# Patient Record
Sex: FEMALE | Race: BLACK OR AFRICAN AMERICAN | NOT HISPANIC OR LATINO | Employment: OTHER | URBAN - METROPOLITAN AREA
[De-identification: names, ages, dates, MRNs, and addresses within clinical notes are randomized per-mention and may not be internally consistent; named-entity substitution may affect disease eponyms.]

---

## 2017-02-06 ENCOUNTER — HISTORICAL (OUTPATIENT)
Dept: ADMINISTRATIVE | Facility: HOSPITAL | Age: 72
End: 2017-02-06

## 2017-02-06 LAB — GLUCOSE SERPL-MCNC: 95 MG/DL (ref 70–99)

## 2017-05-25 ENCOUNTER — HOSPITAL ENCOUNTER (EMERGENCY)
Facility: HOSPITAL | Age: 72
Discharge: HOSPICE | End: 2017-05-26
Attending: EMERGENCY MEDICINE
Payer: MEDICARE

## 2017-05-25 DIAGNOSIS — M54.2 NECK PAIN: ICD-10-CM

## 2017-05-25 LAB
ALBUMIN SERPL BCP-MCNC: 3.3 G/DL
ALP SERPL-CCNC: 55 U/L
ALT SERPL W/O P-5'-P-CCNC: 7 U/L
ANION GAP SERPL CALC-SCNC: 13 MMOL/L
AST SERPL-CCNC: 10 U/L
BASOPHILS # BLD AUTO: 0.1 K/UL
BASOPHILS NFR BLD: 0.8 %
BILIRUB SERPL-MCNC: 0.3 MG/DL
BUN SERPL-MCNC: 8 MG/DL
CALCIUM SERPL-MCNC: 9.3 MG/DL
CHLORIDE SERPL-SCNC: 107 MMOL/L
CO2 SERPL-SCNC: 23 MMOL/L
CREAT SERPL-MCNC: 0.7 MG/DL
CRP SERPL-MCNC: 179.7 MG/L
DIFFERENTIAL METHOD: ABNORMAL
EOSINOPHIL # BLD AUTO: 0.2 K/UL
EOSINOPHIL NFR BLD: 1.8 %
ERYTHROCYTE [DISTWIDTH] IN BLOOD BY AUTOMATED COUNT: 16.8 %
ERYTHROCYTE [SEDIMENTATION RATE] IN BLOOD BY WESTERGREN METHOD: 63 MM/HR
EST. GFR  (AFRICAN AMERICAN): >60 ML/MIN/1.73 M^2
EST. GFR  (NON AFRICAN AMERICAN): >60 ML/MIN/1.73 M^2
GLUCOSE SERPL-MCNC: 92 MG/DL
HCT VFR BLD AUTO: 32.1 %
HGB BLD-MCNC: 10.7 G/DL
LYMPHOCYTES # BLD AUTO: 1.8 K/UL
LYMPHOCYTES NFR BLD: 18.3 %
MCH RBC QN AUTO: 27.7 PG
MCHC RBC AUTO-ENTMCNC: 33.3 %
MCV RBC AUTO: 83 FL
MONOCYTES # BLD AUTO: 0.8 K/UL
MONOCYTES NFR BLD: 8 %
NEUTROPHILS # BLD AUTO: 7.1 K/UL
NEUTROPHILS NFR BLD: 71.1 %
PLATELET # BLD AUTO: 195 K/UL
PMV BLD AUTO: 9.6 FL
POTASSIUM SERPL-SCNC: 4.1 MMOL/L
PROT SERPL-MCNC: 7.4 G/DL
RBC # BLD AUTO: 3.86 M/UL
SODIUM SERPL-SCNC: 143 MMOL/L
WBC # BLD AUTO: 9.9 K/UL

## 2017-05-25 PROCEDURE — A9585 GADOBUTROL INJECTION: HCPCS | Performed by: EMERGENCY MEDICINE

## 2017-05-25 PROCEDURE — 80053 COMPREHEN METABOLIC PANEL: CPT

## 2017-05-25 PROCEDURE — 85651 RBC SED RATE NONAUTOMATED: CPT

## 2017-05-25 PROCEDURE — 96376 TX/PRO/DX INJ SAME DRUG ADON: CPT

## 2017-05-25 PROCEDURE — 63600175 PHARM REV CODE 636 W HCPCS: Performed by: EMERGENCY MEDICINE

## 2017-05-25 PROCEDURE — 96374 THER/PROPH/DIAG INJ IV PUSH: CPT

## 2017-05-25 PROCEDURE — 86140 C-REACTIVE PROTEIN: CPT

## 2017-05-25 PROCEDURE — 36415 COLL VENOUS BLD VENIPUNCTURE: CPT

## 2017-05-25 PROCEDURE — 99285 EMERGENCY DEPT VISIT HI MDM: CPT | Mod: 25

## 2017-05-25 PROCEDURE — 25500020 PHARM REV CODE 255: Performed by: EMERGENCY MEDICINE

## 2017-05-25 PROCEDURE — 85025 COMPLETE CBC W/AUTO DIFF WBC: CPT

## 2017-05-25 RX ORDER — MORPHINE SULFATE 2 MG/ML
2 INJECTION, SOLUTION INTRAMUSCULAR; INTRAVENOUS
Status: COMPLETED | OUTPATIENT
Start: 2017-05-25 | End: 2017-05-25

## 2017-05-25 RX ORDER — ATORVASTATIN CALCIUM 80 MG/1
80 TABLET, FILM COATED ORAL DAILY
COMMUNITY
End: 2020-01-01 | Stop reason: SDUPTHER

## 2017-05-25 RX ORDER — LISINOPRIL 20 MG/1
20 TABLET ORAL DAILY
COMMUNITY
End: 2020-01-01

## 2017-05-25 RX ORDER — METOPROLOL TARTRATE 25 MG/1
12.5 TABLET, FILM COATED ORAL DAILY
COMMUNITY
End: 2020-01-01 | Stop reason: SDUPTHER

## 2017-05-25 RX ORDER — GADOBUTROL 604.72 MG/ML
INJECTION INTRAVENOUS
Status: DISCONTINUED
Start: 2017-05-25 | End: 2017-05-26 | Stop reason: HOSPADM

## 2017-05-25 RX ORDER — ALBUTEROL SULFATE 5 MG/ML
2.5 SOLUTION RESPIRATORY (INHALATION) 2 TIMES DAILY
Status: ON HOLD | COMMUNITY
End: 2020-01-01 | Stop reason: SDUPTHER

## 2017-05-25 RX ORDER — SUCRALFATE 1 G/1
1 TABLET ORAL 4 TIMES DAILY
COMMUNITY
End: 2019-10-15

## 2017-05-25 RX ORDER — METHOCARBAMOL 750 MG/1
750 TABLET, FILM COATED ORAL 4 TIMES DAILY
COMMUNITY
End: 2017-09-14

## 2017-05-25 RX ORDER — GADOBUTROL 604.72 MG/ML
6 INJECTION INTRAVENOUS
Status: COMPLETED | OUTPATIENT
Start: 2017-05-25 | End: 2017-05-25

## 2017-05-25 RX ADMIN — GADOBUTROL 6 ML: 604.72 INJECTION INTRAVENOUS at 09:05

## 2017-05-25 RX ADMIN — MORPHINE SULFATE 2 MG: 2 INJECTION, SOLUTION INTRAMUSCULAR; INTRAVENOUS at 09:05

## 2017-05-25 RX ADMIN — MORPHINE SULFATE 2 MG: 2 INJECTION, SOLUTION INTRAMUSCULAR; INTRAVENOUS at 05:05

## 2017-05-25 NOTE — ED PROVIDER NOTES
Encounter Date: 5/25/2017    SCRIBE #1 NOTE: I, Zoë maynard, am scribing for, and in the presence of, Dr Nam.       History     Chief Complaint   Patient presents with    Neck Pain     x 2 week. pt states it hurts from back of head to neck. seen at [facility] was told it was arthritis. pt reports no improvement with robaxin     Review of patient's allergies indicates:   Allergen Reactions    Iodine and iodide containing products      05/25/2017  4:50 PM     Chief Complaint: Neck Pain    Yocasta Almonte is a 72 y.o. female presenting to the E.D. with a gradual onset of constant moderate neck pain which has been worsening over the past two weeks. Pain is exacerbated with movement and there are no alleviating factors. Assocaited posterior HA and chronic subjective weakness to her right arm. No new numbness or weakness. Denies fever, abdominal pain. She has a past medical history of Cancer; COPD; Diabetes mellitus; GERD; Hyperlipidemia; Hypertension; and Thyroid disease.  Pt has no past surgical history on file.  This is her fourth visit to an emergency Department for the symptoms.      The history is provided by the patient.     Past Medical History:   Diagnosis Date    Cancer     lymphoma    COPD (chronic obstructive pulmonary disease)     Diabetes mellitus     GERD (gastroesophageal reflux disease)     Hyperlipidemia     Hypertension     Thyroid disease      History reviewed. No pertinent surgical history.  History reviewed. No pertinent family history.  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   Eyes: Negative for visual disturbance.   Respiratory: Negative for cough.    Cardiovascular: Negative for chest pain.    Gastrointestinal: Negative for abdominal pain, diarrhea, nausea and vomiting.   Genitourinary: Negative for difficulty urinating and pelvic pain.   Musculoskeletal: Positive for neck pain. Negative for arthralgias.   Skin: Negative for rash.   Neurological: Positive for headaches. Negative for weakness.   All other systems reviewed and are negative.      Physical Exam     Initial Vitals [05/25/17 1625]   BP Pulse Resp Temp SpO2   124/60 98 17 98.4 °F (36.9 °C) 98 %     Physical Exam    Nursing note and vitals reviewed.  Constitutional: She appears well-developed and well-nourished. She is not diaphoretic. No distress.   HENT:   Head: Normocephalic and atraumatic.   Mouth/Throat: Oropharynx is clear and moist.   Oropharyngeal space is widely patent without any swelling   Eyes: Conjunctivae are normal.   Neck: Neck supple.   Markedly decreased ROM.  Diffuse tenderness throughout the cervical spine and also the trapezius muscles.  Decreased ability to extend the neck or look left or right.  She is still able to flex her neck.   Cardiovascular: Normal rate, regular rhythm, normal heart sounds and intact distal pulses. Exam reveals no gallop and no friction rub.    No murmur heard.  Pulmonary/Chest: Breath sounds normal. She has no wheezes. She has no rhonchi. She has no rales.   Abdominal: Soft. She exhibits no distension. There is no tenderness.   Musculoskeletal:        Cervical back: She exhibits tenderness.   Midline cervical tenderness. Trapezius tenderness.    Neurological: She is alert and oriented to person, place, and time. No cranial nerve deficit.   No upper or lower extremity weakness.  Bicep flexion and extension is normal.  Wrist flexion and extension is normal.  Plantar flexion and dorsiflexion is normal.   Skin: No rash noted. No erythema.   Psychiatric: She has a normal mood and affect.         ED Course   Procedures  Labs Reviewed - No data to display          Medical Decision Making:   Clinical  Tests:   Lab Tests: Ordered and Reviewed  Radiological Study: Ordered and Reviewed            Scribe Attestation:   Scribe #1: I performed the above scribed service and the documentation accurately describes the services I performed. I attest to the accuracy of the note.    Attending Attestation:           Physician Attestation for Scribe:  Physician Attestation Statement for Scribe #1: I, Dr Nam, reviewed documentation, as scribed by Zoë Ramos in my presence, and it is both accurate and complete.                 ED Course   Value Comment By Time   CRP: (!) 179.7 (Reviewed) Nam Nam MD 05/25 1827    IMPRESSION:  1. No cervical spine fracture or other acute CT pathology.  2. There is likely multinodular thyroid goiter, greater in the left lobe than in the right lobe.  Thank you for allowing us to participate in the care of your patient.  Dictated and Authenticated by: Ghanshyam Cartwright MD  05/25/2017 8:07 PM Central Time ([place] & [place]) Nam Nam MD 05/25 2011    Impression:  Abnormal thickening of prevertebral soft tissues and abnormal signal along the inferior endplate of  C3; please see differential considerations and follow reasoning discussed above.  Mild cord signal abnormality suggesting mild edema or contusion (if there has been recent trauma).  Thank you for allowing us to participate in the care of your patient.  Dictated and Authenticated by: Ferny Mata MD  05/25/2017 10:42 PM Central Time ([place] & [place]) Nam Nam MD 05/25 9499    Case discussed with virtual radiology MRI concerning for phlegmon versus osteomyelitis.  Patient requesting transfer to [place].  Will not be able to keep the patient here at [facility] since we have no neurosurgery. Nam Nam MD 05/25 9719    Transfer center called to facilitate transfer Nam Nam MD 05/25 0309    Patient has no neurologic deficits at this time no numbness or weakness of the extremities no strength deficit Nam  KAREN Nam MD 05/25 8066     Clinical Impression:   The encounter diagnosis was Neck pain.    72-year-old with diabetes and lymphoma presents with several weeks of neck pain.  Patient has lymphoma and diabetes which puts her at risk for immune suppression.  CRP and ESR markedly elevated so an MRI was performed to assess for infection.  MRI does appear to show prevertebral phlegmon and possible C3 osteomyelitis.  Case was discussed with Dr Acosta at [facility] and he accepts.  I have verified that they have neurosurgery at their facility which we do not have here.  Patient is being transferred to [place] for a higher level of care family has requested to go to [place] instead of [place].  She has no neurologic deficits at the time of transfer.  She is complaining of chronic right arm weakness but none is present objectively on exam.        Nam Nam MD  05/28/17 2447

## 2017-05-25 NOTE — ED NOTES
C/O severe neck pain for weeks that's not getting any better. Has been to the ER 4 times for same. Robaxin and physical therapy not effective. Decreased ROM with neck. Pain goes from top of head to bottom of neck. Denies fever.  AAO x3. Family at bedside.

## 2017-05-26 VITALS
RESPIRATION RATE: 16 BRPM | HEART RATE: 86 BPM | OXYGEN SATURATION: 100 % | TEMPERATURE: 98 F | SYSTOLIC BLOOD PRESSURE: 149 MMHG | BODY MASS INDEX: 23.08 KG/M2 | WEIGHT: 143 LBS | DIASTOLIC BLOOD PRESSURE: 70 MMHG

## 2017-05-26 NOTE — ED NOTES
Attempted to call report to [facility] but nurse is unavailable at this time.

## 2017-05-26 NOTE — PROVIDER PROGRESS NOTES - EMERGENCY DEPT.
Encounter Date: 5/25/2017    ED Physician Progress Notes        Physician Note:   I discussed recent MRI with Dr. Farooq regarding findings mostly concordant with overnight radiology with the exception of small epidural fluid collection worrisome for additional epidural abscess.  I did call [facility] and speak with the patient's nurse who will pass the findings on to the managing team.

## 2017-08-08 ENCOUNTER — OFFICE VISIT (OUTPATIENT)
Dept: RADIATION ONCOLOGY | Facility: CLINIC | Age: 72
End: 2017-08-08
Payer: MEDICARE

## 2017-08-08 DIAGNOSIS — C34.11 MALIGNANT NEOPLASM OF UPPER LOBE OF RIGHT LUNG: ICD-10-CM

## 2017-08-08 PROCEDURE — 3008F BODY MASS INDEX DOCD: CPT | Mod: ,,, | Performed by: RADIOLOGY

## 2017-08-08 PROCEDURE — 99214 OFFICE O/P EST MOD 30 MIN: CPT | Mod: ,,, | Performed by: RADIOLOGY

## 2017-08-08 PROCEDURE — 1159F MED LIST DOCD IN RCRD: CPT | Mod: ,,, | Performed by: RADIOLOGY

## 2017-08-08 NOTE — PROGRESS NOTES
Yocasta Almonte  [id number]  1945 8/8/2017  Kee Manzo Md  [address] [place] [address]    DIAGNOSIS:     ICD-10-CM ICD-9-CM    1. Malignant neoplasm of upper lobe of right lung C34.11 162.3      REASON FOR VISIT: Routine scheduled follow-up.    HISTORY OF PRESENT ILLNESS:   Patient with a diagnosis of right upper lobe nodule consistent with tumor underwent stereotactic radiation therapy in October 2016.  Patient received a dose of 54 gray in 3 fractions.    Since that time she has done moderately well. She has had some decrease in weight. She's had no evidence of metastatic disease clinically.    She underwent a CAT scan of the chest this week which showed a stable 1.4 cm lesion in the right upper lobe of the lung without evidence of metastasis or lymph node enlargement.        INTERVAL HISTORY:   Most recently she did have some issues with her neck. She stated that she went to [facility] where it sounds like she underwent cervical spine surgery. She relates to me that it was not malignant related.  Clinically at the present time she states that she does continue to smoke but she's had no increasing shortness of breath chest pain or pleuritic type symptoms. Minor nonproductive cough.  She remains able tour with a cane.    Review of Systems   Constitutional: Negative for chills and diaphoresis.   HENT:   Negative for lump/mass and mouth sores.    Eyes: Negative for eye problems and icterus.   Respiratory: Positive for cough. Negative for dizziness on exertion and hemoptysis.    Cardiovascular: Negative for chest pain and leg swelling.   Gastrointestinal: Negative for abdominal pain and constipation.   Genitourinary: Negative for dysuria and frequency.    Musculoskeletal: Positive for arthralgias, back pain and neck pain. Negative for gait problem.   Skin: Negative for itching and rash.   Neurological: Negative for gait problem and headaches.   Hematological: Negative for adenopathy. Does not  bruise/bleed easily.   Psychiatric/Behavioral: Negative for confusion. The patient is not nervous/anxious.      Past Medical History:   Diagnosis Date    Cancer     lymphoma    COPD (chronic obstructive pulmonary disease)     Diabetes mellitus     GERD (gastroesophageal reflux disease)     Hyperlipidemia     Hypertension     Thyroid disease      Past Surgical History:   Procedure Laterality Date    NECK SURGERY      infection in neck removed     Social History     Social History    Marital status:      Spouse name: N/A    Number of children: N/A    Years of education: N/A     Social History Main Topics    Smoking status: Current Some Day Smoker     Packs/day: 0.50     Years: 50.00    Smokeless tobacco: Never Used      Comment: says she is trying to quit    Alcohol use No      Comment: 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 No pertinent family history.  Medication List with Changes/Refills   Current Medications    ALBUTEROL (PROVENTIL) 5 MG/ML NEBULIZER SOLUTION    Take 2.5 mg by nebulization every 6 (six) hours as needed for Wheezing. Rescue    ATORVASTATIN (LIPITOR) 80 MG TABLET    Take 80 mg by mouth once daily.    CALCIUM CARBONATE (OS-HARINDER) 600 MG (1,500 MG) TAB    Take 600 mg by mouth once daily.    CILOSTAZOL (PLETAL) 100 MG TAB    Take 100 mg by mouth 2 (two) times daily.     DOCUSATE SODIUM (COLACE) 100 MG CAPSULE    Take 100 mg by mouth 2 (two) times daily as needed for Constipation.     GABAPENTIN (NEURONTIN) 300 MG CAPSULE    Take 300 mg by mouth 3 (three) times daily.    GLIMEPIRIDE (AMARYL) 2 MG TABLET    Take 2 mg by mouth 2 (two) times daily with meals.    IBRUTINIB (IMBRUVICA) 140 MG CAP    Take 420 mg by mouth once daily.    LISINOPRIL (PRINIVIL,ZESTRIL) 20 MG TABLET     Take 20 mg by mouth once daily.    METHOCARBAMOL (ROBAXIN) 750 MG TAB    Take 750 mg by mouth 4 (four) times daily.     METOPROLOL TARTRATE (LOPRESSOR) 25 MG TABLET    Take 12.5 mg by mouth 2 (two) times daily.     OXYMETAZOLINE (AFRIN) 0.05 % NASAL SPRAY    2 sprays by Nasal route 2 (two) times daily.    PANTOPRAZOLE (PROTONIX) 40 MG TABLET    Take 1 tablet (40 mg total) by mouth once daily.    POTASSIUM CHLORIDE SA (K-DUR,KLOR-CON) 20 MEQ TABLET    Take 20 mEq by mouth 2 (two) times daily.    PRENATAL #115-IRON-FOLIC ACID 29 MG IRON- 1 MG CHEW    Take by mouth.    SUCRALFATE (CARAFATE) 1 GRAM TABLET    Take 1 g by mouth 4 (four) times daily.     Review of patient's allergies indicates:   Allergen Reactions    Iodine and iodide containing products        QUALITY OF LIFE: 70%- Cares for Self: Unable to Carry on Normal Activity or Active Work    There were no vitals filed for this visit.    PHYSICAL EXAM: Weight a proximal 135 a decrease of about 20 pounds over the past year  GENERAL: alert; in no apparent distress.   HEAD: normocephalic, atraumatic.  EYES: pupils are equal, round, reactive to light and accommodation. Sclera anicteric. Conjunctiva not injected.   NOSE/THROAT: no nasal erythema or rhinorrhea. Oropharynx pink, without erythema, ulcerations or thrush.   NECK: no cervical motion rigidity; supple with no masses.  CHEST: clear to auscultation bilaterally; no wheezes, crackles or rubs. Patient is speaking comfortably on room air with normal work of breathing without using accessory muscles of respiration. Lungs are grossly hyperexpanded  CARDIOVASCULAR: regular rate and rhythm; no murmurs, rubs or gallops.  ABDOMEN: soft, nontender, nondistended. Bowel sounds present.   Muscle skeletal- patient has decreased range of motion of the cervical neck on flexion and extension. Small scar noted anteriorly   NEUROLOGIC: cranial nerves II-XII intact bilaterally. Strength 5/5 in bilateral upper and lower extremities.  No sensory deficits appreciated. Reflexes globally intact. No cerebellar signs. Normal gait.  LYMPHATIC: no cervical, supraclavicular or axillary adenopathy appreciated bilaterally.   EXTREMITIES: no clubbing, cyanosis, edema.  SKIN: no erythema, rashes, ulcerations noted.     ANCILLARY DATA: CT scan of the chest discussed as above showing stability of disease no progression in size of the lesion    ASSESSMENT: Patient with diagnosis of lung mass no tissue obtainable secondary to severe COPD status post SB RT, 54 gray in 3 fractions, 10 months ago  PLAN:  Patient appears to be showing stability radiographically and clinically. I will have her follow-up with me in 6 months. I recommend repeat imaging of the chest around that time..  She has an appointment to see Dr. Carmen in 3 months.    All questions answered and contact information provided. Patient understands free to call us anytime with any questions or concerns regarding radiation therapy.    TIME SPENT WITH PATIENT: I have personally seen and evaluated this patient. Approximately 30 minutes were spent with the patient discussing follow-up careplan.     PHYSICIAN: Abelino Culp MD

## 2017-09-14 ENCOUNTER — HOSPITAL ENCOUNTER (EMERGENCY)
Facility: HOSPITAL | Age: 72
Discharge: HOME | End: 2017-09-15
Attending: EMERGENCY MEDICINE
Payer: MEDICARE

## 2017-09-14 DIAGNOSIS — M54.42 CHRONIC LEFT-SIDED LOW BACK PAIN WITH LEFT-SIDED SCIATICA: Primary | ICD-10-CM

## 2017-09-14 DIAGNOSIS — G89.29 CHRONIC LEFT-SIDED LOW BACK PAIN WITH LEFT-SIDED SCIATICA: Primary | ICD-10-CM

## 2017-09-14 DIAGNOSIS — N30.01 ACUTE CYSTITIS WITH HEMATURIA: ICD-10-CM

## 2017-09-14 LAB
ALBUMIN SERPL BCP-MCNC: 3.2 G/DL
ALP SERPL-CCNC: 71 U/L
ALT SERPL W/O P-5'-P-CCNC: 8 U/L
ANION GAP SERPL CALC-SCNC: 13 MMOL/L
AST SERPL-CCNC: 13 U/L
BACTERIA #/AREA URNS HPF: ABNORMAL /HPF
BASOPHILS # BLD AUTO: 0.1 K/UL
BASOPHILS NFR BLD: 0.5 %
BILIRUB SERPL-MCNC: 0.2 MG/DL
BILIRUB UR QL STRIP: NEGATIVE
BUN SERPL-MCNC: 14 MG/DL
CALCIUM SERPL-MCNC: 9.4 MG/DL
CHLORIDE SERPL-SCNC: 107 MMOL/L
CLARITY UR: CLEAR
CO2 SERPL-SCNC: 23 MMOL/L
COLOR UR: YELLOW
CREAT SERPL-MCNC: 0.7 MG/DL
DIFFERENTIAL METHOD: ABNORMAL
EOSINOPHIL # BLD AUTO: 0.3 K/UL
EOSINOPHIL NFR BLD: 2.3 %
ERYTHROCYTE [DISTWIDTH] IN BLOOD BY AUTOMATED COUNT: 16.5 %
EST. GFR  (AFRICAN AMERICAN): >60 ML/MIN/1.73 M^2
EST. GFR  (NON AFRICAN AMERICAN): >60 ML/MIN/1.73 M^2
GIANT PLATELETS BLD QL SMEAR: PRESENT
GLUCOSE SERPL-MCNC: 97 MG/DL
GLUCOSE UR QL STRIP: NEGATIVE
HCT VFR BLD AUTO: 32.1 %
HGB BLD-MCNC: 10.6 G/DL
HGB UR QL STRIP: ABNORMAL
KETONES UR QL STRIP: NEGATIVE
LEUKOCYTE ESTERASE UR QL STRIP: ABNORMAL
LYMPHOCYTES # BLD AUTO: 3.3 K/UL
LYMPHOCYTES NFR BLD: 27.8 %
MCH RBC QN AUTO: 26.4 PG
MCHC RBC AUTO-ENTMCNC: 33 G/DL
MCV RBC AUTO: 80 FL
MICROSCOPIC COMMENT: ABNORMAL
MONOCYTES # BLD AUTO: 0.9 K/UL
MONOCYTES NFR BLD: 7.4 %
NEUTROPHILS # BLD AUTO: 7.4 K/UL
NEUTROPHILS NFR BLD: 62 %
NITRITE UR QL STRIP: NEGATIVE
NRBC BLD-RTO: 0 /100 WBC
PH UR STRIP: 6 [PH] (ref 5–8)
PLATELET # BLD AUTO: 222 K/UL
PLATELET BLD QL SMEAR: ABNORMAL
PMV BLD AUTO: 10 FL
POTASSIUM SERPL-SCNC: 3.7 MMOL/L
PROT SERPL-MCNC: 7.2 G/DL
PROT UR QL STRIP: ABNORMAL
RBC # BLD AUTO: 4.01 M/UL
RBC #/AREA URNS HPF: 38 /HPF (ref 0–4)
SODIUM SERPL-SCNC: 143 MMOL/L
SP GR UR STRIP: 1.02 (ref 1–1.03)
SQUAMOUS #/AREA URNS HPF: 17 /HPF
URN SPEC COLLECT METH UR: ABNORMAL
UROBILINOGEN UR STRIP-ACNC: 1 EU/DL
WBC # BLD AUTO: 11.9 K/UL
WBC #/AREA URNS HPF: 10 /HPF (ref 0–5)

## 2017-09-14 PROCEDURE — 96360 HYDRATION IV INFUSION INIT: CPT

## 2017-09-14 PROCEDURE — 80053 COMPREHEN METABOLIC PANEL: CPT

## 2017-09-14 PROCEDURE — 99284 EMERGENCY DEPT VISIT MOD MDM: CPT | Mod: 25

## 2017-09-14 PROCEDURE — 81000 URINALYSIS NONAUTO W/SCOPE: CPT

## 2017-09-14 PROCEDURE — 25000003 PHARM REV CODE 250: Performed by: EMERGENCY MEDICINE

## 2017-09-14 PROCEDURE — 85025 COMPLETE CBC W/AUTO DIFF WBC: CPT

## 2017-09-14 PROCEDURE — 36415 COLL VENOUS BLD VENIPUNCTURE: CPT

## 2017-09-14 RX ORDER — CEPHALEXIN 250 MG/1
500 CAPSULE ORAL
Status: COMPLETED | OUTPATIENT
Start: 2017-09-14 | End: 2017-09-15

## 2017-09-14 RX ORDER — CEPHALEXIN 500 MG/1
500 CAPSULE ORAL 4 TIMES DAILY
Qty: 28 CAPSULE | Refills: 0 | Status: SHIPPED | OUTPATIENT
Start: 2017-09-14 | End: 2017-09-21

## 2017-09-14 RX ADMIN — SODIUM CHLORIDE 500 ML: 0.9 INJECTION, SOLUTION INTRAVENOUS at 08:09

## 2017-09-15 VITALS
RESPIRATION RATE: 17 BRPM | SYSTOLIC BLOOD PRESSURE: 144 MMHG | DIASTOLIC BLOOD PRESSURE: 70 MMHG | HEART RATE: 92 BPM | WEIGHT: 130 LBS | HEIGHT: 66 IN | OXYGEN SATURATION: 98 % | TEMPERATURE: 98 F | BODY MASS INDEX: 20.89 KG/M2

## 2017-09-15 PROCEDURE — 25000003 PHARM REV CODE 250: Performed by: EMERGENCY MEDICINE

## 2017-09-15 RX ADMIN — CEPHALEXIN 500 MG: 250 CAPSULE ORAL at 12:09

## 2017-09-15 NOTE — ED NOTES
Pt aaox4, resp even and unlabored, skin warm dry appropriate for race. Pt reports back pain and aches x 6 days.

## 2017-09-15 NOTE — ED PROVIDER NOTES
Encounter Date: 9/14/2017    SCRIBE #1 NOTE: I, Bibiana Amezquita, am scribing for, and in the presence of, Dr. Glynn.       History     Chief Complaint   Patient presents with    Back Pain     Pain across low back since Saturday.     9/14/2017  7:16 PM     Chief Complaint: Back pain    The patient is a 72 y.o. female with PMHx of DM, GERD, HTN, thyroid disease, COPD, HLD and lymphoma who presents with back pain. Patient c/o gradual onset of constant, sharp pains in the right lower back radiating to the right buttocks and down the legs bilaterally which started 5 days ago. Pt states her pain is exacerbated with ambulation and mild sob when exerting herself. Denies any recent falls or injuries. Pt has oxycodone for pain but states it is not helping with pain. She reports mild neck pain which started right before her back pain. No rashes, numbness, weakness, bladder or bowel incontinence. Pt states she has a decreased appetite due to pain. Denies any cp, dysuria, chest pain, nausea, vomiting or abdominal pain. Shx of neck surgery.      The history is provided by the patient.     Review of patient's allergies indicates:   Allergen Reactions    Iodine and iodide containing products      Past Medical History:   Diagnosis Date    Cancer     lymphoma    COPD (chronic obstructive pulmonary disease)     Diabetes mellitus     GERD (gastroesophageal reflux disease)     Hyperlipidemia     Hypertension     Thyroid disease      Past Surgical History:   Procedure Laterality Date    NECK SURGERY      infection in neck removed     History reviewed. No pertinent family history.  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 Negative for chills and fever.   HENT: Negative for congestion, rhinorrhea and sore throat.    Respiratory: Positive for shortness of breath. Negative for cough.    Cardiovascular: Negative for chest pain.   Gastrointestinal: Negative for abdominal pain, diarrhea, nausea and vomiting.   Genitourinary: Negative for dysuria.   Musculoskeletal: Positive for back pain and neck pain. Negative for myalgias.   Skin: Negative for rash.   Neurological: Negative for weakness and numbness.   Hematological: Does not bruise/bleed easily.   All other systems reviewed and are negative.      Physical Exam     Initial Vitals [09/14/17 1838]   BP Pulse Resp Temp SpO2   (!) 154/70 84 16 98.1 °F (36.7 °C) 98 %      MAP       98         Physical Exam    Nursing note and vitals reviewed.  Constitutional: She appears well-developed and well-nourished. No distress.   HENT:   Head: Normocephalic and atraumatic.   Nose: Nose normal.   Eyes: EOM are normal. Pupils are equal, round, and reactive to light.   Neck: Normal range of motion.   Scar over anterior throat.   Cardiovascular: Normal rate, regular rhythm, normal heart sounds, intact distal pulses and normal pulses. Exam reveals no gallop and no friction rub.    No murmur heard.  Pulses:       Dorsalis pedis pulses are 2+ on the right side, and 2+ on the left side.   Pulmonary/Chest: Breath sounds normal. She has no wheezes. She has no rhonchi. She has no rales.   Abdominal: Soft. She exhibits no distension. There is no tenderness.   Musculoskeletal: Normal range of motion. She exhibits no edema.   Neurological: She is alert and oriented to person, place, and time. She displays a negative Romberg sign.   5/5 knee flexion and extension. Weakness to dorsi flexion of the left foot. Patient is able to stand up and use her walker to ambulate.   Skin: Skin is dry. No rash noted.   Psychiatric: She has a normal mood and affect.         ED Course   Procedures  Labs Reviewed - No data  to display          Medical Decision Making:   Patient has low back pain with radiation to the back down to the left leg and the right calf.  She likely has sciatica, however given her medical history and the fact that she is on Pletal I'm concerned about worsening claudication.  However, she doesn't seem to have these at rest but mostly on exertion.  She needs to closely follow-up with vascular surgery and her primary care physician.  She does not appear to be significantly dehydrated.  She does not appear to have a high anion gap metabolic acidosis.  She does not have a cool extremity and she has 2+ bilateral dorsalis pedis and posterior tibialis pulses.  I will give her a rx for orthopedics and she needs to follow up closely with her pmd.     11:34 PM patient's ABIs appear to be normal and arterial ultrasound doesn't show any signs of significant stenosis.  This is likely a lumbar radiculopathy.  I don't believe she has cauda equina feel that she is stable for discharge.  [facility] already has pain medicine.  I will give her first dose metabolic for urinary tract infection here.  She has no signs of sepsis or pyelonephritis.  She needs to follow up with her regular doctor.            Scribe Attestation:   Scribe #1: I performed the above scribed service and the documentation accurately describes the services I performed. I attest to the accuracy of the note.    Attending Attestation:           Physician Attestation for Scribe:  Physician Attestation Statement for Scribe #1: I, Dr. Glynn, reviewed documentation, as scribed by Bibiana Amezquita in my presence, and it is both accurate and complete.                 ED Course      Clinical Impression:   The primary encounter diagnosis was Chronic left-sided low back pain with left-sided sciatica. A diagnosis of Acute cystitis with hematuria was also pertinent to this visit.                           Ghanshyam Glynn MD  09/14/17 3297

## 2017-11-24 DIAGNOSIS — G06.1 INTRASPINAL ABSCESS AND GRANULOMA: Primary | ICD-10-CM

## 2017-11-24 DIAGNOSIS — M50.21 OTHER CERVICAL DISC DISPLACEMENT, HIGH CERVICAL REGION: ICD-10-CM

## 2017-11-24 DIAGNOSIS — M54.2 CERVICALGIA: ICD-10-CM

## 2017-11-24 DIAGNOSIS — R13.10 DYSPHAGIA, UNSPECIFIED TYPE: ICD-10-CM

## 2017-12-05 ENCOUNTER — LAB VISIT (OUTPATIENT)
Dept: LAB | Facility: HOSPITAL | Age: 72
End: 2017-12-05
Attending: INTERNAL MEDICINE
Payer: MEDICARE

## 2017-12-05 DIAGNOSIS — M54.2 CERVICALGIA: ICD-10-CM

## 2017-12-05 DIAGNOSIS — R13.10 PROBLEMS WITH SWALLOWING AND MASTICATION: ICD-10-CM

## 2017-12-05 DIAGNOSIS — R63.4 LOSS OF WEIGHT: ICD-10-CM

## 2017-12-05 DIAGNOSIS — E11.9 DIABETES MELLITUS WITHOUT COMPLICATION: ICD-10-CM

## 2017-12-05 DIAGNOSIS — G06.1 INTRASPINAL ABSCESS: Primary | ICD-10-CM

## 2017-12-05 DIAGNOSIS — M50.21 HERNIATED NUCLEUS PULPOSUS, C3-4: ICD-10-CM

## 2017-12-05 DIAGNOSIS — K22.2 STRICTURE AND STENOSIS OF ESOPHAGUS: ICD-10-CM

## 2017-12-05 DIAGNOSIS — J44.89 OBSTRUCTIVE CHRONIC BRONCHITIS WITHOUT EXACERBATION: ICD-10-CM

## 2017-12-05 LAB
BUN SERPL-MCNC: 10 MG/DL
CREAT SERPL-MCNC: 0.6 MG/DL
EST. GFR  (AFRICAN AMERICAN): >60 ML/MIN/1.73 M^2
EST. GFR  (NON AFRICAN AMERICAN): >60 ML/MIN/1.73 M^2

## 2017-12-05 PROCEDURE — 84520 ASSAY OF UREA NITROGEN: CPT

## 2017-12-05 PROCEDURE — 36415 COLL VENOUS BLD VENIPUNCTURE: CPT

## 2017-12-05 PROCEDURE — 82565 ASSAY OF CREATININE: CPT

## 2017-12-06 ENCOUNTER — HOSPITAL ENCOUNTER (OUTPATIENT)
Dept: RADIOLOGY | Facility: HOSPITAL | Age: 72
Discharge: HOME | End: 2017-12-06
Attending: INTERNAL MEDICINE
Payer: MEDICARE

## 2017-12-06 DIAGNOSIS — G06.1 INTRASPINAL ABSCESS AND GRANULOMA: ICD-10-CM

## 2017-12-06 DIAGNOSIS — M54.2 CERVICALGIA: ICD-10-CM

## 2017-12-06 DIAGNOSIS — M50.21 OTHER CERVICAL DISC DISPLACEMENT, HIGH CERVICAL REGION: ICD-10-CM

## 2017-12-06 DIAGNOSIS — R13.10 DYSPHAGIA, UNSPECIFIED TYPE: ICD-10-CM

## 2017-12-06 PROCEDURE — A9585 GADOBUTROL INJECTION: HCPCS | Performed by: INTERNAL MEDICINE

## 2017-12-06 PROCEDURE — 72156 MRI NECK SPINE W/O & W/DYE: CPT | Mod: 26,,, | Performed by: RADIOLOGY

## 2017-12-06 PROCEDURE — 25500020 PHARM REV CODE 255: Performed by: INTERNAL MEDICINE

## 2017-12-06 PROCEDURE — 72156 MRI NECK SPINE W/O & W/DYE: CPT | Mod: TC

## 2017-12-06 RX ORDER — GADOBUTROL 604.72 MG/ML
INJECTION INTRAVENOUS
Status: DISPENSED
Start: 2017-12-06 | End: 2017-12-06

## 2017-12-06 RX ORDER — GADOBUTROL 604.72 MG/ML
5 INJECTION INTRAVENOUS
Status: COMPLETED | OUTPATIENT
Start: 2017-12-06 | End: 2017-12-06

## 2017-12-06 RX ADMIN — GADOBUTROL 5 ML: 604.72 INJECTION INTRAVENOUS at 09:12

## 2018-06-26 ENCOUNTER — HOSPITAL ENCOUNTER (EMERGENCY)
Facility: HOSPITAL | Age: 73
Discharge: HOME | End: 2018-06-26
Attending: EMERGENCY MEDICINE
Payer: MEDICARE

## 2018-06-26 VITALS
TEMPERATURE: 98 F | BODY MASS INDEX: 22.18 KG/M2 | HEART RATE: 72 BPM | RESPIRATION RATE: 18 BRPM | WEIGHT: 138 LBS | HEIGHT: 66 IN | SYSTOLIC BLOOD PRESSURE: 134 MMHG | OXYGEN SATURATION: 98 % | DIASTOLIC BLOOD PRESSURE: 62 MMHG

## 2018-06-26 VITALS
DIASTOLIC BLOOD PRESSURE: 51 MMHG | TEMPERATURE: 98 F | HEART RATE: 72 BPM | RESPIRATION RATE: 18 BRPM | SYSTOLIC BLOOD PRESSURE: 100 MMHG | BODY MASS INDEX: 22.18 KG/M2 | WEIGHT: 138 LBS | HEIGHT: 66 IN | OXYGEN SATURATION: 100 %

## 2018-06-26 DIAGNOSIS — H61.23 BILATERAL IMPACTED CERUMEN: Primary | ICD-10-CM
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
## 2018-06-26 DIAGNOSIS — M25.561 ACUTE PAIN OF RIGHT KNEE: Primary | ICD-10-CM

## 2018-06-26 PROCEDURE — 25000003 PHARM REV CODE 250: Performed by: PHYSICIAN ASSISTANT

## 2018-06-26 PROCEDURE — 99283 EMERGENCY DEPT VISIT LOW MDM: CPT | Mod: 25

## 2018-06-26 PROCEDURE — 25000003 PHARM REV CODE 250: Performed by: EMERGENCY MEDICINE

## 2018-06-26 PROCEDURE — 69209 REMOVE IMPACTED EAR WAX UNI: CPT | Mod: LT

## 2018-06-26 PROCEDURE — 99283 EMERGENCY DEPT VISIT LOW MDM: CPT | Mod: 25,27

## 2018-06-26 RX ORDER — ACETAMINOPHEN 325 MG/1
650 TABLET ORAL
Status: COMPLETED | OUTPATIENT
Start: 2018-06-26 | End: 2018-06-26

## 2018-06-26 RX ORDER — DOCUSATE SODIUM 50 MG/5ML
100 LIQUID ORAL ONCE
Status: COMPLETED | OUTPATIENT
Start: 2018-06-26 | End: 2018-06-26

## 2018-06-26 RX ORDER — DOCUSATE SODIUM 50 MG/5ML
100 LIQUID ORAL DAILY
Status: DISCONTINUED | OUTPATIENT
Start: 2018-06-26 | End: 2018-06-26

## 2018-06-26 RX ADMIN — DOCUSATE SODIUM 100 MG: 50 LIQUID ORAL at 01:06

## 2018-06-26 RX ADMIN — ACETAMINOPHEN 650 MG: 325 TABLET, FILM COATED ORAL at 04:06

## 2018-06-26 NOTE — ED NOTES
C/o rash to left jawline, and ongoing left ear pain has been taking cipro drops since June 13 but is not feeling better. States right ear also feels full. Alert calm family at bedside aware to notify nurse of needs or concerns.

## 2018-06-26 NOTE — ED NOTES
Both ears irrigated with warm water, hydrogen peroxide and colace. Tolerated well. Large amount of black ear wax removed from right, what appears to be cotton removed with ear wax from left ear pt states that she has been using cotton balls to keep ear drops in place advised not to do so in the future.

## 2018-06-26 NOTE — ED NOTES
"Pt stepped off a curb and tripped fell onto right knee, applied ice, has not taken anything for pain, able to ambulate but states it is very painful, sensation and pulses intact. States pain hurts "all the way down my leg" ice pack applied and warm blanket. Family at bedside. Pt currently eating chips alert calm denies hitting head or LOC  "

## 2018-06-26 NOTE — ED PROVIDER NOTES
"Encounter Date: 6/26/2018    SCRIBE #1 NOTE: I, Bibiana Amezquita, am scribing for, and in the presence of, Manju Hagen PA-C.       History     Chief Complaint   Patient presents with    Otalgia     L ear pain x 3 wks. saw PCP on June 13th and told it was "stopped up". on cipro drops with no relief    Rash     Rash to L jawline x 2 weeks. stings     6/26/2018  1:26 PM     Chief Complaint: Ear pain    The patient is a 73 y.o. female with PMHx of DM, HTN, GERD, COPD, HLD, thyroid disease and lymphoma who presents with ear pain. Patient c/o gradual onset of moderate stinging left ear pain that has been constant for 2 weeks. "Feeks like something is bubbling in my ear". The patient has been seen by her pcp, Dr. Welch at [facility] twice. She received antibiotics and ear drops, however patient reports no relief of pain. No ear drainage or fevers. Has hx of sinuses. Patient also reports an itchy rash to the left jaw that started 1 week ago. She denies any new detergents or soaps. Shx of neck surgery.      The history is provided by the patient.     Review of patient's allergies indicates:   Allergen Reactions    Iodine and iodide containing products      Past Medical History:   Diagnosis Date    Cancer     lymphoma    COPD (chronic obstructive pulmonary disease)     Diabetes mellitus     GERD (gastroesophageal reflux disease)     Hyperlipidemia     Hypertension     Thyroid disease      Past Surgical History:   Procedure Laterality Date    NECK SURGERY      infection in neck removed     History reviewed. No pertinent family history.  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   HENT: Positive " for ear pain (left). Negative for congestion, rhinorrhea and sore throat.    Respiratory: Negative for cough and shortness of breath.    Cardiovascular: Negative for chest pain.   Gastrointestinal: Negative for abdominal pain, diarrhea, nausea and vomiting.   Genitourinary: Negative for dysuria.   Musculoskeletal: Negative for back pain and myalgias.   Skin: Positive for rash (left jaw).   Neurological: Negative for weakness and numbness.   Hematological: Does not bruise/bleed easily.       Physical Exam     Initial Vitals [06/26/18 1313]   BP Pulse Resp Temp SpO2   134/62 72 18 98.1 °F (36.7 °C) 98 %      MAP       --         Physical Exam    Nursing note and vitals reviewed.  Constitutional: No distress.   HENT:   Head: Normocephalic and atraumatic.   Right Ear: External ear normal.   Left Ear: External ear normal.   Mouth/Throat: Mucous membranes are normal.   Papular rash noted to the left jaw line. No vesicles. No TTP.    Cerumen impaction bilaterally.   Eyes: EOM are normal. Pupils are equal, round, and reactive to light.   Neck: Normal range of motion.   Cardiovascular: Normal rate, regular rhythm, normal heart sounds and intact distal pulses. Exam reveals no gallop and no friction rub.    No murmur heard.  Pulmonary/Chest: Breath sounds normal. She has no wheezes. She has no rhonchi. She has no rales.   Musculoskeletal: Normal range of motion. She exhibits no edema.   Neurological: She is alert and oriented to person, place, and time. She has normal strength.   Skin: Skin is dry. Rash noted. No petechiae and no purpura noted. Rash is papular. Rash is not vesicular.   Psychiatric: She has a normal mood and affect.         ED Course   Procedures  Labs Reviewed - No data to display       Imaging Results    None          Medical Decision Making:   History:   Old Medical Records: I decided to obtain old medical records.       APC / Resident Notes:   Urgent evaluation of a 73-year-old female who presents with left  ear pain for 3 weeks.  She has bilateral cerumen impaction.  No movement tenderness. She denied drainage or decreased hearing.  She also reports rash to the left face.  She has a papular rash that is nontender.  There is no petechiae purpura.  No vesicles noted. I doubt herpes zoster.  No oral lesions. Irrigation of bilateral ears performed with significant cerumen noted. Upon re-evaluation unable to visualize the TM.  There is no retraction, perforation or erythema.  She reports improvement in symptoms after the irrigation.  Follow up with ENT and dermatology. Discussed results with patient. Return precautions given. Based on my clinical evaluation, I do not appreciate any immediate, emergent, or life threatening condition or etiology that warrants additional workup today and feel that the patient can be discharged with close follow up care.  Patient is to follow up with their primary care provider. Case was discussed with Dr. Sharma who has evaluated the patient and is in agreement with the plan of care. All questions answered.          Scribe Attestation:   Scribe #1: I performed the above scribed service and the documentation accurately describes the services I performed. I attest to the accuracy of the note.    I, Manju Hagen PA-C, personally performed the services described in this documentation. All medical record entries made by the scribe were at my direction and in my presence.  I have reviewed the chart and agree that the record reflects my personal performance and is accurate and complete. Manju Hagen PA-C.  2:29 PM 06/26/2018             Clinical Impression:   The encounter diagnosis was Bilateral impacted cerumen.      Disposition:   Disposition: Discharged  Condition: Stable                        Manju Hagen PA-C  06/26/18 1431

## 2018-06-26 NOTE — ED PROVIDER NOTES
Encounter Date: 6/26/2018    SCRIBE #1 NOTE: Bibiana SANCHEZ , am scribing for, and in the presence of, Manju Hagen PA-C.       History     Chief Complaint   Patient presents with    Fall     trip and fall off curb with R knee pain and inability to bear weight. no obvious deformity. denies hitting head or LOC     6/26/2018  4:36 PM     Chief Complaint: Fall    The patient is a 73 y.o. female with PMHx of DM, GERD, HTN, thyroid disease, COPD, HLD who presents with fall. The patient tripped and fell over the curb while walking out of the store 15 minutes ago PTA. Pt fell on her knee. She complains of constant sharp pain to the right knee with mild swelling. She also reports increasing pain with walking. No numbness or tingling sensation. Shx of neck surgery.      The history is provided by the patient.     Review of patient's allergies indicates:   Allergen Reactions    Iodine and iodide containing products      Past Medical History:   Diagnosis Date    Cancer     lymphoma    COPD (chronic obstructive pulmonary disease)     Diabetes mellitus     GERD (gastroesophageal reflux disease)     Hyperlipidemia     Hypertension     Thyroid disease      Past Surgical History:   Procedure Laterality Date    NECK SURGERY      infection in neck removed     No family history on file.  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   HENT: Negative for congestion, rhinorrhea and sore throat.    Respiratory: Negative for cough and shortness of breath.    Cardiovascular: Negative for chest pain.   Gastrointestinal: Negative for abdominal pain, diarrhea, nausea and vomiting.   Genitourinary: Negative for  dysuria.   Musculoskeletal: Positive for arthralgias (r knee pain) and joint swelling (r knee). Negative for back pain and myalgias.   Skin: Negative for rash.   Neurological: Negative for weakness and numbness.   Hematological: Does not bruise/bleed easily.       Physical Exam     Initial Vitals [06/26/18 1540]   BP Pulse Resp Temp SpO2   (!) 100/51 72 18 97.9 °F (36.6 °C) 100 %      MAP       --         Physical Exam    Nursing note and vitals reviewed.  Constitutional: No distress.   HENT:   Head: Normocephalic and atraumatic.   Right Ear: External ear normal.   Left Ear: External ear normal.   Mouth/Throat: Mucous membranes are normal.   Eyes: EOM are normal. Pupils are equal, round, and reactive to light.   Neck: Normal range of motion.   Cardiovascular: Normal rate, regular rhythm, normal heart sounds and intact distal pulses. Exam reveals no gallop and no friction rub.    No murmur heard.  Pulmonary/Chest: Breath sounds normal. She has no wheezes. She has no rhonchi. She has no rales.   Abdominal: Soft. She exhibits no distension. There is no tenderness.   Musculoskeletal: Normal range of motion. She exhibits tenderness.   Right medial knee pain with small joint effusion. No joint laxity. Pain with full ROM. Normal pedal pulse.   Neurological: She is alert and oriented to person, place, and time. She has normal strength.   Skin: Skin is dry and intact. No abrasion, no bruising, no ecchymosis and no laceration noted. No erythema.   Psychiatric: She has a normal mood and affect.         ED Course   Procedures  Labs Reviewed - No data to display       Imaging Results          X-Ray Knee 3 View Right (Final result)  Result time 06/26/18 17:03:35    Final result by Kvng Colin MD (06/26/18 17:03:35)                 Impression:      Small joint effusion and moderate osteoarthritis.  No radiographically evident fracture.      Electronically signed by: Kvng Colin  MD  Date:    06/26/2018  Time:    17:03             Narrative:    EXAMINATION:  XR KNEE 3 VIEW RIGHT    CLINICAL HISTORY:  Unspecified fall, initial encounter    TECHNIQUE:  AP, lateral, and Merchant views of the right knee were performed.    COMPARISON:  None    FINDINGS:  A small joint effusion is present.  No fracture identified.  Moderate tricompartmental degenerative change is present.  Moderate vascular calcifications are seen.  Chondrocalcinosis is seen in the medial and lateral compartments.                                 Medical Decision Making:   History:   Old Medical Records: I decided to obtain old medical records.  Clinical Tests:   Radiological Study: Ordered and Reviewed       APC / Resident Notes:   This is an urgent evaluation of a 73 year old female with complaint of knee pain. Patient reports tripping and falling onto the right knee. Patient reports pain is worse with ambulation. Patient denied lower extremity numbness/tingling. Patient is noted to have tenderness to palpation on exam. There is no erythema or warmth. I doubt septic joint. Small effusion noted. No joint laxity. Joint line tenderness. Pain with valgus and varus stress. Patient with normal strength bilaterally to lower extremities. Xray is negative. Patient states she has a cane at home to help with stability. Discussed results with patient. Return precautions given. Patient is to follow up with their primary care provider. Case was discussed with Dr. Sharma who has evaluated the patient and is in agreement with the plan of care. All questions answered.          Scribe Attestation:   Scribe #1: I performed the above scribed service and the documentation accurately describes the services I performed. I attest to the accuracy of the note.    I, Manju Hagen PA-C, personally performed the services described in this documentation. All medical record entries made by the scribe were at my direction and in my presence.  I have  reviewed the chart and agree that the record reflects my personal performance and is accurate and complete. Manju Hagen PA-C.  5:31 PM 06/26/2018             Clinical Impression:   The primary encounter diagnosis was Acute pain of right knee. A diagnosis of Fall was also pertinent to this visit.      Disposition:   Disposition: Discharged  Condition: Stable                        Manju Hagen PA-C  06/26/18 1730

## 2018-06-26 NOTE — DISCHARGE INSTRUCTIONS
Follow up with an ear, nose and throat doctor and dermatology.  Do not put anything in your ear.  Follow up with your primary care provider.  For worsening symptoms, chest pain, shortness of breath, increased abdominal pain, high grade fever, stroke or stroke like symptoms, immediately go to the nearest Emergency Room or call 911 as soon as possible.

## 2018-06-27 ENCOUNTER — HOSPITAL ENCOUNTER (EMERGENCY)
Facility: HOSPITAL | Age: 73
Discharge: HOME | End: 2018-06-27
Attending: EMERGENCY MEDICINE
Payer: MEDICARE

## 2018-06-27 VITALS
BODY MASS INDEX: 22.27 KG/M2 | TEMPERATURE: 98 F | HEART RATE: 90 BPM | RESPIRATION RATE: 24 BRPM | SYSTOLIC BLOOD PRESSURE: 140 MMHG | DIASTOLIC BLOOD PRESSURE: 91 MMHG | WEIGHT: 138 LBS | OXYGEN SATURATION: 97 %

## 2018-06-27 DIAGNOSIS — S89.91XD RIGHT KNEE INJURY, SUBSEQUENT ENCOUNTER: ICD-10-CM

## 2018-06-27 DIAGNOSIS — M25.561 RIGHT KNEE PAIN: ICD-10-CM

## 2018-06-27 PROCEDURE — 99284 EMERGENCY DEPT VISIT MOD MDM: CPT | Mod: 25

## 2018-06-27 PROCEDURE — 63600175 PHARM REV CODE 636 W HCPCS: Performed by: EMERGENCY MEDICINE

## 2018-06-27 PROCEDURE — 96376 TX/PRO/DX INJ SAME DRUG ADON: CPT

## 2018-06-27 PROCEDURE — 96374 THER/PROPH/DIAG INJ IV PUSH: CPT

## 2018-06-27 RX ORDER — FLUTICASONE PROPIONATE 50 MCG
1 SPRAY, SUSPENSION (ML) NASAL DAILY
COMMUNITY
End: 2018-09-19 | Stop reason: SDUPTHER

## 2018-06-27 RX ORDER — HYDROCODONE BITARTRATE AND ACETAMINOPHEN 5; 325 MG/1; MG/1
1 TABLET ORAL EVERY 6 HOURS PRN
Qty: 4 TABLET | Refills: 0 | Status: SHIPPED | OUTPATIENT
Start: 2018-06-27 | End: 2018-09-19

## 2018-06-27 RX ORDER — MORPHINE SULFATE 4 MG/ML
6 INJECTION, SOLUTION INTRAMUSCULAR; INTRAVENOUS
Status: COMPLETED | OUTPATIENT
Start: 2018-06-27 | End: 2018-06-27

## 2018-06-27 RX ORDER — AMOXICILLIN 500 MG/1
500 CAPSULE ORAL EVERY 12 HOURS
COMMUNITY
Start: 2018-06-26 | End: 2018-07-06

## 2018-06-27 RX ORDER — MORPHINE SULFATE 4 MG/ML
3 INJECTION, SOLUTION INTRAMUSCULAR; INTRAVENOUS
Status: COMPLETED | OUTPATIENT
Start: 2018-06-27 | End: 2018-06-27

## 2018-06-27 RX ADMIN — MORPHINE SULFATE 6 MG: 4 INJECTION INTRAVENOUS at 07:06

## 2018-06-27 RX ADMIN — MORPHINE SULFATE 3 MG: 4 INJECTION INTRAVENOUS at 08:06

## 2018-06-27 NOTE — ED NOTES
Pt presents to ED with c/o pain to RLE. . S/p trip and fall yesterday. States she was seen in ED and has an appt wit orthopedics tomorrow. Reports swelling has doubled in size to the right knee. States she is unable to bear any weight to affected extremity. Pt placed on BP, pulse ox monitor. VSS. Awaiting MD evaluation. Will continue to monitor prn.

## 2018-06-27 NOTE — ED PROVIDER NOTES
Encounter Date: 6/27/2018    SCRIBE #1 NOTE: I, Ghanshyam Everett, am scribing for, and in the presence of, Dr. Sharma .       History     Chief Complaint   Patient presents with    Leg Pain     bilat leg pain. Fell yesterday     Time seen by provider: 6:30 PM on 06/27/2018    Chief Complaint: right leg pain     Yocasta Almonte is a 73 y.o. female with a PMHx of DM, GERD, HTN, COPD, and HLD who presents to the ED for further evaluation of bilateral leg pain status post fall yesterday at noon. Patient was seen at [facility] ED after the fall, where she had a series of x-rays that was negative. She states that since then her right knee has became more swollen and she has been unable to weight bear on the legs. Patient reports that this pain is a sharp pain that is worse with movement as well. Patient relays that this pain radiates down from her hip throughout the leg.  Daughter admits that the patient has an appointment tomorrow morning with an orthopedist at 0900 this am. Patient denies numbness, weakness, redness, fever, chest pain, abdominal pain, and SOB.         The history is provided by the patient.     Review of patient's allergies indicates:   Allergen Reactions    Iodine and iodide containing products      Past Medical History:   Diagnosis Date    Cancer     lymphoma    COPD (chronic obstructive pulmonary disease)     Diabetes mellitus     GERD (gastroesophageal reflux disease)     Hyperlipidemia     Hypertension     Thyroid disease      Past Surgical History:   Procedure Laterality Date    NECK SURGERY      infection in neck removed     History reviewed. No pertinent family history.  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   HENT: Negative for congestion.    Eyes: Negative for visual disturbance.   Respiratory: Negative for shortness of breath.    Cardiovascular: Negative for chest pain.   Gastrointestinal: Negative for abdominal pain, nausea and vomiting.   Genitourinary: Negative for difficulty urinating.   Musculoskeletal: Positive for arthralgias and joint swelling. Negative for back pain and myalgias.   Skin: Negative for rash.   Neurological: Negative for weakness and numbness.   All other systems reviewed and are negative.      Physical Exam     Initial Vitals [06/27/18 1803]   BP Pulse Resp Temp SpO2   (!) 194/76 75 (!) 24 98.2 °F (36.8 °C) 98 %      MAP       --         Physical Exam    Nursing note and vitals reviewed.  Constitutional: She appears well-developed and well-nourished. She is not diaphoretic. No distress.   HENT:   Head: Normocephalic and atraumatic.   Mouth/Throat: Oropharynx is clear and moist.   Eyes: Conjunctivae are normal.   Neck: Neck supple.   Cardiovascular: Normal rate, regular rhythm, normal heart sounds and intact distal pulses. Exam reveals no gallop and no friction rub.    No murmur heard.  Pulses:       Dorsalis pedis pulses are 2+ on the right side, and 1+ on the left side.        Posterior tibial pulses are 1+ on the right side, and 1+ on the left side.   Pulmonary/Chest: Breath sounds normal. She has no wheezes. She has no rhonchi. She has no rales.   Abdominal: Soft. She exhibits no distension. There is no tenderness.   Musculoskeletal: Normal range of motion.   Right leg being held in the external rotation position. Poor effort exerted in allowing to assess the symmetry of the legs secondary to pain. Diffuse tenderness to palpation to the right knee with a moderate effusion. Mild diffuse tenderness to the right hip region.     Neurological: She is alert and oriented to person, place, and time.   Skin: No rash noted. No erythema.   Psychiatric: Her speech is normal.          ED Course   Procedures  Labs Reviewed - No data to display       Imaging Results    None          Medical Decision Making:   History:   Old Medical Records: I decided to obtain old medical records.  Clinical Tests:   Radiological Study: Ordered and Reviewed            Scribe Attestation:   Scribe #1: I performed the above scribed service and the documentation accurately describes the services I performed. I attest to the accuracy of the note.    I, Dr. Trey Sharma, personally performed the services described in this documentation. All medical record entries made by the scribe were at my direction and in my presence.  I have reviewed the chart and agree that the record reflects my personal performance and is accurate and complete. Trey Sharma MD.  10:47 PM 06/27/2018    Yocasta Almonte is a 73 y.o. female presenting with new caps persistent right leg pain since fall yesterday.  There is no distal neurovascular compromise.  Pain is primarily around the knee with repeat x-rays today show no sign of new fracture or dislocation not previously seen.  I doubt occult hip fracture.  Possibility of ligamentous or meniscal injury to the knee with enlarged knee effusion today once again discussed.  I doubt knee dislocation with relocation and popliteal artery injury; I do not think angiography of the leg is indicated here.  I doubt compartment syndrome.  I did offer admission since the patient is having difficulty walking with the effusion but patient and family state they wish to closely follow up with Orthopedics as planned tomorrow and have already scheduled close follow-up.  She received significant pain relief with morphine IV in the emergency department and I did prescribe several doses of hydrocodone pending appointment tomorrow if needed.  I did also review detailed return precautions.  Patient and family are reliable and have capacity to return.  I do not think she requires emergent orthopedic  intervention this evening.          ED Course as of Jun 27 2247 Wed Jun 27, 2018 2017 XR R hip:  No fx or dislocation. (my read)  [MR]   2018 XR R femur:  No fx or dislocation. (my read)  [MR]   2019 XR R knee:  No fx or dislocation. (my read)  [MR]      ED Course User Index  [MR] Trey Sharma MD     Clinical Impression:   Diagnoses of Right knee injury, subsequent encounter and Right knee pain were pertinent to this visit.                             Trey Sharma MD  06/27/18 2514

## 2018-07-24 ENCOUNTER — HOSPITAL ENCOUNTER (OUTPATIENT)
Facility: HOSPITAL | Age: 73
Discharge: HOME HEALTH | End: 2018-07-26
Attending: EMERGENCY MEDICINE | Admitting: INTERNAL MEDICINE
Payer: MEDICARE

## 2018-07-24 DIAGNOSIS — N39.0 URINARY TRACT INFECTION WITHOUT HEMATURIA, SITE UNSPECIFIED: ICD-10-CM

## 2018-07-24 DIAGNOSIS — E11.9 TYPE 2 DIABETES MELLITUS WITHOUT COMPLICATION, UNSPECIFIED WHETHER LONG TERM INSULIN USE: ICD-10-CM

## 2018-07-24 DIAGNOSIS — R52 PAIN: ICD-10-CM

## 2018-07-24 DIAGNOSIS — I10 ESSENTIAL HYPERTENSION: ICD-10-CM

## 2018-07-24 DIAGNOSIS — L03.90 CELLULITIS: ICD-10-CM

## 2018-07-24 DIAGNOSIS — L03.90 CELLULITIS, UNSPECIFIED CELLULITIS SITE: Primary | ICD-10-CM

## 2018-07-24 DIAGNOSIS — K21.9 GASTROESOPHAGEAL REFLUX DISEASE, ESOPHAGITIS PRESENCE NOT SPECIFIED: ICD-10-CM

## 2018-07-24 DIAGNOSIS — E87.20 LACTIC ACIDOSIS: ICD-10-CM

## 2018-07-24 DIAGNOSIS — J44.9 CHRONIC OBSTRUCTIVE PULMONARY DISEASE, UNSPECIFIED COPD TYPE: ICD-10-CM

## 2018-07-24 PROBLEM — C85.90 LYMPHOMA: Status: ACTIVE | Noted: 2018-07-24

## 2018-07-24 PROBLEM — E78.5 HLD (HYPERLIPIDEMIA): Status: ACTIVE | Noted: 2018-07-24

## 2018-07-24 LAB
ALBUMIN SERPL BCP-MCNC: 3.3 G/DL
ALP SERPL-CCNC: 86 U/L
ALT SERPL W/O P-5'-P-CCNC: 10 U/L
ANION GAP SERPL CALC-SCNC: 14 MMOL/L
AST SERPL-CCNC: 21 U/L
BACTERIA #/AREA URNS HPF: ABNORMAL /HPF
BASOPHILS # BLD AUTO: 0.1 K/UL
BASOPHILS NFR BLD: 1.1 %
BILIRUB SERPL-MCNC: 0.2 MG/DL
BILIRUB UR QL STRIP: NEGATIVE
BUN SERPL-MCNC: 12 MG/DL
CALCIUM SERPL-MCNC: 9.4 MG/DL
CHLORIDE SERPL-SCNC: 105 MMOL/L
CLARITY UR: CLEAR
CO2 SERPL-SCNC: 23 MMOL/L
COLOR UR: YELLOW
CREAT SERPL-MCNC: 0.6 MG/DL
CRP SERPL-MCNC: 26.7 MG/L
DIFFERENTIAL METHOD: ABNORMAL
EOSINOPHIL # BLD AUTO: 0.5 K/UL
EOSINOPHIL NFR BLD: 7.5 %
ERYTHROCYTE [DISTWIDTH] IN BLOOD BY AUTOMATED COUNT: 15.6 %
ERYTHROCYTE [SEDIMENTATION RATE] IN BLOOD BY WESTERGREN METHOD: 68 MM/HR
EST. GFR  (AFRICAN AMERICAN): >60 ML/MIN/1.73 M^2
EST. GFR  (NON AFRICAN AMERICAN): >60 ML/MIN/1.73 M^2
GLUCOSE SERPL-MCNC: 83 MG/DL
GLUCOSE UR QL STRIP: NEGATIVE
HCT VFR BLD AUTO: 35.4 %
HGB BLD-MCNC: 11.4 G/DL
HGB UR QL STRIP: ABNORMAL
INR PPP: 1
KETONES UR QL STRIP: NEGATIVE
LACTATE SERPL-SCNC: 2.5 MMOL/L
LACTATE SERPL-SCNC: 3.1 MMOL/L
LEUKOCYTE ESTERASE UR QL STRIP: ABNORMAL
LYMPHOCYTES # BLD AUTO: 2.3 K/UL
LYMPHOCYTES NFR BLD: 33.3 %
MCH RBC QN AUTO: 27.4 PG
MCHC RBC AUTO-ENTMCNC: 32.1 G/DL
MCV RBC AUTO: 85 FL
MICROSCOPIC COMMENT: ABNORMAL
MONOCYTES # BLD AUTO: 0.6 K/UL
MONOCYTES NFR BLD: 8 %
NEUTROPHILS # BLD AUTO: 3.5 K/UL
NEUTROPHILS NFR BLD: 50.1 %
NITRITE UR QL STRIP: NEGATIVE
PH UR STRIP: 6 [PH] (ref 5–8)
PLATELET # BLD AUTO: 227 K/UL
PMV BLD AUTO: 9 FL
POTASSIUM SERPL-SCNC: 5.2 MMOL/L
PROT SERPL-MCNC: 7.7 G/DL
PROT UR QL STRIP: NEGATIVE
PROTHROMBIN TIME: 9.7 SEC
RBC # BLD AUTO: 4.14 M/UL
RBC #/AREA URNS HPF: 8 /HPF (ref 0–4)
SODIUM SERPL-SCNC: 142 MMOL/L
SP GR UR STRIP: <=1.005 (ref 1–1.03)
SQUAMOUS #/AREA URNS HPF: 20 /HPF
URN SPEC COLLECT METH UR: ABNORMAL
UROBILINOGEN UR STRIP-ACNC: NEGATIVE EU/DL
WBC # BLD AUTO: 7 K/UL
WBC #/AREA URNS HPF: 6 /HPF (ref 0–5)

## 2018-07-24 PROCEDURE — G0378 HOSPITAL OBSERVATION PER HR: HCPCS

## 2018-07-24 PROCEDURE — 83036 HEMOGLOBIN GLYCOSYLATED A1C: CPT

## 2018-07-24 PROCEDURE — 85651 RBC SED RATE NONAUTOMATED: CPT

## 2018-07-24 PROCEDURE — 63600175 PHARM REV CODE 636 W HCPCS: Performed by: INTERNAL MEDICINE

## 2018-07-24 PROCEDURE — 36415 COLL VENOUS BLD VENIPUNCTURE: CPT

## 2018-07-24 PROCEDURE — 85610 PROTHROMBIN TIME: CPT

## 2018-07-24 PROCEDURE — 81000 URINALYSIS NONAUTO W/SCOPE: CPT | Mod: 91

## 2018-07-24 PROCEDURE — S0077 INJECTION, CLINDAMYCIN PHOSP: HCPCS | Performed by: EMERGENCY MEDICINE

## 2018-07-24 PROCEDURE — 96366 THER/PROPH/DIAG IV INF ADDON: CPT

## 2018-07-24 PROCEDURE — 25000003 PHARM REV CODE 250: Performed by: INTERNAL MEDICINE

## 2018-07-24 PROCEDURE — 83605 ASSAY OF LACTIC ACID: CPT | Mod: 91

## 2018-07-24 PROCEDURE — 96365 THER/PROPH/DIAG IV INF INIT: CPT

## 2018-07-24 PROCEDURE — 25000003 PHARM REV CODE 250: Performed by: NURSE PRACTITIONER

## 2018-07-24 PROCEDURE — 25000003 PHARM REV CODE 250: Performed by: EMERGENCY MEDICINE

## 2018-07-24 PROCEDURE — 86140 C-REACTIVE PROTEIN: CPT

## 2018-07-24 PROCEDURE — 87086 URINE CULTURE/COLONY COUNT: CPT

## 2018-07-24 PROCEDURE — 99219 PR INITIAL OBSERVATION CARE,LEVL II: CPT | Mod: ,,, | Performed by: INTERNAL MEDICINE

## 2018-07-24 PROCEDURE — 83605 ASSAY OF LACTIC ACID: CPT

## 2018-07-24 PROCEDURE — 81000 URINALYSIS NONAUTO W/SCOPE: CPT

## 2018-07-24 PROCEDURE — 85025 COMPLETE CBC W/AUTO DIFF WBC: CPT

## 2018-07-24 PROCEDURE — 87040 BLOOD CULTURE FOR BACTERIA: CPT | Mod: 59

## 2018-07-24 PROCEDURE — 80053 COMPREHEN METABOLIC PANEL: CPT

## 2018-07-24 PROCEDURE — 99284 EMERGENCY DEPT VISIT MOD MDM: CPT | Mod: 25

## 2018-07-24 RX ORDER — ONDANSETRON 2 MG/ML
8 INJECTION INTRAMUSCULAR; INTRAVENOUS EVERY 8 HOURS PRN
Status: DISCONTINUED | OUTPATIENT
Start: 2018-07-24 | End: 2018-07-26 | Stop reason: HOSPADM

## 2018-07-24 RX ORDER — LISINOPRIL 10 MG/1
20 TABLET ORAL DAILY
Status: DISCONTINUED | OUTPATIENT
Start: 2018-07-25 | End: 2018-07-25

## 2018-07-24 RX ORDER — VANCOMYCIN HCL IN 5 % DEXTROSE 1G/250ML
15 PLASTIC BAG, INJECTION (ML) INTRAVENOUS ONCE
Status: COMPLETED | OUTPATIENT
Start: 2018-07-24 | End: 2018-07-25

## 2018-07-24 RX ORDER — ATORVASTATIN CALCIUM 40 MG/1
80 TABLET, FILM COATED ORAL DAILY
Status: DISCONTINUED | OUTPATIENT
Start: 2018-07-25 | End: 2018-07-26 | Stop reason: HOSPADM

## 2018-07-24 RX ORDER — SODIUM CHLORIDE 9 MG/ML
INJECTION, SOLUTION INTRAVENOUS CONTINUOUS
Status: DISCONTINUED | OUTPATIENT
Start: 2018-07-24 | End: 2018-07-26 | Stop reason: HOSPADM

## 2018-07-24 RX ORDER — ENOXAPARIN SODIUM 100 MG/ML
40 INJECTION SUBCUTANEOUS EVERY 24 HOURS
Status: DISCONTINUED | OUTPATIENT
Start: 2018-07-24 | End: 2018-07-26 | Stop reason: HOSPADM

## 2018-07-24 RX ORDER — HYDROCODONE BITARTRATE AND ACETAMINOPHEN 5; 325 MG/1; MG/1
1 TABLET ORAL EVERY 6 HOURS PRN
Status: DISCONTINUED | OUTPATIENT
Start: 2018-07-24 | End: 2018-07-26 | Stop reason: HOSPADM

## 2018-07-24 RX ORDER — ACETAMINOPHEN 325 MG/1
650 TABLET ORAL EVERY 4 HOURS PRN
Status: DISCONTINUED | OUTPATIENT
Start: 2018-07-24 | End: 2018-07-26 | Stop reason: HOSPADM

## 2018-07-24 RX ORDER — GABAPENTIN 300 MG/1
300 CAPSULE ORAL 3 TIMES DAILY
Status: DISCONTINUED | OUTPATIENT
Start: 2018-07-24 | End: 2018-07-26 | Stop reason: HOSPADM

## 2018-07-24 RX ORDER — RAMELTEON 8 MG/1
8 TABLET ORAL NIGHTLY PRN
Status: DISCONTINUED | OUTPATIENT
Start: 2018-07-24 | End: 2018-07-26 | Stop reason: HOSPADM

## 2018-07-24 RX ORDER — PANTOPRAZOLE SODIUM 40 MG/1
40 TABLET, DELAYED RELEASE ORAL DAILY
Status: DISCONTINUED | OUTPATIENT
Start: 2018-07-25 | End: 2018-07-26 | Stop reason: HOSPADM

## 2018-07-24 RX ORDER — GLUCAGON 1 MG
1 KIT INJECTION
Status: DISCONTINUED | OUTPATIENT
Start: 2018-07-24 | End: 2018-07-26 | Stop reason: HOSPADM

## 2018-07-24 RX ORDER — CLINDAMYCIN PHOSPHATE 900 MG/50ML
900 INJECTION, SOLUTION INTRAVENOUS
Status: COMPLETED | OUTPATIENT
Start: 2018-07-24 | End: 2018-07-24

## 2018-07-24 RX ORDER — LANOLIN ALCOHOL/MO/W.PET/CERES
800 CREAM (GRAM) TOPICAL
Status: DISCONTINUED | OUTPATIENT
Start: 2018-07-24 | End: 2018-07-26 | Stop reason: HOSPADM

## 2018-07-24 RX ORDER — SUCRALFATE 1 G/1
1 TABLET ORAL 4 TIMES DAILY
Status: DISCONTINUED | OUTPATIENT
Start: 2018-07-24 | End: 2018-07-26 | Stop reason: HOSPADM

## 2018-07-24 RX ORDER — POTASSIUM CHLORIDE 20 MEQ/15ML
40 SOLUTION ORAL
Status: DISCONTINUED | OUTPATIENT
Start: 2018-07-24 | End: 2018-07-26 | Stop reason: HOSPADM

## 2018-07-24 RX ORDER — METOPROLOL TARTRATE 25 MG/1
12.5 TABLET ORAL DAILY
Status: DISCONTINUED | OUTPATIENT
Start: 2018-07-25 | End: 2018-07-26 | Stop reason: HOSPADM

## 2018-07-24 RX ORDER — IBUPROFEN 200 MG
24 TABLET ORAL
Status: DISCONTINUED | OUTPATIENT
Start: 2018-07-24 | End: 2018-07-26 | Stop reason: HOSPADM

## 2018-07-24 RX ORDER — POTASSIUM CHLORIDE 20 MEQ/15ML
60 SOLUTION ORAL
Status: DISCONTINUED | OUTPATIENT
Start: 2018-07-24 | End: 2018-07-26 | Stop reason: HOSPADM

## 2018-07-24 RX ORDER — IBUPROFEN 200 MG
16 TABLET ORAL
Status: DISCONTINUED | OUTPATIENT
Start: 2018-07-24 | End: 2018-07-26 | Stop reason: HOSPADM

## 2018-07-24 RX ORDER — ACETAMINOPHEN 500 MG
1000 TABLET ORAL EVERY 6 HOURS PRN
Status: DISCONTINUED | OUTPATIENT
Start: 2018-07-24 | End: 2018-07-26 | Stop reason: HOSPADM

## 2018-07-24 RX ORDER — IPRATROPIUM BROMIDE AND ALBUTEROL SULFATE 2.5; .5 MG/3ML; MG/3ML
3 SOLUTION RESPIRATORY (INHALATION) EVERY 4 HOURS PRN
Status: DISCONTINUED | OUTPATIENT
Start: 2018-07-24 | End: 2018-07-26 | Stop reason: HOSPADM

## 2018-07-24 RX ORDER — SODIUM CHLORIDE 0.9 % (FLUSH) 0.9 %
5 SYRINGE (ML) INJECTION
Status: DISCONTINUED | OUTPATIENT
Start: 2018-07-24 | End: 2018-07-26 | Stop reason: HOSPADM

## 2018-07-24 RX ORDER — FLUTICASONE FUROATE AND VILANTEROL 100; 25 UG/1; UG/1
1 POWDER RESPIRATORY (INHALATION) DAILY
Status: DISCONTINUED | OUTPATIENT
Start: 2018-07-25 | End: 2018-07-26 | Stop reason: HOSPADM

## 2018-07-24 RX ORDER — FLUTICASONE PROPIONATE 50 MCG
1 SPRAY, SUSPENSION (ML) NASAL DAILY
Status: DISCONTINUED | OUTPATIENT
Start: 2018-07-25 | End: 2018-07-26 | Stop reason: HOSPADM

## 2018-07-24 RX ORDER — INSULIN ASPART 100 [IU]/ML
0-5 INJECTION, SOLUTION INTRAVENOUS; SUBCUTANEOUS
Status: DISCONTINUED | OUTPATIENT
Start: 2018-07-24 | End: 2018-07-26 | Stop reason: HOSPADM

## 2018-07-24 RX ADMIN — HYDROCODONE BITARTRATE AND ACETAMINOPHEN 1 TABLET: 5; 325 TABLET ORAL at 11:07

## 2018-07-24 RX ADMIN — GABAPENTIN 300 MG: 300 CAPSULE ORAL at 11:07

## 2018-07-24 RX ADMIN — SODIUM CHLORIDE 500 ML: 0.9 INJECTION, SOLUTION INTRAVENOUS at 07:07

## 2018-07-24 RX ADMIN — VANCOMYCIN HYDROCHLORIDE 1000 MG: 1 INJECTION, POWDER, LYOPHILIZED, FOR SOLUTION INTRAVENOUS at 11:07

## 2018-07-24 RX ADMIN — SODIUM CHLORIDE: 0.9 INJECTION, SOLUTION INTRAVENOUS at 11:07

## 2018-07-24 RX ADMIN — CLINDAMYCIN IN 5 PERCENT DEXTROSE 900 MG: 18 INJECTION, SOLUTION INTRAVENOUS at 07:07

## 2018-07-24 RX ADMIN — SUCRALFATE 1 G: 1 TABLET ORAL at 11:07

## 2018-07-24 NOTE — ED PROVIDER NOTES
Encounter Date: 7/24/2018    SCRIBE #1 NOTE: Gisel SANCHEZ, am scribing for, and in the presence of, .       History     Chief Complaint   Patient presents with    Foot Swelling     LEFT foot swelling, warmth    Rash     LEFT ear       Time seen by provider: 6:18 PM on 07/24/2018    Yocasta Almonte is a 73 y.o. female with NIDDM, HTN, Thyroid disease, COPD, HLD, and lymphoma who presents to the ED with leg pain. Patient complains of left lower leg swelling and pain for the past few days. She states that she fractured her right leg a month ago that has been doing well. Patient complains otherwise of a white productive cough and denies fever, shortness of breath, chest pain, abdominal pain, constipation, nausea, vomiting, numbness/tingling, or weakness. She notes having a left ear rash and swelling and states she has been put on medication for shingles and states the pain has improved but the rash has not resolved.         The history is provided by the patient. No  was used.     Review of patient's allergies indicates:   Allergen Reactions    Iodine and iodide containing products      Past Medical History:   Diagnosis Date    Cancer     lymphoma    COPD (chronic obstructive pulmonary disease)     Diabetes mellitus     GERD (gastroesophageal reflux disease)     Hyperlipidemia     Hypertension     Thyroid disease      Past Surgical History:   Procedure Laterality Date    NECK SURGERY      infection in neck removed     Family History   Problem Relation Age of Onset    Cancer Mother     No Known Problems Father      Social History   Substance Use Topics    Smoking status: Current Every Day Smoker     Packs/day: 0.50     Years: 50.00    Smokeless tobacco: Never Used      Comment: says she is trying to quit    Alcohol use No      Comment: hx of alochol abuse but does not drink at all now     Review of Systems   Constitutional: Negative for activity change, appetite change,  chills, diaphoresis, fatigue and fever.   HENT: Negative for congestion, ear discharge, ear pain, rhinorrhea, sore throat, trouble swallowing and voice change.    Respiratory: Positive for cough (white productive). Negative for choking, chest tightness, shortness of breath, wheezing and stridor.    Cardiovascular: Negative for chest pain, palpitations and leg swelling.   Gastrointestinal: Negative for abdominal distention, abdominal pain, blood in stool, constipation, diarrhea, nausea and vomiting.   Genitourinary: Negative for difficulty urinating, dysuria, flank pain, frequency and hematuria.   Musculoskeletal: Positive for arthralgias (L ankle/foot). Negative for back pain, joint swelling, myalgias, neck pain and neck stiffness.   Skin: Positive for rash. Negative for color change.        Swelling to L ear     Neurological: Negative for dizziness, syncope, speech difficulty, weakness, numbness and headaches.   Hematological: Negative for adenopathy. Does not bruise/bleed easily.   All other systems reviewed and are negative.      Physical Exam     Initial Vitals [07/24/18 1659]   BP Pulse Resp Temp SpO2   (!) 140/67 98 16 98 °F (36.7 °C) 96 %      MAP       --         Physical Exam    Nursing note and vitals reviewed.  Constitutional: She appears well-developed and well-nourished. She is not diaphoretic. No distress.   HENT:   Head: Normocephalic and atraumatic.   Mouth/Throat: Oropharynx is clear and moist. No oropharyngeal exudate.   Swelling to the left ear currently being treated for shingles no tenderness to palpation, no vesicular rash to ear drum no drainage or sign of infection.    Eyes: EOM are normal. Pupils are equal, round, and reactive to light.   3 mm pupils   Neck: Neck supple. No tracheal deviation present.   Cardiovascular: Normal rate, regular rhythm, normal heart sounds and intact distal pulses. Exam reveals no gallop and no friction rub.    No murmur heard.  Pulses:       Radial pulses are 2+  on the right side, and 2+ on the left side.        Posterior tibial pulses are 2+ on the right side, and 2+ on the left side.   Pulmonary/Chest: Breath sounds normal. No respiratory distress. She has no wheezes. She has no rhonchi. She has no rales.   Abdominal: Soft. Bowel sounds are normal. She exhibits no distension. There is no tenderness. There is no rebound, no guarding and no CVA tenderness.   Musculoskeletal: She exhibits edema. She exhibits no tenderness.   No step-off, deformity, or bony tenderness of the spine. Full ROM in LE's. Negative straight leg raise, bilaterally.   Right leg in hinged knee immobilizer. Left leg has mild edema and warmth to palpation at mid calf downward. Good ROM of the ankle and knee on the left. Small pressure blister to lateral of left 5th and 4th digit of right foot with no purulent drainage.    Lymphadenopathy:     She has no cervical adenopathy.   Neurological: She is alert and oriented to person, place, and time. She has normal strength. No cranial nerve deficit or sensory deficit.   Skin: Skin is warm and dry. Capillary refill takes less than 2 seconds.         ED Course   Procedures  Labs Reviewed   LACTIC ACID, PLASMA - Abnormal; Notable for the following:        Result Value    Lactate (Lactic Acid) 3.1 (*)     All other components within normal limits   URINALYSIS - Abnormal; Notable for the following:     Specific Gravity, UA <=1.005 (*)     Occult Blood UA 2+ (*)     Leukocytes, UA 2+ (*)     All other components within normal limits   CBC W/ AUTO DIFFERENTIAL - Abnormal; Notable for the following:     Hemoglobin 11.4 (*)     Hematocrit 35.4 (*)     RDW 15.6 (*)     MPV 9.0 (*)     All other components within normal limits   COMPREHENSIVE METABOLIC PANEL - Abnormal; Notable for the following:     Potassium 5.2 (*)     Albumin 3.3 (*)     All other components within normal limits   SEDIMENTATION RATE - Abnormal; Notable for the following:     Sed Rate 68 (*)     All  other components within normal limits   C-REACTIVE PROTEIN - Abnormal; Notable for the following:     CRP 26.7 (*)     All other components within normal limits   URINALYSIS MICROSCOPIC - Abnormal; Notable for the following:     RBC, UA 8 (*)     WBC, UA 6 (*)     Bacteria, UA Few (*)     All other components within normal limits   CULTURE, BLOOD   CULTURE, BLOOD   PROTIME-INR          Imaging Results          X-Ray Ankle Complete Left (Final result)  Result time 07/24/18 18:45:11    Final result by Adi Khan DO (07/24/18 18:45:11)                 Impression:      As above      Electronically signed by: Adi Khan DO  Date:    07/24/2018  Time:    18:45             Narrative:    EXAMINATION:  XR ANKLE COMPLETE 3 VIEW LEFT    CLINICAL HISTORY:  Pain, unspecified    TECHNIQUE:  AP, lateral and oblique views of the left ankle were performed.    COMPARISON:  None    FINDINGS:  No acute fracture or dislocation.  There are dystrophic calcifications noted within the soft tissues of the distal lower leg.  There is a sclerotic lesion within the distal diaphysis of the tibia that appears to demonstrate some marks and rings type calcification possibly representing an enchondroma.  This lesion measures approximately 2.5 cm.  Dorsal and plantar calcaneal enthesophytes noted..                               X-Ray Foot Complete Left (Final result)  Result time 07/24/18 18:45:59    Final result by Adi Khan DO (07/24/18 18:45:59)                 Impression:      As above      Electronically signed by: Adi Khan DO  Date:    07/24/2018  Time:    18:45             Narrative:    EXAMINATION:  XR FOOT COMPLETE 3 VIEW LEFT    CLINICAL HISTORY:  . Pain, unspecified    TECHNIQUE:  AP, lateral, and oblique views of the foot were performed.    COMPARISON:  None    FINDINGS:  There is no evidence to suggest acute fracture or dislocation.  A mild hallux valgus deformity is noted.  There are mild degenerative changes  noted throughout the foot.  Dorsal and plantar calcaneal enthesophytes are noted.  Generalized osteopenia is noted.                                 Medical Decision Making:   History:   Old Medical Records: I decided to obtain old medical records.  Initial Assessment:   Emergent evaluation of 73 year old female presenting with redness and swelling of the left foot.  Differential Diagnosis:   Different diagnosis includes, but is not limited to soft tissue injury, fracture, and cellulitis.   Clinical Tests:   Lab Tests: Ordered and Reviewed  Radiological Study: Ordered and Reviewed            Scribe Attestation:   Scribe #1: I performed the above scribed service and the documentation accurately describes the services I performed. I attest to the accuracy of the note.    Attending Attestation:             Attending ED Notes:   Patient has cellulitis with possibly infected blisters to her left foot, patient also noted to have urinary tract infection given the fact that she is a diabetic patient will be admitted to Internal Medicine for IV antibiotics and for further evaluation and monitoring       I, Dr. Sony Baxter, personally performed the services described in this documentation. All medical record entries made by the scribe were at my direction and in my presence.  I have reviewed the chart and agree that the record reflects my personal performance and is accurate and complete. Sony Brady MD.                 Clinical Impression:   The primary encounter diagnosis was Cellulitis, unspecified cellulitis site. Diagnoses of Pain and Urinary tract infection without hematuria, site unspecified were also pertinent to this visit.      Disposition:   Disposition: Discharged  Condition: Stable                        Sony Baxter MD  07/24/18 3691

## 2018-07-25 PROBLEM — I10 ESSENTIAL HYPERTENSION: Status: ACTIVE | Noted: 2018-07-25

## 2018-07-25 PROBLEM — E11.9 TYPE 2 DIABETES MELLITUS WITHOUT COMPLICATION: Status: ACTIVE | Noted: 2018-07-25

## 2018-07-25 PROBLEM — E87.20 LACTIC ACIDOSIS: Status: ACTIVE | Noted: 2018-07-25

## 2018-07-25 PROBLEM — B02.9 SHINGLES: Status: ACTIVE | Noted: 2018-07-25

## 2018-07-25 LAB
ALBUMIN SERPL BCP-MCNC: 2.9 G/DL
ALP SERPL-CCNC: 75 U/L
ALT SERPL W/O P-5'-P-CCNC: 6 U/L
ANION GAP SERPL CALC-SCNC: 8 MMOL/L
AST SERPL-CCNC: 7 U/L
BACTERIA #/AREA URNS HPF: ABNORMAL /HPF
BASOPHILS # BLD AUTO: 0 K/UL
BASOPHILS NFR BLD: 0.4 %
BILIRUB SERPL-MCNC: 0.2 MG/DL
BILIRUB UR QL STRIP: NEGATIVE
BUN SERPL-MCNC: 12 MG/DL
CALCIUM SERPL-MCNC: 8.8 MG/DL
CHLORIDE SERPL-SCNC: 108 MMOL/L
CLARITY UR: CLEAR
CO2 SERPL-SCNC: 26 MMOL/L
COLOR UR: YELLOW
CREAT SERPL-MCNC: 0.6 MG/DL
DIFFERENTIAL METHOD: ABNORMAL
EOSINOPHIL # BLD AUTO: 0.5 K/UL
EOSINOPHIL NFR BLD: 7.9 %
ERYTHROCYTE [DISTWIDTH] IN BLOOD BY AUTOMATED COUNT: 15.9 %
EST. GFR  (AFRICAN AMERICAN): >60 ML/MIN/1.73 M^2
EST. GFR  (NON AFRICAN AMERICAN): >60 ML/MIN/1.73 M^2
ESTIMATED AVG GLUCOSE: 120 MG/DL
FOLATE SERPL-MCNC: 16.3 NG/ML
GLUCOSE SERPL-MCNC: 92 MG/DL
GLUCOSE UR QL STRIP: NEGATIVE
HBA1C MFR BLD HPLC: 5.8 %
HCT VFR BLD AUTO: 31 %
HGB BLD-MCNC: 10.2 G/DL
HGB UR QL STRIP: ABNORMAL
IRON SERPL-MCNC: 29 UG/DL
KETONES UR QL STRIP: NEGATIVE
LACTATE SERPL-SCNC: 1.7 MMOL/L
LEUKOCYTE ESTERASE UR QL STRIP: ABNORMAL
LYMPHOCYTES # BLD AUTO: 1.9 K/UL
LYMPHOCYTES NFR BLD: 28.7 %
MAGNESIUM SERPL-MCNC: 1.8 MG/DL
MCH RBC QN AUTO: 27.8 PG
MCHC RBC AUTO-ENTMCNC: 32.8 G/DL
MCV RBC AUTO: 85 FL
MICROSCOPIC COMMENT: ABNORMAL
MONOCYTES # BLD AUTO: 0.8 K/UL
MONOCYTES NFR BLD: 11.9 %
NEUTROPHILS # BLD AUTO: 3.4 K/UL
NEUTROPHILS NFR BLD: 51.1 %
NITRITE UR QL STRIP: NEGATIVE
PH UR STRIP: 7 [PH] (ref 5–8)
PHOSPHATE SERPL-MCNC: 3.7 MG/DL
PLATELET # BLD AUTO: 190 K/UL
PMV BLD AUTO: 8.5 FL
POCT GLUCOSE: 121 MG/DL (ref 70–110)
POCT GLUCOSE: 127 MG/DL (ref 70–110)
POTASSIUM SERPL-SCNC: 3.7 MMOL/L
PROT SERPL-MCNC: 6.4 G/DL
PROT UR QL STRIP: NEGATIVE
RBC # BLD AUTO: 3.66 M/UL
RBC #/AREA URNS HPF: 6 /HPF (ref 0–4)
SATURATED IRON: 10 %
SODIUM SERPL-SCNC: 142 MMOL/L
SP GR UR STRIP: 1.01 (ref 1–1.03)
SQUAMOUS #/AREA URNS HPF: 1 /HPF
TOTAL IRON BINDING CAPACITY: 299 UG/DL
TRANSFERRIN SERPL-MCNC: 202 MG/DL
URN SPEC COLLECT METH UR: ABNORMAL
UROBILINOGEN UR STRIP-ACNC: NEGATIVE EU/DL
VIT B12 SERPL-MCNC: 476 PG/ML
WBC # BLD AUTO: 6.7 K/UL
WBC #/AREA URNS HPF: 1 /HPF (ref 0–5)

## 2018-07-25 PROCEDURE — 25000003 PHARM REV CODE 250: Performed by: INTERNAL MEDICINE

## 2018-07-25 PROCEDURE — 96360 HYDRATION IV INFUSION INIT: CPT

## 2018-07-25 PROCEDURE — 63600175 PHARM REV CODE 636 W HCPCS: Performed by: INTERNAL MEDICINE

## 2018-07-25 PROCEDURE — 94640 AIRWAY INHALATION TREATMENT: CPT

## 2018-07-25 PROCEDURE — 96361 HYDRATE IV INFUSION ADD-ON: CPT

## 2018-07-25 PROCEDURE — 83605 ASSAY OF LACTIC ACID: CPT

## 2018-07-25 PROCEDURE — 96372 THER/PROPH/DIAG INJ SC/IM: CPT

## 2018-07-25 PROCEDURE — 96366 THER/PROPH/DIAG IV INF ADDON: CPT

## 2018-07-25 PROCEDURE — 25000242 PHARM REV CODE 250 ALT 637 W/ HCPCS: Performed by: NURSE PRACTITIONER

## 2018-07-25 PROCEDURE — 94761 N-INVAS EAR/PLS OXIMETRY MLT: CPT

## 2018-07-25 PROCEDURE — G0378 HOSPITAL OBSERVATION PER HR: HCPCS

## 2018-07-25 PROCEDURE — 63600175 PHARM REV CODE 636 W HCPCS: Performed by: NURSE PRACTITIONER

## 2018-07-25 PROCEDURE — 25000003 PHARM REV CODE 250: Performed by: NURSE PRACTITIONER

## 2018-07-25 PROCEDURE — 80053 COMPREHEN METABOLIC PANEL: CPT

## 2018-07-25 PROCEDURE — 83735 ASSAY OF MAGNESIUM: CPT

## 2018-07-25 PROCEDURE — 82607 VITAMIN B-12: CPT

## 2018-07-25 PROCEDURE — S4991 NICOTINE PATCH NONLEGEND: HCPCS | Performed by: NURSE PRACTITIONER

## 2018-07-25 PROCEDURE — 82746 ASSAY OF FOLIC ACID SERUM: CPT

## 2018-07-25 PROCEDURE — 85025 COMPLETE CBC W/AUTO DIFF WBC: CPT

## 2018-07-25 PROCEDURE — 36415 COLL VENOUS BLD VENIPUNCTURE: CPT

## 2018-07-25 PROCEDURE — 96365 THER/PROPH/DIAG IV INF INIT: CPT

## 2018-07-25 PROCEDURE — 99225 PR SUBSEQUENT OBSERVATION CARE,LEVEL II: CPT | Mod: ,,, | Performed by: INTERNAL MEDICINE

## 2018-07-25 PROCEDURE — 84100 ASSAY OF PHOSPHORUS: CPT

## 2018-07-25 PROCEDURE — 99900035 HC TECH TIME PER 15 MIN (STAT)

## 2018-07-25 PROCEDURE — 83540 ASSAY OF IRON: CPT

## 2018-07-25 RX ORDER — MUPIROCIN 20 MG/G
OINTMENT TOPICAL 2 TIMES DAILY
Status: DISCONTINUED | OUTPATIENT
Start: 2018-07-25 | End: 2018-07-26 | Stop reason: HOSPADM

## 2018-07-25 RX ORDER — IBUPROFEN 200 MG
1 TABLET ORAL DAILY
Status: DISCONTINUED | OUTPATIENT
Start: 2018-07-25 | End: 2018-07-26 | Stop reason: HOSPADM

## 2018-07-25 RX ADMIN — ENOXAPARIN SODIUM 40 MG: 100 INJECTION SUBCUTANEOUS at 12:07

## 2018-07-25 RX ADMIN — ATORVASTATIN CALCIUM 80 MG: 40 TABLET, FILM COATED ORAL at 09:07

## 2018-07-25 RX ADMIN — GABAPENTIN 300 MG: 300 CAPSULE ORAL at 09:07

## 2018-07-25 RX ADMIN — HYDROCODONE BITARTRATE AND ACETAMINOPHEN 1 TABLET: 5; 325 TABLET ORAL at 09:07

## 2018-07-25 RX ADMIN — VANCOMYCIN HYDROCHLORIDE 750 MG: 1 INJECTION, POWDER, LYOPHILIZED, FOR SOLUTION INTRAVENOUS at 12:07

## 2018-07-25 RX ADMIN — GABAPENTIN 300 MG: 300 CAPSULE ORAL at 02:07

## 2018-07-25 RX ADMIN — ENOXAPARIN SODIUM 40 MG: 100 INJECTION SUBCUTANEOUS at 05:07

## 2018-07-25 RX ADMIN — FLUTICASONE PROPIONATE 50 MCG: 50 SPRAY, METERED NASAL at 08:07

## 2018-07-25 RX ADMIN — NICOTINE 1 PATCH: 14 PATCH, EXTENDED RELEASE TRANSDERMAL at 08:07

## 2018-07-25 RX ADMIN — FLUTICASONE FUROATE AND VILANTEROL TRIFENATATE 1 PUFF: 100; 25 POWDER RESPIRATORY (INHALATION) at 07:07

## 2018-07-25 RX ADMIN — SUCRALFATE 1 G: 1 TABLET ORAL at 09:07

## 2018-07-25 RX ADMIN — VANCOMYCIN HYDROCHLORIDE 750 MG: 1 INJECTION, POWDER, LYOPHILIZED, FOR SOLUTION INTRAVENOUS at 11:07

## 2018-07-25 RX ADMIN — MUPIROCIN: 20 OINTMENT TOPICAL at 09:07

## 2018-07-25 RX ADMIN — SUCRALFATE 1 G: 1 TABLET ORAL at 08:07

## 2018-07-25 RX ADMIN — PANTOPRAZOLE SODIUM 40 MG: 40 TABLET, DELAYED RELEASE ORAL at 09:07

## 2018-07-25 RX ADMIN — SUCRALFATE 1 G: 1 TABLET ORAL at 05:07

## 2018-07-25 RX ADMIN — SUCRALFATE 1 G: 1 TABLET ORAL at 12:07

## 2018-07-25 NOTE — ED NOTES
Upon transfer to room 202, patient is AAOx4, no cardiac or respiratory complications at this time. No needs or questions from patient or family.

## 2018-07-25 NOTE — PLAN OF CARE
"The pt stated that she lives alone however her grandaughter recently moved in after her fall to check on her. She has a hoe walker,cane and nebulizer. She is active with HH services and she thinks the name is "[facility] ". The pts pharmacy of choice is [facility]. Family transports her to medical appts. Verified PCP as Dr. Hernandez and insurance as [employer].  I educated the pt on the blue discharge folder and wrote my name and number on the pts white board. Joleen ADAM [facility] [place]     07/25/18 0247   Discharge Assessment   Assessment Type Discharge Planning Assessment   Confirmed/corrected address and phone number on facesheet? Yes   Assessment information obtained from? Patient   Communicated expected length of stay with patient/caregiver no   Prior to hospitilization cognitive status: Alert/Oriented   Prior to hospitalization functional status: Assistive Equipment   Current cognitive status: Alert/Oriented   Current Functional Status: Assistive Equipment   Lives With alone   Able to Return to Prior Arrangements yes   Is patient able to care for self after discharge? Yes   Readmission Within The Last 30 Days no previous admission in last 30 days   Patient currently being followed by outpatient case management? No   Patient currently receives any other outside agency services? No   Equipment Currently Used at Home walker, standard;cane, straight;nebulizer   Do you have any problems affording any of your prescribed medications? No   Is the patient taking medications as prescribed? yes   Does the patient have transportation home? Yes   Transportation Available family or friend will provide   Does the patient receive services at the [facility]? No   Discharge Plan A Home;Home Health   Discharge Plan B Home with family   Patient/Family In Agreement With Plan yes     "

## 2018-07-25 NOTE — ASSESSMENT & PLAN NOTE
Patient presents with 1 1/2 week of progressively swelling, warmth and pain to left lower foot.  No trauma, skin intact, no ulcers.    Pharmacy consult to dose Vancomycin.  Keep extremity elevated.

## 2018-07-25 NOTE — ASSESSMENT & PLAN NOTE
Recent treatment with antiviral therapy for 10 days for shingles outbreak on left ear.  Rash still present with mild swelling to area.  Patient reportedly has been scratching the area.  Possible secondary infection - antibiotic coverage for skin infection same as cellulitis.  Monitor area.

## 2018-07-25 NOTE — ED NOTES
Presents to the ER with c/o left lower extremity swelling and erythema that started a few days ago. Patient arrives to ED with right leg in a knee immobilizer that is secondary to fracturing her leg one month ago. Associated complaints are rash to left ear that patient is being treated for shingles and a productive cough with white sputum. Mucous membranes are pink and moist. Skin is warm, dry and intact. Lungs are clear bilaterally, respirations are regular and unlabored. Denies cough, congestion, rhinorrhea or SOB. BS active x4, no tenderness with palpation, abd is soft and not distended. Denies any appetite or activity change. S1S2, capillary refill is < 2 seconds. Denies dysuria, difficulty urinating, frequency, numbness, tingling or weakness. CHINMAY MANLEY

## 2018-07-25 NOTE — PLAN OF CARE
Pt signed the disclosure form for home health.  Left message on granddajustina Almonte's voice mail, [phone] requesting she call me with name of pt's home health agency.      07/25/18 0668   Discharge Reassessment   Discharge Plan A Home Health

## 2018-07-25 NOTE — CONSULTS
Yocasta Almonte [id number] is a 73 y.o. female who has been consulted for vancomycin dosing.    The patient has the following labs:     Date Creatinine (mg/dl)    BUN WBC Count   7/25/2018 Estimated Creatinine Clearance: 77 mL/min (based on SCr of 0.6 mg/dL). Lab Results   Component Value Date    BUN 12 07/24/2018     Lab Results   Component Value Date    WBC 7.00 07/24/2018        Current weight is 62.6 kg (138 lb 0.1 oz)      The patient received  1000 mg on 7/24 at 2330.    The patient will be started on vancomycin at a dose of 750 mg every 12 hours.  The vancomycin trough has been ordered for 7/26 at 1100.  Target trough goal is 10 to 15.    Patient will be followed by pharmacy for changes in renal function, toxicity, and efficacy.   Thank you for allowing us to participate in this patient's care.     Amanda Finn

## 2018-07-25 NOTE — SUBJECTIVE & OBJECTIVE
Past Medical History:   Diagnosis Date    Cancer     lymphoma    COPD (chronic obstructive pulmonary disease)     Diabetes mellitus     GERD (gastroesophageal reflux disease)     Hyperlipidemia     Hypertension     Thyroid disease        Past Surgical History:   Procedure Laterality Date    NECK SURGERY      infection in neck removed       Review of patient's allergies indicates:   Allergen Reactions    Iodine and iodide containing products        No current facility-administered medications on file prior to encounter.      Current Outpatient Prescriptions on File Prior to Encounter   Medication Sig    albuterol (PROVENTIL) 5 mg/mL nebulizer solution Take 2.5 mg by nebulization 2 (two) times daily. Rescue     atorvastatin (LIPITOR) 80 MG tablet Take 80 mg by mouth once daily.    ciprofloxacin-hydrocortisone 0.2-1% (CIPRO HC OTIC) otic suspension Place 3 drops into the left ear 2 (two) times daily. Take for 16 days.    fluticasone (FLONASE) 50 mcg/actuation nasal spray 1 spray by Each Nare route once daily.    FLUTICASONE/VILANTEROL (BREO ELLIPTA INHL) Inhale 1 puff into the lungs once daily.    gabapentin (NEURONTIN) 300 MG capsule Take 300 mg by mouth 3 (three) times daily.    glimepiride (AMARYL) 2 MG tablet Take 2 mg by mouth 2 (two) times daily with meals.    HYDROcodone-acetaminophen (NORCO) 5-325 mg per tablet Take 1 tablet by mouth every 6 (six) hours as needed for Pain.    ibrutinib (IMBRUVICA) 140 mg Cap Take 420 mg by mouth once daily.    lisinopril (PRINIVIL,ZESTRIL) 20 MG tablet Take 20 mg by mouth once daily.    metoprolol tartrate (LOPRESSOR) 25 MG tablet Take 12.5 mg by mouth once daily.     pantoprazole (PROTONIX) 40 MG tablet Take 1 tablet (40 mg total) by mouth once daily.    potassium chloride SA (K-DUR,KLOR-CON) 20 MEQ tablet Take 20 mEq by mouth once daily.     prenatal #115-iron-folic acid 29 mg iron- 1 mg Chew Take 1 tablet by mouth once daily.     sucralfate (CARAFATE)  1 gram tablet Take 1 g by mouth 4 (four) times daily.     Family History     Problem Relation (Age of Onset)    Cancer Mother    No Known Problems Father        Social History Main Topics    Smoking status: Current Every Day Smoker     Packs/day: 0.50     Years: 50.00    Smokeless tobacco: Never Used      Comment: says she is trying to quit    Alcohol use No      Comment: hx of alochol abuse but does not drink at all now    Drug use: No      Comment: none    Sexual activity: Not Currently     Birth control/ protection: None     Review of Systems   Constitutional: Positive for chills and fatigue. Negative for appetite change and fever.   HENT: Negative for congestion, hearing loss, postnasal drip, sore throat and trouble swallowing.    Eyes: Negative for photophobia and visual disturbance.   Respiratory: Positive for cough and wheezing. Negative for shortness of breath.    Cardiovascular: Positive for leg swelling (Left foot swelling). Negative for chest pain and palpitations.   Gastrointestinal: Negative for abdominal pain, diarrhea, nausea and vomiting.   Endocrine: Negative for polydipsia, polyphagia and polyuria.   Genitourinary: Negative for dysuria, frequency and urgency.   Musculoskeletal: Positive for gait problem (Recent patellar injury/fracture right knee, uses wheelchair) and myalgias (Left foot). Negative for neck pain and neck stiffness.   Skin: Positive for color change (Redness to left foot) and rash (Left ear with surrounding swelling.). Negative for wound.   Neurological: Positive for headaches. Negative for dizziness, syncope, weakness and numbness.   Hematological: Does not bruise/bleed easily.   Psychiatric/Behavioral: Negative for confusion. The patient is not nervous/anxious.      Objective:     Vital Signs (Most Recent):  Temp: 98 °F (36.7 °C) (07/24/18 1659)  Pulse: 89 (07/24/18 2028)  Resp: 16 (07/24/18 1659)  BP: (!) 145/87 (07/24/18 2028)  SpO2: 97 % (07/24/18 2028) Vital Signs (24h  Range):  Temp:  [98 °F (36.7 °C)] 98 °F (36.7 °C)  Pulse:  [89-98] 89  Resp:  [16] 16  SpO2:  [96 %-97 %] 97 %  BP: (140-145)/(67-87) 145/87     Weight: 62.6 kg (138 lb)  Body mass index is 22.27 kg/m².    Physical Exam   Constitutional: She is oriented to person, place, and time. She appears well-developed and well-nourished. No distress.   HENT:   Head: Normocephalic and atraumatic.       Mouth/Throat: Oropharynx is clear and moist.   Eyes: Conjunctivae and EOM are normal. Pupils are equal, round, and reactive to light. No scleral icterus.   Neck: Normal range of motion. Neck supple. No JVD present. No tracheal deviation present. No thyromegaly present.   Cardiovascular: Normal rate, regular rhythm, normal heart sounds and intact distal pulses.  Exam reveals no gallop and no friction rub.    No murmur heard.  Pulses:       Dorsalis pedis pulses are 2+ on the right side, and 2+ on the left side.   Pulmonary/Chest: Effort normal and breath sounds normal. No accessory muscle usage. No respiratory distress. She has no wheezes. She has no rhonchi. She has no rales.   Abdominal: Soft. Bowel sounds are normal. She exhibits no distension and no mass. There is no tenderness. There is no rebound and no guarding.   Musculoskeletal: She exhibits edema (Left foot 2+) and tenderness (Left foot). She exhibits no deformity.   Decreased ROM to Left foot, Right leg in knee immobilizer.   Neurological: She is alert and oriented to person, place, and time. She has normal strength. She exhibits normal muscle tone. Coordination normal.   Skin: Skin is warm, dry and intact. Capillary refill takes less than 2 seconds. No rash noted. She is not diaphoretic. There is erythema (Left foot).   Left foot warm to touch, redness mild redness noted.   Skin intact.   Psychiatric: She has a normal mood and affect. Her speech is normal and behavior is normal. Thought content normal.   Vitals reviewed.        CRANIAL NERVES     CN III, IV, VI    Pupils are equal, round, and reactive to light.  Extraocular motions are normal.        Significant Labs:   CBC:   Recent Labs  Lab 07/24/18  1855   WBC 7.00   HGB 11.4*   HCT 35.4*        CMP:   Recent Labs  Lab 07/24/18 1856      K 5.2*      CO2 23   GLU 83   BUN 12   CREATININE 0.6   CALCIUM 9.4   PROT 7.7   ALBUMIN 3.3*   BILITOT 0.2   ALKPHOS 86   AST 21   ALT 10   ANIONGAP 14   EGFRNONAA >60     Coagulation:   Recent Labs  Lab 07/24/18  1855   INR 1.0     Lactic Acid:   Recent Labs  Lab 07/24/18  1855   LACTATE 3.1*     Urine Studies:   Recent Labs  Lab 07/24/18  1831   COLORU Yellow   APPEARANCEUA Clear   PHUR 6.0   SPECGRAV <=1.005*   PROTEINUA Negative   GLUCUA Negative   KETONESU Negative   BILIRUBINUA Negative   OCCULTUA 2+*   NITRITE Negative   UROBILINOGEN Negative   LEUKOCYTESUR 2+*   RBCUA 8*   WBCUA 6*   BACTERIA Few*   SQUAMEPITHEL 20       Significant Imaging:   Xray left ankle:FINDINGS:  No acute fracture or dislocation.  There are dystrophic calcifications noted within the soft tissues of the distal lower leg.  There is a sclerotic lesion within the distal diaphysis of the tibia that appears to demonstrate some marks and rings type calcification possibly representing an enchondroma.  This lesion measures approximately 2.5 cm.  Dorsal and plantar calcaneal enthesophytes noted..    Xray left foot: FINDINGS:  There is no evidence to suggest acute fracture or dislocation.  A mild hallux valgus deformity is noted.  There are mild degenerative changes noted throughout the foot.  Dorsal and plantar calcaneal enthesophytes are noted.  Generalized osteopenia is noted.

## 2018-07-25 NOTE — ASSESSMENT & PLAN NOTE
Chronic problem.  Controlled.  Continue home antihypertensives and monitor b/p closely for any needed adjustments.

## 2018-07-25 NOTE — ASSESSMENT & PLAN NOTE
Patient with chronic cough and wheezing. No acute exacerbation. Noncompliant with medications.  Does not wear home O2.  Currently smokes.  Continue home Breo and initiate duo nebs prn wheezing and shortness of breath.

## 2018-07-25 NOTE — ASSESSMENT & PLAN NOTE
Likely 2/2 to cellulitis.  Does not appear septic and does not meet SIRS criteria.  IV hydration.  Trend lactate level.

## 2018-07-25 NOTE — HPI
"Yocasta Almonte is a 74 yo female with PMHx significant for lymphoma, DM2, HTN, Thyroid disease, COPD and HLD.  She was admitted to the service of hospital medicine for cellulitis of the left foot. She presented to the ED today with a 1 1/2 week onset of swelling, redness and warmth to her right foot, which worsened today.  She stated that her foot has been painful (stinging sensation) without radiation. Shes stated that her foot feels "hard to " and has decreased range of motion.  She reported associated chills, fatigue, and HA.  She reports that she has COPD and has been wheezing, does not take her inhalers and nebulizer treatments as she is supposed to, and has had a cough productive of white sputum, mostly at night.  She denied fever, chest pain, palpitations, abdominal pain, n/v/d and dysuria, urgency or frequency.  "

## 2018-07-25 NOTE — ASSESSMENT & PLAN NOTE
Chronic problem. Controlled glucose upon admission.  Check HgA1C for control.  Hold oral Glimepiride   Accuchecks ac/hs  Supplemental Novolog SSI prn

## 2018-07-25 NOTE — NURSING
Notified by PCT of low BP. Manual recheck 89/35, Sahara Turcios NP notified, new orders noted, NS started at 250ml/hr x 500ml to 22g IV in figer on left hand, tolerating well, will cont to monitor.

## 2018-07-25 NOTE — PROGRESS NOTES
"Progress Note  Hospital Medicine  Patient Name:Yocasta Almonte  MRN:  [id number]  Patient Class: OP- Observation  Admit Date: 7/24/2018  Length of Stay: 0 days  Expected Discharge Date:   Attending Physician: Rodri Dial MD  Primary Care Provider:  Rani Welch MD    SUBJECTIVE:     Principal Problem: Cellulitis  Initial history of present illness: Yocasta Almonte is a 74 yo female with PMHx significant for lymphoma, DM2, HTN, Thyroid disease, COPD and HLD.  She was admitted to the service of hospital medicine for cellulitis of the left foot. She presented to the ED today with a 1 1/2 week onset of swelling, redness and warmth to her right foot, which worsened today.  She stated that her foot has been painful (stinging sensation) without radiation. Shes stated that her foot feels "hard to " and has decreased range of motion.  She reported associated chills, fatigue, and HA.  She reports that she has COPD and has been wheezing, does not take her inhalers and nebulizer treatments as she is supposed to, and has had a cough productive of white sputum, mostly at night.  She denied fever, chest pain, palpitations, abdominal pain, n/v/d and dysuria, urgency or frequency.    PMH/PSH/SH/FH/Meds: reviewed.    Symptoms/Review of Systems: Afebrile. Left ankle and foot swelling slightly better. BP is low. Lactate positive. No shortness of breath, cough, chest pain or headache, fever or abdominal pain.     Diet:  Adequate intake.    Activity level: Normal.    Pain: as above    OBJECTIVE:   Vital Signs (Most Recent):      Temp: 97.5 °F (36.4 °C) (07/25/18 0701)  Pulse: 82 (07/25/18 0757)  Resp: 14 (07/25/18 0757)  BP: (!) 105/56 (07/25/18 0701)  SpO2: 98 % (07/25/18 0757)       Vital Signs Range (Last 24H):  Temp:  [97.1 °F (36.2 °C)-98 °F (36.7 °C)]   Pulse:  [73-98]   Resp:  [14-18]   BP: ()/(35-87)   SpO2:  [90 %-98 %]     Weight: 62.6 kg (138 lb 0.1 oz)  Body mass index is 22.28 kg/m².    Intake/Output " Summary (Last 24 hours) at 07/25/18 0858  Last data filed at 07/25/18 0645   Gross per 24 hour   Intake             1860 ml   Output              650 ml   Net             1210 ml     Physical Examination:  Mouth/Throat: Oropharynx is clear and moist.   Eyes: Conjunctivae and EOM are normal. Pupils are equal, round, and reactive to light. No scleral icterus.   Neck: Normal range of motion. Neck supple. No JVD present. No tracheal deviation present. No thyromegaly present.   Cardiovascular: Normal rate, regular rhythm, normal heart sounds and intact distal pulses.  Exam reveals no gallop and no friction rub.    No murmur heard.  Pulses:       Dorsalis pedis pulses are 2+ on the right side, and 2+ on the left side.   Pulmonary/Chest: Effort normal and breath sounds normal. No accessory muscle usage. No respiratory distress. She has no wheezes. She has no rhonchi. She has no rales.   Abdominal: Soft. Bowel sounds are normal. She exhibits no distension and no mass. There is no tenderness. There is no rebound and no guarding.   Musculoskeletal: She exhibits edema (Left foot 2+) and tenderness (Left foot). She exhibits no deformity.   Decreased ROM to Left foot, Right leg in knee immobilizer.   Neurological: She is alert and oriented to person, place, and time. She has normal strength. She exhibits normal muscle tone. Coordination normal.   Skin: Skin is warm, dry and intact. Capillary refill takes less than 2 seconds. No rash noted. She is not diaphoretic. There is erythema (Left foot).   Left foot warm to touch, redness mild redness noted.   Skin intact.   Psychiatric: She has a normal mood and affect. Her speech is normal and behavior is normal. Thought content normal.   Vitals reviewed.  CRANIAL NERVES    CN III, IV, VI   Pupils are equal, round, and reactive to light.  Extraocular motions are normal.     CBC:    Recent Labs  Lab 07/24/18  1855 07/25/18  0554   WBC 7.00 6.70   RBC 4.14 3.66*   HGB 11.4* 10.2*   HCT  35.4* 31.0*    190   MCV 85 85   MCH 27.4 27.8   MCHC 32.1 32.8   BMP    Recent Labs  Lab 07/24/18 1856   GLU 83      K 5.2*      CO2 23   BUN 12   CREATININE 0.6   CALCIUM 9.4      Diagnostic Results:  Microbiology Results (last 7 days)     Procedure Component Value Units Date/Time    Urine culture [[id number]]     Order Status:  No result Specimen:  Urine     Blood culture #1 **CANNOT BE ORDERED STAT** [[id number]] Collected:  07/24/18 1856    Order Status:  Completed Specimen:  Blood from Peripheral, Left  Hand Updated:  07/25/18 0545     Blood Culture, Routine No Growth to date    Blood culture #2 **CANNOT BE ORDERED STAT** [[id number]] Collected:  07/24/18 1931    Order Status:  Sent Specimen:  Blood from Peripheral, Left  Hand Updated:  07/24/18 2321         Xray left ankle:FINDINGS:  No acute fracture or dislocation.  There are dystrophic calcifications noted within the soft tissues of the distal lower leg.  There is a sclerotic lesion within the distal diaphysis of the tibia that appears to demonstrate some marks and rings type calcification possibly representing an enchondroma.  This lesion measures approximately 2.5 cm.  Dorsal and plantar calcaneal enthesophytes noted..     Xray left foot: FINDINGS:  There is no evidence to suggest acute fracture or dislocation.  A mild hallux valgus deformity is noted.  There are mild degenerative changes noted throughout the foot.  Dorsal and plantar calcaneal enthesophytes are noted.  Generalized osteopenia is noted.    Assessment/Plan:     * Cellulitis     Patient presents with 1 1/2 week of progressively swelling, warmth and pain to left lower foot.  No trauma, skin intact, no ulcers.    Pharmacy consult to dose Vancomycin.  Keep extremity elevated.       Lactic acidosis     Likely 2/2 to cellulitis.  Does not appear septic and does not meet SIRS criteria.  IV hydration.  Trend lactate level.       Type 2 diabetes mellitus without complication      Chronic problem. Controlled glucose upon admission.  Check HgA1C for control.  Hold oral Glimepiride   Accuchecks ac/hs  Supplemental Novolog SSI prn       Lymphoma     Chronic problem.  Continue Ibrutinib.       Shingles     Recent treatment with antiviral therapy for 10 days for shingles outbreak on left ear.  Rash still present with mild swelling to area.  Patient reportedly has been scratching the area.  Possible secondary infection - antibiotic coverage for skin infection same as cellulitis.  Monitor area.       GERD (gastroesophageal reflux disease)     Chronic problem.  Continue PPI and Carafate.       COPD (chronic obstructive pulmonary disease)     Patient with chronic cough and wheezing. No acute exacerbation. Noncompliant with medications.  Does not wear home O2.  Currently smokes.  Continue home Breo and initiate duo nebs prn wheezing and shortness of breath.       HLD (hyperlipidemia)     Chronic problem. Continue Statin.       Essential hypertension     Chronic problem.  Controlled.  Continue home antihypertensives and monitor b/p closely for any needed adjustments.     Discussed with patient's daughter.   VTE Risk Mitigation         Ordered     enoxaparin injection 40 mg  Daily      07/24/18 2152     IP VTE HIGH RISK PATIENT  Once      07/24/18 2152        Rodri Dial MD  Department of Hospital Medicine   [facility]

## 2018-07-25 NOTE — H&P
"[facility] Hospital Medicine  History & Physical    Patient Name: Yocasta Almonte  MRN: [id number]  Admission Date: 7/24/2018  Attending Physician: Rodri Dial MD   Primary Care Provider: Rani Welch MD         Patient information was obtained from patient, past medical records and ER records.     Subjective:     Principal Problem:Cellulitis    Chief Complaint:   Chief Complaint   Patient presents with    Foot Swelling     LEFT foot swelling, warmth    Rash     LEFT ear        HPI: Yocasta Almonte is a 72 yo female with PMHx significant for lymphoma, DM2, HTN, Thyroid disease, COPD and HLD.  She was admitted to the service of hospital medicine for cellulitis of the left foot. She presented to the ED today with a 1 1/2 week onset of swelling, redness and warmth to her right foot, which worsened today.  She stated that her foot has been painful (stinging sensation) without radiation. Shes stated that her foot feels "hard to " and has decreased range of motion.  She reported associated chills, fatigue, and HA.  She reports that she has COPD and has been wheezing, does not take her inhalers and nebulizer treatments as she is supposed to, and has had a cough productive of white sputum, mostly at night.  She denied fever, chest pain, palpitations, abdominal pain, n/v/d and dysuria, urgency or frequency.    Past Medical History:   Diagnosis Date    Cancer     lymphoma    COPD (chronic obstructive pulmonary disease)     Diabetes mellitus     GERD (gastroesophageal reflux disease)     Hyperlipidemia     Hypertension     Thyroid disease        Past Surgical History:   Procedure Laterality Date    NECK SURGERY      infection in neck removed       Review of patient's allergies indicates:   Allergen Reactions    Iodine and iodide containing products        No current facility-administered medications on file prior to encounter.      Current Outpatient Prescriptions on File Prior to " Encounter   Medication Sig    albuterol (PROVENTIL) 5 mg/mL nebulizer solution Take 2.5 mg by nebulization 2 (two) times daily. Rescue     atorvastatin (LIPITOR) 80 MG tablet Take 80 mg by mouth once daily.    ciprofloxacin-hydrocortisone 0.2-1% (CIPRO HC OTIC) otic suspension Place 3 drops into the left ear 2 (two) times daily. Take for 16 days.    fluticasone (FLONASE) 50 mcg/actuation nasal spray 1 spray by Each Nare route once daily.    FLUTICASONE/VILANTEROL (BREO ELLIPTA INHL) Inhale 1 puff into the lungs once daily.    gabapentin (NEURONTIN) 300 MG capsule Take 300 mg by mouth 3 (three) times daily.    glimepiride (AMARYL) 2 MG tablet Take 2 mg by mouth 2 (two) times daily with meals.    HYDROcodone-acetaminophen (NORCO) 5-325 mg per tablet Take 1 tablet by mouth every 6 (six) hours as needed for Pain.    ibrutinib (IMBRUVICA) 140 mg Cap Take 420 mg by mouth once daily.    lisinopril (PRINIVIL,ZESTRIL) 20 MG tablet Take 20 mg by mouth once daily.    metoprolol tartrate (LOPRESSOR) 25 MG tablet Take 12.5 mg by mouth once daily.     pantoprazole (PROTONIX) 40 MG tablet Take 1 tablet (40 mg total) by mouth once daily.    potassium chloride SA (K-DUR,KLOR-CON) 20 MEQ tablet Take 20 mEq by mouth once daily.     prenatal #115-iron-folic acid 29 mg iron- 1 mg Chew Take 1 tablet by mouth once daily.     sucralfate (CARAFATE) 1 gram tablet Take 1 g by mouth 4 (four) times daily.     Family History     Problem Relation (Age of Onset)    Cancer Mother    No Known Problems Father        Social History Main Topics    Smoking status: Current Every Day Smoker     Packs/day: 0.50     Years: 50.00    Smokeless tobacco: Never Used      Comment: says she is trying to quit    Alcohol use No      Comment: hx of alochol abuse but does not drink at all now    Drug use: No      Comment: none    Sexual activity: Not Currently     Birth control/ protection: None     Review of Systems   Constitutional: Positive for  chills and fatigue. Negative for appetite change and fever.   HENT: Negative for congestion, hearing loss, postnasal drip, sore throat and trouble swallowing.    Eyes: Negative for photophobia and visual disturbance.   Respiratory: Positive for cough and wheezing. Negative for shortness of breath.    Cardiovascular: Positive for leg swelling (Left foot swelling). Negative for chest pain and palpitations.   Gastrointestinal: Negative for abdominal pain, diarrhea, nausea and vomiting.   Endocrine: Negative for polydipsia, polyphagia and polyuria.   Genitourinary: Negative for dysuria, frequency and urgency.   Musculoskeletal: Positive for gait problem (Recent patellar injury/fracture right knee, uses wheelchair) and myalgias (Left foot). Negative for neck pain and neck stiffness.   Skin: Positive for color change (Redness to left foot) and rash (Left ear with surrounding swelling.). Negative for wound.   Neurological: Positive for headaches. Negative for dizziness, syncope, weakness and numbness.   Hematological: Does not bruise/bleed easily.   Psychiatric/Behavioral: Negative for confusion. The patient is not nervous/anxious.      Objective:     Vital Signs (Most Recent):  Temp: 98 °F (36.7 °C) (07/24/18 1659)  Pulse: 89 (07/24/18 2028)  Resp: 16 (07/24/18 1659)  BP: (!) 145/87 (07/24/18 2028)  SpO2: 97 % (07/24/18 2028) Vital Signs (24h Range):  Temp:  [98 °F (36.7 °C)] 98 °F (36.7 °C)  Pulse:  [89-98] 89  Resp:  [16] 16  SpO2:  [96 %-97 %] 97 %  BP: (140-145)/(67-87) 145/87     Weight: 62.6 kg (138 lb)  Body mass index is 22.27 kg/m².    Physical Exam   Constitutional: She is oriented to person, place, and time. She appears well-developed and well-nourished. No distress.   HENT:   Head: Normocephalic and atraumatic.       Mouth/Throat: Oropharynx is clear and moist.   Eyes: Conjunctivae and EOM are normal. Pupils are equal, round, and reactive to light. No scleral icterus.   Neck: Normal range of motion. Neck  supple. No JVD present. No tracheal deviation present. No thyromegaly present.   Cardiovascular: Normal rate, regular rhythm, normal heart sounds and intact distal pulses.  Exam reveals no gallop and no friction rub.    No murmur heard.  Pulses:       Dorsalis pedis pulses are 2+ on the right side, and 2+ on the left side.   Pulmonary/Chest: Effort normal and breath sounds normal. No accessory muscle usage. No respiratory distress. She has no wheezes. She has no rhonchi. She has no rales.   Abdominal: Soft. Bowel sounds are normal. She exhibits no distension and no mass. There is no tenderness. There is no rebound and no guarding.   Musculoskeletal: She exhibits edema (Left foot 2+) and tenderness (Left foot). She exhibits no deformity.   Decreased ROM to Left foot, Right leg in knee immobilizer.   Neurological: She is alert and oriented to person, place, and time. She has normal strength. She exhibits normal muscle tone. Coordination normal.   Skin: Skin is warm, dry and intact. Capillary refill takes less than 2 seconds. No rash noted. She is not diaphoretic. There is erythema (Left foot).   Left foot warm to touch, redness mild redness noted.   Skin intact.   Psychiatric: She has a normal mood and affect. Her speech is normal and behavior is normal. Thought content normal.   Vitals reviewed.        CRANIAL NERVES     CN III, IV, VI   Pupils are equal, round, and reactive to light.  Extraocular motions are normal.        Significant Labs:   CBC:   Recent Labs  Lab 07/24/18 1855   WBC 7.00   HGB 11.4*   HCT 35.4*        CMP:   Recent Labs  Lab 07/24/18  1856      K 5.2*      CO2 23   GLU 83   BUN 12   CREATININE 0.6   CALCIUM 9.4   PROT 7.7   ALBUMIN 3.3*   BILITOT 0.2   ALKPHOS 86   AST 21   ALT 10   ANIONGAP 14   EGFRNONAA >60     Coagulation:   Recent Labs  Lab 07/24/18  1855   INR 1.0     Lactic Acid:   Recent Labs  Lab 07/24/18 1855   LACTATE 3.1*     Urine Studies:   Recent Labs  Lab  07/24/18  1831   COLORU Yellow   APPEARANCEUA Clear   PHUR 6.0   SPECGRAV <=1.005*   PROTEINUA Negative   GLUCUA Negative   KETONESU Negative   BILIRUBINUA Negative   OCCULTUA 2+*   NITRITE Negative   UROBILINOGEN Negative   LEUKOCYTESUR 2+*   RBCUA 8*   WBCUA 6*   BACTERIA Few*   SQUAMEPITHEL 20       Significant Imaging:   Xray left ankle:FINDINGS:  No acute fracture or dislocation.  There are dystrophic calcifications noted within the soft tissues of the distal lower leg.  There is a sclerotic lesion within the distal diaphysis of the tibia that appears to demonstrate some marks and rings type calcification possibly representing an enchondroma.  This lesion measures approximately 2.5 cm.  Dorsal and plantar calcaneal enthesophytes noted..    Xray left foot: FINDINGS:  There is no evidence to suggest acute fracture or dislocation.  A mild hallux valgus deformity is noted.  There are mild degenerative changes noted throughout the foot.  Dorsal and plantar calcaneal enthesophytes are noted.  Generalized osteopenia is noted.      Assessment/Plan:     * Cellulitis    Patient presents with 1 1/2 week of progressively swelling, warmth and pain to left lower foot.  No trauma, skin intact, no ulcers.    Pharmacy consult to dose Vancomycin.  Keep extremity elevated.              Lactic acidosis    Likely 2/2 to cellulitis.  Does not appear septic and does not meet SIRS criteria.  IV hydration.  Trend lactate level.          Type 2 diabetes mellitus without complication    Chronic problem. Controlled glucose upon admission.  Check HgA1C for control.  Hold oral Glimepiride   Accuchecks ac/hs  Supplemental Novolog SSI prn          Lymphoma    Chronic problem.  Continue Ibrutinib.          Shingles    Recent treatment with antiviral therapy for 10 days for shingles outbreak on left ear.  Rash still present with mild swelling to area.  Patient reportedly has been scratching the area.  Possible secondary infection - antibiotic  coverage for skin infection same as cellulitis.  Monitor area.          GERD (gastroesophageal reflux disease)    Chronic problem.  Continue PPI and Carafate.          COPD (chronic obstructive pulmonary disease)    Patient with chronic cough and wheezing. No acute exacerbation. Noncompliant with medications.  Does not wear home O2.  Currently smokes.  Continue home Breo and initiate duo nebs prn wheezing and shortness of breath.            HLD (hyperlipidemia)    Chronic problem. Continue Statin.            Essential hypertension    Chronic problem.  Controlled.  Continue home antihypertensives and monitor b/p closely for any needed adjustments.          VTE Risk Mitigation         Ordered     enoxaparin injection 40 mg  Daily      07/24/18 2152     IP VTE HIGH RISK PATIENT  Once      07/24/18 2152             Sahara Osborne NP  Department of Hospital Medicine   [facility]

## 2018-07-26 VITALS
SYSTOLIC BLOOD PRESSURE: 131 MMHG | BODY MASS INDEX: 22.18 KG/M2 | HEIGHT: 66 IN | TEMPERATURE: 96 F | OXYGEN SATURATION: 96 % | HEART RATE: 66 BPM | RESPIRATION RATE: 20 BRPM | WEIGHT: 138 LBS | DIASTOLIC BLOOD PRESSURE: 58 MMHG

## 2018-07-26 LAB
ALBUMIN SERPL BCP-MCNC: 2.5 G/DL
ALP SERPL-CCNC: 62 U/L
ALT SERPL W/O P-5'-P-CCNC: 6 U/L
ANION GAP SERPL CALC-SCNC: 5 MMOL/L
AST SERPL-CCNC: 7 U/L
BASOPHILS # BLD AUTO: 0.1 K/UL
BASOPHILS NFR BLD: 1.7 %
BILIRUB SERPL-MCNC: 0.2 MG/DL
BUN SERPL-MCNC: 10 MG/DL
CALCIUM SERPL-MCNC: 8.4 MG/DL
CHLORIDE SERPL-SCNC: 111 MMOL/L
CO2 SERPL-SCNC: 26 MMOL/L
CREAT SERPL-MCNC: 0.6 MG/DL
DIFFERENTIAL METHOD: ABNORMAL
EOSINOPHIL # BLD AUTO: 0.5 K/UL
EOSINOPHIL NFR BLD: 7.6 %
ERYTHROCYTE [DISTWIDTH] IN BLOOD BY AUTOMATED COUNT: 16.2 %
EST. GFR  (AFRICAN AMERICAN): >60 ML/MIN/1.73 M^2
EST. GFR  (NON AFRICAN AMERICAN): >60 ML/MIN/1.73 M^2
GLUCOSE SERPL-MCNC: 110 MG/DL
HCT VFR BLD AUTO: 28 %
HGB BLD-MCNC: 8.9 G/DL
LYMPHOCYTES # BLD AUTO: 1.7 K/UL
LYMPHOCYTES NFR BLD: 27.8 %
MAGNESIUM SERPL-MCNC: 2.1 MG/DL
MCH RBC QN AUTO: 27.2 PG
MCHC RBC AUTO-ENTMCNC: 31.8 G/DL
MCV RBC AUTO: 86 FL
MONOCYTES # BLD AUTO: 0.6 K/UL
MONOCYTES NFR BLD: 10.1 %
NEUTROPHILS # BLD AUTO: 3.2 K/UL
NEUTROPHILS NFR BLD: 52.8 %
PHOSPHATE SERPL-MCNC: 3.2 MG/DL
PLATELET # BLD AUTO: 185 K/UL
PMV BLD AUTO: 8.6 FL
POCT GLUCOSE: 103 MG/DL (ref 70–110)
POTASSIUM SERPL-SCNC: 3.6 MMOL/L
PROT SERPL-MCNC: 5.3 G/DL
RBC # BLD AUTO: 3.27 M/UL
SODIUM SERPL-SCNC: 142 MMOL/L
VANCOMYCIN TROUGH SERPL-MCNC: 13.2 UG/ML
WBC # BLD AUTO: 6 K/UL

## 2018-07-26 PROCEDURE — 94761 N-INVAS EAR/PLS OXIMETRY MLT: CPT

## 2018-07-26 PROCEDURE — 83735 ASSAY OF MAGNESIUM: CPT

## 2018-07-26 PROCEDURE — 99900035 HC TECH TIME PER 15 MIN (STAT)

## 2018-07-26 PROCEDURE — 82962 GLUCOSE BLOOD TEST: CPT

## 2018-07-26 PROCEDURE — 80053 COMPREHEN METABOLIC PANEL: CPT

## 2018-07-26 PROCEDURE — 84100 ASSAY OF PHOSPHORUS: CPT

## 2018-07-26 PROCEDURE — 25000003 PHARM REV CODE 250: Performed by: INTERNAL MEDICINE

## 2018-07-26 PROCEDURE — 96365 THER/PROPH/DIAG IV INF INIT: CPT

## 2018-07-26 PROCEDURE — 25000003 PHARM REV CODE 250: Performed by: NURSE PRACTITIONER

## 2018-07-26 PROCEDURE — S4991 NICOTINE PATCH NONLEGEND: HCPCS | Performed by: NURSE PRACTITIONER

## 2018-07-26 PROCEDURE — 99217 PR OBSERVATION CARE DISCHARGE: CPT | Mod: ,,, | Performed by: INTERNAL MEDICINE

## 2018-07-26 PROCEDURE — 85025 COMPLETE CBC W/AUTO DIFF WBC: CPT

## 2018-07-26 PROCEDURE — 96361 HYDRATE IV INFUSION ADD-ON: CPT

## 2018-07-26 PROCEDURE — 96366 THER/PROPH/DIAG IV INF ADDON: CPT

## 2018-07-26 PROCEDURE — 63600175 PHARM REV CODE 636 W HCPCS: Performed by: INTERNAL MEDICINE

## 2018-07-26 PROCEDURE — 94640 AIRWAY INHALATION TREATMENT: CPT

## 2018-07-26 PROCEDURE — 80202 ASSAY OF VANCOMYCIN: CPT

## 2018-07-26 PROCEDURE — G0378 HOSPITAL OBSERVATION PER HR: HCPCS

## 2018-07-26 PROCEDURE — 36415 COLL VENOUS BLD VENIPUNCTURE: CPT

## 2018-07-26 RX ORDER — FERROUS SULFATE 325(65) MG
325 TABLET ORAL
Qty: 60 TABLET | Refills: 0 | Status: SHIPPED | OUTPATIENT
Start: 2018-07-26 | End: 2018-09-24

## 2018-07-26 RX ORDER — CLINDAMYCIN HYDROCHLORIDE 300 MG/1
300 CAPSULE ORAL 3 TIMES DAILY
Qty: 21 CAPSULE | Refills: 0 | Status: SHIPPED | OUTPATIENT
Start: 2018-07-26 | End: 2018-08-02

## 2018-07-26 RX ADMIN — HYDROCODONE BITARTRATE AND ACETAMINOPHEN 1 TABLET: 5; 325 TABLET ORAL at 03:07

## 2018-07-26 RX ADMIN — PANTOPRAZOLE SODIUM 40 MG: 40 TABLET, DELAYED RELEASE ORAL at 09:07

## 2018-07-26 RX ADMIN — GABAPENTIN 300 MG: 300 CAPSULE ORAL at 09:07

## 2018-07-26 RX ADMIN — ATORVASTATIN CALCIUM 80 MG: 40 TABLET, FILM COATED ORAL at 09:07

## 2018-07-26 RX ADMIN — MUPIROCIN: 20 OINTMENT TOPICAL at 09:07

## 2018-07-26 RX ADMIN — NICOTINE 1 PATCH: 14 PATCH, EXTENDED RELEASE TRANSDERMAL at 09:07

## 2018-07-26 RX ADMIN — FLUTICASONE FUROATE AND VILANTEROL TRIFENATATE 1 PUFF: 100; 25 POWDER RESPIRATORY (INHALATION) at 09:07

## 2018-07-26 RX ADMIN — SUCRALFATE 1 G: 1 TABLET ORAL at 12:07

## 2018-07-26 RX ADMIN — METOPROLOL TARTRATE 12.5 MG: 25 TABLET, FILM COATED ORAL at 10:07

## 2018-07-26 RX ADMIN — FLUTICASONE PROPIONATE 50 MCG: 50 SPRAY, METERED NASAL at 09:07

## 2018-07-26 RX ADMIN — SUCRALFATE 1 G: 1 TABLET ORAL at 09:07

## 2018-07-26 RX ADMIN — VANCOMYCIN HYDROCHLORIDE 750 MG: 1 INJECTION, POWDER, LYOPHILIZED, FOR SOLUTION INTRAVENOUS at 12:07

## 2018-07-26 NOTE — PLAN OF CARE
Problem: Patient Care Overview  Goal: Plan of Care Review  Outcome: Ongoing (interventions implemented as appropriate)  Pt remained free from injury/falls this shift. VSS- no signs of any distress. Tele-  NSR. POC reviewed with pt and family. Pt repositioned with assist, skin intact. Family @BS, bed locked in lowest position, SR upx2, call light within reach. Will continue to monitor.

## 2018-07-26 NOTE — PLAN OF CARE
430pm CM received call back from Dr. KAREN Welch office with hospital followup, CM added to avs and called to notify patient, spoke with grand daughter.      07/26/18 1631   Discharge Assessment   Assessment Type Discharge Planning Reassessment

## 2018-07-26 NOTE — PLAN OF CARE
Pt resting quietly at this time. Able to make needs known. Resp even/unlaboered. Alert and oriented. Complaint of pain x1. Medicated appropriately. Up to bedside  Commode x1 with 1 assist. Very unsteady. Call light in reach. Will cont to monitor.

## 2018-07-26 NOTE — PLAN OF CARE
07/26/18 1349   Final Note   Assessment Type Final Discharge Note   Discharge Disposition Home-Health  ([facility] HH resumption)

## 2018-07-26 NOTE — DISCHARGE SUMMARY
"Discharge Summary  Hospital Medicine    Admit Date: 7/24/2018    Date and Time: 7/26/20181:30 PM    Discharge Attending Physician: Rodri Dial MD    Primary Care Physician: Rani Welch MD    Diagnoses:  Active Hospital Problems    Diagnosis  POA    *Cellulitis [L03.90]  Yes    Type 2 diabetes mellitus without complication [E11.9]  Yes    Essential hypertension [I10]  Yes    Shingles [B02.9]  Yes    Lactic acidosis [E87.2]  Yes    HLD (hyperlipidemia) [E78.5]  Yes    COPD (chronic obstructive pulmonary disease) [J44.9]  Yes    Lymphoma [C85.90]  Yes    GERD (gastroesophageal reflux disease) [K21.9]  Yes      Resolved Hospital Problems    Diagnosis Date Resolved POA   No resolved problems to display.     Discharged Condition: Good    Hospital Course:   Yocasta Almonte is a 74 yo female with PMHx significant for lymphoma, DM2, HTN, Thyroid disease, COPD and HLD.  She was admitted to the service of hospital medicine for cellulitis of the left foot. She presented to the ED with a 1 1/2 week onset of swelling, redness and warmth to her right foot, which worsened on the day of presentation.  She stated that her foot has been painful (stinging sensation) without radiation. Shes stated that her foot feels "hard to " and has decreased range of motion.  She reported associated chills, fatigue, and HA.  She reports that she has COPD and has been wheezing, does not take her inhalers and nebulizer treatments as she is supposed to, and has had a cough productive of white sputum, mostly at night.  She denied fever, chest pain, palpitations, abdominal pain, n/v/d and dysuria, urgency or frequency. Patient was admitted to Hospitalist medicine service. Patient was started on IV antibiotics. Patient encouraged to elevate extremity. Routine wound care continued. Patient's symptoms improved and patient transitioned to PO antibiotics. Home earle to be resumed. Patient was discharged home in stable condition with " following discharge plan of care.     Consults: None    Significant Diagnostic Studies:     Microbiology Results (last 7 days)     Procedure Component Value Units Date/Time    Blood culture #2 **CANNOT BE ORDERED STAT** [[id number]] Collected:  07/24/18 1931    Order Status:  Completed Specimen:  Blood from Peripheral, Left  Hand Updated:  07/26/18 0613     Blood Culture, Routine No Growth to date     Blood Culture, Routine No Growth to date    Blood culture #1 **CANNOT BE ORDERED STAT** [[id number]] Collected:  07/24/18 1856    Order Status:  Completed Specimen:  Blood from Peripheral, Left  Hand Updated:  07/25/18 2322     Blood Culture, Routine No Growth to date     Blood Culture, Routine No Growth to date    Urine culture [[id number]] Collected:  07/24/18 2324    Order Status:  Sent Specimen:  Urine from Urine, Clean Catch Updated:  07/25/18 2127    Urine culture [[id number]]     Order Status:  Canceled Specimen:  Urine         Special Treatments/Procedures: None  Disposition: Home or Self Care    Medications:  Reconciled Home Medications: Current Discharge Medication List      START taking these medications    Details   clindamycin (CLEOCIN) 300 MG capsule Take 1 capsule (300 mg total) by mouth 3 (three) times daily. for 7 days  Qty: 21 capsule, Refills: 0      ferrous sulfate 325 mg (65 mg iron) Tab tablet Take 1 tablet (325 mg total) by mouth daily with breakfast.  Qty: 60 tablet, Refills: 0         CONTINUE these medications which have NOT CHANGED    Details   albuterol (PROVENTIL) 5 mg/mL nebulizer solution Take 2.5 mg by nebulization 2 (two) times daily. Rescue       atorvastatin (LIPITOR) 80 MG tablet Take 80 mg by mouth once daily.      ciprofloxacin-hydrocortisone 0.2-1% (CIPRO HC OTIC) otic suspension Place 3 drops into the left ear 2 (two) times daily. Take for 16 days.      fluticasone (FLONASE) 50 mcg/actuation nasal spray 1 spray by Each Nare route once daily.      FLUTICASONE/VILANTEROL (BREO ELLIPTA  INHL) Inhale 1 puff into the lungs once daily.      gabapentin (NEURONTIN) 300 MG capsule Take 300 mg by mouth 3 (three) times daily.      glimepiride (AMARYL) 2 MG tablet Take 2 mg by mouth 2 (two) times daily with meals.      HYDROcodone-acetaminophen (NORCO) 5-325 mg per tablet Take 1 tablet by mouth every 6 (six) hours as needed for Pain.  Qty: 4 tablet, Refills: 0      ibrutinib (IMBRUVICA) 140 mg Cap Take 420 mg by mouth once daily.      lisinopril (PRINIVIL,ZESTRIL) 20 MG tablet Take 20 mg by mouth once daily.      metoprolol tartrate (LOPRESSOR) 25 MG tablet Take 12.5 mg by mouth once daily.       pantoprazole (PROTONIX) 40 MG tablet Take 1 tablet (40 mg total) by mouth once daily.  Qty: 30 tablet, Refills: 0      potassium chloride SA (K-DUR,KLOR-CON) 20 MEQ tablet Take 20 mEq by mouth once daily.       prenatal #115-iron-folic acid 29 mg iron- 1 mg Chew Take 1 tablet by mouth once daily.       sucralfate (CARAFATE) 1 gram tablet Take 1 g by mouth 4 (four) times daily.             Discharge Procedure Orders  Ambulatory referral to Home Health   Referral Priority: Routine Referral Type: Home Health   Referral Reason: Specialty Services Required    Requested Specialty: Home Health Services    Number of Visits Requested: 1      Diet diabetic     Diet Cardiac     Other restrictions (specify):   Order Comments: PLEASE OBSERVE FALL PRECAUTIONS   Scheduling Instructions: Keep affected foot elevated     Call MD for:   Order Comments: For worsening symptoms, chest pain, shortness of breath, increased abdominal pain, high grade fever, stroke or stroke like symptoms, immediately go to the nearest Emergency Room or call 911 as soon as possible.       Follow-up Information     Rani Welch MD In 1 week.    Specialty:  Family Medicine  Contact information:  [address]  [address]  [place] [address] [phone]

## 2018-07-26 NOTE — PLAN OF CARE
I called Dr. Mirna Welch office at [phone] and confirmed that the pt is with [facility] services. Joleen Vaughan LMSW     07/26/18 1100   Discharge Assessment   Assessment Type Discharge Planning Reassessment

## 2018-07-26 NOTE — PROGRESS NOTES
SS[facility] sent patient information to [facility] through [facility] for resumption of home health services.SHANA Rivas    The referral for the patient in [facility] OBS, room 202, bed 202 A admitted at 7/24/2018 5:17 PM has been updated by [email or web address].  Update: Accepted.

## 2018-07-26 NOTE — CONSULTS
Date: 7/26/2018   Yocasta Almonte [id number] is a 73 y.o. female who has been consulted for vancomycin dosing.    The patient has the following labs:     Creatinine (mg/dl)    WBC Count   Serum creatinine: 0.6 mg/dL 07/26/18 0525  Estimated creatinine clearance: 77 mL/min Lab Results   Component Value Date    WBC 6.00 07/26/2018        Current weight is 62.6 kg (138 lb 0.1 oz)      Pt is receiving vancomycin 750 mg every 12 hours.  Vancomycin trough from 7/26/2018  at 1058  was 13.2 mg/dL.  Target trough range is 10-15 mg/dL.   Trough was drawn on time and anticipate it is  Therapeutic. Pharmacy will continue current dose.   The vancomycin trough has been ordered for 7/27 at 2300.     Patient will be followed by pharmacy for changes in renal function, toxicity, and efficacy.    Thank you for allowing us to participate in this patient's care.     Anjelica Don, [place]

## 2018-07-27 LAB
BACTERIA UR CULT: NO GROWTH
POCT GLUCOSE: 138 MG/DL (ref 70–110)

## 2018-07-29 LAB — BACTERIA BLD CULT: NORMAL

## 2018-07-30 LAB — BACTERIA BLD CULT: NORMAL

## 2018-09-19 ENCOUNTER — OFFICE VISIT (OUTPATIENT)
Dept: ALLERGY | Facility: CLINIC | Age: 73
End: 2018-09-19
Payer: MEDICARE

## 2018-09-19 VITALS
OXYGEN SATURATION: 99 % | SYSTOLIC BLOOD PRESSURE: 120 MMHG | DIASTOLIC BLOOD PRESSURE: 74 MMHG | WEIGHT: 142 LBS | BODY MASS INDEX: 22.29 KG/M2 | HEIGHT: 67 IN | HEART RATE: 72 BPM

## 2018-09-19 DIAGNOSIS — J31.0 CHRONIC RHINITIS: ICD-10-CM

## 2018-09-19 DIAGNOSIS — Z72.0 TOBACCO ABUSE: ICD-10-CM

## 2018-09-19 DIAGNOSIS — L29.9 PRURITUS: ICD-10-CM

## 2018-09-19 DIAGNOSIS — T50.905A ADVERSE EFFECT OF DRUG, INITIAL ENCOUNTER: Primary | ICD-10-CM

## 2018-09-19 DIAGNOSIS — R21 RASH AND NONSPECIFIC SKIN ERUPTION: ICD-10-CM

## 2018-09-19 PROCEDURE — 99204 OFFICE O/P NEW MOD 45 MIN: CPT | Mod: ,,, | Performed by: ALLERGY & IMMUNOLOGY

## 2018-09-19 RX ORDER — FLUTICASONE PROPIONATE 50 MCG
1 SPRAY, SUSPENSION (ML) NASAL 2 TIMES DAILY
Qty: 1 BOTTLE | Refills: 3 | Status: SHIPPED | OUTPATIENT
Start: 2018-09-19 | End: 2020-01-01 | Stop reason: SDUPTHER

## 2018-09-19 RX ORDER — TRIAMCINOLONE ACETONIDE 1 MG/G
CREAM TOPICAL
Qty: 453 G | Refills: 1 | Status: SHIPPED | OUTPATIENT
Start: 2018-09-19 | End: 2019-10-15

## 2018-09-19 NOTE — PROGRESS NOTES
"Subjective:       Patient ID: Yocasta Almonte is a 73 y.o. female.    Chief Complaint: Rash (referred by [facility] ER)    HPI     Pt presents as a new patient for rash    Rash onset 3 months  Location ears spread to arms and neck and chest. Pt states it is spreading.   Trigger : pt thought is was possible from her cancer medications. She has chemo once daily.   Treatment: pt doesn't want to take anything otc currently  In the ed she was given steroid and a cream- murpirocin.     Cancer doc: Jimenez Matt [address] [facility] [address] fax [phone], phone - [phone].   On chemo for lymphoma  Has had lymphoma for about 3-4 years.       Review of Systems      General: neg unexpected weight changes, fevers, chills, night sweats, malaise  HEENT: see hpi, Neg eye pain, vision changes, ear drainage, nose bleeds, throat tightness, sores in the mouth  CV: Neg chest pain, palpitations, swelling  Resp: see hpi, neg shortness of breath, hemoptysis, cough  GI: see hpi, neg dysphagia, night abdominal pain, reflux, chronic diarrhea, chronic constipation  Derm: See Hpi,  neg flushing  Mu/sk: Neg joint pain, joint swelling   Psych: Neg anxiety  neuro: neg chronic headaches, muscle weakness  Endo: neg heat/cold intolerance, chronic fatigue    Objective:     Vitals:    09/19/18 0913   BP: 120/74   Pulse: 72   SpO2: 99%   Weight: 64.4 kg (142 lb)   Height: 5' 7" (1.702 m)        Physical Exam      General: no acute distress, well developed well nourished   HEENT:   Head:normocephalic atraumatic  Eyes: NERI, EOMI, Neg injection, scleral icterus, or conjunctival papillary hypertrophy.  Ears: tm clear bilaterally, normal canal  Nose: nares patent  OP: mucus membranes moist, - cobblestoning, - PND, neg erythema or lesions  Neck: supple, Full range of motion, neg lymphadenopathy  Chest: full respiratory excursion no abnormal chest abnormality  Resp: clear to ascultation bilaterally  CV: RRR, neg MRG, brisk capillary refill  Abdomen: BS+, non tender, non " distended  Ext:  Neg clubbing, cyanosis, pitting edema  Skin: pustular papules on the chest neck, and bilateral ac.   Lymph: neg supraclavicular, axillary     Assessment:       1. Adverse effect of drug, initial encounter    2. Rash and nonspecific skin eruption    3. Pruritus    4. Chronic rhinitis    5. Tobacco abuse        Plan:       Adverse effect of drug, initial encounter    Rash and nonspecific skin eruption  -     triamcinolone acetonide 0.1% (KENALOG) 0.1 % cream; Spare the face apply 2-3 times per day for itch relief on the rash.  Dispense: 453 g; Refill: 1    Pruritus  -     triamcinolone acetonide 0.1% (KENALOG) 0.1 % cream; Spare the face apply 2-3 times per day for itch relief on the rash.  Dispense: 453 g; Refill: 1    Chronic rhinitis  -     fluticasone (FLONASE) 50 mcg/actuation nasal spray; 1 spray (50 mcg total) by Each Nare route 2 (two) times daily.  Dispense: 1 Bottle; Refill: 3    Tobacco abuse          Start high dose antihistamines  Start sarna prn   Start triamcinolone 0.1 % BID ointment     Most likely from the chemo. Would like to treat through the rash if possible. Tx for lymphoma takes precedence.     Follow up in 4 weeks to monitor itch control.     Start rhinitis care with saline 1-2 times per day and fluticasone 1 sen bid.         Jina Pantoja M.D.  Allergy/Immunology  [facility]   [phone] phone  [phone] fax

## 2018-09-19 NOTE — PATIENT INSTRUCTIONS
Itch:  Zyrtec 1 pill twice per day   sarna as needed on any area as often as you need for itch  Triamcinolone topical steroid cream twice per day on affected areas with rash. Spare the face.     For sinuses  Continue saline 1-2 times per day  Start fluticasone or flonase 1 spray per nare twice per day

## 2019-01-01 ENCOUNTER — CLINICAL SUPPORT (OUTPATIENT)
Dept: HEMATOLOGY/ONCOLOGY | Facility: CLINIC | Age: 74
End: 2019-01-01
Payer: MEDICARE

## 2019-01-01 ENCOUNTER — OFFICE VISIT (OUTPATIENT)
Dept: FAMILY MEDICINE | Facility: CLINIC | Age: 74
End: 2019-01-01
Payer: MEDICARE

## 2019-01-01 ENCOUNTER — TELEPHONE (OUTPATIENT)
Dept: FAMILY MEDICINE | Facility: CLINIC | Age: 74
End: 2019-01-01

## 2019-01-01 VITALS
HEIGHT: 64 IN | WEIGHT: 145 LBS | DIASTOLIC BLOOD PRESSURE: 88 MMHG | SYSTOLIC BLOOD PRESSURE: 146 MMHG | HEART RATE: 92 BPM | BODY MASS INDEX: 24.75 KG/M2

## 2019-01-01 DIAGNOSIS — K21.9 GASTROESOPHAGEAL REFLUX DISEASE WITHOUT ESOPHAGITIS: ICD-10-CM

## 2019-01-01 DIAGNOSIS — J44.0 ACUTE BRONCHITIS WITH COPD: ICD-10-CM

## 2019-01-01 DIAGNOSIS — H60.543 ECZEMA OF EXTERNAL EAR, BILATERAL: ICD-10-CM

## 2019-01-01 DIAGNOSIS — D51.8 ANEMIA OF DECREASED VITAMIN B12 ABSORPTION: ICD-10-CM

## 2019-01-01 DIAGNOSIS — J44.9 CHRONIC OBSTRUCTIVE PULMONARY DISEASE, UNSPECIFIED COPD TYPE: ICD-10-CM

## 2019-01-01 DIAGNOSIS — E11.9 TYPE 2 DIABETES MELLITUS WITHOUT COMPLICATION, WITHOUT LONG-TERM CURRENT USE OF INSULIN: Primary | ICD-10-CM

## 2019-01-01 DIAGNOSIS — J20.9 ACUTE BRONCHITIS WITH COPD: ICD-10-CM

## 2019-01-01 DIAGNOSIS — C91.10 CHRONIC LYMPHOCYTIC LEUKEMIA: ICD-10-CM

## 2019-01-01 PROCEDURE — 1101F PR PT FALLS ASSESS DOC 0-1 FALLS W/OUT INJ PAST YR: ICD-10-PCS | Mod: S$GLB,,, | Performed by: FAMILY MEDICINE

## 2019-01-01 PROCEDURE — 96372 PR INJECTION,THERAP/PROPH/DIAG2ST, IM OR SUBCUT: ICD-10-PCS | Mod: S$GLB,,, | Performed by: INTERNAL MEDICINE

## 2019-01-01 PROCEDURE — 3077F SYST BP >= 140 MM HG: CPT | Mod: S$GLB,,, | Performed by: FAMILY MEDICINE

## 2019-01-01 PROCEDURE — 1159F PR MEDICATION LIST DOCUMENTED IN MEDICAL RECORD: ICD-10-PCS | Mod: S$GLB,,, | Performed by: FAMILY MEDICINE

## 2019-01-01 PROCEDURE — 3077F PR MOST RECENT SYSTOLIC BLOOD PRESSURE >= 140 MM HG: ICD-10-PCS | Mod: S$GLB,,, | Performed by: FAMILY MEDICINE

## 2019-01-01 PROCEDURE — 1101F PT FALLS ASSESS-DOCD LE1/YR: CPT | Mod: S$GLB,,, | Performed by: FAMILY MEDICINE

## 2019-01-01 PROCEDURE — 96372 THER/PROPH/DIAG INJ SC/IM: CPT | Mod: S$GLB,,, | Performed by: INTERNAL MEDICINE

## 2019-01-01 PROCEDURE — 99214 PR OFFICE/OUTPT VISIT, EST, LEVL IV, 30-39 MIN: ICD-10-PCS | Mod: S$GLB,,, | Performed by: FAMILY MEDICINE

## 2019-01-01 PROCEDURE — 99214 OFFICE O/P EST MOD 30 MIN: CPT | Mod: S$GLB,,, | Performed by: FAMILY MEDICINE

## 2019-01-01 PROCEDURE — 3079F DIAST BP 80-89 MM HG: CPT | Mod: S$GLB,,, | Performed by: FAMILY MEDICINE

## 2019-01-01 PROCEDURE — 3079F PR MOST RECENT DIASTOLIC BLOOD PRESSURE 80-89 MM HG: ICD-10-PCS | Mod: S$GLB,,, | Performed by: FAMILY MEDICINE

## 2019-01-01 PROCEDURE — 1159F MED LIST DOCD IN RCRD: CPT | Mod: S$GLB,,, | Performed by: FAMILY MEDICINE

## 2019-01-01 RX ADMIN — CYANOCOBALAMIN 1000 MCG: 1000 INJECTION, SOLUTION INTRAMUSCULAR; SUBCUTANEOUS at 10:11

## 2019-01-01 RX ADMIN — CYANOCOBALAMIN 1000 MCG: 1000 INJECTION, SOLUTION INTRAMUSCULAR; SUBCUTANEOUS at 10:12

## 2019-02-11 ENCOUNTER — OFFICE VISIT (OUTPATIENT)
Dept: HEMATOLOGY/ONCOLOGY | Facility: CLINIC | Age: 74
End: 2019-02-11
Payer: MEDICARE

## 2019-02-11 VITALS
DIASTOLIC BLOOD PRESSURE: 78 MMHG | WEIGHT: 145.63 LBS | TEMPERATURE: 98 F | SYSTOLIC BLOOD PRESSURE: 156 MMHG | HEIGHT: 63 IN | RESPIRATION RATE: 20 BRPM | BODY MASS INDEX: 25.8 KG/M2 | HEART RATE: 96 BPM

## 2019-02-11 DIAGNOSIS — C34.11 MALIGNANT NEOPLASM OF UPPER LOBE OF RIGHT LUNG: Primary | ICD-10-CM

## 2019-02-11 DIAGNOSIS — D51.8 ANEMIA OF DECREASED VITAMIN B12 ABSORPTION: ICD-10-CM

## 2019-02-11 DIAGNOSIS — C83.04 SMALL B-CELL LYMPHOMA OF LYMPH NODES OF AXILLA: ICD-10-CM

## 2019-02-11 PROCEDURE — 3078F DIAST BP <80 MM HG: CPT | Mod: ,,, | Performed by: INTERNAL MEDICINE

## 2019-02-11 PROCEDURE — 3078F PR MOST RECENT DIASTOLIC BLOOD PRESSURE < 80 MM HG: ICD-10-PCS | Mod: ,,, | Performed by: INTERNAL MEDICINE

## 2019-02-11 PROCEDURE — 99204 PR OFFICE/OUTPT VISIT, NEW, LEVL IV, 45-59 MIN: ICD-10-PCS | Mod: ,,, | Performed by: INTERNAL MEDICINE

## 2019-02-11 PROCEDURE — 3077F PR MOST RECENT SYSTOLIC BLOOD PRESSURE >= 140 MM HG: ICD-10-PCS | Mod: ,,, | Performed by: INTERNAL MEDICINE

## 2019-02-11 PROCEDURE — 3077F SYST BP >= 140 MM HG: CPT | Mod: ,,, | Performed by: INTERNAL MEDICINE

## 2019-02-11 PROCEDURE — 99204 OFFICE O/P NEW MOD 45 MIN: CPT | Mod: ,,, | Performed by: INTERNAL MEDICINE

## 2019-02-11 NOTE — LETTER
February 11, 2019      Rani Welch MD  [address]  [address]  [place] [address]           [facility] Hemat[facility]  [address]  [address]  [place] [address]  Phone: [phone]  Fax: [phone]          Patient: Yocasta Almonte   MR Number: [id number]   YOB: 1945   Date of Visit: 2/11/2019       Dear Dr. Rani Welch:    Thank you for referring Yocasta Almonte to me for evaluation. Attached you will find relevant portions of my assessment and plan of care.    If you have questions, please do not hesitate to call me. I look forward to following Yocasta Almonte along with you.    Sincerely,    CHRISTOPHER Kuo MD    Enclosure  CC:  No Recipients    If you would like to receive this communication electronically, please contact [email or web address] or [phone] to request more information on [employer] access.    For providers and/or their staff who would like to refer a patient to [facility], please contact us through our one-stop-shop provider referral line, [facility] , at [phone].    If you feel you have received this communication in error or would no longer like to receive these types of communications, please e-mail [email or web address]

## 2019-02-12 NOTE — PROGRESS NOTES
[facility] History & Physical    Subjective:      Patient ID:   Yocasta Almonte  74 y.o. 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  Denies fever, night sweats or weight loss.  Hx includes DM, type 2, HTN, GERD, COPD, cholesterol, thyroid dx,  and clinical lung cancer treated with empirical  Stereotactic Rad Rx.  Surgery LN bx R axilla, neck surgery for infection.  Smoke 1/2 ppd x's 50 years.  ETOH no.  Allergy iodine.  Mom cancer, Dad ? Health.    ROS:   GEN: normal without any fever, night sweats or weight loss  HEENT: normal with no HA's, sore throat, stiff neck, changes in vision  CV: normal with no CP, SOB, PND, VALENCIA or orthopnea  PULM: see HPI  GI: normal with no abdominal pain, nausea, vomiting, constipation, diarrhea, melanotic stools, BRBPR, or hematemesis  : normal with no hematuria, dysuria  BREAST: normal with no mass, discharge, pain  SKIN: normal with no rash, erythema, bruising, or swelling     Past Medical History:   Diagnosis Date    Cancer     lymphoma    COPD (chronic obstructive pulmonary disease)     Diabetes mellitus     GERD (gastroesophageal reflux disease)     Hyperlipidemia     Hypertension     Thyroid disease      Past Surgical History:   Procedure Laterality Date    ESOPHAGOGASTRODUODENOSCOPY (EGD) N/A 1/9/2015    [facility]    NECK SURGERY      infection in neck removed       Review of patient's allergies indicates:   Allergen Reactions    Iodine and iodide containing products      Social History     Socioeconomic History    Marital status:      Spouse name: Not on file    Number of children: Not on file    Years of education: Not on file    Highest education level: Not on file   Social Needs    Financial resource strain: Not on file    Food insecurity - worry: Not on file    Food insecurity - inability: Not on file    Transportation needs - medical:  Not on file    Transportation needs - non-medical: Not on file   Occupational History    Not on file   Tobacco Use    Smoking status: Current Every Day Smoker     Packs/day: 0.50     Years: 50.00     Pack years: 25.00    Smokeless tobacco: Never Used    Tobacco comment: says she is trying to quit   Substance and Sexual Activity    Alcohol use: No     Comment: hx of alochol abuse but does not drink at all now    Drug use: No     Comment: none    Sexual activity: Not Currently     Birth control/protection: None   Other Topics Concern    Not on file   Social History Narrative    Not on file         Current Outpatient Medications:     albuterol (PROVENTIL) 5 mg/mL nebulizer solution, Take 2.5 mg by nebulization 2 (two) times daily. Rescue , Disp: , Rfl:     atorvastatin (LIPITOR) 80 MG tablet, Take 80 mg by mouth once daily., Disp: , Rfl:     ciprofloxacin-hydrocortisone 0.2-1% (CIPRO HC OTIC) otic suspension, Place 3 drops into the left ear 2 (two) times daily. Take for 16 days., Disp: , Rfl:     fluticasone (FLONASE) 50 mcg/actuation nasal spray, 1 spray (50 mcg total) by Each Nare route 2 (two) times daily., Disp: 1 Bottle, Rfl: 3    FLUTICASONE/VILANTEROL (BREO ELLIPTA INHL), Inhale 1 puff into the lungs once daily., Disp: , Rfl:     gabapentin (NEURONTIN) 300 MG capsule, Take 300 mg by mouth 3 (three) times daily., Disp: , Rfl:     glimepiride (AMARYL) 2 MG tablet, Take 2 mg by mouth 2 (two) times daily with meals., Disp: , Rfl:     ibrutinib (IMBRUVICA) 140 mg Cap, Take 420 mg by mouth once daily., Disp: , Rfl:     lisinopril (PRINIVIL,ZESTRIL) 20 MG tablet, Take 20 mg by mouth once daily., Disp: , Rfl:     metoprolol tartrate (LOPRESSOR) 25 MG tablet, Take 12.5 mg by mouth once daily. , Disp: , Rfl:     pantoprazole (PROTONIX) 40 MG tablet, Take 1 tablet (40 mg total) by mouth once daily., Disp: 30 tablet, Rfl: 0    potassium chloride SA (K-DUR,KLOR-CON) 20 MEQ tablet, Take 20 mEq by mouth  "once daily. , Disp: , Rfl:     prenatal #115-iron-folic acid 29 mg iron- 1 mg Chew, Take 1 tablet by mouth once daily. , Disp: , Rfl:     sucralfate (CARAFATE) 1 gram tablet, Take 1 g by mouth 4 (four) times daily., Disp: , Rfl:     triamcinolone acetonide 0.1% (KENALOG) 0.1 % cream, Spare the face apply 2-3 times per day for itch relief on the rash., Disp: 453 g, Rfl: 1          Objective:   Vitals:  Blood pressure (!) 156/78, pulse 96, temperature 98.2 °F (36.8 °C), resp. rate 20, height 5' 3" (1.6 m), weight 66 kg (145 lb 9.6 oz).    Physical Examination:   GEN: no apparent distress, comfortable  HEAD: atraumatic and normocephalic  EYES: no pallor, no icterus  ENT:  no pharyngeal erythema, external ears WNL; no nasal discharge; no thrush  NECK: no masses, thyroid normal, trachea midline, no LAD/LN's, supple  CV: RRR with no murmur; normal pulse; normal S1 and S2; no pedal edema  CHEST: Normal respiratory effort; CTAB; normal breath sounds; no wheeze or crackles  ABDOM: nontender and nondistended; soft,  no rebound/guarding, L/S NP  MUSC/Skeletal: ROM normal; no crepitus; joints normal; no deformities   EXTREM: she has soft palpable mass at L supraclavicular area, I suspect a lipoma.  SKIN: no rashes, lesions, ulcers, petechiae  : no CVAT  NEURO: grossly intact; motor/sensory WNL;  no tremors  PSYCH: normal mood, affect and behavior  LYMPH: normal cervical, supraclavicular, axillary and groin LN's  BREASTS: no palpable mass L or R breast.    Labs:     CBC, CMP, B 12, ferritin ordered.  PET scan ordered for re evaluation of Small lymphocytic NHL, treated with Ibrutinib.    Assessment:   (1) 74 y.o. female with diagnosis of  Small lymphocytic NHL/CLL, on ibrutinib po daily.    (2)L supraclavicular mass, suspected LN or lipoma.    Schedule PET for re evaluation of status of NHL and lung cancer hx.    (3)Clinical Stage 1 lung cancer hx, treated with stereotactic Rad Rx.    RTC 1 month with lab and PET scan.       "         Follow-up in about 5 weeks (around 3/18/2019) for check of blood status after therapy, follow up on cancer status.    I have explained and the patient understands all of  the current recommendation(s). I have answered all of their questions to the best of my ability and to their complete satisfaction.

## 2019-02-20 ENCOUNTER — TELEPHONE (OUTPATIENT)
Dept: HEMATOLOGY/ONCOLOGY | Facility: CLINIC | Age: 74
End: 2019-02-20

## 2019-02-20 NOTE — TELEPHONE ENCOUNTER
----- Message from Alicia Murrieta sent at 2/20/2019 10:48 AM CST -----  The patient called and said she is almost out of her Imbruvica and [employer] told her that they need a prescription from Dr Sanders. They said they faxed us a form to get prescription ok'd. Please check on this and call the patient back at [phone]

## 2019-03-27 LAB
ALBUMIN SERPL-MCNC: 3.6 G/DL (ref 3.1–4.7)
ALP SERPL-CCNC: 78 IU/L (ref 40–104)
ALT (SGPT): 6 IU/L (ref 3–33)
AST SERPL-CCNC: 13 IU/L (ref 10–40)
BASOPHILS NFR BLD: 0.1 K/UL (ref 0–0.2)
BASOPHILS NFR BLD: 1.4 %
BILIRUB SERPL-MCNC: 0.4 MG/DL (ref 0.3–1)
BUN SERPL-MCNC: 10 MG/DL (ref 8–20)
CALCIUM SERPL-MCNC: 9 MG/DL (ref 7.7–10.4)
CHLORIDE: 109 MMOL/L (ref 98–110)
CO2 SERPL-SCNC: 28.7 MMOL/L (ref 22.8–31.6)
CREATININE: 0.5 MG/DL (ref 0.6–1.4)
EOSINOPHIL NFR BLD: 0.2 K/UL (ref 0–0.7)
EOSINOPHIL NFR BLD: 4.6 %
ERYTHROCYTE [DISTWIDTH] IN BLOOD BY AUTOMATED COUNT: 15.2 % (ref 11.7–14.9)
FERRITIN SERPL-MCNC: 10 NG/ML (ref 24–162)
GLUCOSE: 86 MG/DL (ref 70–99)
GRAN #: 1.7 K/UL (ref 1.4–6.5)
GRAN%: 38.6 %
HCT VFR BLD AUTO: 35.7 % (ref 36–48)
HGB BLD-MCNC: 11.3 G/DL (ref 12–15)
IMMATURE GRANS (ABS): 0 K/UL (ref 0–1)
IMMATURE GRANULOCYTES: 0.2 %
LYMPH #: 2 K/UL (ref 1.2–3.4)
LYMPH%: 46 %
MCH RBC QN AUTO: 26.8 PG (ref 25–35)
MCHC RBC AUTO-ENTMCNC: 31.7 G/DL (ref 31–36)
MCV RBC AUTO: 84.8 FL (ref 79–98)
MONO #: 0.4 K/UL (ref 0.1–0.6)
MONO%: 9.2 %
NUCLEATED RBCS: 0 %
PLATELET # BLD AUTO: 206 K/UL (ref 140–440)
PMV BLD AUTO: 11.7 FL (ref 8.8–12.7)
POTASSIUM SERPL-SCNC: 3.5 MMOL/L (ref 3.5–5)
PROT SERPL-MCNC: 6.7 G/DL (ref 6–8.2)
RBC # BLD AUTO: 4.21 M/UL (ref 3.5–5.5)
SODIUM: 145 MMOL/L (ref 134–144)
VITAMIN B12: 285 PG/ML (ref 62–940)
WBC # BLD AUTO: 4.4 K/UL (ref 5–10)

## 2019-04-01 ENCOUNTER — TELEPHONE (OUTPATIENT)
Dept: HEMATOLOGY/ONCOLOGY | Facility: CLINIC | Age: 74
End: 2019-04-01

## 2019-04-01 ENCOUNTER — OFFICE VISIT (OUTPATIENT)
Dept: HEMATOLOGY/ONCOLOGY | Facility: CLINIC | Age: 74
End: 2019-04-01
Payer: MEDICARE

## 2019-04-01 VITALS
RESPIRATION RATE: 20 BRPM | BODY MASS INDEX: 26.22 KG/M2 | SYSTOLIC BLOOD PRESSURE: 154 MMHG | DIASTOLIC BLOOD PRESSURE: 81 MMHG | TEMPERATURE: 98 F | WEIGHT: 148 LBS | HEART RATE: 60 BPM

## 2019-04-01 DIAGNOSIS — D51.8 ANEMIA OF DECREASED VITAMIN B12 ABSORPTION: ICD-10-CM

## 2019-04-01 DIAGNOSIS — D50.0 IRON DEFICIENCY ANEMIA DUE TO CHRONIC BLOOD LOSS: ICD-10-CM

## 2019-04-01 PROCEDURE — 3079F DIAST BP 80-89 MM HG: CPT | Mod: ,,, | Performed by: INTERNAL MEDICINE

## 2019-04-01 PROCEDURE — 3288F FALL RISK ASSESSMENT DOCD: CPT | Mod: ,,, | Performed by: INTERNAL MEDICINE

## 2019-04-01 PROCEDURE — 3077F PR MOST RECENT SYSTOLIC BLOOD PRESSURE >= 140 MM HG: ICD-10-PCS | Mod: ,,, | Performed by: INTERNAL MEDICINE

## 2019-04-01 PROCEDURE — 99214 OFFICE O/P EST MOD 30 MIN: CPT | Mod: 25,,, | Performed by: INTERNAL MEDICINE

## 2019-04-01 PROCEDURE — 3077F SYST BP >= 140 MM HG: CPT | Mod: ,,, | Performed by: INTERNAL MEDICINE

## 2019-04-01 PROCEDURE — 99214 PR OFFICE/OUTPT VISIT, EST, LEVL IV, 30-39 MIN: ICD-10-PCS | Mod: 25,,, | Performed by: INTERNAL MEDICINE

## 2019-04-01 PROCEDURE — 1100F PR PT FALLS ASSESS DOC 2+ FALLS/FALL W/INJURY/YR: ICD-10-PCS | Mod: ,,, | Performed by: INTERNAL MEDICINE

## 2019-04-01 PROCEDURE — 3079F PR MOST RECENT DIASTOLIC BLOOD PRESSURE 80-89 MM HG: ICD-10-PCS | Mod: ,,, | Performed by: INTERNAL MEDICINE

## 2019-04-01 PROCEDURE — 3288F PR FALLS RISK ASSESSMENT DOCUMENTED: ICD-10-PCS | Mod: ,,, | Performed by: INTERNAL MEDICINE

## 2019-04-01 PROCEDURE — 1100F PTFALLS ASSESS-DOCD GE2>/YR: CPT | Mod: ,,, | Performed by: INTERNAL MEDICINE

## 2019-04-01 PROCEDURE — 96372 THER/PROPH/DIAG INJ SC/IM: CPT | Mod: ,,, | Performed by: INTERNAL MEDICINE

## 2019-04-01 PROCEDURE — 96372 PR INJECTION,THERAP/PROPH/DIAG2ST, IM OR SUBCUT: ICD-10-PCS | Mod: ,,, | Performed by: INTERNAL MEDICINE

## 2019-04-01 RX ORDER — CYANOCOBALAMIN 1000 UG/ML
1000 INJECTION, SOLUTION INTRAMUSCULAR; SUBCUTANEOUS
Status: SHIPPED | OUTPATIENT
Start: 2019-04-01

## 2019-04-01 RX ORDER — CYANOCOBALAMIN 1000 UG/ML
1000 INJECTION, SOLUTION INTRAMUSCULAR; SUBCUTANEOUS
Status: CANCELLED | OUTPATIENT
Start: 2019-04-01

## 2019-04-01 RX ADMIN — CYANOCOBALAMIN 1000 MCG: 1000 INJECTION, SOLUTION INTRAMUSCULAR; SUBCUTANEOUS at 02:04

## 2019-04-01 NOTE — PROGRESS NOTES
[facility] History & Physical    Subjective:      Patient ID:   Yocasta Almonte  74 y.o. female  1945  MD Yuir, MD Suni.      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  Denies fever, night sweats or weight loss.    She continues on Ibrutinib now, 2 daily.    Hx includes DM, type 2, HTN, GERD, COPD, cholesterol, thyroid dx,  and clinical lung cancer treated with empirical  Stereotactic Rad Rx. To the right upper lung area.    She has chronic low back pain. PET scan supports degenerative disc disease at the back area. She is to follow-up with Dr. Welch for evaluation of this complaint.    Surgery LN bx R axilla, neck surgery for infection.  Smoke 1/2 ppd x's 50 years.  ETOH no.  Allergy iodine.  Mom cancer, Dad ? Health.    ROS:   GEN: normal without any fever, night sweats or weight loss  HEENT: normal with no HA's, sore throat, stiff neck, changes in vision  CV: normal with no CP, SOB, PND, VALENCIA or orthopnea  PULM: see HPI  GI: normal with no abdominal pain, nausea, vomiting, constipation, diarrhea, melanotic stools, BRBPR, or hematemesis  : normal with no hematuria, dysuria  BREAST: normal with no mass, discharge, pain  SKIN: normal with no rash, erythema, bruising, or swelling     Past Medical History:   Diagnosis Date    Cancer     lymphoma    COPD (chronic obstructive pulmonary disease)     Diabetes mellitus     GERD (gastroesophageal reflux disease)     Hyperlipidemia     Hypertension     Thyroid disease      Past Surgical History:   Procedure Laterality Date    ESOPHAGOGASTRODUODENOSCOPY (EGD) N/A 1/9/2015    Performed by Rubio Ray MD at [facility] ENDO    NECK SURGERY      infection in neck removed       Review of patient's allergies indicates:   Allergen Reactions    Iodine and iodide containing products      Social History     Socioeconomic History    Marital status:      Spouse name: Not on file    Number of children: Not on  file    Years of education: Not on file    Highest education level: Not on file   Occupational History    Not on file   Social Needs    Financial resource strain: Not on file    Food insecurity:     Worry: Not on file     Inability: Not on file    Transportation needs:     Medical: Not on file     Non-medical: Not on file   Tobacco Use    Smoking status: Current Every Day Smoker     Packs/day: 0.50     Years: 50.00     Pack years: 25.00    Smokeless tobacco: Never Used    Tobacco comment: says she is trying to quit   Substance and Sexual Activity    Alcohol use: No     Comment: hx of alochol abuse but does not drink at all now    Drug use: No     Comment: none    Sexual activity: Not Currently     Birth control/protection: None   Lifestyle    Physical activity:     Days per week: Not on file     Minutes per session: Not on file    Stress: Not on file   Relationships    Social connections:     Talks on phone: Not on file     Gets together: Not on file     Attends Yazdanism service: Not on file     Active member of club or organization: Not on file     Attends meetings of clubs or organizations: Not on file     Relationship status: Not on file    Intimate partner violence:     Fear of current or ex partner: Not on file     Emotionally abused: Not on file     Physically abused: Not on file     Forced sexual activity: Not on file   Other Topics Concern    Not on file   Social History Narrative    Not on file         Current Outpatient Medications:     albuterol (PROVENTIL) 5 mg/mL nebulizer solution, Take 2.5 mg by nebulization 2 (two) times daily. Rescue , Disp: , Rfl:     atorvastatin (LIPITOR) 80 MG tablet, Take 80 mg by mouth once daily., Disp: , Rfl:     ciprofloxacin-hydrocortisone 0.2-1% (CIPRO HC OTIC) otic suspension, Place 3 drops into the left ear 2 (two) times daily. Take for 16 days., Disp: , Rfl:     fluticasone (FLONASE) 50 mcg/actuation nasal spray, 1 spray (50 mcg total) by Each  Nare route 2 (two) times daily., Disp: 1 Bottle, Rfl: 3    FLUTICASONE/VILANTEROL (BREO ELLIPTA INHL), Inhale 1 puff into the lungs once daily., Disp: , Rfl:     gabapentin (NEURONTIN) 300 MG capsule, Take 300 mg by mouth 3 (three) times daily., Disp: , Rfl:     glimepiride (AMARYL) 2 MG tablet, Take 2 mg by mouth 2 (two) times daily with meals., Disp: , Rfl:     ibrutinib (IMBRUVICA) 140 mg Cap, Take 420 mg by mouth once daily., Disp: , Rfl:     lisinopril (PRINIVIL,ZESTRIL) 20 MG tablet, Take 20 mg by mouth once daily., Disp: , Rfl:     metoprolol tartrate (LOPRESSOR) 25 MG tablet, Take 12.5 mg by mouth once daily. , Disp: , Rfl:     pantoprazole (PROTONIX) 40 MG tablet, Take 1 tablet (40 mg total) by mouth once daily., Disp: 30 tablet, Rfl: 0    potassium chloride SA (K-DUR,KLOR-CON) 20 MEQ tablet, Take 20 mEq by mouth once daily. , Disp: , Rfl:     prenatal #115-iron-folic acid 29 mg iron- 1 mg Chew, Take 1 tablet by mouth once daily. , Disp: , Rfl:     sucralfate (CARAFATE) 1 gram tablet, Take 1 g by mouth 4 (four) times daily., Disp: , Rfl:     triamcinolone acetonide 0.1% (KENALOG) 0.1 % cream, Spare the face apply 2-3 times per day for itch relief on the rash., Disp: 453 g, Rfl: 1          Objective:   Vitals:  Blood pressure (!) 154/81, pulse 60, temperature 98.1 °F (36.7 °C), resp. rate 20, weight 67.1 kg (148 lb).    Physical Examination:   GEN: no apparent distress, comfortable  HEAD: atraumatic and normocephalic  EYES: no pallor, no icterus  ENT:  no pharyngeal erythema, external ears WNL; no nasal discharge; no thrush  NECK: no masses, thyroid normal, trachea midline, no LAD/LN's, supple  CV: RRR with no murmur; normal pulse; normal S1 and S2; no pedal edema  CHEST: Normal respiratory effort; CTAB; normal breath sounds; no wheeze or crackles  ABDOM: nontender and nondistended; soft,  no rebound/guarding, L/S NP  MUSC/Skeletal: ROM normal; no crepitus; joints normal; no deformities   EXTREM:  I do not palpate left supraclavicular Adenopathy at this time.  SKIN: no rashes, lesions, ulcers, petechiae  : no CVAT  NEURO: grossly intact; motor/sensory WNL;  no tremors  PSYCH: normal mood, affect and behavior  LYMPH: normal cervical, supraclavicular, axillary and groin LN's  BREASTS: no palpable mass L or R breast.    Labs:     Hemoglobin is 11.3. MCV is 84. B-12 is low at 285, and ferritin is low at 10.    PET scan showed increased opacity at the right upper lung area at 2.5 cm. This could be postradiation change. There is a 13 mm nodule at the left upper lobe area. Will check her CAT scan again of the chest in 2 months and discuss with Dr. Canas if there is any progressive enlargement.    She had a colonoscopy 1 year ago per Dr. Streeter, polyps were removed.    Assessment:   (1) 74 y.o. female with diagnosis of  Small lymphocytic NHL/CLL, on ibrutinib po daily.    (2) abnormal PET scan with 13 mm nodule and left upper lobe and increased opacity at 2.5 cm and right upper lobe, possibly post radiation change. Check CAT scan in 2 months    (3) anemia secondary to B-12 deficiency and iron deficiency. Start B-12 injections monthly, first injection today. Start her on ferrous sulfate 325 mg #1 by mouth daily.  Call for results of previous colonoscopy, Dr. Streeter.    (4) low back pain symptoms ×2 years, probably secondary to degenerative disc disease. Refer back to Dr. Welch, her PMD, for management or disposition.    (4)Clinical Stage 1 lung cancer hx, treated with stereotactic Rad Rx.

## 2019-04-01 NOTE — LETTER
April 1, 2019      Rani Welch MD  [address]  [address]  [place] [address]           [facility] - Hematology Oncology  [address] [facility]  [address]  [place] [address]  Phone: [phone]  Fax: [phone]          Patient: Yocasta Almonte   MR Number: [id number]   YOB: 1945   Date of Visit: 4/1/2019       Dear Dr. Rani Welch:    Thank you for referring Yocasta Almonte to me for evaluation. Attached you will find relevant portions of my assessment and plan of care.    If you have questions, please do not hesitate to call me. I look forward to following Yocasta Almonte along with you.    Sincerely,    CHRISTOPHER Kuo MD    Enclosure  CC:  No Recipients    If you would like to receive this communication electronically, please contact [email or web address] or [phone] to request more information on [facility] Link access.    For providers and/or their staff who would like to refer a patient to [facility], please contact us through our one-stop-shop provider referral line, [facility] , at [phone].    If you feel you have received this communication in error or would no longer like to receive these types of communications, please e-mail [email or web address]

## 2019-05-06 ENCOUNTER — OFFICE VISIT (OUTPATIENT)
Dept: HEMATOLOGY/ONCOLOGY | Facility: CLINIC | Age: 74
End: 2019-05-06
Payer: MEDICARE

## 2019-05-06 VITALS
DIASTOLIC BLOOD PRESSURE: 78 MMHG | WEIGHT: 148.38 LBS | SYSTOLIC BLOOD PRESSURE: 148 MMHG | TEMPERATURE: 98 F | RESPIRATION RATE: 20 BRPM | HEART RATE: 121 BPM | BODY MASS INDEX: 26.29 KG/M2

## 2019-05-06 DIAGNOSIS — D50.0 IRON DEFICIENCY ANEMIA DUE TO CHRONIC BLOOD LOSS: Primary | ICD-10-CM

## 2019-05-06 PROCEDURE — 1101F PT FALLS ASSESS-DOCD LE1/YR: CPT | Mod: ,,, | Performed by: INTERNAL MEDICINE

## 2019-05-06 PROCEDURE — 96372 PR INJECTION,THERAP/PROPH/DIAG2ST, IM OR SUBCUT: ICD-10-PCS | Mod: ,,, | Performed by: INTERNAL MEDICINE

## 2019-05-06 PROCEDURE — 3078F PR MOST RECENT DIASTOLIC BLOOD PRESSURE < 80 MM HG: ICD-10-PCS | Mod: ,,, | Performed by: INTERNAL MEDICINE

## 2019-05-06 PROCEDURE — 3077F SYST BP >= 140 MM HG: CPT | Mod: ,,, | Performed by: INTERNAL MEDICINE

## 2019-05-06 PROCEDURE — 3078F DIAST BP <80 MM HG: CPT | Mod: ,,, | Performed by: INTERNAL MEDICINE

## 2019-05-06 PROCEDURE — 99213 PR OFFICE/OUTPT VISIT, EST, LEVL III, 20-29 MIN: ICD-10-PCS | Mod: 25,,, | Performed by: INTERNAL MEDICINE

## 2019-05-06 PROCEDURE — 99213 OFFICE O/P EST LOW 20 MIN: CPT | Mod: 25,,, | Performed by: INTERNAL MEDICINE

## 2019-05-06 PROCEDURE — 96372 THER/PROPH/DIAG INJ SC/IM: CPT | Mod: ,,, | Performed by: INTERNAL MEDICINE

## 2019-05-06 PROCEDURE — 3077F PR MOST RECENT SYSTOLIC BLOOD PRESSURE >= 140 MM HG: ICD-10-PCS | Mod: ,,, | Performed by: INTERNAL MEDICINE

## 2019-05-06 PROCEDURE — 1101F PR PT FALLS ASSESS DOC 0-1 FALLS W/OUT INJ PAST YR: ICD-10-PCS | Mod: ,,, | Performed by: INTERNAL MEDICINE

## 2019-05-06 RX ADMIN — CYANOCOBALAMIN 1000 MCG: 1000 INJECTION, SOLUTION INTRAMUSCULAR; SUBCUTANEOUS at 01:05

## 2019-05-07 NOTE — PROGRESS NOTES
[facility] History & Physical    Subjective:      Patient ID:   Yocasta Almonte  74 y.o. female  1945  MD Yuri, MD Suni.      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  Denies fever, night sweats or weight loss.    She continues on Ibrutinib now, 2 daily.    Hx includes DM, type 2, HTN, GERD, COPD, cholesterol, thyroid dx,  and clinical lung cancer treated with empirical  Stereotactic Rad Rx. To the right upper lung area.    She has chronic low back pain. PET scan supports degenerative disc disease at the back area. She is to follow-up with Dr. Welch for evaluation of this complaint.    Surgery LN bx R axilla, neck surgery for infection.  Smoke 1/2 ppd x's 50 years.  ETOH no.  Allergy iodine.  Mom cancer, Dad ? Health.    ROS:   GEN: normal without any fever, night sweats or weight loss  HEENT: normal with no HA's, sore throat, stiff neck, changes in vision  CV: normal with no CP, SOB, PND, VALENCIA or orthopnea  PULM: see HPI  GI: normal with no abdominal pain, nausea, vomiting, constipation, diarrhea, melanotic stools, BRBPR, or hematemesis  : normal with no hematuria, dysuria  BREAST: normal with no mass, discharge, pain  SKIN: normal with no rash, erythema, bruising, or swelling     Past Medical History:   Diagnosis Date    Cancer     lymphoma    COPD (chronic obstructive pulmonary disease)     Diabetes mellitus     GERD (gastroesophageal reflux disease)     Hyperlipidemia     Hypertension     Thyroid disease      Past Surgical History:   Procedure Laterality Date    ESOPHAGOGASTRODUODENOSCOPY (EGD) N/A 1/9/2015    Performed by [facility] ENDO    NECK SURGERY      infection in neck removed       Review of patient's allergies indicates:   Allergen Reactions    Iodine and iodide containing products      Social History     Socioeconomic History    Marital status:      Spouse name: Not on file    Number of children: Not on  file    Years of education: Not on file    Highest education level: Not on file   Occupational History    Not on file   Social Needs    Financial resource strain: Not on file    Food insecurity:     Worry: Not on file     Inability: Not on file    Transportation needs:     Medical: Not on file     Non-medical: Not on file   Tobacco Use    Smoking status: Current Every Day Smoker     Packs/day: 0.50     Years: 50.00     Pack years: 25.00    Smokeless tobacco: Never Used    Tobacco comment: says she is trying to quit   Substance and Sexual Activity    Alcohol use: No     Comment: hx of alochol abuse but does not drink at all now    Drug use: No     Comment: none    Sexual activity: Not Currently     Birth control/protection: None   Lifestyle    Physical activity:     Days per week: Not on file     Minutes per session: Not on file    Stress: Not on file   Relationships    Social connections:     Talks on phone: Not on file     Gets together: Not on file     Attends Shinto service: Not on file     Active member of club or organization: Not on file     Attends meetings of clubs or organizations: Not on file     Relationship status: Not on file   Other Topics Concern    Not on file   Social History Narrative    Not on file         Current Outpatient Medications:     albuterol (PROVENTIL) 5 mg/mL nebulizer solution, Take 2.5 mg by nebulization 2 (two) times daily. Rescue , Disp: , Rfl:     atorvastatin (LIPITOR) 80 MG tablet, Take 80 mg by mouth once daily., Disp: , Rfl:     ciprofloxacin-hydrocortisone 0.2-1% (CIPRO HC OTIC) otic suspension, Place 3 drops into the left ear 2 (two) times daily. Take for 16 days., Disp: , Rfl:     fluticasone (FLONASE) 50 mcg/actuation nasal spray, 1 spray (50 mcg total) by Each Nare route 2 (two) times daily., Disp: 1 Bottle, Rfl: 3    FLUTICASONE/VILANTEROL (BREO ELLIPTA INHL), Inhale 1 puff into the lungs once daily., Disp: , Rfl:     gabapentin (NEURONTIN) 300  MG capsule, Take 300 mg by mouth 3 (three) times daily., Disp: , Rfl:     glimepiride (AMARYL) 2 MG tablet, Take 2 mg by mouth 2 (two) times daily with meals., Disp: , Rfl:     ibrutinib (IMBRUVICA) 140 mg Cap, Take 420 mg by mouth once daily., Disp: , Rfl:     lisinopril (PRINIVIL,ZESTRIL) 20 MG tablet, Take 20 mg by mouth once daily., Disp: , Rfl:     metoprolol tartrate (LOPRESSOR) 25 MG tablet, Take 12.5 mg by mouth once daily. , Disp: , Rfl:     pantoprazole (PROTONIX) 40 MG tablet, Take 1 tablet (40 mg total) by mouth once daily., Disp: 30 tablet, Rfl: 0    potassium chloride SA (K-DUR,KLOR-CON) 20 MEQ tablet, Take 20 mEq by mouth once daily. , Disp: , Rfl:     prenatal #115-iron-folic acid 29 mg iron- 1 mg Chew, Take 1 tablet by mouth once daily. , Disp: , Rfl:     sucralfate (CARAFATE) 1 gram tablet, Take 1 g by mouth 4 (four) times daily., Disp: , Rfl:     triamcinolone acetonide 0.1% (KENALOG) 0.1 % cream, Spare the face apply 2-3 times per day for itch relief on the rash., Disp: 453 g, Rfl: 1    Current Facility-Administered Medications:     cyanocobalamin injection 1,000 mcg, 1,000 mcg, Subcutaneous, Q30 Days, CHRISTOPHER Kuo MD, 1,000 mcg at 05/06/19 1358          Objective:   Vitals:  Blood pressure (!) 148/78, pulse (!) 121, temperature 98.4 °F (36.9 °C), resp. rate 20, weight 67.3 kg (148 lb 6.4 oz).    Physical Examination:   GEN: no apparent distress, comfortable  HEAD: atraumatic and normocephalic  EYES: no pallor, no icterus  ENT:  no pharyngeal erythema, external ears WNL; no nasal discharge; no thrush  NECK: no masses, thyroid normal, trachea midline, no LAD/LN's, supple  CV: RRR with no murmur; normal pulse; normal S1 and S2; no pedal edema  CHEST: Normal respiratory effort; CTAB; normal breath sounds; no wheeze or crackles  ABDOM: nontender and nondistended; soft,  no rebound/guarding, L/S NP  MUSC/Skeletal: ROM normal; no crepitus; joints normal; no deformities   EXTREM: I do not  palpate left supraclavicular Adenopathy at this time.  SKIN: no rashes, lesions, ulcers, petechiae  : no CVAT  NEURO: grossly intact; motor/sensory WNL;  no tremors  PSYCH: normal mood, affect and behavior  LYMPH: normal cervical, supraclavicular, axillary and groin LN's  BREASTS: no palpable mass L or R breast.    Labs:     Hemoglobin is 11.3. MCV is 84. B-12 is low at 285, and ferritin is low at 10.  Started on B 12 shots and Fe po.    PET scan showed increased opacity at the right upper lung area at 2.5 cm. This could be postradiation change. There is a 13 mm nodule at the left upper lobe area. Will check her CAT scan again of the chest in 2 months and discuss with Dr. Canas if there is any progressive enlargement.    She had a colonoscopy 1 year ago per Dr. Streeter, polyps were removed.  Diverticulosis, hemorrhoids.  EGD gastritis.    Assessment:   (1) 74 y.o. female with diagnosis of  Small lymphocytic NHL/CLL, on ibrutinib po daily.    (2) abnormal PET scan with 13 mm nodule and left upper lobe and increased opacity at 2.5 cm and right upper lobe, possibly post radiation change. Check CAT scan in 2 months    (3) anemia secondary to B-12 deficiency and iron deficiency. Start B-12 injections monthly, first injection today. Start her on ferrous sulfate 325 mg #1 by mouth daily.    RTC 1 month.    (4) low back pain symptoms ×2 years, probably secondary to degenerative disc disease. Refer back to Dr. Welch, her PMD, for management or disposition.    (4)Clinical Stage 1 lung cancer hx, treated with stereotactic Rad Rx.

## 2019-05-29 LAB
BASOPHILS NFR BLD: 0.1 K/UL (ref 0–0.2)
BASOPHILS NFR BLD: 1.2 %
EOSINOPHIL NFR BLD: 0.3 K/UL (ref 0–0.7)
EOSINOPHIL NFR BLD: 5.2 %
ERYTHROCYTE [DISTWIDTH] IN BLOOD BY AUTOMATED COUNT: 16.3 % (ref 12.5–14.5)
FERRITIN SERPL-MCNC: 15 NG/ML (ref 24–162)
GRAN #: 2.2 K/UL (ref 1.4–6.5)
GRAN%: 46.1 %
HCT VFR BLD AUTO: 34.4 % (ref 36–48)
HGB BLD-MCNC: 11 G/DL (ref 12–15)
IMMATURE GRANS (ABS): 0 K/UL (ref 0–1)
IMMATURE GRANULOCYTES: 0.4 %
LYMPH #: 1.7 K/UL (ref 1.2–3.4)
LYMPH%: 34.7 %
MCH RBC QN AUTO: 27.1 PG (ref 25–35)
MCHC RBC AUTO-ENTMCNC: 32 G/DL (ref 31–36)
MCV RBC AUTO: 84.7 FL (ref 79–98)
MONO #: 0.6 K/UL (ref 0.1–0.6)
MONO%: 12.4 %
NUCLEATED RBCS: 0 %
NUCLEATED RED BLOOD CELLS: 0 /100 WBC
PERFORMED BY:: ABNORMAL
PLATELET # BLD AUTO: 167 K/UL (ref 140–440)
PMV BLD AUTO: 9.3 FL (ref 8.8–12.7)
RBC # BLD AUTO: 4.06 M/UL (ref 3.5–5.5)
WBC # BLD: 4.8 K/UL (ref 5–10)

## 2019-06-12 NOTE — SUBJECTIVE & OBJECTIVE
Called pt to advise the insurance info that is on file is no longer eligible. Left a message on pt vm to advise of the information.    Interval History: no new issues overnight. Right elbow pain controlled with meds. She reports increased ROM.   CRP normal    Review of Systems   Constitutional: Negative for diaphoresis and fever.   Respiratory: Negative for cough and shortness of breath.    Cardiovascular: Negative for chest pain and leg swelling.   Gastrointestinal: Negative for abdominal distention and abdominal pain.   Musculoskeletal: Positive for joint swelling (right elbow and right hand pain).   Skin: Negative for rash.   Neurological: Negative for speech difficulty and numbness.     Objective:     Vital Signs (Most Recent):  Temp: 98.4 °F (36.9 °C) (07/25/18 1138)  Pulse: 89 (07/25/18 1138)  Resp: 20 (07/25/18 1138)  BP: (!) 99/51 (07/25/18 1138)  SpO2: 95 % (07/25/18 1138) Vital Signs (24h Range):  Temp:  [97.1 °F (36.2 °C)-98.4 °F (36.9 °C)] 98.4 °F (36.9 °C)  Pulse:  [73-98] 89  Resp:  [14-20] 20  SpO2:  [90 %-98 %] 95 %  BP: ()/(35-87) 99/51     Weight: 62.6 kg (138 lb 0.1 oz)  Body mass index is 22.28 kg/m².    Intake/Output Summary (Last 24 hours) at 07/25/18 1526  Last data filed at 07/25/18 0645   Gross per 24 hour   Intake             1860 ml   Output              650 ml   Net             1210 ml      Physical Exam   Constitutional: She is oriented to person, place, and time. She appears well-developed and well-nourished.   HENT:   Head: Normocephalic and atraumatic.   Mouth/Throat: Oropharynx is clear and moist.   Eyes: Conjunctivae and EOM are normal. Pupils are equal, round, and reactive to light.   Neck: Normal range of motion. Neck supple.   Cardiovascular: Normal rate, regular rhythm, normal heart sounds and intact distal pulses.    No murmur heard.  Pulmonary/Chest: Effort normal and breath sounds normal. She has no wheezes.   Abdominal: Soft. Bowel sounds are normal. There is no tenderness.   Musculoskeletal: Normal range of motion.   Neurological: She is alert and oriented to person, place, and time.   Skin: Skin is  warm and dry.   Psychiatric: She has a normal mood and affect. Her behavior is normal. Judgment normal.   Nursing note and vitals reviewed.      Significant Labs: {Results:21639}    Significant Imaging: {Imaging Review:86226}

## 2019-07-09 ENCOUNTER — TELEPHONE (OUTPATIENT)
Dept: HEMATOLOGY/ONCOLOGY | Facility: CLINIC | Age: 74
End: 2019-07-09

## 2019-07-09 ENCOUNTER — OFFICE VISIT (OUTPATIENT)
Dept: HEMATOLOGY/ONCOLOGY | Facility: CLINIC | Age: 74
End: 2019-07-09
Payer: MEDICARE

## 2019-07-09 VITALS
SYSTOLIC BLOOD PRESSURE: 125 MMHG | WEIGHT: 149.81 LBS | DIASTOLIC BLOOD PRESSURE: 73 MMHG | RESPIRATION RATE: 20 BRPM | BODY MASS INDEX: 26.54 KG/M2 | TEMPERATURE: 99 F | HEART RATE: 108 BPM

## 2019-07-09 DIAGNOSIS — C83.04 SMALL B-CELL LYMPHOMA OF LYMPH NODES OF AXILLA: ICD-10-CM

## 2019-07-09 DIAGNOSIS — D51.8 ANEMIA OF DECREASED VITAMIN B12 ABSORPTION: ICD-10-CM

## 2019-07-09 DIAGNOSIS — C34.11 MALIGNANT NEOPLASM OF UPPER LOBE OF RIGHT LUNG: Primary | ICD-10-CM

## 2019-07-09 DIAGNOSIS — J43.1 PANLOBULAR EMPHYSEMA: ICD-10-CM

## 2019-07-09 DIAGNOSIS — D50.0 IRON DEFICIENCY ANEMIA DUE TO CHRONIC BLOOD LOSS: ICD-10-CM

## 2019-07-09 PROCEDURE — 3074F PR MOST RECENT SYSTOLIC BLOOD PRESSURE < 130 MM HG: ICD-10-PCS | Mod: ,,, | Performed by: INTERNAL MEDICINE

## 2019-07-09 PROCEDURE — 1101F PT FALLS ASSESS-DOCD LE1/YR: CPT | Mod: ,,, | Performed by: INTERNAL MEDICINE

## 2019-07-09 PROCEDURE — 99213 OFFICE O/P EST LOW 20 MIN: CPT | Mod: ,,, | Performed by: INTERNAL MEDICINE

## 2019-07-09 PROCEDURE — 1101F PR PT FALLS ASSESS DOC 0-1 FALLS W/OUT INJ PAST YR: ICD-10-PCS | Mod: ,,, | Performed by: INTERNAL MEDICINE

## 2019-07-09 PROCEDURE — 3078F DIAST BP <80 MM HG: CPT | Mod: ,,, | Performed by: INTERNAL MEDICINE

## 2019-07-09 PROCEDURE — 3074F SYST BP LT 130 MM HG: CPT | Mod: ,,, | Performed by: INTERNAL MEDICINE

## 2019-07-09 PROCEDURE — 3078F PR MOST RECENT DIASTOLIC BLOOD PRESSURE < 80 MM HG: ICD-10-PCS | Mod: ,,, | Performed by: INTERNAL MEDICINE

## 2019-07-09 PROCEDURE — 99213 PR OFFICE/OUTPT VISIT, EST, LEVL III, 20-29 MIN: ICD-10-PCS | Mod: ,,, | Performed by: INTERNAL MEDICINE

## 2019-07-09 NOTE — LETTER
July 9, 2019      Rani Welch MD  [address]  [address]  [place] [address]           [facility] - Hematology Oncology  [address] [facility]  [address]  [place] [address]  Phone: [phone]  Fax: [phone]          Patient: Yocasta Almonte   MR Number: [id number]   YOB: 1945   Date of Visit: 7/9/2019       Dear Dr. Rani Welch:    Thank you for referring Yocasta Almonte to me for evaluation. Attached you will find relevant portions of my assessment and plan of care.    If you have questions, please do not hesitate to call me. I look forward to following Yocasta Almonte along with you.    Sincerely,    CHRISTOPHER Kuo MD    Enclosure  CC:  No Recipients    If you would like to receive this communication electronically, please contact [email or web address] or [phone] to request more information on [employer] Link access.    For providers and/or their staff who would like to refer a patient to [facility], please contact us through our one-stop-shop provider referral line, [facility] , at [phone].    If you feel you have received this communication in error or would no longer like to receive these types of communications, please e-mail [email or web address]

## 2019-07-10 NOTE — TELEPHONE ENCOUNTER
I placed order for injectafer weekly x's 2 weeks.    Get her date and time.    Copay assistance.    Can you try to estimate her out of pocket expense  For the 2 infusions.  So we can see if she can afford it.?  Let me know?

## 2019-07-10 NOTE — PROGRESS NOTES
[facility] History & Physical    Subjective:      Patient ID:   Yocasta Almonte  74 y.o. female  1945  MD Yuri, MD Suni.      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  Denies fever, night sweats or weight loss.    She continues on Ibrutinib now, 2 daily.    On B 12 injections monthly, increased energy.    On oral Fe, no increase  in Hgb or Ferritin.  Discussed Injectafer weekly x's 2 weeks to replenish Fe stores.  She asked if I would check on co pay?    Hx includes DM, type 2, HTN, GERD, COPD, cholesterol, thyroid dx,  and clinical lung cancer treated with empirical  Stereotactic Rad Rx. To the right upper lung area.    She has chronic low back pain. PET scan supports degenerative disc disease at the back area. She is to follow-up with Dr. Welch for evaluation of this complaint.    Surgery LN bx R axilla, neck surgery for infection.  Smoke 1/2 ppd x's 50 years.  ETOH no.  Allergy iodine.  Mom cancer, Dad ? Health.    ROS:   GEN: normal without any fever, night sweats or weight loss  HEENT: normal with no HA's, sore throat, stiff neck, changes in vision  CV: normal with no CP, SOB, PND, VALENCIA or orthopnea  PULM: see HPI  GI: normal with no abdominal pain, nausea, vomiting, constipation, diarrhea, melanotic stools, BRBPR, or hematemesis  : normal with no hematuria, dysuria  BREAST: normal with no mass, discharge, pain  SKIN: normal with no rash, erythema, bruising, or swelling     Past Medical History:   Diagnosis Date    Cancer     lymphoma    COPD (chronic obstructive pulmonary disease)     Diabetes mellitus     GERD (gastroesophageal reflux disease)     Hyperlipidemia     Hypertension     Thyroid disease      Past Surgical History:   Procedure Laterality Date    ESOPHAGOGASTRODUODENOSCOPY (EGD) N/A 1/9/2015    Performed by Rubio Ray MD at [facility]O    NECK SURGERY      infection in neck removed       Review of patient's allergies indicates:    Allergen Reactions    Iodine and iodide containing products      Social History     Socioeconomic History    Marital status:      Spouse name: Not on file    Number of children: Not on file    Years of education: Not on file    Highest education level: Not on file   Occupational History    Not on file   Social Needs    Financial resource strain: Not on file    Food insecurity:     Worry: Not on file     Inability: Not on file    Transportation needs:     Medical: Not on file     Non-medical: Not on file   Tobacco Use    Smoking status: Current Every Day Smoker     Packs/day: 0.50     Years: 50.00     Pack years: 25.00    Smokeless tobacco: Never Used    Tobacco comment: says she is trying to quit   Substance and Sexual Activity    Alcohol use: No     Comment: hx of alochol abuse but does not drink at all now    Drug use: No     Comment: none    Sexual activity: Not Currently     Birth control/protection: None   Lifestyle    Physical activity:     Days per week: Not on file     Minutes per session: Not on file    Stress: Not on file   Relationships    Social connections:     Talks on phone: Not on file     Gets together: Not on file     Attends Buddhist service: Not on file     Active member of club or organization: Not on file     Attends meetings of clubs or organizations: Not on file     Relationship status: Not on file   Other Topics Concern    Not on file   Social History Narrative    Not on file         Current Outpatient Medications:     albuterol (PROVENTIL) 5 mg/mL nebulizer solution, Take 2.5 mg by nebulization 2 (two) times daily. Rescue , Disp: , Rfl:     atorvastatin (LIPITOR) 80 MG tablet, Take 80 mg by mouth once daily., Disp: , Rfl:     ciprofloxacin-hydrocortisone 0.2-1% (CIPRO HC OTIC) otic suspension, Place 3 drops into the left ear 2 (two) times daily. Take for 16 days., Disp: , Rfl:     fluticasone (FLONASE) 50 mcg/actuation nasal spray, 1 spray (50 mcg total) by  Each Nare route 2 (two) times daily., Disp: 1 Bottle, Rfl: 3    FLUTICASONE/VILANTEROL (BREO ELLIPTA INHL), Inhale 1 puff into the lungs once daily., Disp: , Rfl:     gabapentin (NEURONTIN) 300 MG capsule, Take 300 mg by mouth 3 (three) times daily., Disp: , Rfl:     glimepiride (AMARYL) 2 MG tablet, Take 2 mg by mouth 2 (two) times daily with meals., Disp: , Rfl:     ibrutinib (IMBRUVICA) 140 mg Cap, Take 420 mg by mouth once daily., Disp: , Rfl:     lisinopril (PRINIVIL,ZESTRIL) 20 MG tablet, Take 20 mg by mouth once daily., Disp: , Rfl:     metoprolol tartrate (LOPRESSOR) 25 MG tablet, Take 12.5 mg by mouth once daily. , Disp: , Rfl:     potassium chloride SA (K-DUR,KLOR-CON) 20 MEQ tablet, Take 20 mEq by mouth once daily. , Disp: , Rfl:     prenatal #115-iron-folic acid 29 mg iron- 1 mg Chew, Take 1 tablet by mouth once daily. , Disp: , Rfl:     sucralfate (CARAFATE) 1 gram tablet, Take 1 g by mouth 4 (four) times daily., Disp: , Rfl:     triamcinolone acetonide 0.1% (KENALOG) 0.1 % cream, Spare the face apply 2-3 times per day for itch relief on the rash., Disp: 453 g, Rfl: 1    pantoprazole (PROTONIX) 40 MG tablet, Take 1 tablet (40 mg total) by mouth once daily., Disp: 30 tablet, Rfl: 0    Current Facility-Administered Medications:     cyanocobalamin injection 1,000 mcg, 1,000 mcg, Subcutaneous, Q30 Days, CHRISTOPHER Kuo MD, 1,000 mcg at 05/06/19 1358          Objective:   Vitals:  Blood pressure 125/73, pulse 108, temperature 98.7 °F (37.1 °C), resp. rate 20, weight 67.9 kg (149 lb 12.8 oz).    Physical Examination:   GEN: no apparent distress, comfortable  HEAD: atraumatic and normocephalic  EYES: no pallor, no icterus  ENT:  no pharyngeal erythema, external ears WNL; no nasal discharge; no thrush  NECK: no masses, thyroid normal, trachea midline, no LAD/LN's, supple  CV: RRR with no murmur; normal pulse; normal S1 and S2; no pedal edema  CHEST: Normal respiratory effort; CTAB; normal breath  sounds; no wheeze or crackles  ABDOM: nontender and nondistended; soft,  no rebound/guarding, L/S NP  MUSC/Skeletal: ROM normal; no crepitus; joints normal; no deformities   EXTREM: I do not palpate left supraclavicular Adenopathy at this time.  SKIN: no rashes, lesions, ulcers, petechiae  : no CVAT  NEURO: grossly intact; motor/sensory WNL;  no tremors  PSYCH: normal mood, affect and behavior  LYMPH: normal cervical, supraclavicular, axillary and groin LN's  BREASTS: no palpable mass L or R breast.    Labs:     Hemoglobin is 11.3. MCV is 84. B-12 is low at 285, and ferritin is low at 10.  Started on B 12 shots and Fe po.    Now Hgb 11.  Ferritin 15.    PET scan showed increased opacity at the right upper lung area at 2.5 cm. This could be postradiation change. There is a 13 mm nodule at the left upper lobe area. Will check her CAT scan again of the chest in 2 months and discuss with Dr. Culp  if there is any progressive enlargement.    She had a colonoscopy 1 year ago per Dr. Streeter, polyps were removed.  Diverticulosis, hemorrhoids.  EGD gastritis.    Assessment:   (1) 74 y.o. female with diagnosis of  Small lymphocytic NHL/CLL, on ibrutinib po daily.    (2) abnormal PET scan with 13 mm nodule and left upper lobe and increased opacity at 2.5 cm and right upper lobe, possibly post radiation change. Check CAT scan in 2 months    (3) anemia secondary to B-12 deficiency and iron deficiency. On B12 injections monthly.  Check on affordability of Injectafer infusion.  RTC 1 month for B12, monthly.    RTC see me 3 months.    (4) low back pain symptoms ×2 years, probably secondary to degenerative disc disease. Refer back to Dr. Welch, her PMD, for management or disposition.    (4)Clinical Stage 1 lung cancer hx, treated with stereotactic Rad Rx.

## 2019-07-17 RX ORDER — CYANOCOBALAMIN 1000 UG/ML
1000 INJECTION, SOLUTION INTRAMUSCULAR; SUBCUTANEOUS
Status: CANCELLED | OUTPATIENT
Start: 2019-07-24

## 2019-07-18 ENCOUNTER — TELEPHONE (OUTPATIENT)
Dept: HEMATOLOGY/ONCOLOGY | Facility: CLINIC | Age: 74
End: 2019-07-18

## 2019-07-18 NOTE — TELEPHONE ENCOUNTER
----- Message from Sarahi Hill sent at 7/15/2019  2:03 PM CDT -----  Contact: Sachin NP with [facility]  Sachin did a housecall with pt and she's been complaining of ear pain and itching for 1 mo. Tried oral and otic txs.  Sachin wants to know does dr. clarke want to follow her for this or refer her to ENT?    Pain has resolved, but itching has not.    # [phone]

## 2019-07-24 RX ORDER — CYANOCOBALAMIN 1000 UG/ML
1000 INJECTION, SOLUTION INTRAMUSCULAR; SUBCUTANEOUS
Status: CANCELLED | OUTPATIENT
Start: 2019-07-31

## 2019-08-05 ENCOUNTER — CLINICAL SUPPORT (OUTPATIENT)
Dept: HEMATOLOGY/ONCOLOGY | Facility: CLINIC | Age: 74
End: 2019-08-05
Payer: MEDICARE

## 2019-08-05 DIAGNOSIS — D51.8 ANEMIA OF DECREASED VITAMIN B12 ABSORPTION: ICD-10-CM

## 2019-08-05 PROCEDURE — 96372 THER/PROPH/DIAG INJ SC/IM: CPT | Mod: S$GLB,,, | Performed by: INTERNAL MEDICINE

## 2019-08-05 PROCEDURE — 96372 PR INJECTION,THERAP/PROPH/DIAG2ST, IM OR SUBCUT: ICD-10-PCS | Mod: S$GLB,,, | Performed by: INTERNAL MEDICINE

## 2019-08-05 RX ADMIN — CYANOCOBALAMIN 1000 MCG: 1000 INJECTION, SOLUTION INTRAMUSCULAR; SUBCUTANEOUS at 10:08

## 2019-09-09 ENCOUNTER — CLINICAL SUPPORT (OUTPATIENT)
Dept: HEMATOLOGY/ONCOLOGY | Facility: CLINIC | Age: 74
End: 2019-09-09
Payer: MEDICARE

## 2019-09-09 DIAGNOSIS — D51.8 ANEMIA OF DECREASED VITAMIN B12 ABSORPTION: ICD-10-CM

## 2019-09-09 PROCEDURE — 96372 THER/PROPH/DIAG INJ SC/IM: CPT | Mod: S$GLB,,, | Performed by: INTERNAL MEDICINE

## 2019-09-09 PROCEDURE — 96372 PR INJECTION,THERAP/PROPH/DIAG2ST, IM OR SUBCUT: ICD-10-PCS | Mod: S$GLB,,, | Performed by: INTERNAL MEDICINE

## 2019-09-09 RX ADMIN — CYANOCOBALAMIN 1000 MCG: 1000 INJECTION, SOLUTION INTRAMUSCULAR; SUBCUTANEOUS at 09:09

## 2019-10-07 ENCOUNTER — LAB VISIT (OUTPATIENT)
Dept: LAB | Facility: HOSPITAL | Age: 74
End: 2019-10-07
Attending: INTERNAL MEDICINE
Payer: MEDICARE

## 2019-10-07 DIAGNOSIS — E78.2 MIXED HYPERLIPIDEMIA: ICD-10-CM

## 2019-10-07 DIAGNOSIS — D50.0 IRON DEFICIENCY ANEMIA SECONDARY TO BLOOD LOSS (CHRONIC): Primary | ICD-10-CM

## 2019-10-07 DIAGNOSIS — C85.10 B-CELL LYMPHOMA, UNSPECIFIED B-CELL LYMPHOMA TYPE, UNSPECIFIED BODY REGION: ICD-10-CM

## 2019-10-07 DIAGNOSIS — E11.9 TYPE 2 DIABETES MELLITUS WITHOUT COMPLICATION, WITHOUT LONG-TERM CURRENT USE OF INSULIN: Primary | ICD-10-CM

## 2019-10-07 LAB
BASOPHILS # BLD AUTO: 0.05 K/UL (ref 0–0.2)
BASOPHILS NFR BLD: 1 % (ref 0–1.9)
DIFFERENTIAL METHOD: ABNORMAL
EOSINOPHIL # BLD AUTO: 0.2 K/UL (ref 0–0.5)
EOSINOPHIL NFR BLD: 3.7 % (ref 0–8)
ERYTHROCYTE [DISTWIDTH] IN BLOOD BY AUTOMATED COUNT: 15.5 % (ref 11.5–14.5)
FERRITIN SERPL-MCNC: 158 NG/ML (ref 20–300)
HCT VFR BLD AUTO: 42.5 % (ref 37–48.5)
HGB BLD-MCNC: 13.9 G/DL (ref 12–16)
IMM GRANULOCYTES # BLD AUTO: 0.03 K/UL (ref 0–0.04)
IMM GRANULOCYTES NFR BLD AUTO: 0.6 % (ref 0–0.5)
LYMPHOCYTES # BLD AUTO: 1.7 K/UL (ref 1–4.8)
LYMPHOCYTES NFR BLD: 34.3 % (ref 18–48)
MCH RBC QN AUTO: 28.1 PG (ref 27–31)
MCHC RBC AUTO-ENTMCNC: 32.7 G/DL (ref 32–36)
MCV RBC AUTO: 86 FL (ref 82–98)
MONOCYTES # BLD AUTO: 0.4 K/UL (ref 0.3–1)
MONOCYTES NFR BLD: 8.7 % (ref 4–15)
NEUTROPHILS # BLD AUTO: 2.5 K/UL (ref 1.8–7.7)
NEUTROPHILS NFR BLD: 51.7 % (ref 38–73)
NRBC BLD-RTO: 0 /100 WBC
PLATELET # BLD AUTO: 157 K/UL (ref 150–350)
PMV BLD AUTO: 11.1 FL (ref 9.2–12.9)
RBC # BLD AUTO: 4.95 M/UL (ref 4–5.4)
WBC # BLD AUTO: 4.84 K/UL (ref 3.9–12.7)

## 2019-10-07 PROCEDURE — 85025 COMPLETE CBC W/AUTO DIFF WBC: CPT

## 2019-10-07 PROCEDURE — 82728 ASSAY OF FERRITIN: CPT

## 2019-10-08 LAB
ALBUMIN SERPL-MCNC: 4.3 G/DL (ref 3.6–5.1)
ALBUMIN/GLOB SERPL: 1.4 (CALC) (ref 1–2.5)
ALP SERPL-CCNC: 116 U/L (ref 33–130)
ALT SERPL-CCNC: 6 U/L (ref 6–29)
AST SERPL-CCNC: 9 U/L (ref 10–35)
BILIRUB SERPL-MCNC: 0.4 MG/DL (ref 0.2–1.2)
BUN SERPL-MCNC: 11 MG/DL (ref 7–25)
BUN/CREAT SERPL: 21 (CALC) (ref 6–22)
CALCIUM SERPL-MCNC: 9.5 MG/DL (ref 8.6–10.4)
CHLORIDE SERPL-SCNC: 104 MMOL/L (ref 98–110)
CHOLEST SERPL-MCNC: 247 MG/DL
CHOLEST/HDLC SERPL: 3.8 (CALC)
CO2 SERPL-SCNC: 29 MMOL/L (ref 20–32)
CREAT SERPL-MCNC: 0.53 MG/DL (ref 0.6–0.93)
GFRSERPLBLD MDRD-ARVRAT: 93 ML/MIN/1.73M2
GLOBULIN SER CALC-MCNC: 3 G/DL (CALC) (ref 1.9–3.7)
GLUCOSE SERPL-MCNC: 94 MG/DL (ref 65–139)
HDLC SERPL-MCNC: 65 MG/DL
LDLC SERPL CALC-MCNC: 154 MG/DL (CALC)
NONHDLC SERPL-MCNC: 182 MG/DL (CALC)
POTASSIUM SERPL-SCNC: 3.8 MMOL/L (ref 3.5–5.3)
PROT SERPL-MCNC: 7.3 G/DL (ref 6.1–8.1)
SODIUM SERPL-SCNC: 143 MMOL/L (ref 135–146)
TRIGL SERPL-MCNC: 151 MG/DL
TSH SERPL-ACNC: 0.41 MIU/L (ref 0.4–4.5)

## 2019-10-14 ENCOUNTER — OFFICE VISIT (OUTPATIENT)
Dept: HEMATOLOGY/ONCOLOGY | Facility: CLINIC | Age: 74
End: 2019-10-14
Payer: MEDICARE

## 2019-10-14 VITALS
RESPIRATION RATE: 20 BRPM | WEIGHT: 150 LBS | HEART RATE: 110 BPM | TEMPERATURE: 99 F | SYSTOLIC BLOOD PRESSURE: 160 MMHG | BODY MASS INDEX: 26.57 KG/M2 | DIASTOLIC BLOOD PRESSURE: 78 MMHG

## 2019-10-14 DIAGNOSIS — C34.11 MALIGNANT NEOPLASM OF UPPER LOBE OF RIGHT LUNG: Primary | ICD-10-CM

## 2019-10-14 DIAGNOSIS — C85.10 B-CELL LYMPHOMA, UNSPECIFIED B-CELL LYMPHOMA TYPE, UNSPECIFIED BODY REGION: ICD-10-CM

## 2019-10-14 DIAGNOSIS — Z23 NEED FOR PROPHYLACTIC VACCINATION AND INOCULATION AGAINST INFLUENZA: ICD-10-CM

## 2019-10-14 PROCEDURE — 90662 IIV NO PRSV INCREASED AG IM: CPT | Mod: S$GLB,,, | Performed by: INTERNAL MEDICINE

## 2019-10-14 PROCEDURE — 1101F PT FALLS ASSESS-DOCD LE1/YR: CPT | Mod: S$GLB,,, | Performed by: INTERNAL MEDICINE

## 2019-10-14 PROCEDURE — G0008 ADMIN INFLUENZA VIRUS VAC: HCPCS | Mod: S$GLB,,, | Performed by: INTERNAL MEDICINE

## 2019-10-14 PROCEDURE — G0008 FLU VACCINE - HIGH DOSE (65+) PRESERVATIVE FREE IM: ICD-10-PCS | Mod: S$GLB,,, | Performed by: INTERNAL MEDICINE

## 2019-10-14 PROCEDURE — 96372 THER/PROPH/DIAG INJ SC/IM: CPT | Mod: 59,S$GLB,, | Performed by: INTERNAL MEDICINE

## 2019-10-14 PROCEDURE — 99214 OFFICE O/P EST MOD 30 MIN: CPT | Mod: 25,S$GLB,, | Performed by: INTERNAL MEDICINE

## 2019-10-14 PROCEDURE — 3077F SYST BP >= 140 MM HG: CPT | Mod: S$GLB,,, | Performed by: INTERNAL MEDICINE

## 2019-10-14 PROCEDURE — 96372 PR INJECTION,THERAP/PROPH/DIAG2ST, IM OR SUBCUT: ICD-10-PCS | Mod: 59,S$GLB,, | Performed by: INTERNAL MEDICINE

## 2019-10-14 PROCEDURE — 1101F PR PT FALLS ASSESS DOC 0-1 FALLS W/OUT INJ PAST YR: ICD-10-PCS | Mod: S$GLB,,, | Performed by: INTERNAL MEDICINE

## 2019-10-14 PROCEDURE — 90662 FLU VACCINE - HIGH DOSE (65+) PRESERVATIVE FREE IM: ICD-10-PCS | Mod: S$GLB,,, | Performed by: INTERNAL MEDICINE

## 2019-10-14 PROCEDURE — 3077F PR MOST RECENT SYSTOLIC BLOOD PRESSURE >= 140 MM HG: ICD-10-PCS | Mod: S$GLB,,, | Performed by: INTERNAL MEDICINE

## 2019-10-14 PROCEDURE — 3078F DIAST BP <80 MM HG: CPT | Mod: S$GLB,,, | Performed by: INTERNAL MEDICINE

## 2019-10-14 PROCEDURE — 3078F PR MOST RECENT DIASTOLIC BLOOD PRESSURE < 80 MM HG: ICD-10-PCS | Mod: S$GLB,,, | Performed by: INTERNAL MEDICINE

## 2019-10-14 PROCEDURE — 99214 PR OFFICE/OUTPT VISIT, EST, LEVL IV, 30-39 MIN: ICD-10-PCS | Mod: 25,S$GLB,, | Performed by: INTERNAL MEDICINE

## 2019-10-14 RX ADMIN — CYANOCOBALAMIN 1000 MCG: 1000 INJECTION, SOLUTION INTRAMUSCULAR; SUBCUTANEOUS at 11:10

## 2019-10-14 NOTE — PROGRESS NOTES
[facility] History & Physical    Subjective:      Patient ID:   Yocasta Almonte  74 y.o. female  1945  MD Yuri, MD Suni.      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  Denies fever, night sweats or weight loss.    She continues on Ibrutinib now, 2 daily.    On B 12 injections monthly, increased energy.    On oral Fe, no increase  in Hgb or Ferritin.  Discussed Injectafer weekly x's 2 weeks to replenish Fe stores.      Hx includes DM, type 2, HTN, GERD, COPD, cholesterol, thyroid dx,  and clinical lung cancer treated with empirical  Stereotactic Rad Rx. To the right upper lung area.    She has chronic low back pain. PET scan supports degenerative disc disease at the back area. She is to follow-up with Dr. Welch for evaluation of this complaint.    Seeing Dr. Art of ENT for R ear infection.    Surgery LN bx R axilla, neck surgery for infection.  Smoke 1/2 ppd x's 50 years.  ETOH no.  Allergy iodine.  Mom cancer, Dad ? Health.    ROS:   GEN: normal without any fever, night sweats or weight loss  HEENT: normal with no HA's, sore throat, stiff neck, changes in vision  CV: normal with no CP, SOB, PND, VALENCIA or orthopnea  PULM: see HPI  GI: normal with no abdominal pain, nausea, vomiting, constipation, diarrhea, melanotic stools, BRBPR, or hematemesis  : normal with no hematuria, dysuria  BREAST: normal with no mass, discharge, pain  SKIN: normal with no rash, erythema, bruising, or swelling     Past Medical History:   Diagnosis Date    Cancer     lymphoma    COPD (chronic obstructive pulmonary disease)     Diabetes mellitus     GERD (gastroesophageal reflux disease)     Hyperlipidemia     Hypertension     Thyroid disease      Past Surgical History:   Procedure Laterality Date    NECK SURGERY      infection in neck removed       Review of patient's allergies indicates:   Allergen Reactions    Iodine and iodide containing products            Current  Outpatient Medications:     albuterol (PROVENTIL) 5 mg/mL nebulizer solution, Take 2.5 mg by nebulization 2 (two) times daily. Rescue , Disp: , Rfl:     atorvastatin (LIPITOR) 80 MG tablet, Take 80 mg by mouth once daily., Disp: , Rfl:     ciprofloxacin-hydrocortisone 0.2-1% (CIPRO HC OTIC) otic suspension, Place 3 drops into the left ear 2 (two) times daily. Take for 16 days., Disp: , Rfl:     fluticasone (FLONASE) 50 mcg/actuation nasal spray, 1 spray (50 mcg total) by Each Nare route 2 (two) times daily., Disp: 1 Bottle, Rfl: 3    FLUTICASONE/VILANTEROL (BREO ELLIPTA INHL), Inhale 1 puff into the lungs once daily., Disp: , Rfl:     gabapentin (NEURONTIN) 300 MG capsule, Take 300 mg by mouth 3 (three) times daily., Disp: , Rfl:     glimepiride (AMARYL) 2 MG tablet, Take 2 mg by mouth 2 (two) times daily with meals., Disp: , Rfl:     ibrutinib (IMBRUVICA) 140 mg Cap, Take 420 mg by mouth once daily., Disp: , Rfl:     lisinopril (PRINIVIL,ZESTRIL) 20 MG tablet, Take 20 mg by mouth once daily., Disp: , Rfl:     metoprolol tartrate (LOPRESSOR) 25 MG tablet, Take 12.5 mg by mouth once daily. , Disp: , Rfl:     potassium chloride SA (K-DUR,KLOR-CON) 20 MEQ tablet, Take 20 mEq by mouth once daily. , Disp: , Rfl:     prenatal #115-iron-folic acid 29 mg iron- 1 mg Chew, Take 1 tablet by mouth once daily. , Disp: , Rfl:     sucralfate (CARAFATE) 1 gram tablet, Take 1 g by mouth 4 (four) times daily., Disp: , Rfl:     triamcinolone acetonide 0.1% (KENALOG) 0.1 % cream, Spare the face apply 2-3 times per day for itch relief on the rash., Disp: 453 g, Rfl: 1    pantoprazole (PROTONIX) 40 MG tablet, Take 1 tablet (40 mg total) by mouth once daily., Disp: 30 tablet, Rfl: 0    Current Facility-Administered Medications:     cyanocobalamin injection 1,000 mcg, 1,000 mcg, Subcutaneous, Q30 Days, R Harinder Kuo MD, 1,000 mcg at 09/09/19 0915          Objective:   Vitals:  Blood pressure (!) 160/78, pulse 110,  temperature 98.6 °F (37 °C), temperature source Oral, resp. rate 20, weight 68 kg (150 lb).    Physical Examination:   GEN: no apparent distress, comfortable  HEAD: atraumatic and normocephalic  EYES: no pallor, no icterus  ENT:  no pharyngeal erythema, external ear skin excoriated on R ear.; no nasal discharge; no thrush  NECK: no masses, thyroid normal, trachea midline, no LAD/LN's, supple  CV: RRR with no murmur; normal pulse; normal S1 and S2; no pedal edema  CHEST: Normal respiratory effort; CTAB; normal breath sounds; no wheeze or crackles  ABDOM: nontender and nondistended; soft,  no rebound/guarding, L/S NP  MUSC/Skeletal: ROM normal; no crepitus; joints normal; no deformities   EXTREM: I do not palpate left supraclavicular Adenopathy at this time.  SKIN: no rashes, lesions, ulcers, petechiae  : no CVAT  NEURO: grossly intact; motor/sensory WNL;  no tremors  PSYCH: normal mood, affect and behavior  LYMPH: normal cervical, supraclavicular, axillary and groin LN's  BREASTS: no palpable mass L or R breast.    Labs:     Hemoglobin is 11.3. MCV is 84. B-12 is low at 285, and ferritin is low at 10.  Started on B 12 shots and injectafer previously.    Now Hgb 11.  Ferritin 15.  Better now Hgb 13.9., Ferritin up at 158.    PET scan showed increased opacity at the right upper lung area at 2.5 cm. This could be postradiation change. There is a 13 mm nodule at the left upper lobe area. Will check her CAT scan again of the chest in 2 months and discuss with Dr. Culp  if there is any progressive enlargement.    She had a colonoscopy 1 year ago per Dr. Streeter, polyps were removed.  Diverticulosis, hemorrhoids.  EGD gastritis.    Continue Imbruvica, check PET and RTC 1/2020.  Assessment:   (1) 74 y.o. female with diagnosis of  Small lymphocytic NHL/CLL, on ibrutinib po daily.    (2) abnormal PET scan with 13 mm nodule and left upper lobe and increased opacity at 2.5 cm and right upper lobe, possibly post radiation  change.     (3) anemia secondary to B-12 deficiency and iron deficiency. On B12 injections monthly. S/P Injectafer.  Hgb better 12.    Check PET and RTC 1/2020.    (4) low back pain symptoms ×2 years, probably secondary to degenerative disc disease. Refer back to Dr. Welch, her PMD, for management or disposition.    (4)Clinical Stage 1 lung cancer hx, treated with stereotactic Rad Rx.  Check PET 1/2020.

## 2019-10-15 ENCOUNTER — OFFICE VISIT (OUTPATIENT)
Dept: FAMILY MEDICINE | Facility: CLINIC | Age: 74
End: 2019-10-15
Payer: MEDICARE

## 2019-10-15 VITALS
WEIGHT: 147 LBS | DIASTOLIC BLOOD PRESSURE: 72 MMHG | HEIGHT: 64 IN | SYSTOLIC BLOOD PRESSURE: 136 MMHG | BODY MASS INDEX: 25.1 KG/M2 | HEART RATE: 80 BPM

## 2019-10-15 DIAGNOSIS — C91.10 CHRONIC LYMPHOCYTIC LEUKEMIA: ICD-10-CM

## 2019-10-15 DIAGNOSIS — J31.0 CHRONIC RHINITIS: ICD-10-CM

## 2019-10-15 DIAGNOSIS — K21.9 GASTROESOPHAGEAL REFLUX DISEASE WITHOUT ESOPHAGITIS: ICD-10-CM

## 2019-10-15 DIAGNOSIS — E78.2 MIXED HYPERLIPIDEMIA: ICD-10-CM

## 2019-10-15 DIAGNOSIS — H60.541 ECZEMATOID OTITIS EXTERNA OF RIGHT EAR, UNSPECIFIED CHRONICITY: ICD-10-CM

## 2019-10-15 DIAGNOSIS — D50.0 IRON DEFICIENCY ANEMIA DUE TO CHRONIC BLOOD LOSS: ICD-10-CM

## 2019-10-15 DIAGNOSIS — D51.8 ANEMIA OF DECREASED VITAMIN B12 ABSORPTION: ICD-10-CM

## 2019-10-15 DIAGNOSIS — F17.210 CIGARETTE NICOTINE DEPENDENCE WITHOUT COMPLICATION: ICD-10-CM

## 2019-10-15 DIAGNOSIS — E11.9 TYPE 2 DIABETES MELLITUS WITHOUT COMPLICATION, WITHOUT LONG-TERM CURRENT USE OF INSULIN: Primary | ICD-10-CM

## 2019-10-15 DIAGNOSIS — E87.6 HYPOKALEMIA: ICD-10-CM

## 2019-10-15 DIAGNOSIS — I10 ESSENTIAL HYPERTENSION: ICD-10-CM

## 2019-10-15 DIAGNOSIS — I25.10 CORONARY ARTERY DISEASE WITHOUT ANGINA PECTORIS, UNSPECIFIED VESSEL OR LESION TYPE, UNSPECIFIED WHETHER NATIVE OR TRANSPLANTED HEART: ICD-10-CM

## 2019-10-15 PROBLEM — C34.10 MALIGNANT NEOPLASM OF UPPER LOBE, BRONCHUS OR LUNG: Status: ACTIVE | Noted: 2019-07-09

## 2019-10-15 PROBLEM — J30.9 ALLERGIC RHINITIS: Status: ACTIVE | Noted: 2018-10-18

## 2019-10-15 PROBLEM — E11.69 TYPE 2 DIABETES MELLITUS WITH OTHER SPECIFIED COMPLICATION: Status: ACTIVE | Noted: 2019-10-15

## 2019-10-15 PROBLEM — F33.0 MILD RECURRENT MAJOR DEPRESSION: Status: ACTIVE | Noted: 2018-10-18

## 2019-10-15 PROBLEM — R13.10 DYSPHAGIA: Status: ACTIVE | Noted: 2019-10-15

## 2019-10-15 PROBLEM — R63.0 LOSS OF APPETITE: Status: ACTIVE | Noted: 2017-05-08

## 2019-10-15 PROBLEM — B02.9 SHINGLES: Status: RESOLVED | Noted: 2018-07-25 | Resolved: 2019-10-15

## 2019-10-15 PROBLEM — E55.9 VITAMIN D DEFICIENCY: Status: ACTIVE | Noted: 2017-05-08

## 2019-10-15 PROBLEM — Z72.0 TOBACCO USER: Status: ACTIVE | Noted: 2019-10-15

## 2019-10-15 PROCEDURE — 99214 PR OFFICE/OUTPT VISIT, EST, LEVL IV, 30-39 MIN: ICD-10-PCS | Mod: S$GLB,,, | Performed by: FAMILY MEDICINE

## 2019-10-15 PROCEDURE — 99214 OFFICE O/P EST MOD 30 MIN: CPT | Mod: S$GLB,,, | Performed by: FAMILY MEDICINE

## 2019-10-15 RX ORDER — ESOMEPRAZOLE MAGNESIUM 40 MG/1
1 CAPSULE, DELAYED RELEASE ORAL DAILY
COMMUNITY
Start: 2019-02-19 | End: 2019-10-15 | Stop reason: SDUPTHER

## 2019-10-15 RX ORDER — ESOMEPRAZOLE MAGNESIUM 40 MG/1
40 CAPSULE, DELAYED RELEASE ORAL DAILY
Qty: 90 CAPSULE | Refills: 1 | Status: SHIPPED | OUTPATIENT
Start: 2019-10-15 | End: 2019-01-01 | Stop reason: ALTCHOICE

## 2019-10-15 RX ORDER — CLOBETASOL PROPIONATE 0.5 MG/G
OINTMENT TOPICAL 2 TIMES DAILY
Qty: 30 G | Refills: 2 | Status: SHIPPED | OUTPATIENT
Start: 2019-10-15 | End: 2020-01-01

## 2019-10-15 RX ORDER — MIRTAZAPINE 30 MG/1
1 TABLET, FILM COATED ORAL NIGHTLY
COMMUNITY
End: 2020-01-01 | Stop reason: SDUPTHER

## 2019-10-15 NOTE — PROGRESS NOTES
SUBJECTIVE:    Patient ID: Yocasta Almonte is a 74 y.o. female.    Chief Complaint: Regular Check Up and check ears    Inpatient history of hypothyroidism and hyperlipidemia presents for visit.  Labs look good with the exception of her cholesterol level.  Patient admits to intermittent compliance with her atorvastatin.  Patient with CLL also with spot on her lung. Will have repeat PET scan in January, taking oral chemotherapy.  Breathing good.  Has history of COPD.  Still smokes occasionally.  She is up-to-date with flu and pneumonia vaccinations.  Has had a recent visit with pulmonology Dr Pizano.  Vitals are good. appetitie ok  Still with recurrent issues with otiits externa and itching at her ear lobes and in ear canal. Has seen ENT about 2 weeks ago and using  Topical steroids but still with itching   DM well contrlled on oral medications and diet.        Past Medical History:   Diagnosis Date    Cancer     lymphoma    COPD (chronic obstructive pulmonary disease)     Diabetes mellitus     GERD (gastroesophageal reflux disease)     Hyperlipidemia     Hypertension     Thyroid disease      Past Surgical History:   Procedure Laterality Date    NECK SURGERY      infection in neck removed     Family History   Problem Relation Age of Onset    Cancer Mother     No Known Problems Father        Marital Status:   Alcohol History:  reports that she does not drink alcohol.  Tobacco History:  reports that she has been smoking. She has a 25.00 pack-year smoking history. She has never used smokeless tobacco.  Drug History:  reports that she does not use drugs.    Review of patient's allergies indicates:   Allergen Reactions    Iodine and iodide containing products        Current Outpatient Medications:     albuterol (PROVENTIL) 5 mg/mL nebulizer solution, Take 2.5 mg by nebulization 2 (two) times daily. Rescue , Disp: , Rfl:     atorvastatin (LIPITOR) 80 MG tablet, Take 80 mg by mouth once daily., Disp: ,  Rfl:     ciprofloxacin-hydrocortisone 0.2-1% (CIPRO HC OTIC) otic suspension, Place 3 drops into the left ear 2 (two) times daily. Take for 16 days., Disp: , Rfl:     esomeprazole (NEXIUM) 40 MG capsule, Take 1 capsule (40 mg total) by mouth once daily., Disp: 90 capsule, Rfl: 1    fluticasone (FLONASE) 50 mcg/actuation nasal spray, 1 spray (50 mcg total) by Each Nare route 2 (two) times daily., Disp: 1 Bottle, Rfl: 3    FLUTICASONE/VILANTEROL (BREO ELLIPTA INHL), Inhale 1 puff into the lungs once daily., Disp: , Rfl:     gabapentin (NEURONTIN) 300 MG capsule, Take 300 mg by mouth 2 (two) times daily. , Disp: , Rfl:     glimepiride (AMARYL) 2 MG tablet, Take 2 mg by mouth 2 (two) times daily with meals., Disp: , Rfl:     ibrutinib (IMBRUVICA) 140 mg Cap, Take 420 mg by mouth once daily., Disp: , Rfl:     lisinopril (PRINIVIL,ZESTRIL) 20 MG tablet, Take 20 mg by mouth once daily., Disp: , Rfl:     metoprolol tartrate (LOPRESSOR) 25 MG tablet, Take 12.5 mg by mouth once daily. , Disp: , Rfl:     mirtazapine (REMERON) 30 MG tablet, Take 1 tablet by mouth every evening., Disp: , Rfl:     potassium chloride SA (K-DUR,KLOR-CON) 20 MEQ tablet, Take 20 mEq by mouth once daily. , Disp: , Rfl:     clobetasol 0.05% (TEMOVATE) 0.05 % Oint, Apply topically 2 (two) times daily. For rash, Disp: 30 g, Rfl: 2    Current Facility-Administered Medications:     cyanocobalamin injection 1,000 mcg, 1,000 mcg, Subcutaneous, Q30 Days, CHRISTOPHER Kuo MD, 1,000 mcg at 10/14/19 1104    Review of Systems   Constitutional: Negative for activity change, fatigue and unexpected weight change.   HENT: Negative for hearing loss, postnasal drip, sinus pressure, sore throat and voice change.    Eyes: Negative for photophobia and visual disturbance.   Respiratory: Negative for cough, shortness of breath and wheezing.    Cardiovascular: Negative for chest pain and palpitations.   Gastrointestinal: Negative for constipation, diarrhea and  "nausea.   Genitourinary: Negative for difficulty urinating, frequency, hematuria and urgency.   Musculoskeletal: Positive for arthralgias. Negative for back pain.   Skin: Positive for rash.   Neurological: Negative for weakness, light-headedness and headaches.   Hematological: Negative for adenopathy. Does not bruise/bleed easily.   Psychiatric/Behavioral: The patient is not nervous/anxious.           Objective:      Vitals:    10/15/19 1057   BP: 136/72   Pulse: 80   Weight: 66.7 kg (147 lb)   Height: 5' 4" (1.626 m)     Physical Exam   Constitutional: She is oriented to person, place, and time. Vital signs are normal. She appears well-developed and well-nourished. No distress.   HENT:   Head: Normocephalic and atraumatic.   Right Ear: Tympanic membrane normal.   Left Ear: Tympanic membrane and external ear normal.   Ears:    Mouth/Throat: She has dentures.   External irritation, scaling and excoriation   Eyes: Pupils are equal, round, and reactive to light. Conjunctivae, EOM and lids are normal.   Neck: Full passive range of motion without pain. Neck supple. No JVD present. No tracheal deviation present. No thyromegaly present.   Cardiovascular: Normal rate and regular rhythm. PMI is not displaced.   Pulmonary/Chest: Effort normal and breath sounds normal.   Abdominal: Soft. Bowel sounds are normal. There is no hepatosplenomegaly. There is no tenderness. There is no rebound and no guarding.   Musculoskeletal: Normal range of motion. She exhibits no edema or tenderness.   Thoracic kyphosis   Neurological: She is alert and oriented to person, place, and time.   Skin: Skin is warm and dry. No rash noted.   Psychiatric: She has a normal mood and affect.   Vitals reviewed.      Orders Only on 10/07/2019   Component Date Value Ref Range Status    Glucose 10/07/2019 94  65 - 139 mg/dL Final    BUN, Bld 10/07/2019 11  7 - 25 mg/dL Final    Creatinine 10/07/2019 0.53* 0.60 - 0.93 mg/dL Final    eGFR if non African " American 10/07/2019 93  > OR = 60 mL/min/1.73m2 Final    eGFR if  10/07/2019 108  > OR = 60 mL/min/1.73m2 Final    BUN/Creatinine Ratio 10/07/2019 21  6 - 22 (calc) Final    Sodium 10/07/2019 143  135 - 146 mmol/L Final    Potassium 10/07/2019 3.8  3.5 - 5.3 mmol/L Final    Chloride 10/07/2019 104  98 - 110 mmol/L Final    CO2 10/07/2019 29  20 - 32 mmol/L Final    Calcium 10/07/2019 9.5  8.6 - 10.4 mg/dL Final    Total Protein 10/07/2019 7.3  6.1 - 8.1 g/dL Final    Albumin 10/07/2019 4.3  3.6 - 5.1 g/dL Final    Globulin, Total 10/07/2019 3.0  1.9 - 3.7 g/dL (calc) Final    Albumin/Globulin Ratio 10/07/2019 1.4  1.0 - 2.5 (calc) Final    Total Bilirubin 10/07/2019 0.4  0.2 - 1.2 mg/dL Final    Alkaline Phosphatase 10/07/2019 116  33 - 130 U/L Final    AST 10/07/2019 9* 10 - 35 U/L Final    ALT 10/07/2019 6  6 - 29 U/L Final    Cholesterol 10/07/2019 247* <200 mg/dL Final    HDL 10/07/2019 65  >50 mg/dL Final    Triglycerides 10/07/2019 151* <150 mg/dL Final    LDL Cholesterol 10/07/2019 154* mg/dL (calc) Final    Hdl/Cholesterol Ratio 10/07/2019 3.8  <5.0 (calc) Final    Non HDL Chol. (LDL+VLDL) 10/07/2019 182* <130 mg/dL (calc) Final    TSH w/reflex to FT4 10/07/2019 0.41  0.40 - 4.50 mIU/L Final   Lab Visit on 10/07/2019   Component Date Value Ref Range Status    WBC 10/07/2019 4.84  3.90 - 12.70 K/uL Final    RBC 10/07/2019 4.95  4.00 - 5.40 M/uL Final    Hemoglobin 10/07/2019 13.9  12.0 - 16.0 g/dL Final    Hematocrit 10/07/2019 42.5  37.0 - 48.5 % Final    Mean Corpuscular Volume 10/07/2019 86  82 - 98 fL Final    Mean Corpuscular Hemoglobin 10/07/2019 28.1  27.0 - 31.0 pg Final    Mean Corpuscular Hemoglobin Conc 10/07/2019 32.7  32.0 - 36.0 g/dL Final    RDW 10/07/2019 15.5* 11.5 - 14.5 % Final    Platelets 10/07/2019 157  150 - 350 K/uL Final    MPV 10/07/2019 11.1  9.2 - 12.9 fL Final    Immature Granulocytes 10/07/2019 0.6* 0.0 - 0.5 % Final    Gran #  (ANC) 10/07/2019 2.5  1.8 - 7.7 K/uL Final    Immature Grans (Abs) 10/07/2019 0.03  0.00 - 0.04 K/uL Final    Lymph # 10/07/2019 1.7  1.0 - 4.8 K/uL Final    Mono # 10/07/2019 0.4  0.3 - 1.0 K/uL Final    Eos # 10/07/2019 0.2  0.0 - 0.5 K/uL Final    Baso # 10/07/2019 0.05  0.00 - 0.20 K/uL Final    nRBC 10/07/2019 0  0 /100 WBC Final    Gran% 10/07/2019 51.7  38.0 - 73.0 % Final    Lymph% 10/07/2019 34.3  18.0 - 48.0 % Final    Mono% 10/07/2019 8.7  4.0 - 15.0 % Final    Eosinophil% 10/07/2019 3.7  0.0 - 8.0 % Final    Basophil% 10/07/2019 1.0  0.0 - 1.9 % Final    Differential Method 10/07/2019 Automated   Final    Ferritin 10/07/2019 158  20.0 - 300.0 ng/mL Final   Orders Only on 05/29/2019   Component Date Value Ref Range Status    WBC 05/29/2019 4.8* 5.0 - 10.0 K/ul Final    RBC 05/29/2019 4.06  3.50 - 5.50 M/ul Final    Hemoglobin 05/29/2019 11.0* 12.0 - 15.0 g/dl Final    Hematocrit 05/29/2019 34.4* 36.0 - 48.0 % Final    MCV 05/29/2019 84.7  79.0 - 98.0 fl Final    MCH 05/29/2019 27.1  25.0 - 35.0 pg Final    MCHC 05/29/2019 32.0  31.0 - 36.0 g/dl Final    RDW 05/29/2019 16.3* 12.5 - 14.5 % Final    Platelets 05/29/2019 167  140 - 440 K/ul Final    MPV 05/29/2019 9.3  8.8 - 12.7 fl Final    Gran% 05/29/2019 46.1  % Final    Lymph% 05/29/2019 34.7  % Final    Mono% 05/29/2019 12.4  % Final    Eosinophil% 05/29/2019 5.2  % Final    Basophil% 05/29/2019 1.2  % Final    Gran # (ANC) 05/29/2019 2.2  1.4 - 6.5 K/ul Final    Lymph # 05/29/2019 1.7  1.2 - 3.4 K/ul Final    Mono # 05/29/2019 0.6  0.1 - 0.6 K/ul Final    Eos # 05/29/2019 0.3  0.0 - 0.7 K/ul Final    Baso # 05/29/2019 0.1  0.0 - 0.2 K/ul Final    nRBC# 05/29/2019 0  /100 WBC Final    nRBC% 05/29/2019 0  % Final    Immature Granulocytes 05/29/2019 0.4  % Final    Immature Grans (Abs) 05/29/2019 0.0  0.0 - 1.0 K/uL Final    Performed by: 05/29/2019 Message:   Final    Ferritin 05/29/2019 15* 24 - 162 ng/mL Final        Assessment:       1. Type 2 diabetes mellitus without complication, without long-term current use of insulin    2. Eczematoid otitis externa of right ear, unspecified chronicity    3. Chronic lymphocytic leukemia    4. Coronary artery disease without angina pectoris, unspecified vessel or lesion type, unspecified whether native or transplanted heart    5. Hypokalemia    6. Cigarette nicotine dependence without complication    7. Anemia of decreased vitamin B12 absorption    8. Iron deficiency anemia due to chronic blood loss    9. Mixed hyperlipidemia    10. Essential hypertension    11. Gastroesophageal reflux disease without esophagitis    12. Chronic rhinitis         Plan:       Type 2 diabetes mellitus without complication, without long-term current use of insulin  -     Hemoglobin A1C, POCT    Eczematoid otitis externa of right ear, unspecified chronicity  -     clobetasol 0.05% (TEMOVATE) 0.05 % Oint; Apply topically 2 (two) times daily. For rash  Dispense: 30 g; Refill: 2    Chronic lymphocytic leukemia    Coronary artery disease without angina pectoris, unspecified vessel or lesion type, unspecified whether native or transplanted heart    Hypokalemia    Cigarette nicotine dependence without complication    Anemia of decreased vitamin B12 absorption    Iron deficiency anemia due to chronic blood loss    Mixed hyperlipidemia    Essential hypertension    Gastroesophageal reflux disease without esophagitis  -     esomeprazole (NEXIUM) 40 MG capsule; Take 1 capsule (40 mg total) by mouth once daily.  Dispense: 90 capsule; Refill: 1    Chronic rhinitis     Compliance of medications encouraged.  All labs reviewed with patient.  Smoking cessation encouraged.  Steroid on med given to help with eczematous type rash.  She is to continue follow-up with pulmonology and Oncology  Follow up in about 4 months (around 2/15/2020).

## 2019-11-14 NOTE — TELEPHONE ENCOUNTER
----- Message from Gela Padron sent at 11/14/2019  9:59 AM CST -----  Contact: Sachin grace nurse practayanna Stephenson with is a Nurse Practitioner with [employer]. The patient has a  Uri . She took a z-pac and she is not any better. Sachin put her  on amoxillicin 1 g 3 xs a day for 10 days and  doxycycline  100 mg 2 xs a day for 1 week. The patient needs a f/u apt the beginning of next week. Call the patient to set that up.  Sachin # [phone] pts # [phone]  [facility]

## 2019-11-15 NOTE — TELEPHONE ENCOUNTER
----- Message from Gela Padron sent at 11/14/2019  9:59 AM CST -----  Contact: Sachin grace nurse practayanna Stephneson with is a Nurse Practitioner with [employer]. The patient has a  Uri . She took a z-pac and she is not any better. Sachin put her  on amoxillicin 1 g 3 xs a day for 10 days and  doxycycline  100 mg 2 xs a day for 1 week. The patient needs a f/u apt the beginning of next week. Call the patient to set that up.  Sachin # [phone] pts # [phone]  [facility]

## 2019-11-26 NOTE — PROGRESS NOTES
SUBJECTIVE:    Patient ID: Yocasta Almonte is a 74 y.o. female.    Chief Complaint: Cold Followup; finished amoxicillin/doxycycline; and ears still bothersome    Patient with COPD, hypertension and type 2 diabetes presents for follow-up visit.  She was seen approximately 3 weeks ago by [facility] through her insurance provider for issues with acute bronchitis.  Just prior to this she was seen by her ENT.  She had received a course of azithromycin no was later followed by doxycycline and Augmentin.  She still has some cough.  She uses her inhaler as needed.  She does continue to smoke.  But she feels much better.  And diabetes and blood pressure is acceptable.  Patient has had recurring problems with the otitis externa.  Has been treated by ENT with steroid cream and steroid drops.  She has also require antibiotics for this.  Her primary complaint is that her years continued itch.  Especially bothers her at night.  She has been using the steroid cream but it has not been affective.    Has issues with GERD.  Had been on Nexium for some time.  Her ENT has discontinued this medicine has placed her on Zantac 150 b.i.d..  She does find this helpful.      Past Medical History:   Diagnosis Date    Cancer     lymphoma    COPD (chronic obstructive pulmonary disease)     Diabetes mellitus     GERD (gastroesophageal reflux disease)     Hyperlipidemia     Hypertension     Thyroid disease      Past Surgical History:   Procedure Laterality Date    NECK SURGERY      infection in neck removed     Family History   Problem Relation Age of Onset    Cancer Mother     No Known Problems Father        Marital Status:   Alcohol History:  reports that she does not drink alcohol.  Tobacco History:  reports that she has been smoking. She has a 25.00 pack-year smoking history. She has never used smokeless tobacco.  Drug History:  reports that she does not use drugs.    Review of patient's allergies indicates:   Allergen  Reactions    Iodine and iodide containing products        Current Outpatient Medications:     albuterol (PROVENTIL) 5 mg/mL nebulizer solution, Take 2.5 mg by nebulization 2 (two) times daily. Rescue , Disp: , Rfl:     atorvastatin (LIPITOR) 80 MG tablet, Take 80 mg by mouth once daily., Disp: , Rfl:     ciprofloxacin-hydrocortisone 0.2-1% (CIPRO HC OTIC) otic suspension, Place 3 drops into the left ear 2 (two) times daily. Take for 16 days., Disp: , Rfl:     clobetasol 0.05% (TEMOVATE) 0.05 % Oint, Apply topically 2 (two) times daily. For rash, Disp: 30 g, Rfl: 2    fluticasone (FLONASE) 50 mcg/actuation nasal spray, 1 spray (50 mcg total) by Each Nare route 2 (two) times daily., Disp: 1 Bottle, Rfl: 3    FLUTICASONE/VILANTEROL (BREO ELLIPTA INHL), Inhale 1 puff into the lungs once daily., Disp: , Rfl:     gabapentin (NEURONTIN) 300 MG capsule, Take 300 mg by mouth 2 (two) times daily. , Disp: , Rfl:     glimepiride (AMARYL) 2 MG tablet, Take 2 mg by mouth 2 (two) times daily with meals., Disp: , Rfl:     ibrutinib (IMBRUVICA) 140 mg Cap, Take 420 mg by mouth once daily., Disp: , Rfl:     lisinopril (PRINIVIL,ZESTRIL) 20 MG tablet, Take 20 mg by mouth once daily., Disp: , Rfl:     metoprolol tartrate (LOPRESSOR) 25 MG tablet, Take 12.5 mg by mouth once daily. , Disp: , Rfl:     mirtazapine (REMERON) 30 MG tablet, Take 1 tablet by mouth every evening., Disp: , Rfl:     potassium chloride SA (K-DUR,KLOR-CON) 20 MEQ tablet, Take 20 mEq by mouth once daily. , Disp: , Rfl:     ranitidine (ZANTAC) 150 MG tablet, Take 1 tablet (150 mg total) by mouth 2 (two) times daily. For heartburn, Disp: 180 tablet, Rfl: 3    Current Facility-Administered Medications:     cyanocobalamin injection 1,000 mcg, 1,000 mcg, Subcutaneous, Q30 Days, R Harinder Kuo, MD, 1,000 mcg at 11/11/19 1004    Review of Systems   Constitutional: Negative for activity change, fatigue and unexpected weight change.   HENT: Negative for ear  "discharge, ear pain, hearing loss, postnasal drip, sinus pressure, sore throat and voice change.    Eyes: Negative for photophobia and visual disturbance.   Respiratory: Positive for cough. Negative for shortness of breath and wheezing.    Cardiovascular: Negative for chest pain and palpitations.   Gastrointestinal: Negative for constipation, diarrhea and nausea.   Genitourinary: Negative for difficulty urinating, frequency, hematuria and urgency.   Musculoskeletal: Positive for arthralgias. Negative for back pain.   Skin: Positive for rash.   Neurological: Negative for weakness, light-headedness and headaches.   Hematological: Negative for adenopathy. Does not bruise/bleed easily.   Psychiatric/Behavioral: The patient is not nervous/anxious.           Objective:      Vitals:    11/26/19 1211   BP: (!) 146/88   Pulse: 92   Weight: 65.8 kg (145 lb)   Height: 5' 4" (1.626 m)     Physical Exam   Constitutional: She is oriented to person, place, and time. Vital signs are normal. She appears well-developed and well-nourished. No distress.   Ambulates with cane   HENT:   Head: Normocephalic and atraumatic.   Right Ear: Hearing, tympanic membrane and external ear normal.   Left Ear: Hearing, tympanic membrane and external ear normal.   Mild scaling at canal entrance   Eyes: Pupils are equal, round, and reactive to light. Conjunctivae, EOM and lids are normal.   Neck: Full passive range of motion without pain. Neck supple. No JVD present. No tracheal deviation present. No thyromegaly present.   Cardiovascular: Normal rate and regular rhythm. PMI is not displaced.   Pulmonary/Chest: Effort normal and breath sounds normal.   Abdominal: Soft. Bowel sounds are normal. There is no hepatosplenomegaly. There is no tenderness. There is no rebound and no guarding.   Musculoskeletal: Normal range of motion. She exhibits no edema or tenderness.   Thoracic kyphosis   Neurological: She is alert and oriented to person, place, and time. "   Skin: Skin is warm and dry. No rash noted.   Psychiatric: She has a normal mood and affect.   Vitals reviewed.        Assessment:       1. Type 2 diabetes mellitus without complication, without long-term current use of insulin    2. Gastroesophageal reflux disease without esophagitis    3. Chronic obstructive pulmonary disease, unspecified COPD type    4. Chronic lymphocytic leukemia    5. Acute bronchitis with COPD    6. Eczema of external ear, bilateral         Plan:       Type 2 diabetes mellitus without complication, without long-term current use of insulin    Gastroesophageal reflux disease without esophagitis  -     ranitidine (ZANTAC) 150 MG tablet; Take 1 tablet (150 mg total) by mouth 2 (two) times daily. For heartburn  Dispense: 180 tablet; Refill: 3    Chronic obstructive pulmonary disease, unspecified COPD type    Chronic lymphocytic leukemia    Acute bronchitis with COPD    Eczema of external ear, bilateral     Keep current appointment scheduled for February fFollow up if symptoms worsen or fail to improve.

## 2020-01-01 ENCOUNTER — DOCUMENT SCAN (OUTPATIENT)
Dept: HOME HEALTH SERVICES | Facility: HOSPITAL | Age: 75
End: 2020-01-01

## 2020-01-01 ENCOUNTER — TELEPHONE (OUTPATIENT)
Dept: FAMILY MEDICINE | Facility: CLINIC | Age: 75
End: 2020-01-01

## 2020-01-01 ENCOUNTER — TELEPHONE (OUTPATIENT)
Dept: HEMATOLOGY/ONCOLOGY | Facility: CLINIC | Age: 75
End: 2020-01-01

## 2020-01-01 ENCOUNTER — HOSPITAL ENCOUNTER (OUTPATIENT)
Dept: PULMONOLOGY | Facility: HOSPITAL | Age: 75
Discharge: HOME | End: 2020-01-28
Attending: RADIOLOGY
Payer: MEDICARE

## 2020-01-01 ENCOUNTER — OFFICE VISIT (OUTPATIENT)
Dept: FAMILY MEDICINE | Facility: CLINIC | Age: 75
End: 2020-01-01
Payer: MEDICARE

## 2020-01-01 ENCOUNTER — CLINICAL SUPPORT (OUTPATIENT)
Dept: HEMATOLOGY/ONCOLOGY | Facility: CLINIC | Age: 75
End: 2020-01-01
Payer: MEDICARE

## 2020-01-01 ENCOUNTER — HOSPITAL ENCOUNTER (OUTPATIENT)
Dept: RADIOLOGY | Facility: HOSPITAL | Age: 75
Discharge: HOME | End: 2020-06-03
Attending: RADIOLOGY
Payer: MEDICARE

## 2020-01-01 ENCOUNTER — HOSPITAL ENCOUNTER (INPATIENT)
Facility: HOSPITAL | Age: 75
LOS: 5 days | DRG: 207 | End: 2020-11-09
Attending: EMERGENCY MEDICINE | Admitting: HOSPITALIST
Payer: MEDICARE

## 2020-01-01 ENCOUNTER — DOCUMENTATION ONLY (OUTPATIENT)
Dept: RADIATION ONCOLOGY | Facility: CLINIC | Age: 75
End: 2020-01-01

## 2020-01-01 ENCOUNTER — ANESTHESIA (OUTPATIENT)
Dept: SURGERY | Facility: HOSPITAL | Age: 75
End: 2020-01-01
Payer: MEDICARE

## 2020-01-01 ENCOUNTER — OFFICE VISIT (OUTPATIENT)
Dept: HEMATOLOGY/ONCOLOGY | Facility: CLINIC | Age: 75
End: 2020-01-01
Payer: MEDICARE

## 2020-01-01 ENCOUNTER — TELEPHONE (OUTPATIENT)
Dept: RADIATION ONCOLOGY | Facility: CLINIC | Age: 75
End: 2020-01-01

## 2020-01-01 ENCOUNTER — HOSPITAL ENCOUNTER (OUTPATIENT)
Dept: RADIOLOGY | Facility: HOSPITAL | Age: 75
Discharge: HOME | End: 2020-01-09
Attending: INTERNAL MEDICINE
Payer: MEDICARE

## 2020-01-01 ENCOUNTER — PROCEDURE VISIT (OUTPATIENT)
Dept: SLEEP MEDICINE | Facility: HOSPITAL | Age: 75
End: 2020-01-01
Attending: INTERNAL MEDICINE
Payer: MEDICARE

## 2020-01-01 ENCOUNTER — TELEPHONE (OUTPATIENT)
Dept: PULMONOLOGY | Facility: CLINIC | Age: 75
End: 2020-01-01

## 2020-01-01 ENCOUNTER — ANESTHESIA EVENT (OUTPATIENT)
Dept: SURGERY | Facility: HOSPITAL | Age: 75
End: 2020-01-01
Payer: MEDICARE

## 2020-01-01 ENCOUNTER — EXTERNAL HOME HEALTH (OUTPATIENT)
Dept: HOME HEALTH SERVICES | Facility: HOSPITAL | Age: 75
End: 2020-01-01

## 2020-01-01 ENCOUNTER — HOSPITAL ENCOUNTER (OUTPATIENT)
Dept: RADIOLOGY | Facility: HOSPITAL | Age: 75
Discharge: HOME | End: 2020-10-27
Attending: INTERNAL MEDICINE
Payer: MEDICARE

## 2020-01-01 ENCOUNTER — LAB VISIT (OUTPATIENT)
Dept: LAB | Facility: HOSPITAL | Age: 75
End: 2020-01-01
Attending: INTERNAL MEDICINE
Payer: MEDICARE

## 2020-01-01 ENCOUNTER — OFFICE VISIT (OUTPATIENT)
Dept: RADIATION ONCOLOGY | Facility: CLINIC | Age: 75
End: 2020-01-01
Payer: MEDICARE

## 2020-01-01 ENCOUNTER — HOSPITAL ENCOUNTER (OUTPATIENT)
Dept: RADIOLOGY | Facility: HOSPITAL | Age: 75
Discharge: HOME | End: 2020-10-13
Attending: INTERNAL MEDICINE
Payer: MEDICARE

## 2020-01-01 ENCOUNTER — DOCUMENTATION ONLY (OUTPATIENT)
Dept: PULMONOLOGY | Facility: CLINIC | Age: 75
End: 2020-01-01

## 2020-01-01 ENCOUNTER — OFFICE VISIT (OUTPATIENT)
Dept: PULMONOLOGY | Facility: CLINIC | Age: 75
End: 2020-01-01
Payer: MEDICARE

## 2020-01-01 ENCOUNTER — HOSPITAL ENCOUNTER (INPATIENT)
Facility: HOSPITAL | Age: 75
LOS: 1 days | Discharge: HOME HEALTH | DRG: 190 | End: 2020-04-23
Attending: EMERGENCY MEDICINE | Admitting: FAMILY MEDICINE
Payer: MEDICARE

## 2020-01-01 ENCOUNTER — HOSPITAL ENCOUNTER (OUTPATIENT)
Facility: HOSPITAL | Age: 75
Discharge: HOME HEALTH | End: 2020-05-13
Attending: EMERGENCY MEDICINE | Admitting: INTERNAL MEDICINE
Payer: MEDICARE

## 2020-01-01 VITALS
BODY MASS INDEX: 25.78 KG/M2 | TEMPERATURE: 97 F | DIASTOLIC BLOOD PRESSURE: 79 MMHG | WEIGHT: 159.69 LBS | SYSTOLIC BLOOD PRESSURE: 147 MMHG | HEART RATE: 75 BPM

## 2020-01-01 VITALS
DIASTOLIC BLOOD PRESSURE: 68 MMHG | HEART RATE: 89 BPM | BODY MASS INDEX: 23.64 KG/M2 | SYSTOLIC BLOOD PRESSURE: 109 MMHG | OXYGEN SATURATION: 96 % | WEIGHT: 147.06 LBS | RESPIRATION RATE: 18 BRPM | HEIGHT: 66 IN | TEMPERATURE: 98 F

## 2020-01-01 VITALS
HEART RATE: 80 BPM | HEIGHT: 66 IN | DIASTOLIC BLOOD PRESSURE: 80 MMHG | TEMPERATURE: 98 F | WEIGHT: 145 LBS | SYSTOLIC BLOOD PRESSURE: 154 MMHG | BODY MASS INDEX: 23.3 KG/M2

## 2020-01-01 VITALS
WEIGHT: 145 LBS | SYSTOLIC BLOOD PRESSURE: 136 MMHG | HEIGHT: 66 IN | HEART RATE: 76 BPM | DIASTOLIC BLOOD PRESSURE: 74 MMHG | BODY MASS INDEX: 23.3 KG/M2

## 2020-01-01 VITALS
SYSTOLIC BLOOD PRESSURE: 124 MMHG | TEMPERATURE: 98 F | DIASTOLIC BLOOD PRESSURE: 74 MMHG | BODY MASS INDEX: 24.11 KG/M2 | WEIGHT: 150 LBS | HEART RATE: 75 BPM | HEIGHT: 66 IN

## 2020-01-01 VITALS
WEIGHT: 125.69 LBS | RESPIRATION RATE: 18 BRPM | HEIGHT: 66 IN | SYSTOLIC BLOOD PRESSURE: 119 MMHG | TEMPERATURE: 98 F | HEART RATE: 62 BPM | OXYGEN SATURATION: 97 % | BODY MASS INDEX: 20.2 KG/M2 | DIASTOLIC BLOOD PRESSURE: 65 MMHG

## 2020-01-01 VITALS
RESPIRATION RATE: 16 BRPM | SYSTOLIC BLOOD PRESSURE: 155 MMHG | HEART RATE: 103 BPM | WEIGHT: 145.5 LBS | BODY MASS INDEX: 23.48 KG/M2 | DIASTOLIC BLOOD PRESSURE: 74 MMHG | TEMPERATURE: 98 F

## 2020-01-01 VITALS
WEIGHT: 146.63 LBS | WEIGHT: 145 LBS | DIASTOLIC BLOOD PRESSURE: 73 MMHG | BODY MASS INDEX: 23.3 KG/M2 | SYSTOLIC BLOOD PRESSURE: 154 MMHG | RESPIRATION RATE: 18 BRPM | TEMPERATURE: 99 F | HEIGHT: 66 IN | HEART RATE: 118 BPM | BODY MASS INDEX: 23.66 KG/M2

## 2020-01-01 VITALS
DIASTOLIC BLOOD PRESSURE: 84 MMHG | HEART RATE: 93 BPM | BODY MASS INDEX: 23.46 KG/M2 | WEIGHT: 146 LBS | HEIGHT: 66 IN | TEMPERATURE: 99 F | OXYGEN SATURATION: 97 % | SYSTOLIC BLOOD PRESSURE: 133 MMHG

## 2020-01-01 VITALS
WEIGHT: 171.06 LBS | SYSTOLIC BLOOD PRESSURE: 112 MMHG | BODY MASS INDEX: 27.49 KG/M2 | HEIGHT: 66 IN | DIASTOLIC BLOOD PRESSURE: 54 MMHG | OXYGEN SATURATION: 100 % | TEMPERATURE: 99 F

## 2020-01-01 VITALS
DIASTOLIC BLOOD PRESSURE: 74 MMHG | HEART RATE: 94 BPM | WEIGHT: 144.13 LBS | OXYGEN SATURATION: 97 % | BODY MASS INDEX: 23.26 KG/M2 | SYSTOLIC BLOOD PRESSURE: 127 MMHG

## 2020-01-01 VITALS
HEART RATE: 94 BPM | SYSTOLIC BLOOD PRESSURE: 109 MMHG | BODY MASS INDEX: 24.68 KG/M2 | DIASTOLIC BLOOD PRESSURE: 62 MMHG | WEIGHT: 152.88 LBS | TEMPERATURE: 97 F

## 2020-01-01 DIAGNOSIS — C91.10 CHRONIC LYMPHOCYTIC LEUKEMIA: ICD-10-CM

## 2020-01-01 DIAGNOSIS — C34.12 MALIGNANT NEOPLASM OF UPPER LOBE OF LEFT LUNG: ICD-10-CM

## 2020-01-01 DIAGNOSIS — E87.6 HYPOKALEMIA: ICD-10-CM

## 2020-01-01 DIAGNOSIS — G47.33 OSA (OBSTRUCTIVE SLEEP APNEA): Primary | ICD-10-CM

## 2020-01-01 DIAGNOSIS — R13.10 DYSPHAGIA, UNSPECIFIED TYPE: ICD-10-CM

## 2020-01-01 DIAGNOSIS — D51.8 ANEMIA OF DECREASED VITAMIN B12 ABSORPTION: ICD-10-CM

## 2020-01-01 DIAGNOSIS — I10 ESSENTIAL HYPERTENSION: ICD-10-CM

## 2020-01-01 DIAGNOSIS — C34.11 MALIGNANT NEOPLASM OF UPPER LOBE OF RIGHT LUNG: Primary | ICD-10-CM

## 2020-01-01 DIAGNOSIS — A41.9 SEPTIC SHOCK: ICD-10-CM

## 2020-01-01 DIAGNOSIS — R91.8 MASS OF UPPER LOBE OF LEFT LUNG: ICD-10-CM

## 2020-01-01 DIAGNOSIS — J96.01 ACUTE HYPOXEMIC RESPIRATORY FAILURE: ICD-10-CM

## 2020-01-01 DIAGNOSIS — G47.33 OSA (OBSTRUCTIVE SLEEP APNEA): ICD-10-CM

## 2020-01-01 DIAGNOSIS — E11.9 TYPE 2 DIABETES MELLITUS WITHOUT COMPLICATION, WITHOUT LONG-TERM CURRENT USE OF INSULIN: Primary | ICD-10-CM

## 2020-01-01 DIAGNOSIS — G62.9 NEUROPATHY: ICD-10-CM

## 2020-01-01 DIAGNOSIS — Z45.2 ENCOUNTER FOR CENTRAL LINE PLACEMENT: ICD-10-CM

## 2020-01-01 DIAGNOSIS — C34.11 MALIGNANT NEOPLASM OF UPPER LOBE OF RIGHT LUNG: ICD-10-CM

## 2020-01-01 DIAGNOSIS — K21.9 GASTROESOPHAGEAL REFLUX DISEASE WITHOUT ESOPHAGITIS: ICD-10-CM

## 2020-01-01 DIAGNOSIS — C34.92 BRONCHOGENIC CARCINOMA OF LEFT LUNG: ICD-10-CM

## 2020-01-01 DIAGNOSIS — M15.9 PRIMARY OSTEOARTHRITIS INVOLVING MULTIPLE JOINTS: ICD-10-CM

## 2020-01-01 DIAGNOSIS — R79.89 TROPONIN LEVEL ELEVATED: ICD-10-CM

## 2020-01-01 DIAGNOSIS — J44.1 COPD EXACERBATION: Primary | ICD-10-CM

## 2020-01-01 DIAGNOSIS — E78.2 MIXED HYPERLIPIDEMIA: ICD-10-CM

## 2020-01-01 DIAGNOSIS — C34.12 MALIGNANT NEOPLASM OF UPPER LOBE OF LEFT LUNG: Primary | ICD-10-CM

## 2020-01-01 DIAGNOSIS — J69.0: ICD-10-CM

## 2020-01-01 DIAGNOSIS — R53.1 WEAKNESS: ICD-10-CM

## 2020-01-01 DIAGNOSIS — J44.1 COPD WITH ACUTE EXACERBATION: ICD-10-CM

## 2020-01-01 DIAGNOSIS — Z20.822 COVID-19 VIRUS NOT DETECTED: ICD-10-CM

## 2020-01-01 DIAGNOSIS — R06.00 NOCTURNAL DYSPNEA: Primary | ICD-10-CM

## 2020-01-01 DIAGNOSIS — R07.89 LEFT-SIDED CHEST WALL PAIN: ICD-10-CM

## 2020-01-01 DIAGNOSIS — M15.8 OTHER OSTEOARTHRITIS INVOLVING MULTIPLE JOINTS: ICD-10-CM

## 2020-01-01 DIAGNOSIS — D50.0 IRON DEFICIENCY ANEMIA DUE TO CHRONIC BLOOD LOSS: ICD-10-CM

## 2020-01-01 DIAGNOSIS — R13.14 PHARYNGOESOPHAGEAL DYSPHAGIA: ICD-10-CM

## 2020-01-01 DIAGNOSIS — J44.9 CHRONIC OBSTRUCTIVE PULMONARY DISEASE, UNSPECIFIED COPD TYPE: ICD-10-CM

## 2020-01-01 DIAGNOSIS — F51.12 INSUFFICIENT SLEEP SYNDROME: ICD-10-CM

## 2020-01-01 DIAGNOSIS — C85.10 B-CELL LYMPHOMA, UNSPECIFIED B-CELL LYMPHOMA TYPE, UNSPECIFIED BODY REGION: ICD-10-CM

## 2020-01-01 DIAGNOSIS — R06.00 NOCTURNAL DYSPNEA: ICD-10-CM

## 2020-01-01 DIAGNOSIS — R07.9 CHEST PAIN, UNSPECIFIED TYPE: ICD-10-CM

## 2020-01-01 DIAGNOSIS — J44.1 COPD EXACERBATION: ICD-10-CM

## 2020-01-01 DIAGNOSIS — E11.69 TYPE 2 DIABETES MELLITUS WITH OTHER SPECIFIED COMPLICATION, UNSPECIFIED WHETHER LONG TERM INSULIN USE: ICD-10-CM

## 2020-01-01 DIAGNOSIS — R06.02 SHORTNESS OF BREATH: ICD-10-CM

## 2020-01-01 DIAGNOSIS — I25.10 CORONARY ARTERY DISEASE WITHOUT ANGINA PECTORIS, UNSPECIFIED VESSEL OR LESION TYPE, UNSPECIFIED WHETHER NATIVE OR TRANSPLANTED HEART: ICD-10-CM

## 2020-01-01 DIAGNOSIS — Z23 FLU VACCINE NEED: ICD-10-CM

## 2020-01-01 DIAGNOSIS — I46.9 CARDIOPULMONARY ARREST: Primary | ICD-10-CM

## 2020-01-01 DIAGNOSIS — Z46.59 ENCOUNTER FOR NASOGASTRIC (NG) TUBE PLACEMENT: ICD-10-CM

## 2020-01-01 DIAGNOSIS — Z72.0 TOBACCO ABUSE: ICD-10-CM

## 2020-01-01 DIAGNOSIS — K21.9 GASTROESOPHAGEAL REFLUX DISEASE WITHOUT ESOPHAGITIS: Primary | ICD-10-CM

## 2020-01-01 DIAGNOSIS — J31.0 CHRONIC RHINITIS: ICD-10-CM

## 2020-01-01 DIAGNOSIS — I46.9 CARDIAC ARREST: ICD-10-CM

## 2020-01-01 DIAGNOSIS — Z72.0 TOBACCO USER: ICD-10-CM

## 2020-01-01 DIAGNOSIS — H60.63 CHRONIC OTITIS EXTERNA OF BOTH EARS, UNSPECIFIED TYPE: ICD-10-CM

## 2020-01-01 DIAGNOSIS — R65.21 SEPTIC SHOCK: ICD-10-CM

## 2020-01-01 DIAGNOSIS — H60.543 ECZEMA OF EXTERNAL EAR, BILATERAL: ICD-10-CM

## 2020-01-01 DIAGNOSIS — R06.02 SOB (SHORTNESS OF BREATH): ICD-10-CM

## 2020-01-01 DIAGNOSIS — R79.89 ELEVATED TROPONIN: ICD-10-CM

## 2020-01-01 DIAGNOSIS — C91.10 CHRONIC LYMPHOCYTIC LEUKEMIA: Primary | ICD-10-CM

## 2020-01-01 DIAGNOSIS — E11.69 TYPE 2 DIABETES MELLITUS WITH OTHER SPECIFIED COMPLICATION, WITHOUT LONG-TERM CURRENT USE OF INSULIN: Primary | ICD-10-CM

## 2020-01-01 DIAGNOSIS — J96.01 ACUTE RESPIRATORY FAILURE WITH HYPOXIA: Primary | ICD-10-CM

## 2020-01-01 DIAGNOSIS — J96.01 ACUTE HYPOXEMIC RESPIRATORY FAILURE: Primary | ICD-10-CM

## 2020-01-01 DIAGNOSIS — C85.10 B-CELL LYMPHOMA, UNSPECIFIED B-CELL LYMPHOMA TYPE, UNSPECIFIED BODY REGION: Primary | ICD-10-CM

## 2020-01-01 DIAGNOSIS — H93.90 EAR PROBLEM, UNSPECIFIED LATERALITY: ICD-10-CM

## 2020-01-01 DIAGNOSIS — R07.9 CHEST PAIN: ICD-10-CM

## 2020-01-01 DIAGNOSIS — G47.09 OTHER INSOMNIA: ICD-10-CM

## 2020-01-01 DIAGNOSIS — R06.03 RESPIRATORY DISTRESS: ICD-10-CM

## 2020-01-01 DIAGNOSIS — M15.8 OTHER OSTEOARTHRITIS INVOLVING MULTIPLE JOINTS: Primary | ICD-10-CM

## 2020-01-01 DIAGNOSIS — R91.8 MASS OF UPPER LOBE OF LEFT LUNG: Primary | ICD-10-CM

## 2020-01-01 DIAGNOSIS — Z09 HOSPITAL DISCHARGE FOLLOW-UP: ICD-10-CM

## 2020-01-01 DIAGNOSIS — Z20.822 SUSPECTED COVID-19 VIRUS INFECTION: ICD-10-CM

## 2020-01-01 LAB
ALBUMIN SERPL BCP-MCNC: 1.9 G/DL (ref 3.5–5.2)
ALBUMIN SERPL BCP-MCNC: 2 G/DL (ref 3.5–5.2)
ALBUMIN SERPL BCP-MCNC: 2.9 G/DL (ref 3.5–5.2)
ALBUMIN SERPL BCP-MCNC: 3.5 G/DL (ref 3.5–5.2)
ALBUMIN SERPL BCP-MCNC: 3.7 G/DL (ref 3.5–5.2)
ALBUMIN SERPL BCP-MCNC: 3.9 G/DL (ref 3.5–5.2)
ALBUMIN SERPL BCP-MCNC: 3.9 G/DL (ref 3.5–5.2)
ALBUMIN SERPL ELPH-MCNC: 3.3 G/DL (ref 2.9–4.4)
ALBUMIN/GLOB SERPL: 1.2 {RATIO} (ref 0.7–1.7)
ALLENS TEST: ABNORMAL
ALP SERPL-CCNC: 114 U/L (ref 55–135)
ALP SERPL-CCNC: 115 U/L (ref 55–135)
ALP SERPL-CCNC: 68 U/L (ref 55–135)
ALP SERPL-CCNC: 70 U/L (ref 55–135)
ALP SERPL-CCNC: 80 U/L (ref 55–135)
ALP SERPL-CCNC: 81 U/L (ref 55–135)
ALP SERPL-CCNC: 84 U/L (ref 55–135)
ALP SERPL-CCNC: 86 U/L (ref 55–135)
ALP SERPL-CCNC: 89 U/L (ref 55–135)
ALP SERPL-CCNC: 93 U/L (ref 55–135)
ALP SERPL-CCNC: 94 U/L (ref 55–135)
ALP SERPL-CCNC: 96 U/L (ref 55–135)
ALPHA1 GLOB SERPL ELPH-MCNC: 0.2 G/DL (ref 0–0.4)
ALPHA2 GLOB SERPL ELPH-MCNC: 0.7 G/DL (ref 0.4–1)
ALT SERPL W/O P-5'-P-CCNC: 101 U/L (ref 10–44)
ALT SERPL W/O P-5'-P-CCNC: 103 U/L (ref 10–44)
ALT SERPL W/O P-5'-P-CCNC: 106 U/L (ref 10–44)
ALT SERPL W/O P-5'-P-CCNC: 108 U/L (ref 10–44)
ALT SERPL W/O P-5'-P-CCNC: 110 U/L (ref 10–44)
ALT SERPL W/O P-5'-P-CCNC: 115 U/L (ref 10–44)
ALT SERPL W/O P-5'-P-CCNC: 118 U/L (ref 10–44)
ALT SERPL W/O P-5'-P-CCNC: 12 U/L (ref 10–44)
ALT SERPL W/O P-5'-P-CCNC: 13 U/L (ref 10–44)
ALT SERPL W/O P-5'-P-CCNC: 25 U/L (ref 10–44)
ALT SERPL W/O P-5'-P-CCNC: 26 U/L (ref 10–44)
ALT SERPL W/O P-5'-P-CCNC: 80 U/L (ref 10–44)
AMPHET+METHAMPHET UR QL: NEGATIVE
ANION GAP SERPL CALC-SCNC: 12 MMOL/L (ref 8–16)
ANION GAP SERPL CALC-SCNC: 14 MMOL/L (ref 8–16)
ANION GAP SERPL CALC-SCNC: 14 MMOL/L (ref 8–16)
ANION GAP SERPL CALC-SCNC: 15 MMOL/L (ref 8–16)
ANION GAP SERPL CALC-SCNC: 16 MMOL/L (ref 8–16)
ANION GAP SERPL CALC-SCNC: 17 MMOL/L (ref 8–16)
ANION GAP SERPL CALC-SCNC: 17 MMOL/L (ref 8–16)
ANION GAP SERPL CALC-SCNC: 18 MMOL/L (ref 8–16)
ANION GAP SERPL CALC-SCNC: 18 MMOL/L (ref 8–16)
ANION GAP SERPL CALC-SCNC: 19 MMOL/L (ref 8–16)
ANION GAP SERPL CALC-SCNC: 19 MMOL/L (ref 8–16)
ANION GAP SERPL CALC-SCNC: 24 MMOL/L (ref 8–16)
ANION GAP SERPL CALC-SCNC: 5 MMOL/L (ref 8–16)
ANION GAP SERPL CALC-SCNC: 6 MMOL/L (ref 8–16)
ANION GAP SERPL CALC-SCNC: 7 MMOL/L (ref 8–16)
ANION GAP SERPL CALC-SCNC: 9 MMOL/L (ref 8–16)
ANISOCYTOSIS BLD QL SMEAR: SLIGHT
AORTIC ROOT ANNULUS: 3.03 CM
AORTIC VALVE CUSP SEPERATION: 1.48 CM
APTT BLDCRRT: 32.8 SEC (ref 21–32)
APTT BLDCRRT: 37.1 SEC (ref 21–32)
APTT BLDCRRT: 43.3 SEC (ref 21–32)
APTT BLDCRRT: 43.6 SEC (ref 21–32)
APTT BLDCRRT: 44.1 SEC (ref 21–32)
AST SERPL-CCNC: 107 U/L (ref 10–40)
AST SERPL-CCNC: 113 U/L (ref 10–40)
AST SERPL-CCNC: 121 U/L (ref 10–40)
AST SERPL-CCNC: 133 U/L (ref 10–40)
AST SERPL-CCNC: 146 U/L (ref 10–40)
AST SERPL-CCNC: 149 U/L (ref 10–40)
AST SERPL-CCNC: 154 U/L (ref 10–40)
AST SERPL-CCNC: 166 U/L (ref 10–40)
AST SERPL-CCNC: 17 U/L (ref 10–40)
AST SERPL-CCNC: 19 U/L (ref 10–40)
AST SERPL-CCNC: 20 U/L (ref 10–40)
AST SERPL-CCNC: 42 U/L (ref 10–40)
AV INDEX (PROSTH): 0.61
AV MEAN GRADIENT: 3 MMHG
AV PEAK GRADIENT: 5 MMHG
AV VALVE AREA: 1.93 CM2
AV VELOCITY RATIO: 0.7
B-GLOBULIN SERPL ELPH-MCNC: 1 G/DL (ref 0.7–1.3)
B-OH-BUTYR BLD STRIP-SCNC: 0.1 MMOL/L (ref 0–0.5)
BACTERIA #/AREA URNS HPF: ABNORMAL /HPF
BACTERIA #/AREA URNS HPF: NEGATIVE /HPF
BACTERIA BLD CULT: NORMAL
BACTERIA BLD CULT: NORMAL
BARBITURATES UR QL SCN>200 NG/ML: NEGATIVE
BASOPHILS # BLD AUTO: 0.01 K/UL (ref 0–0.2)
BASOPHILS # BLD AUTO: 0.02 K/UL (ref 0–0.2)
BASOPHILS # BLD AUTO: 0.03 K/UL (ref 0–0.2)
BASOPHILS # BLD AUTO: 0.04 K/UL (ref 0–0.2)
BASOPHILS # BLD AUTO: 0.05 K/UL (ref 0–0.2)
BASOPHILS # BLD AUTO: 0.06 K/UL (ref 0–0.2)
BASOPHILS # BLD AUTO: 0.07 K/UL (ref 0–0.2)
BASOPHILS # BLD AUTO: 0.08 K/UL (ref 0–0.2)
BASOPHILS # BLD AUTO: 0.1 K/UL (ref 0–0.2)
BASOPHILS # BLD AUTO: ABNORMAL K/UL (ref 0–0.2)
BASOPHILS NFR BLD: 0 % (ref 0–1.9)
BASOPHILS NFR BLD: 0.2 % (ref 0–1.9)
BASOPHILS NFR BLD: 0.2 % (ref 0–1.9)
BASOPHILS NFR BLD: 0.3 % (ref 0–1.9)
BASOPHILS NFR BLD: 0.4 % (ref 0–1.9)
BASOPHILS NFR BLD: 0.4 % (ref 0–1.9)
BASOPHILS NFR BLD: 0.5 % (ref 0–1.9)
BASOPHILS NFR BLD: 0.5 % (ref 0–1.9)
BASOPHILS NFR BLD: 0.6 % (ref 0–1.9)
BASOPHILS NFR BLD: 0.6 % (ref 0–1.9)
BASOPHILS NFR BLD: 0.7 % (ref 0–1.9)
BASOPHILS NFR BLD: 0.8 % (ref 0–1.9)
BASOPHILS NFR BLD: 0.9 % (ref 0–1.9)
BASOPHILS NFR BLD: 0.9 % (ref 0–1.9)
BASOPHILS NFR BLD: 1 % (ref 0–1.9)
BENZODIAZ UR QL SCN>200 NG/ML: NORMAL
BILIRUB SERPL-MCNC: 0.1 MG/DL (ref 0.1–1)
BILIRUB SERPL-MCNC: 0.3 MG/DL (ref 0.1–1)
BILIRUB SERPL-MCNC: 0.4 MG/DL (ref 0.1–1)
BILIRUB SERPL-MCNC: 0.6 MG/DL (ref 0.1–1)
BILIRUB SERPL-MCNC: 0.7 MG/DL (ref 0.1–1)
BILIRUB SERPL-MCNC: 0.8 MG/DL (ref 0.1–1)
BILIRUB UR QL STRIP: NEGATIVE
BILIRUB UR QL STRIP: NEGATIVE
BNP SERPL-MCNC: 55 PG/ML (ref 0–99)
BNP SERPL-MCNC: 72 PG/ML (ref 0–99)
BNP SERPL-MCNC: 86 PG/ML (ref 0–99)
BSA FOR ECHO PROCEDURE: 1.9 M2
BUN SERPL-MCNC: 10 MG/DL (ref 8–23)
BUN SERPL-MCNC: 10 MG/DL (ref 8–23)
BUN SERPL-MCNC: 12 MG/DL (ref 8–23)
BUN SERPL-MCNC: 13 MG/DL (ref 8–23)
BUN SERPL-MCNC: 14 MG/DL (ref 8–23)
BUN SERPL-MCNC: 14 MG/DL (ref 8–23)
BUN SERPL-MCNC: 17 MG/DL (ref 8–23)
BUN SERPL-MCNC: 18 MG/DL (ref 8–23)
BUN SERPL-MCNC: 20 MG/DL (ref 8–23)
BUN SERPL-MCNC: 21 MG/DL (ref 8–23)
BUN SERPL-MCNC: 22 MG/DL (ref 8–23)
BUN SERPL-MCNC: 24 MG/DL (ref 8–23)
BUN SERPL-MCNC: 26 MG/DL (ref 8–23)
BUN SERPL-MCNC: 27 MG/DL (ref 8–23)
BUN SERPL-MCNC: 27 MG/DL (ref 8–23)
BUN SERPL-MCNC: 28 MG/DL (ref 8–23)
BUN SERPL-MCNC: 32 MG/DL (ref 8–23)
BUN SERPL-MCNC: 34 MG/DL (ref 8–23)
BUN SERPL-MCNC: 38 MG/DL (ref 8–23)
BUN SERPL-MCNC: 41 MG/DL (ref 8–23)
BUN SERPL-MCNC: 51 MG/DL (ref 8–23)
BUN SERPL-MCNC: 52 MG/DL (ref 8–23)
BUN SERPL-MCNC: 55 MG/DL (ref 8–23)
BUN SERPL-MCNC: 57 MG/DL (ref 8–23)
BUN SERPL-MCNC: 61 MG/DL (ref 8–23)
BUN SERPL-MCNC: 62 MG/DL (ref 8–23)
BUN SERPL-MCNC: 63 MG/DL (ref 8–23)
BUN SERPL-MCNC: 8 MG/DL (ref 8–23)
BZE UR QL SCN: NEGATIVE
CALCIUM SERPL-MCNC: 6.9 MG/DL (ref 8.7–10.5)
CALCIUM SERPL-MCNC: 7 MG/DL (ref 8.7–10.5)
CALCIUM SERPL-MCNC: 7.3 MG/DL (ref 8.7–10.5)
CALCIUM SERPL-MCNC: 7.3 MG/DL (ref 8.7–10.5)
CALCIUM SERPL-MCNC: 7.4 MG/DL (ref 8.7–10.5)
CALCIUM SERPL-MCNC: 7.5 MG/DL (ref 8.7–10.5)
CALCIUM SERPL-MCNC: 7.7 MG/DL (ref 8.7–10.5)
CALCIUM SERPL-MCNC: 7.7 MG/DL (ref 8.7–10.5)
CALCIUM SERPL-MCNC: 7.8 MG/DL (ref 8.7–10.5)
CALCIUM SERPL-MCNC: 7.9 MG/DL (ref 8.7–10.5)
CALCIUM SERPL-MCNC: 8.1 MG/DL (ref 8.7–10.5)
CALCIUM SERPL-MCNC: 8.2 MG/DL (ref 8.7–10.5)
CALCIUM SERPL-MCNC: 8.3 MG/DL (ref 8.7–10.5)
CALCIUM SERPL-MCNC: 8.6 MG/DL (ref 8.7–10.5)
CALCIUM SERPL-MCNC: 8.7 MG/DL (ref 8.7–10.5)
CALCIUM SERPL-MCNC: 8.7 MG/DL (ref 8.7–10.5)
CALCIUM SERPL-MCNC: 8.8 MG/DL (ref 8.7–10.5)
CALCIUM SERPL-MCNC: 8.9 MG/DL (ref 8.7–10.5)
CALCIUM SERPL-MCNC: 9 MG/DL (ref 8.7–10.5)
CALCIUM SERPL-MCNC: 9.4 MG/DL (ref 8.7–10.5)
CANNABINOIDS UR QL SCN: NEGATIVE
CHLORIDE SERPL-SCNC: 103 MMOL/L (ref 95–110)
CHLORIDE SERPL-SCNC: 104 MMOL/L (ref 95–110)
CHLORIDE SERPL-SCNC: 104 MMOL/L (ref 95–110)
CHLORIDE SERPL-SCNC: 105 MMOL/L (ref 95–110)
CHLORIDE SERPL-SCNC: 105 MMOL/L (ref 95–110)
CHLORIDE SERPL-SCNC: 106 MMOL/L (ref 95–110)
CHLORIDE SERPL-SCNC: 106 MMOL/L (ref 95–110)
CHLORIDE SERPL-SCNC: 107 MMOL/L (ref 95–110)
CHLORIDE SERPL-SCNC: 108 MMOL/L (ref 95–110)
CHLORIDE SERPL-SCNC: 109 MMOL/L (ref 95–110)
CHLORIDE SERPL-SCNC: 111 MMOL/L (ref 95–110)
CHLORIDE SERPL-SCNC: 111 MMOL/L (ref 95–110)
CHLORIDE SERPL-SCNC: 112 MMOL/L (ref 95–110)
CHLORIDE SERPL-SCNC: 113 MMOL/L (ref 95–110)
CHLORIDE SERPL-SCNC: 116 MMOL/L (ref 95–110)
CHLORIDE SERPL-SCNC: 116 MMOL/L (ref 95–110)
CHLORIDE SERPL-SCNC: 118 MMOL/L (ref 95–110)
CHLORIDE SERPL-SCNC: 121 MMOL/L (ref 95–110)
CHLORIDE SERPL-SCNC: 123 MMOL/L (ref 95–110)
CHLORIDE SERPL-SCNC: 125 MMOL/L (ref 95–110)
CK SERPL-CCNC: 111 U/L (ref 20–180)
CK SERPL-CCNC: 2996 U/L (ref 20–180)
CK SERPL-CCNC: 3754 U/L (ref 20–180)
CK SERPL-CCNC: 4716 U/L (ref 20–180)
CK SERPL-CCNC: 7848 U/L (ref 20–180)
CK SERPL-CCNC: ABNORMAL U/L (ref 20–180)
CLARITY UR: CLEAR
CLARITY UR: CLEAR
CO2 SERPL-SCNC: 10 MMOL/L (ref 23–29)
CO2 SERPL-SCNC: 10 MMOL/L (ref 23–29)
CO2 SERPL-SCNC: 11 MMOL/L (ref 23–29)
CO2 SERPL-SCNC: 12 MMOL/L (ref 23–29)
CO2 SERPL-SCNC: 13 MMOL/L (ref 23–29)
CO2 SERPL-SCNC: 16 MMOL/L (ref 23–29)
CO2 SERPL-SCNC: 16 MMOL/L (ref 23–29)
CO2 SERPL-SCNC: 23 MMOL/L (ref 23–29)
CO2 SERPL-SCNC: 24 MMOL/L (ref 23–29)
CO2 SERPL-SCNC: 25 MMOL/L (ref 23–29)
CO2 SERPL-SCNC: 27 MMOL/L (ref 23–29)
CO2 SERPL-SCNC: 27 MMOL/L (ref 23–29)
CO2 SERPL-SCNC: 28 MMOL/L (ref 23–29)
CO2 SERPL-SCNC: 7 MMOL/L (ref 23–29)
CO2 SERPL-SCNC: 8 MMOL/L (ref 23–29)
CO2 SERPL-SCNC: 8 MMOL/L (ref 23–29)
CO2 SERPL-SCNC: 9 MMOL/L (ref 23–29)
COLOR UR: YELLOW
COLOR UR: YELLOW
CREAT SERPL-MCNC: 0.6 MG/DL (ref 0.5–1.4)
CREAT SERPL-MCNC: 0.7 MG/DL (ref 0.5–1.4)
CREAT SERPL-MCNC: 0.8 MG/DL (ref 0.5–1.4)
CREAT SERPL-MCNC: 1.3 MG/DL (ref 0.5–1.4)
CREAT SERPL-MCNC: 1.3 MG/DL (ref 0.5–1.4)
CREAT SERPL-MCNC: 1.5 MG/DL (ref 0.5–1.4)
CREAT SERPL-MCNC: 1.7 MG/DL (ref 0.5–1.4)
CREAT SERPL-MCNC: 1.9 MG/DL (ref 0.5–1.4)
CREAT SERPL-MCNC: 2.1 MG/DL (ref 0.5–1.4)
CREAT SERPL-MCNC: 2.2 MG/DL (ref 0.5–1.4)
CREAT SERPL-MCNC: 2.4 MG/DL (ref 0.5–1.4)
CREAT SERPL-MCNC: 2.5 MG/DL (ref 0.5–1.4)
CREAT SERPL-MCNC: 2.7 MG/DL (ref 0.5–1.4)
CREAT SERPL-MCNC: 3.1 MG/DL (ref 0.5–1.4)
CREAT SERPL-MCNC: 3.3 MG/DL (ref 0.5–1.4)
CREAT SERPL-MCNC: 3.5 MG/DL (ref 0.5–1.4)
CREAT SERPL-MCNC: 4.3 MG/DL (ref 0.5–1.4)
CREAT SERPL-MCNC: 4.4 MG/DL (ref 0.5–1.4)
CREAT SERPL-MCNC: 4.4 MG/DL (ref 0.5–1.4)
CREAT SERPL-MCNC: 4.6 MG/DL (ref 0.5–1.4)
CREAT SERPL-MCNC: 5 MG/DL (ref 0.5–1.4)
CREAT SERPL-MCNC: 5 MG/DL (ref 0.5–1.4)
CREAT SERPL-MCNC: 5.1 MG/DL (ref 0.5–1.4)
CREAT UR-MCNC: 19.8 MG/DL (ref 15–325)
CRP SERPL-MCNC: 0.61 MG/DL (ref 0–0.75)
CV ECHO LV RWT: 0.74 CM
DACRYOCYTES BLD QL SMEAR: ABNORMAL
DELSYS: ABNORMAL
DIFFERENTIAL METHOD: ABNORMAL
DOHLE BOD BLD QL SMEAR: PRESENT
DOHLE BOD BLD QL SMEAR: PRESENT
DOP CALC AO PEAK VEL: 1.16 M/S
DOP CALC AO VTI: 15.53 CM
DOP CALC LVOT AREA: 3.2 CM2
DOP CALC LVOT DIAMETER: 2.01 CM
DOP CALC LVOT PEAK VEL: 0.81 M/S
DOP CALC LVOT STROKE VOLUME: 29.97 CM3
DOP CALCLVOT PEAK VEL VTI: 9.45 CM
E WAVE DECELERATION TIME: 197.18 MSEC
E/A RATIO: 0.63
E/E' RATIO: 10.2 M/S
ECHO LV POSTERIOR WALL: 1.3 CM (ref 0.6–1.1)
EOSINOPHIL # BLD AUTO: 0 K/UL (ref 0–0.5)
EOSINOPHIL # BLD AUTO: 0.1 K/UL (ref 0–0.5)
EOSINOPHIL # BLD AUTO: 0.2 K/UL (ref 0–0.5)
EOSINOPHIL # BLD AUTO: 0.2 K/UL (ref 0–0.5)
EOSINOPHIL # BLD AUTO: 0.9 K/UL (ref 0–0.5)
EOSINOPHIL # BLD AUTO: 0.9 K/UL (ref 0–0.5)
EOSINOPHIL # BLD AUTO: 1 K/UL (ref 0–0.5)
EOSINOPHIL # BLD AUTO: ABNORMAL K/UL (ref 0–0.5)
EOSINOPHIL NFR BLD: 0 % (ref 0–8)
EOSINOPHIL NFR BLD: 0.7 % (ref 0–8)
EOSINOPHIL NFR BLD: 0.8 % (ref 0–8)
EOSINOPHIL NFR BLD: 0.9 % (ref 0–8)
EOSINOPHIL NFR BLD: 1 % (ref 0–8)
EOSINOPHIL NFR BLD: 1.1 % (ref 0–8)
EOSINOPHIL NFR BLD: 1.2 % (ref 0–8)
EOSINOPHIL NFR BLD: 1.4 % (ref 0–8)
EOSINOPHIL NFR BLD: 1.6 % (ref 0–8)
EOSINOPHIL NFR BLD: 11.3 % (ref 0–8)
EOSINOPHIL NFR BLD: 12.5 % (ref 0–8)
EOSINOPHIL NFR BLD: 2 % (ref 0–8)
EOSINOPHIL NFR BLD: 3.6 % (ref 0–8)
EOSINOPHIL NFR BLD: 4 % (ref 0–8)
EOSINOPHIL NFR BLD: 6 % (ref 0–8)
EOSINOPHIL NFR BLD: 9.5 % (ref 0–8)
EP: 8
ERYTHROCYTE [DISTWIDTH] IN BLOOD BY AUTOMATED COUNT: 14.3 % (ref 11.5–14.5)
ERYTHROCYTE [DISTWIDTH] IN BLOOD BY AUTOMATED COUNT: 14.5 % (ref 11.5–14.5)
ERYTHROCYTE [DISTWIDTH] IN BLOOD BY AUTOMATED COUNT: 14.6 % (ref 11.5–14.5)
ERYTHROCYTE [DISTWIDTH] IN BLOOD BY AUTOMATED COUNT: 14.7 % (ref 11.5–14.5)
ERYTHROCYTE [DISTWIDTH] IN BLOOD BY AUTOMATED COUNT: 14.7 % (ref 11.5–14.5)
ERYTHROCYTE [DISTWIDTH] IN BLOOD BY AUTOMATED COUNT: 14.8 % (ref 11.5–14.5)
ERYTHROCYTE [DISTWIDTH] IN BLOOD BY AUTOMATED COUNT: 14.8 % (ref 11.5–14.5)
ERYTHROCYTE [DISTWIDTH] IN BLOOD BY AUTOMATED COUNT: 14.9 % (ref 11.5–14.5)
ERYTHROCYTE [DISTWIDTH] IN BLOOD BY AUTOMATED COUNT: 14.9 % (ref 11.5–14.5)
ERYTHROCYTE [DISTWIDTH] IN BLOOD BY AUTOMATED COUNT: 15 % (ref 11.5–14.5)
ERYTHROCYTE [DISTWIDTH] IN BLOOD BY AUTOMATED COUNT: 15 % (ref 11.5–14.5)
ERYTHROCYTE [DISTWIDTH] IN BLOOD BY AUTOMATED COUNT: 15.1 % (ref 11.5–14.5)
ERYTHROCYTE [DISTWIDTH] IN BLOOD BY AUTOMATED COUNT: 15.1 % (ref 11.5–14.5)
ERYTHROCYTE [DISTWIDTH] IN BLOOD BY AUTOMATED COUNT: 15.3 % (ref 11.5–14.5)
ERYTHROCYTE [DISTWIDTH] IN BLOOD BY AUTOMATED COUNT: 15.4 % (ref 11.5–14.5)
ERYTHROCYTE [DISTWIDTH] IN BLOOD BY AUTOMATED COUNT: 15.5 % (ref 11.5–14.5)
ERYTHROCYTE [DISTWIDTH] IN BLOOD BY AUTOMATED COUNT: 15.5 % (ref 11.5–14.5)
ERYTHROCYTE [DISTWIDTH] IN BLOOD BY AUTOMATED COUNT: 15.7 % (ref 11.5–14.5)
ERYTHROCYTE [DISTWIDTH] IN BLOOD BY AUTOMATED COUNT: 15.8 % (ref 11.5–14.5)
ERYTHROCYTE [DISTWIDTH] IN BLOOD BY AUTOMATED COUNT: 15.9 % (ref 11.5–14.5)
ERYTHROCYTE [DISTWIDTH] IN BLOOD BY AUTOMATED COUNT: 15.9 % (ref 11.5–14.5)
ERYTHROCYTE [DISTWIDTH] IN BLOOD BY AUTOMATED COUNT: 16 % (ref 11.5–14.5)
ERYTHROCYTE [DISTWIDTH] IN BLOOD BY AUTOMATED COUNT: 16.4 % (ref 11.5–14.5)
ERYTHROCYTE [DISTWIDTH] IN BLOOD BY AUTOMATED COUNT: 16.5 % (ref 11.5–14.5)
ERYTHROCYTE [DISTWIDTH] IN BLOOD BY AUTOMATED COUNT: 16.5 % (ref 11.5–14.5)
ERYTHROCYTE [SEDIMENTATION RATE] IN BLOOD BY WESTERGREN METHOD: 16 MM/H
ERYTHROCYTE [SEDIMENTATION RATE] IN BLOOD BY WESTERGREN METHOD: 22 MM/H
EST. GFR  (AFRICAN AMERICAN): 10 ML/MIN/1.73 M^2
EST. GFR  (AFRICAN AMERICAN): 11 ML/MIN/1.73 M^2
EST. GFR  (AFRICAN AMERICAN): 14 ML/MIN/1.73 M^2
EST. GFR  (AFRICAN AMERICAN): 15 ML/MIN/1.73 M^2
EST. GFR  (AFRICAN AMERICAN): 16 ML/MIN/1.73 M^2
EST. GFR  (AFRICAN AMERICAN): 19 ML/MIN/1.73 M^2
EST. GFR  (AFRICAN AMERICAN): 21 ML/MIN/1.73 M^2
EST. GFR  (AFRICAN AMERICAN): 22 ML/MIN/1.73 M^2
EST. GFR  (AFRICAN AMERICAN): 24 ML/MIN/1.73 M^2
EST. GFR  (AFRICAN AMERICAN): 26 ML/MIN/1.73 M^2
EST. GFR  (AFRICAN AMERICAN): 29 ML/MIN/1.73 M^2
EST. GFR  (AFRICAN AMERICAN): 33 ML/MIN/1.73 M^2
EST. GFR  (AFRICAN AMERICAN): 39 ML/MIN/1.73 M^2
EST. GFR  (AFRICAN AMERICAN): 46 ML/MIN/1.73 M^2
EST. GFR  (AFRICAN AMERICAN): 46 ML/MIN/1.73 M^2
EST. GFR  (AFRICAN AMERICAN): 9 ML/MIN/1.73 M^2
EST. GFR  (AFRICAN AMERICAN): >60 ML/MIN/1.73 M^2
EST. GFR  (NON AFRICAN AMERICAN): 12 ML/MIN/1.73 M^2
EST. GFR  (NON AFRICAN AMERICAN): 13 ML/MIN/1.73 M^2
EST. GFR  (NON AFRICAN AMERICAN): 14 ML/MIN/1.73 M^2
EST. GFR  (NON AFRICAN AMERICAN): 17 ML/MIN/1.73 M^2
EST. GFR  (NON AFRICAN AMERICAN): 18 ML/MIN/1.73 M^2
EST. GFR  (NON AFRICAN AMERICAN): 19 ML/MIN/1.73 M^2
EST. GFR  (NON AFRICAN AMERICAN): 21 ML/MIN/1.73 M^2
EST. GFR  (NON AFRICAN AMERICAN): 22 ML/MIN/1.73 M^2
EST. GFR  (NON AFRICAN AMERICAN): 25 ML/MIN/1.73 M^2
EST. GFR  (NON AFRICAN AMERICAN): 29 ML/MIN/1.73 M^2
EST. GFR  (NON AFRICAN AMERICAN): 34 ML/MIN/1.73 M^2
EST. GFR  (NON AFRICAN AMERICAN): 40 ML/MIN/1.73 M^2
EST. GFR  (NON AFRICAN AMERICAN): 40 ML/MIN/1.73 M^2
EST. GFR  (NON AFRICAN AMERICAN): 8 ML/MIN/1.73 M^2
EST. GFR  (NON AFRICAN AMERICAN): 9 ML/MIN/1.73 M^2
EST. GFR  (NON AFRICAN AMERICAN): >60 ML/MIN/1.73 M^2
ESTIMATED AVG GLUCOSE: 134 MG/DL (ref 68–131)
ETCO2: 17
ETCO2: 19
ETCO2: 21
ETCO2: 23
ETCO2: 25
FERRITIN SERPL-MCNC: 139 NG/ML (ref 20–300)
FERRITIN SERPL-MCNC: 3093 NG/ML (ref 20–300)
FIO2: 100
FIO2: 30
FIO2: 50
FIO2: 99
FOLATE SERPL-MCNC: 16.5 NG/ML (ref 4–24)
FRACTIONAL SHORTENING: 4 % (ref 28–44)
GAMMA GLOB SERPL ELPH-MCNC: 0.7 G/DL (ref 0.4–1.8)
GLOBULIN SER CALC-MCNC: 2.7 G/DL (ref 2.2–3.9)
GLUCOSE SERPL-MCNC: 103 MG/DL (ref 70–110)
GLUCOSE SERPL-MCNC: 107 MG/DL (ref 70–110)
GLUCOSE SERPL-MCNC: 111 MG/DL (ref 70–110)
GLUCOSE SERPL-MCNC: 115 MG/DL (ref 70–110)
GLUCOSE SERPL-MCNC: 117 MG/DL (ref 70–110)
GLUCOSE SERPL-MCNC: 118 MG/DL (ref 70–110)
GLUCOSE SERPL-MCNC: 120 MG/DL (ref 70–110)
GLUCOSE SERPL-MCNC: 122 MG/DL (ref 70–110)
GLUCOSE SERPL-MCNC: 122 MG/DL (ref 70–110)
GLUCOSE SERPL-MCNC: 134 MG/DL (ref 70–110)
GLUCOSE SERPL-MCNC: 139 MG/DL (ref 70–110)
GLUCOSE SERPL-MCNC: 140 MG/DL (ref 70–110)
GLUCOSE SERPL-MCNC: 143 MG/DL (ref 70–110)
GLUCOSE SERPL-MCNC: 145 MG/DL (ref 70–110)
GLUCOSE SERPL-MCNC: 155 MG/DL (ref 70–110)
GLUCOSE SERPL-MCNC: 157 MG/DL (ref 70–110)
GLUCOSE SERPL-MCNC: 157 MG/DL (ref 70–110)
GLUCOSE SERPL-MCNC: 161 MG/DL (ref 70–110)
GLUCOSE SERPL-MCNC: 162 MG/DL (ref 70–110)
GLUCOSE SERPL-MCNC: 163 MG/DL (ref 70–110)
GLUCOSE SERPL-MCNC: 163 MG/DL (ref 70–110)
GLUCOSE SERPL-MCNC: 171 MG/DL (ref 70–110)
GLUCOSE SERPL-MCNC: 177 MG/DL (ref 70–110)
GLUCOSE SERPL-MCNC: 177 MG/DL (ref 70–110)
GLUCOSE SERPL-MCNC: 180 MG/DL (ref 70–110)
GLUCOSE SERPL-MCNC: 432 MG/DL (ref 70–110)
GLUCOSE SERPL-MCNC: 71 MG/DL (ref 70–110)
GLUCOSE SERPL-MCNC: 74 MG/DL (ref 70–110)
GLUCOSE SERPL-MCNC: 74 MG/DL (ref 70–110)
GLUCOSE SERPL-MCNC: 77 MG/DL (ref 70–110)
GLUCOSE SERPL-MCNC: 78 MG/DL (ref 70–110)
GLUCOSE SERPL-MCNC: 78 MG/DL (ref 70–110)
GLUCOSE SERPL-MCNC: 80 MG/DL (ref 70–110)
GLUCOSE SERPL-MCNC: 81 MG/DL (ref 70–110)
GLUCOSE SERPL-MCNC: 82 MG/DL (ref 70–110)
GLUCOSE SERPL-MCNC: 83 MG/DL (ref 70–110)
GLUCOSE SERPL-MCNC: 84 MG/DL (ref 70–110)
GLUCOSE SERPL-MCNC: 84 MG/DL (ref 70–110)
GLUCOSE SERPL-MCNC: 85 MG/DL (ref 70–110)
GLUCOSE SERPL-MCNC: 85 MG/DL (ref 70–110)
GLUCOSE SERPL-MCNC: 86 MG/DL (ref 70–110)
GLUCOSE SERPL-MCNC: 86 MG/DL (ref 70–110)
GLUCOSE SERPL-MCNC: 87 MG/DL (ref 70–110)
GLUCOSE SERPL-MCNC: 88 MG/DL (ref 70–110)
GLUCOSE SERPL-MCNC: 89 MG/DL (ref 70–110)
GLUCOSE SERPL-MCNC: 89 MG/DL (ref 70–110)
GLUCOSE SERPL-MCNC: 99 MG/DL (ref 70–110)
GLUCOSE UR QL STRIP: ABNORMAL
GLUCOSE UR QL STRIP: ABNORMAL
GRAN CASTS #/AREA URNS LPF: 2 /LPF
HBA1C MFR BLD HPLC: 6.3 % (ref 4–5.6)
HBA1C MFR BLD: 5.8 %
HCO3 UR-SCNC: 10.7 MMOL/L (ref 24–28)
HCO3 UR-SCNC: 13.7 MMOL/L (ref 24–28)
HCO3 UR-SCNC: 14.5 MMOL/L (ref 24–28)
HCO3 UR-SCNC: 14.8 MMOL/L (ref 24–28)
HCO3 UR-SCNC: 15.3 MMOL/L (ref 24–28)
HCO3 UR-SCNC: 15.4 MMOL/L (ref 24–28)
HCO3 UR-SCNC: 19.1 MMOL/L (ref 24–28)
HCO3 UR-SCNC: 25 MMOL/L (ref 24–28)
HCO3 UR-SCNC: 7.9 MMOL/L (ref 24–28)
HCT VFR BLD AUTO: 22.4 % (ref 37–48.5)
HCT VFR BLD AUTO: 24.8 % (ref 37–48.5)
HCT VFR BLD AUTO: 25.4 % (ref 37–48.5)
HCT VFR BLD AUTO: 25.4 % (ref 37–48.5)
HCT VFR BLD AUTO: 26 % (ref 37–48.5)
HCT VFR BLD AUTO: 26.2 % (ref 37–48.5)
HCT VFR BLD AUTO: 26.2 % (ref 37–48.5)
HCT VFR BLD AUTO: 26.3 % (ref 37–48.5)
HCT VFR BLD AUTO: 26.4 % (ref 37–48.5)
HCT VFR BLD AUTO: 26.6 % (ref 37–48.5)
HCT VFR BLD AUTO: 26.8 % (ref 37–48.5)
HCT VFR BLD AUTO: 26.9 % (ref 37–48.5)
HCT VFR BLD AUTO: 28 % (ref 37–48.5)
HCT VFR BLD AUTO: 28.3 % (ref 37–48.5)
HCT VFR BLD AUTO: 28.6 % (ref 37–48.5)
HCT VFR BLD AUTO: 28.9 % (ref 37–48.5)
HCT VFR BLD AUTO: 29.2 % (ref 37–48.5)
HCT VFR BLD AUTO: 31.2 % (ref 37–48.5)
HCT VFR BLD AUTO: 32.2 % (ref 37–48.5)
HCT VFR BLD AUTO: 32.7 % (ref 37–48.5)
HCT VFR BLD AUTO: 33.5 % (ref 37–48.5)
HCT VFR BLD AUTO: 35.2 % (ref 37–48.5)
HCT VFR BLD AUTO: 35.6 % (ref 37–48.5)
HCT VFR BLD AUTO: 36.1 % (ref 37–48.5)
HCT VFR BLD AUTO: 36.2 % (ref 37–48.5)
HCT VFR BLD AUTO: 37 % (ref 37–48.5)
HCT VFR BLD AUTO: 37.6 % (ref 37–48.5)
HCT VFR BLD AUTO: 38.6 % (ref 37–48.5)
HCT VFR BLD AUTO: 39.2 % (ref 37–48.5)
HCT VFR BLD AUTO: 39.4 % (ref 37–48.5)
HCT VFR BLD AUTO: 40 % (ref 37–48.5)
HCT VFR BLD AUTO: 41.1 % (ref 37–48.5)
HGB BLD-MCNC: 10.2 G/DL (ref 12–16)
HGB BLD-MCNC: 10.5 G/DL (ref 12–16)
HGB BLD-MCNC: 10.6 G/DL (ref 12–16)
HGB BLD-MCNC: 10.9 G/DL (ref 12–16)
HGB BLD-MCNC: 11.1 G/DL (ref 12–16)
HGB BLD-MCNC: 11.4 G/DL (ref 12–16)
HGB BLD-MCNC: 11.6 G/DL (ref 12–16)
HGB BLD-MCNC: 11.7 G/DL (ref 12–16)
HGB BLD-MCNC: 12 G/DL (ref 12–16)
HGB BLD-MCNC: 12 G/DL (ref 12–16)
HGB BLD-MCNC: 12.2 G/DL (ref 12–16)
HGB BLD-MCNC: 12.2 G/DL (ref 12–16)
HGB BLD-MCNC: 12.5 G/DL (ref 12–16)
HGB BLD-MCNC: 13 G/DL (ref 12–16)
HGB BLD-MCNC: 13.2 G/DL (ref 12–16)
HGB BLD-MCNC: 6.8 G/DL (ref 12–16)
HGB BLD-MCNC: 7.8 G/DL (ref 12–16)
HGB BLD-MCNC: 7.9 G/DL (ref 12–16)
HGB BLD-MCNC: 8.2 G/DL (ref 12–16)
HGB BLD-MCNC: 8.2 G/DL (ref 12–16)
HGB BLD-MCNC: 8.3 G/DL (ref 12–16)
HGB BLD-MCNC: 8.5 G/DL (ref 12–16)
HGB BLD-MCNC: 8.6 G/DL (ref 12–16)
HGB BLD-MCNC: 8.6 G/DL (ref 12–16)
HGB BLD-MCNC: 8.7 G/DL (ref 12–16)
HGB BLD-MCNC: 8.9 G/DL (ref 12–16)
HGB BLD-MCNC: 8.9 G/DL (ref 12–16)
HGB BLD-MCNC: 9.4 G/DL (ref 12–16)
HGB BLD-MCNC: 9.5 G/DL (ref 12–16)
HGB BLD-MCNC: 9.7 G/DL (ref 12–16)
HGB BLD-MCNC: 9.8 G/DL (ref 12–16)
HGB BLD-MCNC: 9.8 G/DL (ref 12–16)
HGB UR QL STRIP: ABNORMAL
HGB UR QL STRIP: ABNORMAL
HYALINE CASTS #/AREA URNS LPF: 0 /LPF
HYALINE CASTS #/AREA URNS LPF: 4 /LPF
HYPOCHROMIA BLD QL SMEAR: ABNORMAL
IGA SERPL-MCNC: 271 MG/DL (ref 64–422)
IGA SERPL-MCNC: 271 MG/DL (ref 64–422)
IGG SERPL-MCNC: 776 MG/DL (ref 586–1602)
IGG SERPL-MCNC: 776 MG/DL (ref 586–1602)
IGM SERPL-MCNC: 48 MG/DL (ref 26–217)
IGM SERPL-MCNC: 48 MG/DL (ref 26–217)
IMM GRANULOCYTES # BLD AUTO: 0.02 K/UL (ref 0–0.04)
IMM GRANULOCYTES # BLD AUTO: 0.02 K/UL (ref 0–0.04)
IMM GRANULOCYTES # BLD AUTO: 0.04 K/UL (ref 0–0.04)
IMM GRANULOCYTES # BLD AUTO: 0.06 K/UL (ref 0–0.04)
IMM GRANULOCYTES # BLD AUTO: 0.07 K/UL (ref 0–0.04)
IMM GRANULOCYTES # BLD AUTO: 0.08 K/UL (ref 0–0.04)
IMM GRANULOCYTES # BLD AUTO: 0.14 K/UL (ref 0–0.04)
IMM GRANULOCYTES # BLD AUTO: 0.14 K/UL (ref 0–0.04)
IMM GRANULOCYTES # BLD AUTO: 0.16 K/UL (ref 0–0.04)
IMM GRANULOCYTES # BLD AUTO: 0.3 K/UL (ref 0–0.04)
IMM GRANULOCYTES # BLD AUTO: 0.36 K/UL (ref 0–0.04)
IMM GRANULOCYTES # BLD AUTO: ABNORMAL K/UL
IMM GRANULOCYTES # BLD AUTO: ABNORMAL K/UL (ref 0–0.04)
IMM GRANULOCYTES NFR BLD AUTO: 0.3 % (ref 0–0.5)
IMM GRANULOCYTES NFR BLD AUTO: 0.3 % (ref 0–0.5)
IMM GRANULOCYTES NFR BLD AUTO: 0.4 % (ref 0–0.5)
IMM GRANULOCYTES NFR BLD AUTO: 0.4 % (ref 0–0.5)
IMM GRANULOCYTES NFR BLD AUTO: 0.5 % (ref 0–0.5)
IMM GRANULOCYTES NFR BLD AUTO: 0.6 % (ref 0–0.5)
IMM GRANULOCYTES NFR BLD AUTO: 0.7 % (ref 0–0.5)
IMM GRANULOCYTES NFR BLD AUTO: 0.7 % (ref 0–0.5)
IMM GRANULOCYTES NFR BLD AUTO: 1.2 % (ref 0–0.5)
IMM GRANULOCYTES NFR BLD AUTO: 1.3 % (ref 0–0.5)
IMM GRANULOCYTES NFR BLD AUTO: 1.3 % (ref 0–0.5)
IMM GRANULOCYTES NFR BLD AUTO: 3.3 % (ref 0–0.5)
IMM GRANULOCYTES NFR BLD AUTO: 5 % (ref 0–0.5)
IMM GRANULOCYTES NFR BLD AUTO: ABNORMAL %
IMM GRANULOCYTES NFR BLD AUTO: ABNORMAL % (ref 0–0.5)
INR PPP: 0.9
INR PPP: 0.9 (ref 0.8–1.2)
INR PPP: 1 (ref 0.8–1.2)
INR PPP: 1.1 (ref 0.8–1.2)
INR PPP: 1.1 (ref 0.8–1.2)
INR PPP: 1.2 (ref 0.8–1.2)
INTERVENTRICULAR SEPTUM: 0.93 CM (ref 0.6–1.1)
IP: 16
IRON SERPL-MCNC: 104 UG/DL (ref 30–160)
KETONES UR QL STRIP: NEGATIVE
KETONES UR QL STRIP: NEGATIVE
LA MAJOR: 4.4 CM
LA MINOR: 4.12 CM
LA WIDTH: 3.28 CM
LABORATORY COMMENT REPORT: NORMAL
LACTATE SERPL-SCNC: 0.8 MMOL/L (ref 0.5–1.9)
LACTATE SERPL-SCNC: 2.6 MMOL/L (ref 0.5–2.2)
LACTATE SERPL-SCNC: 2.7 MMOL/L (ref 0.5–2.2)
LACTATE SERPL-SCNC: 2.9 MMOL/L (ref 0.5–1.9)
LACTATE SERPL-SCNC: 3.5 MMOL/L (ref 0.5–2.2)
LACTATE SERPL-SCNC: 3.8 MMOL/L (ref 0.5–2.2)
LACTATE SERPL-SCNC: 3.8 MMOL/L (ref 0.5–2.2)
LDH SERPL L TO P-CCNC: 240 U/L (ref 110–260)
LEFT ATRIUM SIZE: 3.5 CM
LEFT ATRIUM VOLUME INDEX: 22.2 ML/M2
LEFT ATRIUM VOLUME: 41.52 CM3
LEFT INTERNAL DIMENSION IN SYSTOLE: 3.37 CM (ref 2.1–4)
LEFT VENTRICLE DIASTOLIC VOLUME INDEX: 27.64 ML/M2
LEFT VENTRICLE DIASTOLIC VOLUME: 51.73 ML
LEFT VENTRICLE MASS INDEX: 65 G/M2
LEFT VENTRICLE SYSTOLIC VOLUME INDEX: 24.8 ML/M2
LEFT VENTRICLE SYSTOLIC VOLUME: 46.42 ML
LEFT VENTRICULAR INTERNAL DIMENSION IN DIASTOLE: 3.52 CM (ref 3.5–6)
LEFT VENTRICULAR MASS: 122.48 G
LEUKOCYTE ESTERASE UR QL STRIP: NEGATIVE
LEUKOCYTE ESTERASE UR QL STRIP: NEGATIVE
LIPASE SERPL-CCNC: 41 U/L (ref 4–60)
LV LATERAL E/E' RATIO: 10.2 M/S
LV SEPTAL E/E' RATIO: 10.2 M/S
LYMPHOCYTES # BLD AUTO: 0.4 K/UL (ref 1–4.8)
LYMPHOCYTES # BLD AUTO: 0.5 K/UL (ref 1–4.8)
LYMPHOCYTES # BLD AUTO: 1.2 K/UL (ref 1–4.8)
LYMPHOCYTES # BLD AUTO: 1.7 K/UL (ref 1–4.8)
LYMPHOCYTES # BLD AUTO: 1.9 K/UL (ref 1–4.8)
LYMPHOCYTES # BLD AUTO: 2.9 K/UL (ref 1–4.8)
LYMPHOCYTES # BLD AUTO: 3.2 K/UL (ref 1–4.8)
LYMPHOCYTES # BLD AUTO: 3.2 K/UL (ref 1–4.8)
LYMPHOCYTES # BLD AUTO: ABNORMAL K/UL (ref 1–4.8)
LYMPHOCYTES NFR BLD: 10 % (ref 18–48)
LYMPHOCYTES NFR BLD: 11 % (ref 18–48)
LYMPHOCYTES NFR BLD: 12 % (ref 18–48)
LYMPHOCYTES NFR BLD: 12 % (ref 18–48)
LYMPHOCYTES NFR BLD: 14.5 % (ref 18–48)
LYMPHOCYTES NFR BLD: 14.6 % (ref 18–48)
LYMPHOCYTES NFR BLD: 15 % (ref 18–48)
LYMPHOCYTES NFR BLD: 16 % (ref 18–48)
LYMPHOCYTES NFR BLD: 18 % (ref 18–48)
LYMPHOCYTES NFR BLD: 23.3 % (ref 18–48)
LYMPHOCYTES NFR BLD: 26.9 % (ref 18–48)
LYMPHOCYTES NFR BLD: 3.5 % (ref 18–48)
LYMPHOCYTES NFR BLD: 3.7 % (ref 18–48)
LYMPHOCYTES NFR BLD: 30.7 % (ref 18–48)
LYMPHOCYTES NFR BLD: 34.2 % (ref 18–48)
LYMPHOCYTES NFR BLD: 4 % (ref 18–48)
LYMPHOCYTES NFR BLD: 4.3 % (ref 18–48)
LYMPHOCYTES NFR BLD: 4.6 % (ref 18–48)
LYMPHOCYTES NFR BLD: 5 % (ref 18–48)
LYMPHOCYTES NFR BLD: 58 % (ref 18–48)
LYMPHOCYTES NFR BLD: 6 % (ref 18–48)
LYMPHOCYTES NFR BLD: 6 % (ref 18–48)
LYMPHOCYTES NFR BLD: 7 % (ref 18–48)
LYMPHOCYTES NFR BLD: 7.3 % (ref 18–48)
LYMPHOCYTES NFR BLD: 8 % (ref 18–48)
M PROTEIN SERPL ELPH-MCNC: NORMAL G/DL
MAGNESIUM SERPL-MCNC: 1.7 MG/DL (ref 1.6–2.6)
MAGNESIUM SERPL-MCNC: 1.7 MG/DL (ref 1.6–2.6)
MAGNESIUM SERPL-MCNC: 2 MG/DL (ref 1.6–2.6)
MAGNESIUM SERPL-MCNC: 2.1 MG/DL (ref 1.6–2.6)
MAGNESIUM SERPL-MCNC: 2.1 MG/DL (ref 1.6–2.6)
MAGNESIUM SERPL-MCNC: 2.2 MG/DL (ref 1.6–2.6)
MAGNESIUM SERPL-MCNC: 2.5 MG/DL (ref 1.6–2.6)
MAGNESIUM SERPL-MCNC: 2.5 MG/DL (ref 1.6–2.6)
MAGNESIUM SERPL-MCNC: 2.7 MG/DL (ref 1.6–2.6)
MCH RBC QN AUTO: 28 PG (ref 27–31)
MCH RBC QN AUTO: 28.1 PG (ref 27–31)
MCH RBC QN AUTO: 28.1 PG (ref 27–31)
MCH RBC QN AUTO: 28.2 PG (ref 27–31)
MCH RBC QN AUTO: 28.3 PG (ref 27–31)
MCH RBC QN AUTO: 28.5 PG (ref 27–31)
MCH RBC QN AUTO: 28.5 PG (ref 27–31)
MCH RBC QN AUTO: 28.6 PG (ref 27–31)
MCH RBC QN AUTO: 28.7 PG (ref 27–31)
MCH RBC QN AUTO: 28.8 PG (ref 27–31)
MCH RBC QN AUTO: 28.8 PG (ref 27–31)
MCH RBC QN AUTO: 29 PG (ref 27–31)
MCH RBC QN AUTO: 29.1 PG (ref 27–31)
MCH RBC QN AUTO: 29.2 PG (ref 27–31)
MCH RBC QN AUTO: 29.3 PG (ref 27–31)
MCH RBC QN AUTO: 29.4 PG (ref 27–31)
MCH RBC QN AUTO: 29.4 PG (ref 27–31)
MCH RBC QN AUTO: 29.5 PG (ref 27–31)
MCH RBC QN AUTO: 29.5 PG (ref 27–31)
MCH RBC QN AUTO: 29.7 PG (ref 27–31)
MCH RBC QN AUTO: 29.9 PG (ref 27–31)
MCH RBC QN AUTO: 29.9 PG (ref 27–31)
MCHC RBC AUTO-ENTMCNC: 28.2 G/DL (ref 32–36)
MCHC RBC AUTO-ENTMCNC: 30.4 G/DL (ref 32–36)
MCHC RBC AUTO-ENTMCNC: 31 G/DL (ref 32–36)
MCHC RBC AUTO-ENTMCNC: 31.1 G/DL (ref 32–36)
MCHC RBC AUTO-ENTMCNC: 31.1 G/DL (ref 32–36)
MCHC RBC AUTO-ENTMCNC: 31.3 G/DL (ref 32–36)
MCHC RBC AUTO-ENTMCNC: 31.5 G/DL (ref 32–36)
MCHC RBC AUTO-ENTMCNC: 31.6 G/DL (ref 32–36)
MCHC RBC AUTO-ENTMCNC: 31.7 G/DL (ref 32–36)
MCHC RBC AUTO-ENTMCNC: 31.9 G/DL (ref 32–36)
MCHC RBC AUTO-ENTMCNC: 32.1 G/DL (ref 32–36)
MCHC RBC AUTO-ENTMCNC: 32.2 G/DL (ref 32–36)
MCHC RBC AUTO-ENTMCNC: 32.3 G/DL (ref 32–36)
MCHC RBC AUTO-ENTMCNC: 32.3 G/DL (ref 32–36)
MCHC RBC AUTO-ENTMCNC: 32.4 G/DL (ref 32–36)
MCHC RBC AUTO-ENTMCNC: 32.5 G/DL (ref 32–36)
MCHC RBC AUTO-ENTMCNC: 32.7 G/DL (ref 32–36)
MCHC RBC AUTO-ENTMCNC: 32.9 G/DL (ref 32–36)
MCHC RBC AUTO-ENTMCNC: 32.9 G/DL (ref 32–36)
MCHC RBC AUTO-ENTMCNC: 33 G/DL (ref 32–36)
MCHC RBC AUTO-ENTMCNC: 33.1 G/DL (ref 32–36)
MCHC RBC AUTO-ENTMCNC: 33.2 G/DL (ref 32–36)
MCHC RBC AUTO-ENTMCNC: 33.2 G/DL (ref 32–36)
MCHC RBC AUTO-ENTMCNC: 33.5 G/DL (ref 32–36)
MCHC RBC AUTO-ENTMCNC: 33.6 G/DL (ref 32–36)
MCHC RBC AUTO-ENTMCNC: 33.7 G/DL (ref 32–36)
MCHC RBC AUTO-ENTMCNC: 33.9 G/DL (ref 32–36)
MCV RBC AUTO: 101 FL (ref 82–98)
MCV RBC AUTO: 86 FL (ref 82–98)
MCV RBC AUTO: 87 FL (ref 82–98)
MCV RBC AUTO: 88 FL (ref 82–98)
MCV RBC AUTO: 89 FL (ref 82–98)
MCV RBC AUTO: 90 FL (ref 82–98)
MCV RBC AUTO: 91 FL (ref 82–98)
MCV RBC AUTO: 92 FL (ref 82–98)
MCV RBC AUTO: 93 FL (ref 82–98)
MCV RBC AUTO: 94 FL (ref 82–98)
METAMYELOCYTES NFR BLD MANUAL: 1 %
METAMYELOCYTES NFR BLD MANUAL: 14 %
METAMYELOCYTES NFR BLD MANUAL: 3 %
METAMYELOCYTES NFR BLD MANUAL: 5 %
METAMYELOCYTES NFR BLD MANUAL: 6 %
METAMYELOCYTES NFR BLD MANUAL: 8 %
METAMYELOCYTES NFR BLD MANUAL: 8 %
METHADONE UR QL SCN>300 NG/ML: NEGATIVE
MICROSCOPIC COMMENT: ABNORMAL
MICROSCOPIC COMMENT: ABNORMAL
MIN VOL: 12.4
MIN VOL: 12.8
MIN VOL: 13
MIN VOL: 14.3
MIN VOL: 14.6
MIN VOL: 24.5
MIN VOL: 9.8
MODE: ABNORMAL
MONOCYTES # BLD AUTO: 0 K/UL (ref 0.3–1)
MONOCYTES # BLD AUTO: 0.4 K/UL (ref 0.3–1)
MONOCYTES # BLD AUTO: 0.7 K/UL (ref 0.3–1)
MONOCYTES # BLD AUTO: 0.7 K/UL (ref 0.3–1)
MONOCYTES # BLD AUTO: 0.8 K/UL (ref 0.3–1)
MONOCYTES # BLD AUTO: 0.9 K/UL (ref 0.3–1)
MONOCYTES # BLD AUTO: 1 K/UL (ref 0.3–1)
MONOCYTES # BLD AUTO: 1.2 K/UL (ref 0.3–1)
MONOCYTES # BLD AUTO: 1.2 K/UL (ref 0.3–1)
MONOCYTES # BLD AUTO: 1.3 K/UL (ref 0.3–1)
MONOCYTES # BLD AUTO: 1.8 K/UL (ref 0.3–1)
MONOCYTES # BLD AUTO: ABNORMAL K/UL (ref 0.3–1)
MONOCYTES NFR BLD: 0.5 % (ref 4–15)
MONOCYTES NFR BLD: 10.1 % (ref 4–15)
MONOCYTES NFR BLD: 10.3 % (ref 4–15)
MONOCYTES NFR BLD: 10.3 % (ref 4–15)
MONOCYTES NFR BLD: 11.9 % (ref 4–15)
MONOCYTES NFR BLD: 12 % (ref 4–15)
MONOCYTES NFR BLD: 16.7 % (ref 4–15)
MONOCYTES NFR BLD: 2 % (ref 4–15)
MONOCYTES NFR BLD: 3 % (ref 4–15)
MONOCYTES NFR BLD: 4 % (ref 4–15)
MONOCYTES NFR BLD: 5 % (ref 4–15)
MONOCYTES NFR BLD: 6 % (ref 4–15)
MONOCYTES NFR BLD: 6 % (ref 4–15)
MONOCYTES NFR BLD: 7 % (ref 4–15)
MONOCYTES NFR BLD: 7 % (ref 4–15)
MONOCYTES NFR BLD: 7.2 % (ref 4–15)
MONOCYTES NFR BLD: 7.3 % (ref 4–15)
MONOCYTES NFR BLD: 8 % (ref 4–15)
MONOCYTES NFR BLD: 9 % (ref 4–15)
MONOCYTES NFR BLD: 9.2 % (ref 4–15)
MONOCYTES NFR BLD: 9.2 % (ref 4–15)
MV PEAK A VEL: 0.81 M/S
MV PEAK E VEL: 0.51 M/S
MV STENOSIS PRESSURE HALF TIME: 57.41 MS
MV VALVE AREA P 1/2 METHOD: 3.83 CM2
MYELOCYTES NFR BLD MANUAL: 1 %
MYELOCYTES NFR BLD MANUAL: 2 %
MYELOCYTES NFR BLD MANUAL: 4 %
NEUTROPHILS # BLD AUTO: 3.8 K/UL (ref 1.8–7.7)
NEUTROPHILS # BLD AUTO: 4.1 K/UL (ref 1.8–7.7)
NEUTROPHILS # BLD AUTO: 4.5 K/UL (ref 1.8–7.7)
NEUTROPHILS # BLD AUTO: 4.6 K/UL (ref 1.8–7.7)
NEUTROPHILS # BLD AUTO: 5.3 K/UL (ref 1.8–7.7)
NEUTROPHILS # BLD AUTO: 5.9 K/UL (ref 1.8–7.7)
NEUTROPHILS # BLD AUTO: 7.2 K/UL (ref 1.8–7.7)
NEUTROPHILS # BLD AUTO: 8 K/UL (ref 1.8–7.7)
NEUTROPHILS # BLD AUTO: 8.6 K/UL (ref 1.8–7.7)
NEUTROPHILS # BLD AUTO: 9 K/UL (ref 1.8–7.7)
NEUTROPHILS # BLD AUTO: 9.4 K/UL (ref 1.8–7.7)
NEUTROPHILS # BLD AUTO: 9.6 K/UL (ref 1.8–7.7)
NEUTROPHILS # BLD AUTO: 9.9 K/UL (ref 1.8–7.7)
NEUTROPHILS NFR BLD: 29 % (ref 38–73)
NEUTROPHILS NFR BLD: 34 % (ref 38–73)
NEUTROPHILS NFR BLD: 36 % (ref 38–73)
NEUTROPHILS NFR BLD: 42 % (ref 38–73)
NEUTROPHILS NFR BLD: 43.9 % (ref 38–73)
NEUTROPHILS NFR BLD: 49.6 % (ref 38–73)
NEUTROPHILS NFR BLD: 51 % (ref 38–73)
NEUTROPHILS NFR BLD: 52.9 % (ref 38–73)
NEUTROPHILS NFR BLD: 54 % (ref 38–73)
NEUTROPHILS NFR BLD: 54 % (ref 38–73)
NEUTROPHILS NFR BLD: 55 % (ref 38–73)
NEUTROPHILS NFR BLD: 59 % (ref 38–73)
NEUTROPHILS NFR BLD: 60.5 % (ref 38–73)
NEUTROPHILS NFR BLD: 64 % (ref 38–73)
NEUTROPHILS NFR BLD: 70 % (ref 38–73)
NEUTROPHILS NFR BLD: 71 % (ref 38–73)
NEUTROPHILS NFR BLD: 73.9 % (ref 38–73)
NEUTROPHILS NFR BLD: 74 % (ref 38–73)
NEUTROPHILS NFR BLD: 74.4 % (ref 38–73)
NEUTROPHILS NFR BLD: 75.3 % (ref 38–73)
NEUTROPHILS NFR BLD: 75.8 % (ref 38–73)
NEUTROPHILS NFR BLD: 76 % (ref 38–73)
NEUTROPHILS NFR BLD: 78 % (ref 38–73)
NEUTROPHILS NFR BLD: 79 % (ref 38–73)
NEUTROPHILS NFR BLD: 81.7 % (ref 38–73)
NEUTROPHILS NFR BLD: 82.8 % (ref 38–73)
NEUTROPHILS NFR BLD: 84 % (ref 38–73)
NEUTROPHILS NFR BLD: 84.7 % (ref 38–73)
NEUTROPHILS NFR BLD: 84.9 % (ref 38–73)
NEUTROPHILS NFR BLD: 86 % (ref 38–73)
NEUTROPHILS NFR BLD: 86 % (ref 38–73)
NEUTROPHILS NFR BLD: 91.7 % (ref 38–73)
NEUTS BAND NFR BLD MANUAL: 1 %
NEUTS BAND NFR BLD MANUAL: 12 %
NEUTS BAND NFR BLD MANUAL: 18 %
NEUTS BAND NFR BLD MANUAL: 19 %
NEUTS BAND NFR BLD MANUAL: 2 %
NEUTS BAND NFR BLD MANUAL: 28 %
NEUTS BAND NFR BLD MANUAL: 28 %
NEUTS BAND NFR BLD MANUAL: 30 %
NEUTS BAND NFR BLD MANUAL: 31 %
NEUTS BAND NFR BLD MANUAL: 32 %
NEUTS BAND NFR BLD MANUAL: 33 %
NEUTS BAND NFR BLD MANUAL: 37 %
NEUTS BAND NFR BLD MANUAL: 4 %
NEUTS BAND NFR BLD MANUAL: 5 %
NEUTS BAND NFR BLD MANUAL: 5 %
NEUTS BAND NFR BLD MANUAL: 9 %
NITRITE UR QL STRIP: NEGATIVE
NITRITE UR QL STRIP: NEGATIVE
NRBC BLD-RTO: 0 /100 WBC
NSE SERPL-MCNC: 249 NG/ML
OPIATES UR QL SCN: NEGATIVE
OVALOCYTES BLD QL SMEAR: ABNORMAL
PATH REV BLD -IMP: NORMAL
PCO2 BLDA: 21.2 MMHG (ref 35–45)
PCO2 BLDA: 25.1 MMHG (ref 35–45)
PCO2 BLDA: 27.6 MMHG (ref 35–45)
PCO2 BLDA: 34.1 MMHG (ref 35–45)
PCO2 BLDA: 36.1 MMHG (ref 35–45)
PCO2 BLDA: 36.6 MMHG (ref 35–45)
PCO2 BLDA: 37.3 MMHG (ref 35–45)
PCO2 BLDA: 46.7 MMHG (ref 35–45)
PCO2 BLDA: 47.1 MMHG (ref 35–45)
PCP UR QL SCN>25 NG/ML: NEGATIVE
PEEP: 5
PH SMN: 7.18 [PH] (ref 7.35–7.45)
PH SMN: 7.22 [PH] (ref 7.35–7.45)
PH SMN: 7.24 [PH] (ref 7.35–7.45)
PH SMN: 7.3 [PH] (ref 7.35–7.45)
PH SMN: 7.33 [PH] (ref 7.35–7.45)
PH UR STRIP: 6 [PH] (ref 5–8)
PH UR STRIP: 6 [PH] (ref 5–8)
PHOSPHATE SERPL-MCNC: 3.4 MG/DL (ref 2.7–4.5)
PHOSPHATE SERPL-MCNC: 4.3 MG/DL (ref 2.7–4.5)
PHOSPHATE SERPL-MCNC: 4.5 MG/DL (ref 2.7–4.5)
PHOSPHATE SERPL-MCNC: 5.1 MG/DL (ref 2.7–4.5)
PHOSPHATE SERPL-MCNC: 5.9 MG/DL (ref 2.7–4.5)
PHOSPHATE SERPL-MCNC: 6.7 MG/DL (ref 2.7–4.5)
PHOSPHATE SERPL-MCNC: 6.8 MG/DL (ref 2.7–4.5)
PIP: 23
PIP: 23
PIP: 33
PIP: 34
PIP: 36
PIP: 40
PISA MRMAX VEL: 0.04 M/S
PLATELET # BLD AUTO: 104 K/UL (ref 150–350)
PLATELET # BLD AUTO: 107 K/UL (ref 150–350)
PLATELET # BLD AUTO: 109 K/UL (ref 150–350)
PLATELET # BLD AUTO: 112 K/UL (ref 150–350)
PLATELET # BLD AUTO: 113 K/UL (ref 150–350)
PLATELET # BLD AUTO: 114 K/UL (ref 150–350)
PLATELET # BLD AUTO: 114 K/UL (ref 150–350)
PLATELET # BLD AUTO: 122 K/UL (ref 150–350)
PLATELET # BLD AUTO: 122 K/UL (ref 150–350)
PLATELET # BLD AUTO: 124 K/UL (ref 150–350)
PLATELET # BLD AUTO: 126 K/UL (ref 150–350)
PLATELET # BLD AUTO: 126 K/UL (ref 150–350)
PLATELET # BLD AUTO: 127 K/UL (ref 150–350)
PLATELET # BLD AUTO: 127 K/UL (ref 150–350)
PLATELET # BLD AUTO: 128 K/UL (ref 150–350)
PLATELET # BLD AUTO: 129 K/UL (ref 150–350)
PLATELET # BLD AUTO: 130 K/UL (ref 150–350)
PLATELET # BLD AUTO: 130 K/UL (ref 150–350)
PLATELET # BLD AUTO: 136 K/UL (ref 150–350)
PLATELET # BLD AUTO: 139 K/UL (ref 150–350)
PLATELET # BLD AUTO: 142 K/UL (ref 150–350)
PLATELET # BLD AUTO: 158 K/UL (ref 150–350)
PLATELET # BLD AUTO: 168 K/UL (ref 150–350)
PLATELET # BLD AUTO: 170 K/UL (ref 150–350)
PLATELET # BLD AUTO: 190 K/UL (ref 150–350)
PLATELET # BLD AUTO: 191 K/UL (ref 150–350)
PLATELET # BLD AUTO: 193 K/UL (ref 150–350)
PLATELET # BLD AUTO: 204 K/UL (ref 150–350)
PLATELET # BLD AUTO: 207 K/UL (ref 150–350)
PLATELET # BLD AUTO: 212 K/UL (ref 150–350)
PLATELET # BLD AUTO: 217 K/UL (ref 150–350)
PLATELET # BLD AUTO: 96 K/UL (ref 150–350)
PLATELET BLD QL SMEAR: ABNORMAL
PMV BLD AUTO: 10.1 FL (ref 9.2–12.9)
PMV BLD AUTO: 10.4 FL (ref 9.2–12.9)
PMV BLD AUTO: 10.4 FL (ref 9.2–12.9)
PMV BLD AUTO: 10.5 FL (ref 9.2–12.9)
PMV BLD AUTO: 10.7 FL (ref 9.2–12.9)
PMV BLD AUTO: 10.7 FL (ref 9.2–12.9)
PMV BLD AUTO: 10.8 FL (ref 9.2–12.9)
PMV BLD AUTO: 10.9 FL (ref 9.2–12.9)
PMV BLD AUTO: 11.2 FL (ref 9.2–12.9)
PMV BLD AUTO: 11.3 FL (ref 9.2–12.9)
PMV BLD AUTO: 11.4 FL (ref 9.2–12.9)
PMV BLD AUTO: 11.5 FL (ref 9.2–12.9)
PMV BLD AUTO: 11.6 FL (ref 9.2–12.9)
PMV BLD AUTO: 11.7 FL (ref 9.2–12.9)
PMV BLD AUTO: 11.8 FL (ref 9.2–12.9)
PMV BLD AUTO: 11.8 FL (ref 9.2–12.9)
PMV BLD AUTO: 11.9 FL (ref 9.2–12.9)
PMV BLD AUTO: 11.9 FL (ref 9.2–12.9)
PMV BLD AUTO: 9.9 FL (ref 9.2–12.9)
PMV BLD AUTO: 9.9 FL (ref 9.2–12.9)
PO2 BLDA: 104 MMHG (ref 80–100)
PO2 BLDA: 125 MMHG (ref 80–100)
PO2 BLDA: 176 MMHG (ref 80–100)
PO2 BLDA: 197 MMHG (ref 80–100)
PO2 BLDA: 273 MMHG (ref 80–100)
PO2 BLDA: 83 MMHG (ref 80–100)
PO2 BLDA: 85 MMHG (ref 80–100)
PO2 BLDA: 95 MMHG (ref 80–100)
PO2 BLDA: 99 MMHG (ref 80–100)
POC BE: -1 MMOL/L
POC BE: -12 MMOL/L
POC BE: -12 MMOL/L
POC BE: -13 MMOL/L
POC BE: -17 MMOL/L
POC BE: -21 MMOL/L
POC BE: -9 MMOL/L
POC SATURATED O2: 100 % (ref 95–100)
POC SATURATED O2: 100 % (ref 95–100)
POC SATURATED O2: 94 % (ref 95–100)
POC SATURATED O2: 94 % (ref 95–100)
POC SATURATED O2: 96 % (ref 95–100)
POC SATURATED O2: 97 % (ref 95–100)
POC SATURATED O2: 97 % (ref 95–100)
POC SATURATED O2: 98 % (ref 95–100)
POC SATURATED O2: 99 % (ref 95–100)
POC TCO2: 11 MMOL/L (ref 23–27)
POC TCO2: 15 MMOL/L (ref 23–27)
POC TCO2: 16 MMOL/L (ref 23–27)
POC TCO2: 17 MMOL/L (ref 23–27)
POC TCO2: 20 MMOL/L (ref 23–27)
POC TCO2: 26 MMOL/L (ref 23–27)
POC TCO2: 9 MMOL/L (ref 23–27)
POCT GLUCOSE: 109 MG/DL (ref 70–110)
POCT GLUCOSE: 110 MG/DL (ref 70–110)
POCT GLUCOSE: 114 MG/DL (ref 70–110)
POCT GLUCOSE: 116 MG/DL (ref 70–110)
POCT GLUCOSE: 121 MG/DL (ref 70–110)
POCT GLUCOSE: 121 MG/DL (ref 70–110)
POCT GLUCOSE: 144 MG/DL (ref 70–110)
POCT GLUCOSE: 146 MG/DL (ref 70–110)
POCT GLUCOSE: 147 MG/DL (ref 70–110)
POCT GLUCOSE: 154 MG/DL (ref 70–110)
POCT GLUCOSE: 159 MG/DL (ref 70–110)
POCT GLUCOSE: 185 MG/DL (ref 70–110)
POCT GLUCOSE: 239 MG/DL (ref 70–110)
POCT GLUCOSE: 73 MG/DL (ref 70–110)
POCT GLUCOSE: 73 MG/DL (ref 70–110)
POCT GLUCOSE: 79 MG/DL (ref 70–110)
POCT GLUCOSE: 96 MG/DL (ref 70–110)
POCT GLUCOSE: 98 MG/DL (ref 70–110)
POIKILOCYTOSIS BLD QL SMEAR: SLIGHT
POLYCHROMASIA BLD QL SMEAR: ABNORMAL
POTASSIUM SERPL-SCNC: 3.4 MMOL/L (ref 3.5–5.1)
POTASSIUM SERPL-SCNC: 3.4 MMOL/L (ref 3.5–5.1)
POTASSIUM SERPL-SCNC: 3.5 MMOL/L (ref 3.5–5.1)
POTASSIUM SERPL-SCNC: 3.5 MMOL/L (ref 3.5–5.1)
POTASSIUM SERPL-SCNC: 3.6 MMOL/L (ref 3.5–5.1)
POTASSIUM SERPL-SCNC: 3.7 MMOL/L (ref 3.5–5.1)
POTASSIUM SERPL-SCNC: 3.8 MMOL/L (ref 3.5–5.1)
POTASSIUM SERPL-SCNC: 3.9 MMOL/L (ref 3.5–5.1)
POTASSIUM SERPL-SCNC: 4 MMOL/L (ref 3.5–5.1)
POTASSIUM SERPL-SCNC: 4 MMOL/L (ref 3.5–5.1)
POTASSIUM SERPL-SCNC: 4.1 MMOL/L (ref 3.5–5.1)
POTASSIUM SERPL-SCNC: 4.4 MMOL/L (ref 3.5–5.1)
POTASSIUM SERPL-SCNC: 4.5 MMOL/L (ref 3.5–5.1)
POTASSIUM SERPL-SCNC: 4.5 MMOL/L (ref 3.5–5.1)
POTASSIUM SERPL-SCNC: 4.6 MMOL/L (ref 3.5–5.1)
POTASSIUM SERPL-SCNC: 4.7 MMOL/L (ref 3.5–5.1)
POTASSIUM SERPL-SCNC: 4.9 MMOL/L (ref 3.5–5.1)
POTASSIUM SERPL-SCNC: 5.2 MMOL/L (ref 3.5–5.1)
PROCALCITONIN SERPL IA-MCNC: 2.73 NG/ML
PROCALCITONIN SERPL IA-MCNC: 27.6 NG/ML
PROCALCITONIN SERPL IA-MCNC: <0.05 NG/ML (ref 0–0.5)
PROT PATTERN SERPL IFE-IMP: NORMAL
PROT SERPL-MCNC: 5.1 G/DL (ref 6–8.4)
PROT SERPL-MCNC: 5.2 G/DL (ref 6–8.4)
PROT SERPL-MCNC: 5.3 G/DL (ref 6–8.4)
PROT SERPL-MCNC: 5.4 G/DL (ref 6–8.4)
PROT SERPL-MCNC: 6 G/DL (ref 6–8.5)
PROT SERPL-MCNC: 6.3 G/DL (ref 6–8.4)
PROT SERPL-MCNC: 6.7 G/DL (ref 6–8.4)
PROT SERPL-MCNC: 6.8 G/DL (ref 6–8.4)
PROT SERPL-MCNC: 7.2 G/DL (ref 6–8.4)
PROT UR QL STRIP: ABNORMAL
PROT UR QL STRIP: ABNORMAL
PROTHROMBIN TIME: 10.3 SEC (ref 9–12.5)
PROTHROMBIN TIME: 10.9 SEC (ref 9–12.5)
PROTHROMBIN TIME: 11.7 SEC (ref 9–12.5)
PROTHROMBIN TIME: 12 SEC (ref 10.6–14.8)
PROTHROMBIN TIME: 12.2 SEC (ref 9–12.5)
PROTHROMBIN TIME: 12.8 SEC (ref 9–12.5)
PS: 10
PS: 20
PV PEAK VELOCITY: 0.71 CM/S
RA MAJOR: 3.63 CM
RA PRESSURE: 3 MMHG
RA WIDTH: 3.3 CM
RBC # BLD AUTO: 2.39 M/UL (ref 4–5.4)
RBC # BLD AUTO: 2.68 M/UL (ref 4–5.4)
RBC # BLD AUTO: 2.76 M/UL (ref 4–5.4)
RBC # BLD AUTO: 2.82 M/UL (ref 4–5.4)
RBC # BLD AUTO: 2.83 M/UL (ref 4–5.4)
RBC # BLD AUTO: 2.91 M/UL (ref 4–5.4)
RBC # BLD AUTO: 2.93 M/UL (ref 4–5.4)
RBC # BLD AUTO: 2.95 M/UL (ref 4–5.4)
RBC # BLD AUTO: 2.96 M/UL (ref 4–5.4)
RBC # BLD AUTO: 2.98 M/UL (ref 4–5.4)
RBC # BLD AUTO: 3.04 M/UL (ref 4–5.4)
RBC # BLD AUTO: 3.05 M/UL (ref 4–5.4)
RBC # BLD AUTO: 3.17 M/UL (ref 4–5.4)
RBC # BLD AUTO: 3.22 M/UL (ref 4–5.4)
RBC # BLD AUTO: 3.31 M/UL (ref 4–5.4)
RBC # BLD AUTO: 3.33 M/UL (ref 4–5.4)
RBC # BLD AUTO: 3.36 M/UL (ref 4–5.4)
RBC # BLD AUTO: 3.51 M/UL (ref 4–5.4)
RBC # BLD AUTO: 3.57 M/UL (ref 4–5.4)
RBC # BLD AUTO: 3.66 M/UL (ref 4–5.4)
RBC # BLD AUTO: 3.71 M/UL (ref 4–5.4)
RBC # BLD AUTO: 3.82 M/UL (ref 4–5.4)
RBC # BLD AUTO: 3.97 M/UL (ref 4–5.4)
RBC # BLD AUTO: 4.05 M/UL (ref 4–5.4)
RBC # BLD AUTO: 4.05 M/UL (ref 4–5.4)
RBC # BLD AUTO: 4.19 M/UL (ref 4–5.4)
RBC # BLD AUTO: 4.24 M/UL (ref 4–5.4)
RBC # BLD AUTO: 4.25 M/UL (ref 4–5.4)
RBC # BLD AUTO: 4.27 M/UL (ref 4–5.4)
RBC # BLD AUTO: 4.47 M/UL (ref 4–5.4)
RBC # BLD AUTO: 4.59 M/UL (ref 4–5.4)
RBC # BLD AUTO: 4.62 M/UL (ref 4–5.4)
RBC #/AREA URNS HPF: 4 /HPF (ref 0–4)
RBC #/AREA URNS HPF: 8 /HPF (ref 0–4)
RIGHT VENTRICULAR END-DIASTOLIC DIMENSION: 3.23 CM
SAMPLE: ABNORMAL
SARS-COV-2 RDRP RESP QL NAA+PROBE: NEGATIVE
SARS-COV-2 RNA RESP QL NAA+PROBE: NOT DETECTED
SARS-COV-2 RNA RESP QL NAA+PROBE: NOT DETECTED
SATURATED IRON: 44 % (ref 20–50)
SITE: ABNORMAL
SODIUM SERPL-SCNC: 131 MMOL/L (ref 136–145)
SODIUM SERPL-SCNC: 131 MMOL/L (ref 136–145)
SODIUM SERPL-SCNC: 134 MMOL/L (ref 136–145)
SODIUM SERPL-SCNC: 135 MMOL/L (ref 136–145)
SODIUM SERPL-SCNC: 136 MMOL/L (ref 136–145)
SODIUM SERPL-SCNC: 137 MMOL/L (ref 136–145)
SODIUM SERPL-SCNC: 137 MMOL/L (ref 136–145)
SODIUM SERPL-SCNC: 138 MMOL/L (ref 136–145)
SODIUM SERPL-SCNC: 140 MMOL/L (ref 136–145)
SODIUM SERPL-SCNC: 141 MMOL/L (ref 136–145)
SODIUM SERPL-SCNC: 143 MMOL/L (ref 136–145)
SODIUM SERPL-SCNC: 144 MMOL/L (ref 136–145)
SODIUM SERPL-SCNC: 148 MMOL/L (ref 136–145)
SODIUM SERPL-SCNC: 149 MMOL/L (ref 136–145)
SODIUM SERPL-SCNC: 151 MMOL/L (ref 136–145)
SP GR UR STRIP: 1.01 (ref 1–1.03)
SP GR UR STRIP: 1.02 (ref 1–1.03)
SP02: 100
SP02: 95
SP02: 99
SPONT RATE: 22
SPONT RATE: 31
SQUAMOUS #/AREA URNS HPF: 2 /HPF
TDI LATERAL: 0.05 M/S
TDI SEPTAL: 0.05 M/S
TDI: 0.05 M/S
TOTAL IRON BINDING CAPACITY: 237 UG/DL (ref 250–450)
TOXICOLOGY INFORMATION: NORMAL
TRANSFERRIN SERPL-MCNC: 160 MG/DL (ref 200–375)
TRICUSPID ANNULAR PLANE SYSTOLIC EXCURSION: 1.03 CM
TROPONIN I SERPL DL<=0.01 NG/ML-MCNC: 0.06 NG/ML (ref 0–0.03)
TROPONIN I SERPL DL<=0.01 NG/ML-MCNC: 0.07 NG/ML
TROPONIN I SERPL DL<=0.01 NG/ML-MCNC: 0.08 NG/ML
TROPONIN I SERPL DL<=0.01 NG/ML-MCNC: 0.24 NG/ML (ref 0–0.03)
TROPONIN I SERPL DL<=0.01 NG/ML-MCNC: 0.29 NG/ML (ref 0–0.03)
TROPONIN I SERPL DL<=0.01 NG/ML-MCNC: 0.4 NG/ML (ref 0–0.03)
TROPONIN I SERPL DL<=0.01 NG/ML-MCNC: 0.46 NG/ML (ref 0–0.03)
TROPONIN I SERPL DL<=0.01 NG/ML-MCNC: 0.48 NG/ML (ref 0–0.03)
TROPONIN I SERPL DL<=0.01 NG/ML-MCNC: 0.61 NG/ML (ref 0–0.03)
TROPONIN I SERPL DL<=0.01 NG/ML-MCNC: 0.73 NG/ML (ref 0–0.03)
TROPONIN I SERPL DL<=0.01 NG/ML-MCNC: 0.81 NG/ML (ref 0–0.03)
TROPONIN I SERPL DL<=0.01 NG/ML-MCNC: 0.9 NG/ML (ref 0–0.03)
TROPONIN I SERPL DL<=0.01 NG/ML-MCNC: 0.91 NG/ML (ref 0–0.03)
TROPONIN I SERPL DL<=0.01 NG/ML-MCNC: 0.97 NG/ML (ref 0–0.03)
TROPONIN I SERPL DL<=0.01 NG/ML-MCNC: 0.98 NG/ML (ref 0–0.03)
TROPONIN I SERPL DL<=0.01 NG/ML-MCNC: 1.9 NG/ML (ref 0–0.03)
TROPONIN I SERPL DL<=0.01 NG/ML-MCNC: <0.03 NG/ML
TSH SERPL DL<=0.005 MIU/L-ACNC: 1.27 UIU/ML (ref 0.34–5.6)
URN SPEC COLLECT METH UR: ABNORMAL
URN SPEC COLLECT METH UR: ABNORMAL
UROBILINOGEN UR STRIP-ACNC: NEGATIVE EU/DL
UROBILINOGEN UR STRIP-ACNC: NEGATIVE EU/DL
VANCOMYCIN SERPL-MCNC: 16.7 UG/ML
VANCOMYCIN SERPL-MCNC: 17 UG/ML
VANCOMYCIN SERPL-MCNC: 19.8 UG/ML
VANCOMYCIN SERPL-MCNC: 20.3 UG/ML
VIT B12 SERPL-MCNC: 1680 PG/ML (ref 210–950)
VOL: 470
VT: 450
VT: 450
VT: 600
VT: 615
WBC # BLD AUTO: 10.1 K/UL (ref 3.9–12.7)
WBC # BLD AUTO: 10.42 K/UL (ref 3.9–12.7)
WBC # BLD AUTO: 10.57 K/UL (ref 3.9–12.7)
WBC # BLD AUTO: 10.68 K/UL (ref 3.9–12.7)
WBC # BLD AUTO: 10.75 K/UL (ref 3.9–12.7)
WBC # BLD AUTO: 10.91 K/UL (ref 3.9–12.7)
WBC # BLD AUTO: 11.03 K/UL (ref 3.9–12.7)
WBC # BLD AUTO: 11.05 K/UL (ref 3.9–12.7)
WBC # BLD AUTO: 11.24 K/UL (ref 3.9–12.7)
WBC # BLD AUTO: 11.44 K/UL (ref 3.9–12.7)
WBC # BLD AUTO: 11.48 K/UL (ref 3.9–12.7)
WBC # BLD AUTO: 11.88 K/UL (ref 3.9–12.7)
WBC # BLD AUTO: 11.96 K/UL (ref 3.9–12.7)
WBC # BLD AUTO: 12.07 K/UL (ref 3.9–12.7)
WBC # BLD AUTO: 12.16 K/UL (ref 3.9–12.7)
WBC # BLD AUTO: 12.63 K/UL (ref 3.9–12.7)
WBC # BLD AUTO: 12.83 K/UL (ref 3.9–12.7)
WBC # BLD AUTO: 5.75 K/UL (ref 3.9–12.7)
WBC # BLD AUTO: 6.03 K/UL (ref 3.9–12.7)
WBC # BLD AUTO: 6.41 K/UL (ref 3.9–12.7)
WBC # BLD AUTO: 6.63 K/UL (ref 3.9–12.7)
WBC # BLD AUTO: 6.68 K/UL (ref 3.9–12.7)
WBC # BLD AUTO: 6.73 K/UL (ref 3.9–12.7)
WBC # BLD AUTO: 6.93 K/UL (ref 3.9–12.7)
WBC # BLD AUTO: 7.11 K/UL (ref 3.9–12.7)
WBC # BLD AUTO: 7.11 K/UL (ref 3.9–12.7)
WBC # BLD AUTO: 7.53 K/UL (ref 3.9–12.7)
WBC # BLD AUTO: 7.95 K/UL (ref 3.9–12.7)
WBC # BLD AUTO: 8.6 K/UL (ref 3.9–12.7)
WBC # BLD AUTO: 9.08 K/UL (ref 3.9–12.7)
WBC # BLD AUTO: 9.22 K/UL (ref 3.9–12.7)
WBC # BLD AUTO: 9.33 K/UL (ref 3.9–12.7)
WBC #/AREA URNS HPF: 1 /HPF (ref 0–5)
WBC #/AREA URNS HPF: 1 /HPF (ref 0–5)
YEAST URNS QL MICRO: ABNORMAL

## 2020-01-01 PROCEDURE — 25000003 PHARM REV CODE 250: Performed by: HOSPITALIST

## 2020-01-01 PROCEDURE — 99495 TCM SERVICES (MODERATE COMPLEXITY): ICD-10-PCS | Mod: S$GLB,,, | Performed by: FAMILY MEDICINE

## 2020-01-01 PROCEDURE — 63600175 PHARM REV CODE 636 W HCPCS: Performed by: NURSE PRACTITIONER

## 2020-01-01 PROCEDURE — 94761 N-INVAS EAR/PLS OXIMETRY MLT: CPT

## 2020-01-01 PROCEDURE — 94003 VENT MGMT INPAT SUBQ DAY: CPT

## 2020-01-01 PROCEDURE — 3074F SYST BP LT 130 MM HG: CPT | Mod: S$GLB,,, | Performed by: FAMILY MEDICINE

## 2020-01-01 PROCEDURE — 96374 THER/PROPH/DIAG INJ IV PUSH: CPT | Mod: 59

## 2020-01-01 PROCEDURE — 96372 THER/PROPH/DIAG INJ SC/IM: CPT | Mod: S$GLB,,, | Performed by: INTERNAL MEDICINE

## 2020-01-01 PROCEDURE — 80048 BASIC METABOLIC PNL TOTAL CA: CPT

## 2020-01-01 PROCEDURE — G0378 HOSPITAL OBSERVATION PER HR: HCPCS

## 2020-01-01 PROCEDURE — 85025 COMPLETE CBC W/AUTO DIFF WBC: CPT | Mod: 91

## 2020-01-01 PROCEDURE — 25000003 PHARM REV CODE 250: Performed by: EMERGENCY MEDICINE

## 2020-01-01 PROCEDURE — 82550 ASSAY OF CK (CPK): CPT

## 2020-01-01 PROCEDURE — 85730 THROMBOPLASTIN TIME PARTIAL: CPT

## 2020-01-01 PROCEDURE — 94640 AIRWAY INHALATION TREATMENT: CPT

## 2020-01-01 PROCEDURE — 84484 ASSAY OF TROPONIN QUANT: CPT

## 2020-01-01 PROCEDURE — 99223 PR INITIAL HOSPITAL CARE,LEVL III: ICD-10-PCS | Mod: ,,, | Performed by: PHYSICIAN ASSISTANT

## 2020-01-01 PROCEDURE — 94060 EVALUATION OF WHEEZING: CPT

## 2020-01-01 PROCEDURE — 1159F PR MEDICATION LIST DOCUMENTED IN MEDICAL RECORD: ICD-10-PCS | Mod: S$GLB,,, | Performed by: INTERNAL MEDICINE

## 2020-01-01 PROCEDURE — 87040 BLOOD CULTURE FOR BACTERIA: CPT | Mod: 59

## 2020-01-01 PROCEDURE — 99900026 HC AIRWAY MAINTENANCE (STAT)

## 2020-01-01 PROCEDURE — 80053 COMPREHEN METABOLIC PANEL: CPT

## 2020-01-01 PROCEDURE — 36556 INSERT NON-TUNNEL CV CATH: CPT

## 2020-01-01 PROCEDURE — 83520 IMMUNOASSAY QUANT NOS NONAB: CPT

## 2020-01-01 PROCEDURE — 3078F PR MOST RECENT DIASTOLIC BLOOD PRESSURE < 80 MM HG: ICD-10-PCS | Mod: S$GLB,,, | Performed by: INTERNAL MEDICINE

## 2020-01-01 PROCEDURE — 3078F DIAST BP <80 MM HG: CPT | Mod: S$GLB,,, | Performed by: INTERNAL MEDICINE

## 2020-01-01 PROCEDURE — 83735 ASSAY OF MAGNESIUM: CPT

## 2020-01-01 PROCEDURE — 3077F PR MOST RECENT SYSTOLIC BLOOD PRESSURE >= 140 MM HG: ICD-10-PCS | Mod: S$GLB,,, | Performed by: INTERNAL MEDICINE

## 2020-01-01 PROCEDURE — 63600175 PHARM REV CODE 636 W HCPCS: Performed by: PHYSICIAN ASSISTANT

## 2020-01-01 PROCEDURE — 1101F PR PT FALLS ASSESS DOC 0-1 FALLS W/OUT INJ PAST YR: ICD-10-PCS | Mod: S$GLB,,, | Performed by: INTERNAL MEDICINE

## 2020-01-01 PROCEDURE — 82803 BLOOD GASES ANY COMBINATION: CPT

## 2020-01-01 PROCEDURE — 96372 PR INJECTION,THERAP/PROPH/DIAG2ST, IM OR SUBCUT: ICD-10-PCS | Mod: S$GLB,,, | Performed by: INTERNAL MEDICINE

## 2020-01-01 PROCEDURE — 1159F PR MEDICATION LIST DOCUMENTED IN MEDICAL RECORD: ICD-10-PCS | Mod: S$GLB,,, | Performed by: RADIOLOGY

## 2020-01-01 PROCEDURE — 87184 SC STD DISK METHOD PER PLATE: CPT

## 2020-01-01 PROCEDURE — 99291 CRITICAL CARE FIRST HOUR: CPT | Mod: ,,, | Performed by: INTERNAL MEDICINE

## 2020-01-01 PROCEDURE — 94729 DIFFUSING CAPACITY: CPT

## 2020-01-01 PROCEDURE — 99900035 HC TECH TIME PER 15 MIN (STAT)

## 2020-01-01 PROCEDURE — 27200966 HC CLOSED SUCTION SYSTEM

## 2020-01-01 PROCEDURE — 94770 HC EXHALED C02 TEST: CPT

## 2020-01-01 PROCEDURE — 36415 COLL VENOUS BLD VENIPUNCTURE: CPT

## 2020-01-01 PROCEDURE — 96376 TX/PRO/DX INJ SAME DRUG ADON: CPT

## 2020-01-01 PROCEDURE — U0002 COVID-19 LAB TEST NON-CDC: HCPCS

## 2020-01-01 PROCEDURE — 36600 WITHDRAWAL OF ARTERIAL BLOOD: CPT

## 2020-01-01 PROCEDURE — 3074F SYST BP LT 130 MM HG: CPT | Mod: S$GLB,,, | Performed by: RADIOLOGY

## 2020-01-01 PROCEDURE — 99215 OFFICE O/P EST HI 40 MIN: CPT | Mod: S$GLB,,, | Performed by: RADIOLOGY

## 2020-01-01 PROCEDURE — 99441 PR PHYSICIAN TELEPHONE EVALUATION 5-10 MIN: CPT | Mod: 95,,, | Performed by: INTERNAL MEDICINE

## 2020-01-01 PROCEDURE — 96374 THER/PROPH/DIAG INJ IV PUSH: CPT

## 2020-01-01 PROCEDURE — 99441 PR PHYSICIAN TELEPHONE EVALUATION 5-10 MIN: CPT | Mod: 95,,, | Performed by: FAMILY MEDICINE

## 2020-01-01 PROCEDURE — 63600175 PHARM REV CODE 636 W HCPCS: Performed by: HOSPITALIST

## 2020-01-01 PROCEDURE — 93010 EKG 12-LEAD: ICD-10-PCS | Mod: ,,, | Performed by: INTERNAL MEDICINE

## 2020-01-01 PROCEDURE — 3078F PR MOST RECENT DIASTOLIC BLOOD PRESSURE < 80 MM HG: ICD-10-PCS | Mod: S$GLB,,, | Performed by: FAMILY MEDICINE

## 2020-01-01 PROCEDURE — 63600175 PHARM REV CODE 636 W HCPCS: Performed by: STUDENT IN AN ORGANIZED HEALTH CARE EDUCATION/TRAINING PROGRAM

## 2020-01-01 PROCEDURE — 97162 PT EVAL MOD COMPLEX 30 MIN: CPT

## 2020-01-01 PROCEDURE — 85027 COMPLETE CBC AUTOMATED: CPT | Mod: 91

## 2020-01-01 PROCEDURE — 84484 ASSAY OF TROPONIN QUANT: CPT | Mod: 91

## 2020-01-01 PROCEDURE — 99214 OFFICE O/P EST MOD 30 MIN: CPT | Mod: 25,S$GLB,, | Performed by: INTERNAL MEDICINE

## 2020-01-01 PROCEDURE — 94010 BREATHING CAPACITY TEST: CPT

## 2020-01-01 PROCEDURE — 37799 UNLISTED PX VASCULAR SURGERY: CPT

## 2020-01-01 PROCEDURE — 25500020 PHARM REV CODE 255

## 2020-01-01 PROCEDURE — 97165 OT EVAL LOW COMPLEX 30 MIN: CPT

## 2020-01-01 PROCEDURE — 3077F SYST BP >= 140 MM HG: CPT | Mod: S$GLB,,, | Performed by: INTERNAL MEDICINE

## 2020-01-01 PROCEDURE — 90662 IIV NO PRSV INCREASED AG IM: CPT | Mod: S$GLB,,, | Performed by: FAMILY MEDICINE

## 2020-01-01 PROCEDURE — 99223 1ST HOSP IP/OBS HIGH 75: CPT | Mod: ,,, | Performed by: PHYSICIAN ASSISTANT

## 2020-01-01 PROCEDURE — 86140 C-REACTIVE PROTEIN: CPT

## 2020-01-01 PROCEDURE — 63600175 PHARM REV CODE 636 W HCPCS: Performed by: INTERNAL MEDICINE

## 2020-01-01 PROCEDURE — 99291 CRITICAL CARE FIRST HOUR: CPT | Mod: 25

## 2020-01-01 PROCEDURE — 1101F PT FALLS ASSESS-DOCD LE1/YR: CPT | Mod: S$GLB,,, | Performed by: INTERNAL MEDICINE

## 2020-01-01 PROCEDURE — 99441 PR PHYSICIAN TELEPHONE EVALUATION 5-10 MIN: ICD-10-PCS | Mod: 95,,, | Performed by: FAMILY MEDICINE

## 2020-01-01 PROCEDURE — 36620 INSERTION CATHETER ARTERY: CPT

## 2020-01-01 PROCEDURE — 1101F PR PT FALLS ASSESS DOC 0-1 FALLS W/OUT INJ PAST YR: ICD-10-PCS | Mod: 95,,, | Performed by: FAMILY MEDICINE

## 2020-01-01 PROCEDURE — 1159F MED LIST DOCD IN RCRD: CPT | Mod: S$GLB,,, | Performed by: INTERNAL MEDICINE

## 2020-01-01 PROCEDURE — 1101F PR PT FALLS ASSESS DOC 0-1 FALLS W/OUT INJ PAST YR: ICD-10-PCS | Mod: S$GLB,,, | Performed by: FAMILY MEDICINE

## 2020-01-01 PROCEDURE — 25000003 PHARM REV CODE 250: Performed by: INTERNAL MEDICINE

## 2020-01-01 PROCEDURE — 25000242 PHARM REV CODE 250 ALT 637 W/ HCPCS: Performed by: FAMILY MEDICINE

## 2020-01-01 PROCEDURE — 95709 EEG W/O VID EA 12-26HR INTMT: CPT

## 2020-01-01 PROCEDURE — 88305 TISSUE EXAM BY PATHOLOGIST: CPT | Mod: TC,59

## 2020-01-01 PROCEDURE — 73030 X-RAY EXAM OF SHOULDER: CPT | Mod: TC,PO,LT

## 2020-01-01 PROCEDURE — 85007 BL SMEAR W/DIFF WBC COUNT: CPT

## 2020-01-01 PROCEDURE — 80202 ASSAY OF VANCOMYCIN: CPT

## 2020-01-01 PROCEDURE — 84145 PROCALCITONIN (PCT): CPT

## 2020-01-01 PROCEDURE — A9503 TC99M MEDRONATE: HCPCS

## 2020-01-01 PROCEDURE — 99204 OFFICE O/P NEW MOD 45 MIN: CPT | Mod: S$GLB,,, | Performed by: INTERNAL MEDICINE

## 2020-01-01 PROCEDURE — 83605 ASSAY OF LACTIC ACID: CPT

## 2020-01-01 PROCEDURE — 20000000 HC ICU ROOM

## 2020-01-01 PROCEDURE — 25000242 PHARM REV CODE 250 ALT 637 W/ HCPCS: Performed by: EMERGENCY MEDICINE

## 2020-01-01 PROCEDURE — 83735 ASSAY OF MAGNESIUM: CPT | Mod: 91

## 2020-01-01 PROCEDURE — 99291 PR CRITICAL CARE, E/M 30-74 MINUTES: ICD-10-PCS | Mod: ,,, | Performed by: INTERNAL MEDICINE

## 2020-01-01 PROCEDURE — 92526 ORAL FUNCTION THERAPY: CPT

## 2020-01-01 PROCEDURE — 95720 PR EEG, W/VIDEO, CONT RECORD, I&R, >12<26 HRS: ICD-10-PCS | Mod: ,,, | Performed by: PSYCHIATRY & NEUROLOGY

## 2020-01-01 PROCEDURE — 93005 ELECTROCARDIOGRAM TRACING: CPT | Performed by: INTERNAL MEDICINE

## 2020-01-01 PROCEDURE — 3075F SYST BP GE 130 - 139MM HG: CPT | Mod: S$GLB,,, | Performed by: INTERNAL MEDICINE

## 2020-01-01 PROCEDURE — 25000003 PHARM REV CODE 250

## 2020-01-01 PROCEDURE — 1126F AMNT PAIN NOTED NONE PRSNT: CPT | Mod: S$GLB,,, | Performed by: INTERNAL MEDICINE

## 2020-01-01 PROCEDURE — 71250 CT THORAX DX C-: CPT | Mod: TC,PO

## 2020-01-01 PROCEDURE — 1159F MED LIST DOCD IN RCRD: CPT | Mod: S$GLB,,, | Performed by: FAMILY MEDICINE

## 2020-01-01 PROCEDURE — 83880 ASSAY OF NATRIURETIC PEPTIDE: CPT

## 2020-01-01 PROCEDURE — 96361 HYDRATE IV INFUSION ADD-ON: CPT | Mod: 59

## 2020-01-01 PROCEDURE — 85027 COMPLETE CBC AUTOMATED: CPT

## 2020-01-01 PROCEDURE — 82947 ASSAY GLUCOSE BLOOD QUANT: CPT

## 2020-01-01 PROCEDURE — G0008 FLU VACCINE - QUADRIVALENT - HIGH DOSE (65+) PRESERVATIVE FREE IM: ICD-10-PCS | Mod: S$GLB,,, | Performed by: FAMILY MEDICINE

## 2020-01-01 PROCEDURE — 87206 SMEAR FLUORESCENT/ACID STAI: CPT

## 2020-01-01 PROCEDURE — 80048 BASIC METABOLIC PNL TOTAL CA: CPT | Mod: 91

## 2020-01-01 PROCEDURE — 25000242 PHARM REV CODE 250 ALT 637 W/ HCPCS: Performed by: NURSE PRACTITIONER

## 2020-01-01 PROCEDURE — 25000003 PHARM REV CODE 250: Performed by: NURSE PRACTITIONER

## 2020-01-01 PROCEDURE — 83036 HEMOGLOBIN GLYCOSYLATED A1C: CPT | Mod: QW,,, | Performed by: FAMILY MEDICINE

## 2020-01-01 PROCEDURE — 84100 ASSAY OF PHOSPHORUS: CPT

## 2020-01-01 PROCEDURE — 84443 ASSAY THYROID STIM HORMONE: CPT

## 2020-01-01 PROCEDURE — 87040 BLOOD CULTURE FOR BACTERIA: CPT

## 2020-01-01 PROCEDURE — 95720 EEG PHY/QHP EA INCR W/VEEG: CPT | Mod: ,,, | Performed by: PSYCHIATRY & NEUROLOGY

## 2020-01-01 PROCEDURE — 99291 CRITICAL CARE FIRST HOUR: CPT

## 2020-01-01 PROCEDURE — 85060 PATHOLOGIST REVIEW: ICD-10-PCS | Mod: ,,, | Performed by: PATHOLOGY

## 2020-01-01 PROCEDURE — 87070 CULTURE OTHR SPECIMN AEROBIC: CPT

## 2020-01-01 PROCEDURE — 63600175 PHARM REV CODE 636 W HCPCS: Performed by: FAMILY MEDICINE

## 2020-01-01 PROCEDURE — 85007 BL SMEAR W/DIFF WBC COUNT: CPT | Mod: 91

## 2020-01-01 PROCEDURE — 85610 PROTHROMBIN TIME: CPT

## 2020-01-01 PROCEDURE — C9113 INJ PANTOPRAZOLE SODIUM, VIA: HCPCS | Performed by: PHYSICIAN ASSISTANT

## 2020-01-01 PROCEDURE — 1159F MED LIST DOCD IN RCRD: CPT | Mod: 95,,, | Performed by: FAMILY MEDICINE

## 2020-01-01 PROCEDURE — 25000003 PHARM REV CODE 250: Performed by: PHYSICIAN ASSISTANT

## 2020-01-01 PROCEDURE — 82784 ASSAY IGA/IGD/IGG/IGM EACH: CPT

## 2020-01-01 PROCEDURE — 3078F DIAST BP <80 MM HG: CPT | Mod: S$GLB,,, | Performed by: FAMILY MEDICINE

## 2020-01-01 PROCEDURE — 1126F PR PAIN SEVERITY QUANTIFIED, NO PAIN PRESENT: ICD-10-PCS | Mod: S$GLB,,, | Performed by: INTERNAL MEDICINE

## 2020-01-01 PROCEDURE — 81000 URINALYSIS NONAUTO W/SCOPE: CPT

## 2020-01-01 PROCEDURE — 27000221 HC OXYGEN, UP TO 24 HOURS

## 2020-01-01 PROCEDURE — 63600175 PHARM REV CODE 636 W HCPCS: Performed by: EMERGENCY MEDICINE

## 2020-01-01 PROCEDURE — 37000008 HC ANESTHESIA 1ST 15 MINUTES: Performed by: INTERNAL MEDICINE

## 2020-01-01 PROCEDURE — 87116 MYCOBACTERIA CULTURE: CPT

## 2020-01-01 PROCEDURE — 96375 TX/PRO/DX INJ NEW DRUG ADDON: CPT | Mod: 59

## 2020-01-01 PROCEDURE — 63600175 PHARM REV CODE 636 W HCPCS: Performed by: PSYCHIATRY & NEUROLOGY

## 2020-01-01 PROCEDURE — 94660 CPAP INITIATION&MGMT: CPT

## 2020-01-01 PROCEDURE — 82607 VITAMIN B-12: CPT

## 2020-01-01 PROCEDURE — 83540 ASSAY OF IRON: CPT

## 2020-01-01 PROCEDURE — 1101F PT FALLS ASSESS-DOCD LE1/YR: CPT | Mod: 95,,, | Performed by: FAMILY MEDICINE

## 2020-01-01 PROCEDURE — 25000003 PHARM REV CODE 250: Performed by: CLINIC/CENTER

## 2020-01-01 PROCEDURE — 3074F PR MOST RECENT SYSTOLIC BLOOD PRESSURE < 130 MM HG: ICD-10-PCS | Mod: S$GLB,,, | Performed by: INTERNAL MEDICINE

## 2020-01-01 PROCEDURE — 94760 N-INVAS EAR/PLS OXIMETRY 1: CPT

## 2020-01-01 PROCEDURE — 1101F PR PT FALLS ASSESS DOC 0-1 FALLS W/OUT INJ PAST YR: ICD-10-PCS | Mod: S$GLB,,, | Performed by: RADIOLOGY

## 2020-01-01 PROCEDURE — 99214 PR OFFICE/OUTPT VISIT, EST, LEVL IV, 30-39 MIN: ICD-10-PCS | Mod: 25,S$GLB,, | Performed by: INTERNAL MEDICINE

## 2020-01-01 PROCEDURE — U0002 COVID-19 LAB TEST NON-CDC: HCPCS | Mod: 91

## 2020-01-01 PROCEDURE — 85025 COMPLETE CBC W/AUTO DIFF WBC: CPT

## 2020-01-01 PROCEDURE — 83605 ASSAY OF LACTIC ACID: CPT | Mod: 91

## 2020-01-01 PROCEDURE — 27200043 HC FORCEPS, BIOPSY: Performed by: INTERNAL MEDICINE

## 2020-01-01 PROCEDURE — 63700000 PHARM REV CODE 250 ALT 637 W/O HCPCS: Performed by: FAMILY MEDICINE

## 2020-01-01 PROCEDURE — 25000242 PHARM REV CODE 250 ALT 637 W/ HCPCS: Performed by: PHYSICIAN ASSISTANT

## 2020-01-01 PROCEDURE — 27000014 HC INFLATION DEVICE: Performed by: INTERNAL MEDICINE

## 2020-01-01 PROCEDURE — 83690 ASSAY OF LIPASE: CPT

## 2020-01-01 PROCEDURE — 3074F PR MOST RECENT SYSTOLIC BLOOD PRESSURE < 130 MM HG: ICD-10-PCS | Mod: S$GLB,,, | Performed by: RADIOLOGY

## 2020-01-01 PROCEDURE — 63600175 PHARM REV CODE 636 W HCPCS: Performed by: NURSE ANESTHETIST, CERTIFIED REGISTERED

## 2020-01-01 PROCEDURE — 99214 OFFICE O/P EST MOD 30 MIN: CPT | Mod: 25,S$GLB,, | Performed by: FAMILY MEDICINE

## 2020-01-01 PROCEDURE — 84165 PROTEIN E-PHORESIS SERUM: CPT

## 2020-01-01 PROCEDURE — 3074F SYST BP LT 130 MM HG: CPT | Mod: S$GLB,,, | Performed by: INTERNAL MEDICINE

## 2020-01-01 PROCEDURE — 99441 PR PHYSICIAN TELEPHONE EVALUATION 5-10 MIN: ICD-10-PCS | Mod: 95,,, | Performed by: INTERNAL MEDICINE

## 2020-01-01 PROCEDURE — 99233 SBSQ HOSP IP/OBS HIGH 50: CPT | Mod: ,,, | Performed by: NEUROLOGICAL SURGERY

## 2020-01-01 PROCEDURE — 96372 THER/PROPH/DIAG INJ SC/IM: CPT | Mod: 59

## 2020-01-01 PROCEDURE — 1159F PR MEDICATION LIST DOCUMENTED IN MEDICAL RECORD: ICD-10-PCS | Mod: S$GLB,,, | Performed by: FAMILY MEDICINE

## 2020-01-01 PROCEDURE — 82728 ASSAY OF FERRITIN: CPT

## 2020-01-01 PROCEDURE — 85060 BLOOD SMEAR INTERPRETATION: CPT | Mod: ,,, | Performed by: PATHOLOGY

## 2020-01-01 PROCEDURE — 97166 OT EVAL MOD COMPLEX 45 MIN: CPT

## 2020-01-01 PROCEDURE — 1126F AMNT PAIN NOTED NONE PRSNT: CPT | Mod: S$GLB,,, | Performed by: RADIOLOGY

## 2020-01-01 PROCEDURE — 95806 SLEEP STUDY UNATT&RESP EFFT: CPT

## 2020-01-01 PROCEDURE — 93005 ELECTROCARDIOGRAM TRACING: CPT

## 2020-01-01 PROCEDURE — 99495 TRANSJ CARE MGMT MOD F2F 14D: CPT | Mod: S$GLB,,, | Performed by: FAMILY MEDICINE

## 2020-01-01 PROCEDURE — 99233 PR SUBSEQUENT HOSPITAL CARE,LEVL III: ICD-10-PCS | Mod: ,,, | Performed by: NEUROLOGICAL SURGERY

## 2020-01-01 PROCEDURE — 84100 ASSAY OF PHOSPHORUS: CPT | Mod: 91

## 2020-01-01 PROCEDURE — 83615 LACTATE (LD) (LDH) ENZYME: CPT

## 2020-01-01 PROCEDURE — 99214 PR OFFICE/OUTPT VISIT, EST, LEVL IV, 30-39 MIN: ICD-10-PCS | Mod: S$GLB,,, | Performed by: FAMILY MEDICINE

## 2020-01-01 PROCEDURE — 83036 HEMOGLOBIN GLYCOSYLATED A1C: CPT

## 2020-01-01 PROCEDURE — 90662 FLU VACCINE - QUADRIVALENT - HIGH DOSE (65+) PRESERVATIVE FREE IM: ICD-10-PCS | Mod: S$GLB,,, | Performed by: FAMILY MEDICINE

## 2020-01-01 PROCEDURE — 31500 INSERT EMERGENCY AIRWAY: CPT

## 2020-01-01 PROCEDURE — 94002 VENT MGMT INPAT INIT DAY: CPT

## 2020-01-01 PROCEDURE — 1101F PT FALLS ASSESS-DOCD LE1/YR: CPT | Mod: S$GLB,,, | Performed by: FAMILY MEDICINE

## 2020-01-01 PROCEDURE — 87186 SC STD MICRODIL/AGAR DIL: CPT

## 2020-01-01 PROCEDURE — 3078F DIAST BP <80 MM HG: CPT | Mod: S$GLB,,, | Performed by: RADIOLOGY

## 2020-01-01 PROCEDURE — 43239 EGD BIOPSY SINGLE/MULTIPLE: CPT | Mod: 59 | Performed by: INTERNAL MEDICINE

## 2020-01-01 PROCEDURE — 82746 ASSAY OF FOLIC ACID SERUM: CPT

## 2020-01-01 PROCEDURE — 1111F DSCHRG MED/CURRENT MED MERGE: CPT | Mod: S$GLB,,, | Performed by: INTERNAL MEDICINE

## 2020-01-01 PROCEDURE — 37000009 HC ANESTHESIA EA ADD 15 MINS: Performed by: INTERNAL MEDICINE

## 2020-01-01 PROCEDURE — 82947 ASSAY GLUCOSE BLOOD QUANT: CPT | Mod: 91

## 2020-01-01 PROCEDURE — 94640 AIRWAY INHALATION TREATMENT: CPT | Mod: 76

## 2020-01-01 PROCEDURE — S5010 5% DEXTROSE AND 0.45% SALINE: HCPCS | Performed by: PHYSICIAN ASSISTANT

## 2020-01-01 PROCEDURE — 3075F PR MOST RECENT SYSTOLIC BLOOD PRESS GE 130-139MM HG: ICD-10-PCS | Mod: S$GLB,,, | Performed by: FAMILY MEDICINE

## 2020-01-01 PROCEDURE — 97802 MEDICAL NUTRITION INDIV IN: CPT

## 2020-01-01 PROCEDURE — 93010 ELECTROCARDIOGRAM REPORT: CPT | Mod: ,,, | Performed by: INTERNAL MEDICINE

## 2020-01-01 PROCEDURE — 82962 GLUCOSE BLOOD TEST: CPT

## 2020-01-01 PROCEDURE — 87077 CULTURE AEROBIC IDENTIFY: CPT

## 2020-01-01 PROCEDURE — 3079F DIAST BP 80-89 MM HG: CPT | Mod: S$GLB,,, | Performed by: INTERNAL MEDICINE

## 2020-01-01 PROCEDURE — 97116 GAIT TRAINING THERAPY: CPT

## 2020-01-01 PROCEDURE — G0008 ADMIN INFLUENZA VIRUS VAC: HCPCS | Mod: S$GLB,,, | Performed by: FAMILY MEDICINE

## 2020-01-01 PROCEDURE — 99214 PR OFFICE/OUTPT VISIT, EST, LEVL IV, 30-39 MIN: ICD-10-PCS | Mod: 25,S$GLB,, | Performed by: FAMILY MEDICINE

## 2020-01-01 PROCEDURE — 96376 TX/PRO/DX INJ SAME DRUG ADON: CPT | Mod: 59

## 2020-01-01 PROCEDURE — 99215 PR OFFICE/OUTPT VISIT, EST, LEVL V, 40-54 MIN: ICD-10-PCS | Mod: S$GLB,,, | Performed by: RADIOLOGY

## 2020-01-01 PROCEDURE — 87015 SPECIMEN INFECT AGNT CONCNTJ: CPT

## 2020-01-01 PROCEDURE — 3075F SYST BP GE 130 - 139MM HG: CPT | Mod: S$GLB,,, | Performed by: FAMILY MEDICINE

## 2020-01-01 PROCEDURE — 1111F PR DISCHARGE MEDS RECONCILED W/ CURRENT OUTPATIENT MED LIST: ICD-10-PCS | Mod: S$GLB,,, | Performed by: INTERNAL MEDICINE

## 2020-01-01 PROCEDURE — 95719 EEG PHYS/QHP EA INCR W/O VID: CPT

## 2020-01-01 PROCEDURE — 83036 POCT HEMOGLOBIN A1C: ICD-10-PCS | Mod: QW,,, | Performed by: FAMILY MEDICINE

## 2020-01-01 PROCEDURE — 80307 DRUG TEST PRSMV CHEM ANLYZR: CPT

## 2020-01-01 PROCEDURE — 94727 GAS DIL/WSHOT DETER LNG VOL: CPT

## 2020-01-01 PROCEDURE — 81001 URINALYSIS AUTO W/SCOPE: CPT

## 2020-01-01 PROCEDURE — C1726 CATH, BAL DIL, NON-VASCULAR: HCPCS | Performed by: INTERNAL MEDICINE

## 2020-01-01 PROCEDURE — 51702 INSERT TEMP BLADDER CATH: CPT

## 2020-01-01 PROCEDURE — 99285 EMERGENCY DEPT VISIT HI MDM: CPT | Mod: 25

## 2020-01-01 PROCEDURE — 88312 SPECIAL STAINS GROUP 1: CPT | Mod: TC

## 2020-01-01 PROCEDURE — 43249 ESOPH EGD DILATION <30 MM: CPT | Performed by: INTERNAL MEDICINE

## 2020-01-01 PROCEDURE — 99214 OFFICE O/P EST MOD 30 MIN: CPT | Mod: S$GLB,,, | Performed by: FAMILY MEDICINE

## 2020-01-01 PROCEDURE — 97535 SELF CARE MNGMENT TRAINING: CPT

## 2020-01-01 PROCEDURE — 3078F PR MOST RECENT DIASTOLIC BLOOD PRESSURE < 80 MM HG: ICD-10-PCS | Mod: S$GLB,,, | Performed by: RADIOLOGY

## 2020-01-01 PROCEDURE — 99223 PR INITIAL HOSPITAL CARE,LEVL III: ICD-10-PCS | Mod: ,,, | Performed by: INTERNAL MEDICINE

## 2020-01-01 PROCEDURE — A9552 F18 FDG: HCPCS | Mod: PO

## 2020-01-01 PROCEDURE — 92950 HEART/LUNG RESUSCITATION CPR: CPT

## 2020-01-01 PROCEDURE — 82010 KETONE BODYS QUAN: CPT

## 2020-01-01 PROCEDURE — 3074F PR MOST RECENT SYSTOLIC BLOOD PRESSURE < 130 MM HG: ICD-10-PCS | Mod: S$GLB,,, | Performed by: FAMILY MEDICINE

## 2020-01-01 PROCEDURE — 99204 PR OFFICE/OUTPT VISIT, NEW, LEVL IV, 45-59 MIN: ICD-10-PCS | Mod: S$GLB,,, | Performed by: INTERNAL MEDICINE

## 2020-01-01 PROCEDURE — 80053 COMPREHEN METABOLIC PANEL: CPT | Mod: 91

## 2020-01-01 PROCEDURE — 97530 THERAPEUTIC ACTIVITIES: CPT

## 2020-01-01 PROCEDURE — 96375 TX/PRO/DX INJ NEW DRUG ADDON: CPT

## 2020-01-01 PROCEDURE — 87205 SMEAR GRAM STAIN: CPT

## 2020-01-01 PROCEDURE — 3075F PR MOST RECENT SYSTOLIC BLOOD PRESS GE 130-139MM HG: ICD-10-PCS | Mod: S$GLB,,, | Performed by: INTERNAL MEDICINE

## 2020-01-01 PROCEDURE — 3079F PR MOST RECENT DIASTOLIC BLOOD PRESSURE 80-89 MM HG: ICD-10-PCS | Mod: S$GLB,,, | Performed by: INTERNAL MEDICINE

## 2020-01-01 PROCEDURE — 92610 EVALUATE SWALLOWING FUNCTION: CPT

## 2020-01-01 PROCEDURE — 25000003 PHARM REV CODE 250: Performed by: NURSE ANESTHETIST, CERTIFIED REGISTERED

## 2020-01-01 PROCEDURE — 99223 1ST HOSP IP/OBS HIGH 75: CPT | Mod: ,,, | Performed by: INTERNAL MEDICINE

## 2020-01-01 PROCEDURE — 1159F MED LIST DOCD IN RCRD: CPT | Mod: S$GLB,,, | Performed by: RADIOLOGY

## 2020-01-01 PROCEDURE — 1159F PR MEDICATION LIST DOCUMENTED IN MEDICAL RECORD: ICD-10-PCS | Mod: 95,,, | Performed by: FAMILY MEDICINE

## 2020-01-01 PROCEDURE — 1101F PT FALLS ASSESS-DOCD LE1/YR: CPT | Mod: S$GLB,,, | Performed by: RADIOLOGY

## 2020-01-01 PROCEDURE — 1126F PR PAIN SEVERITY QUANTIFIED, NO PAIN PRESENT: ICD-10-PCS | Mod: S$GLB,,, | Performed by: RADIOLOGY

## 2020-01-01 PROCEDURE — 78815 PET IMAGE W/CT SKULL-THIGH: CPT | Mod: TC,PO

## 2020-01-01 RX ORDER — IPRATROPIUM BROMIDE AND ALBUTEROL SULFATE 2.5; .5 MG/3ML; MG/3ML
3 SOLUTION RESPIRATORY (INHALATION) EVERY 6 HOURS
Status: DISCONTINUED | OUTPATIENT
Start: 2020-01-01 | End: 2020-01-01

## 2020-01-01 RX ORDER — LEVETIRACETAM 10 MG/ML
1000 INJECTION INTRAVASCULAR ONCE
Status: COMPLETED | OUTPATIENT
Start: 2020-01-01 | End: 2020-01-01

## 2020-01-01 RX ORDER — POTASSIUM CHLORIDE 20 MEQ/1
40 TABLET, EXTENDED RELEASE ORAL
Status: DISCONTINUED | OUTPATIENT
Start: 2020-01-01 | End: 2020-01-01 | Stop reason: HOSPADM

## 2020-01-01 RX ORDER — POTASSIUM CHLORIDE 20 MEQ/1
20 TABLET, EXTENDED RELEASE ORAL
Status: DISCONTINUED | OUTPATIENT
Start: 2020-01-01 | End: 2020-01-01 | Stop reason: HOSPADM

## 2020-01-01 RX ORDER — TALC
6 POWDER (GRAM) TOPICAL NIGHTLY PRN
Status: DISCONTINUED | OUTPATIENT
Start: 2020-01-01 | End: 2020-01-01 | Stop reason: HOSPADM

## 2020-01-01 RX ORDER — ALBUTEROL SULFATE 90 UG/1
2 AEROSOL, METERED RESPIRATORY (INHALATION) EVERY 4 HOURS
Status: DISCONTINUED | OUTPATIENT
Start: 2020-01-01 | End: 2020-01-01

## 2020-01-01 RX ORDER — ESOMEPRAZOLE MAGNESIUM 40 MG/1
40 GRANULE, DELAYED RELEASE ORAL
Qty: 90 EACH | Refills: 3 | Status: SHIPPED | OUTPATIENT
Start: 2020-01-01 | End: 2020-01-01

## 2020-01-01 RX ORDER — SODIUM CHLORIDE 9 MG/ML
INJECTION, SOLUTION INTRAVENOUS CONTINUOUS PRN
Status: DISCONTINUED | OUTPATIENT
Start: 2020-01-01 | End: 2020-01-01

## 2020-01-01 RX ORDER — ENOXAPARIN SODIUM 100 MG/ML
40 INJECTION SUBCUTANEOUS EVERY 24 HOURS
Status: DISCONTINUED | OUTPATIENT
Start: 2020-01-01 | End: 2020-01-01 | Stop reason: HOSPADM

## 2020-01-01 RX ORDER — IPRATROPIUM BROMIDE AND ALBUTEROL SULFATE 2.5; .5 MG/3ML; MG/3ML
3 SOLUTION RESPIRATORY (INHALATION)
Status: COMPLETED | OUTPATIENT
Start: 2020-01-01 | End: 2020-01-01

## 2020-01-01 RX ORDER — METOPROLOL TARTRATE 25 MG/1
25 TABLET, FILM COATED ORAL DAILY
Qty: 90 TABLET | Refills: 3 | Status: SHIPPED | OUTPATIENT
Start: 2020-01-01

## 2020-01-01 RX ORDER — POTASSIUM CHLORIDE 7.45 MG/ML
20 INJECTION INTRAVENOUS
Status: DISCONTINUED | OUTPATIENT
Start: 2020-01-01 | End: 2020-01-01 | Stop reason: HOSPADM

## 2020-01-01 RX ORDER — SODIUM CHLORIDE 9 MG/ML
1000 INJECTION, SOLUTION INTRAVENOUS CONTINUOUS
Status: ACTIVE | OUTPATIENT
Start: 2020-01-01 | End: 2020-01-01

## 2020-01-01 RX ORDER — INSULIN ASPART 100 [IU]/ML
0-5 INJECTION, SOLUTION INTRAVENOUS; SUBCUTANEOUS
Status: DISCONTINUED | OUTPATIENT
Start: 2020-01-01 | End: 2020-01-01 | Stop reason: HOSPADM

## 2020-01-01 RX ORDER — LORATADINE 10 MG/1
10 TABLET ORAL DAILY
COMMUNITY
End: 2020-01-01 | Stop reason: SDUPTHER

## 2020-01-01 RX ORDER — IBUPROFEN 200 MG
24 TABLET ORAL
Status: DISCONTINUED | OUTPATIENT
Start: 2020-01-01 | End: 2020-01-01 | Stop reason: HOSPADM

## 2020-01-01 RX ORDER — CHLORHEXIDINE GLUCONATE ORAL RINSE 1.2 MG/ML
15 SOLUTION DENTAL 2 TIMES DAILY
Status: DISCONTINUED | OUTPATIENT
Start: 2020-01-01 | End: 2020-01-01 | Stop reason: HOSPADM

## 2020-01-01 RX ORDER — BUSPIRONE HYDROCHLORIDE 10 MG/1
30 TABLET ORAL ONCE
Status: COMPLETED | OUTPATIENT
Start: 2020-01-01 | End: 2020-01-01

## 2020-01-01 RX ORDER — CETIRIZINE HYDROCHLORIDE 10 MG/1
10 TABLET ORAL DAILY
Status: DISCONTINUED | OUTPATIENT
Start: 2020-01-01 | End: 2020-01-01 | Stop reason: HOSPADM

## 2020-01-01 RX ORDER — SUCRALFATE 1 G/10ML
1 SUSPENSION ORAL EVERY 6 HOURS
Status: DISCONTINUED | OUTPATIENT
Start: 2020-01-01 | End: 2020-01-01 | Stop reason: HOSPADM

## 2020-01-01 RX ORDER — LANOLIN ALCOHOL/MO/W.PET/CERES
800 CREAM (GRAM) TOPICAL
Status: DISCONTINUED | OUTPATIENT
Start: 2020-01-01 | End: 2020-01-01 | Stop reason: HOSPADM

## 2020-01-01 RX ORDER — POTASSIUM CHLORIDE 14.9 MG/ML
60 INJECTION INTRAVENOUS
Status: DISCONTINUED | OUTPATIENT
Start: 2020-01-01 | End: 2020-01-01

## 2020-01-01 RX ORDER — PHENYLEPHRINE HYDROCHLORIDE 10 MG/ML
INJECTION INTRAVENOUS
Status: DISCONTINUED | OUTPATIENT
Start: 2020-01-01 | End: 2020-01-01

## 2020-01-01 RX ORDER — CIPROFLOXACIN AND DEXAMETHASONE 3; 1 MG/ML; MG/ML
4 SUSPENSION/ DROPS AURICULAR (OTIC) 2 TIMES DAILY
Status: DISCONTINUED | OUTPATIENT
Start: 2020-01-01 | End: 2020-01-01 | Stop reason: HOSPADM

## 2020-01-01 RX ORDER — ACETAMINOPHEN 325 MG/1
650 TABLET ORAL EVERY 4 HOURS PRN
Status: DISCONTINUED | OUTPATIENT
Start: 2020-01-01 | End: 2020-01-01 | Stop reason: HOSPADM

## 2020-01-01 RX ORDER — GABAPENTIN 300 MG/1
CAPSULE ORAL
Qty: 180 CAPSULE | Refills: 3 | Status: SHIPPED | OUTPATIENT
Start: 2020-01-01

## 2020-01-01 RX ORDER — CHLORHEXIDINE GLUCONATE ORAL RINSE 1.2 MG/ML
15 SOLUTION DENTAL 2 TIMES DAILY
Status: DISCONTINUED | OUTPATIENT
Start: 2020-01-01 | End: 2020-01-01

## 2020-01-01 RX ORDER — FLUTICASONE PROPIONATE 50 MCG
1 SPRAY, SUSPENSION (ML) NASAL 2 TIMES DAILY
Status: DISCONTINUED | OUTPATIENT
Start: 2020-01-01 | End: 2020-01-01 | Stop reason: HOSPADM

## 2020-01-01 RX ORDER — GABAPENTIN 300 MG/1
300 CAPSULE ORAL 2 TIMES DAILY
Status: DISCONTINUED | OUTPATIENT
Start: 2020-01-01 | End: 2020-01-01 | Stop reason: HOSPADM

## 2020-01-01 RX ORDER — MUPIROCIN 20 MG/G
OINTMENT TOPICAL 2 TIMES DAILY
Status: DISCONTINUED | OUTPATIENT
Start: 2020-01-01 | End: 2020-01-01 | Stop reason: HOSPADM

## 2020-01-01 RX ORDER — ACETAMINOPHEN 325 MG/1
650 TABLET ORAL EVERY 4 HOURS PRN
Status: DISCONTINUED | OUTPATIENT
Start: 2020-01-01 | End: 2020-01-01

## 2020-01-01 RX ORDER — SODIUM CHLORIDE 0.9 % (FLUSH) 0.9 %
10 SYRINGE (ML) INJECTION
Status: DISCONTINUED | OUTPATIENT
Start: 2020-01-01 | End: 2020-01-01 | Stop reason: HOSPADM

## 2020-01-01 RX ORDER — CIPROFLOXACIN HYDROCHLORIDE 3 MG/ML
1 SOLUTION/ DROPS OPHTHALMIC EVERY 6 HOURS
Qty: 5 ML | Refills: 0 | Status: ON HOLD | OUTPATIENT
Start: 2020-01-01 | End: 2020-01-01

## 2020-01-01 RX ORDER — ESOMEPRAZOLE MAGNESIUM 40 MG/1
40 GRANULE, DELAYED RELEASE ORAL
Qty: 90 EACH | Refills: 1 | Status: ON HOLD | OUTPATIENT
Start: 2020-01-01 | End: 2020-01-01 | Stop reason: SDUPTHER

## 2020-01-01 RX ORDER — POTASSIUM CHLORIDE 7.45 MG/ML
40 INJECTION INTRAVENOUS
Status: DISCONTINUED | OUTPATIENT
Start: 2020-01-01 | End: 2020-01-01 | Stop reason: HOSPADM

## 2020-01-01 RX ORDER — INDOMETHACIN 25 MG/1
50 CAPSULE ORAL ONCE
Status: COMPLETED | OUTPATIENT
Start: 2020-01-01 | End: 2020-01-01

## 2020-01-01 RX ORDER — PANTOPRAZOLE SODIUM 40 MG/1
40 TABLET, DELAYED RELEASE ORAL 2 TIMES DAILY
Status: DISCONTINUED | OUTPATIENT
Start: 2020-01-01 | End: 2020-01-01 | Stop reason: HOSPADM

## 2020-01-01 RX ORDER — ALBUTEROL SULFATE 90 UG/1
2 AEROSOL, METERED RESPIRATORY (INHALATION) EVERY 6 HOURS
Status: DISCONTINUED | OUTPATIENT
Start: 2020-01-01 | End: 2020-01-01

## 2020-01-01 RX ORDER — MAGNESIUM SULFATE HEPTAHYDRATE 40 MG/ML
2 INJECTION, SOLUTION INTRAVENOUS
Status: DISCONTINUED | OUTPATIENT
Start: 2020-01-01 | End: 2020-01-01

## 2020-01-01 RX ORDER — CIPROFLOXACIN 2 MG/ML
400 INJECTION, SOLUTION INTRAVENOUS
Status: DISCONTINUED | OUTPATIENT
Start: 2020-01-01 | End: 2020-01-01

## 2020-01-01 RX ORDER — ESOMEPRAZOLE MAGNESIUM 40 MG/1
40 GRANULE, DELAYED RELEASE ORAL
COMMUNITY
End: 2020-01-01 | Stop reason: SDUPTHER

## 2020-01-01 RX ORDER — NOREPINEPHRINE BITARTRATE/D5W 4MG/250ML
0.05 PLASTIC BAG, INJECTION (ML) INTRAVENOUS CONTINUOUS
Status: DISCONTINUED | OUTPATIENT
Start: 2020-01-01 | End: 2020-01-01

## 2020-01-01 RX ORDER — 3% SODIUM CHLORIDE 3 G/100ML
30 INJECTION, SOLUTION INTRAVENOUS CONTINUOUS
Status: DISPENSED | OUTPATIENT
Start: 2020-01-01 | End: 2020-01-01

## 2020-01-01 RX ORDER — GLUCAGON 1 MG
1 KIT INJECTION
Status: DISCONTINUED | OUTPATIENT
Start: 2020-01-01 | End: 2020-01-01 | Stop reason: HOSPADM

## 2020-01-01 RX ORDER — PREDNISONE 20 MG/1
40 TABLET ORAL DAILY
Status: DISCONTINUED | OUTPATIENT
Start: 2020-01-01 | End: 2020-01-01 | Stop reason: HOSPADM

## 2020-01-01 RX ORDER — ATORVASTATIN CALCIUM 40 MG/1
80 TABLET, FILM COATED ORAL DAILY
Status: DISCONTINUED | OUTPATIENT
Start: 2020-01-01 | End: 2020-01-01 | Stop reason: HOSPADM

## 2020-01-01 RX ORDER — PROPOFOL 10 MG/ML
5 INJECTION, EMULSION INTRAVENOUS CONTINUOUS
Status: DISCONTINUED | OUTPATIENT
Start: 2020-01-01 | End: 2020-01-01

## 2020-01-01 RX ORDER — LORATADINE 10 MG/1
10 TABLET ORAL DAILY
Qty: 90 TABLET | Refills: 3 | Status: SHIPPED | OUTPATIENT
Start: 2020-01-01

## 2020-01-01 RX ORDER — ASPIRIN 325 MG
325 TABLET ORAL
Status: COMPLETED | OUTPATIENT
Start: 2020-01-01 | End: 2020-01-01

## 2020-01-01 RX ORDER — GABAPENTIN 300 MG/1
300 CAPSULE ORAL 2 TIMES DAILY
Qty: 180 CAPSULE | Refills: 3 | Status: SHIPPED | OUTPATIENT
Start: 2020-01-01 | End: 2020-01-01 | Stop reason: SDUPTHER

## 2020-01-01 RX ORDER — TIZANIDINE 4 MG/1
TABLET ORAL
COMMUNITY
Start: 2020-01-01

## 2020-01-01 RX ORDER — PANTOPRAZOLE SODIUM 40 MG/1
40 FOR SUSPENSION ORAL DAILY
Status: DISCONTINUED | OUTPATIENT
Start: 2020-01-01 | End: 2020-01-01

## 2020-01-01 RX ORDER — POLYETHYLENE GLYCOL 3350 17 G/17G
17 POWDER, FOR SOLUTION ORAL DAILY
Status: DISCONTINUED | OUTPATIENT
Start: 2020-01-01 | End: 2020-01-01 | Stop reason: HOSPADM

## 2020-01-01 RX ORDER — GLIMEPIRIDE 2 MG/1
2 TABLET ORAL
Qty: 90 TABLET | Refills: 3 | Status: ON HOLD | OUTPATIENT
Start: 2020-01-01 | End: 2020-01-01 | Stop reason: HOSPADM

## 2020-01-01 RX ORDER — PANTOPRAZOLE SODIUM 40 MG/1
40 TABLET, DELAYED RELEASE ORAL
Status: DISCONTINUED | OUTPATIENT
Start: 2020-01-01 | End: 2020-01-01 | Stop reason: HOSPADM

## 2020-01-01 RX ORDER — GLUCAGON 1 MG
1 KIT INJECTION
Status: DISCONTINUED | OUTPATIENT
Start: 2020-01-01 | End: 2020-01-01

## 2020-01-01 RX ORDER — ALBUTEROL SULFATE 90 UG/1
1 AEROSOL, METERED RESPIRATORY (INHALATION) EVERY 6 HOURS PRN
Status: DISCONTINUED | OUTPATIENT
Start: 2020-01-01 | End: 2020-01-01 | Stop reason: HOSPADM

## 2020-01-01 RX ORDER — CIPROFLOXACIN HYDROCHLORIDE 3 MG/ML
1 SOLUTION/ DROPS OPHTHALMIC EVERY 6 HOURS
Status: DISCONTINUED | OUTPATIENT
Start: 2020-01-01 | End: 2020-01-01

## 2020-01-01 RX ORDER — ACETAMINOPHEN 650 MG/20.3ML
650 LIQUID ORAL EVERY 6 HOURS
Status: DISCONTINUED | OUTPATIENT
Start: 2020-01-01 | End: 2020-01-01

## 2020-01-01 RX ORDER — ACETAMINOPHEN 10 MG/ML
1000 INJECTION, SOLUTION INTRAVENOUS EVERY 8 HOURS
Status: COMPLETED | OUTPATIENT
Start: 2020-01-01 | End: 2020-01-01

## 2020-01-01 RX ORDER — METHYLPREDNISOLONE SOD SUCC 125 MG
125 VIAL (EA) INJECTION
Status: COMPLETED | OUTPATIENT
Start: 2020-01-01 | End: 2020-01-01

## 2020-01-01 RX ORDER — LIDOCAINE HYDROCHLORIDE 20 MG/ML
INJECTION, SOLUTION EPIDURAL; INFILTRATION; INTRACAUDAL; PERINEURAL
Status: DISCONTINUED | OUTPATIENT
Start: 2020-01-01 | End: 2020-01-01

## 2020-01-01 RX ORDER — MAGNESIUM SULFATE 1 G/100ML
1 INJECTION INTRAVENOUS
Status: DISCONTINUED | OUTPATIENT
Start: 2020-01-01 | End: 2020-01-01 | Stop reason: HOSPADM

## 2020-01-01 RX ORDER — ESOMEPRAZOLE MAGNESIUM 40 MG/1
40 GRANULE, DELAYED RELEASE ORAL
Qty: 90 EACH | Refills: 1 | Status: SHIPPED | OUTPATIENT
Start: 2020-01-01 | End: 2020-01-01 | Stop reason: SDUPTHER

## 2020-01-01 RX ORDER — ONDANSETRON 2 MG/ML
4 INJECTION INTRAMUSCULAR; INTRAVENOUS
Status: COMPLETED | OUTPATIENT
Start: 2020-01-01 | End: 2020-01-01

## 2020-01-01 RX ORDER — NOREPINEPHRINE BITARTRATE/D5W 4MG/250ML
PLASTIC BAG, INJECTION (ML) INTRAVENOUS
Status: DISPENSED
Start: 2020-01-01 | End: 2020-01-01

## 2020-01-01 RX ORDER — EPINEPHRINE 0.1 MG/ML
INJECTION INTRAVENOUS CODE/TRAUMA/SEDATION MEDICATION
Status: COMPLETED | OUTPATIENT
Start: 2020-01-01 | End: 2020-01-01

## 2020-01-01 RX ORDER — FLUTICASONE PROPIONATE 50 MCG
1 SPRAY, SUSPENSION (ML) NASAL 2 TIMES DAILY
Qty: 16 G | Refills: 3 | Status: SHIPPED | OUTPATIENT
Start: 2020-01-01

## 2020-01-01 RX ORDER — DIPHENHYDRAMINE HYDROCHLORIDE 50 MG/ML
50 INJECTION INTRAMUSCULAR; INTRAVENOUS
Status: COMPLETED | OUTPATIENT
Start: 2020-01-01 | End: 2020-01-01

## 2020-01-01 RX ORDER — LEVOFLOXACIN 750 MG/1
750 TABLET ORAL DAILY
Qty: 3 TABLET | Refills: 0 | Status: SHIPPED | OUTPATIENT
Start: 2020-01-01 | End: 2020-01-01

## 2020-01-01 RX ORDER — AZITHROMYCIN 250 MG/1
TABLET, FILM COATED ORAL
Qty: 6 TABLET | Refills: 0 | Status: SHIPPED | OUTPATIENT
Start: 2020-01-01 | End: 2020-01-01

## 2020-01-01 RX ORDER — METOPROLOL TARTRATE 25 MG/1
12.5 TABLET, FILM COATED ORAL DAILY
Qty: 45 TABLET | Refills: 3 | Status: ON HOLD | OUTPATIENT
Start: 2020-01-01 | End: 2020-01-01 | Stop reason: SDUPTHER

## 2020-01-01 RX ORDER — AZITHROMYCIN 250 MG/1
250 TABLET, FILM COATED ORAL DAILY
Status: DISCONTINUED | OUTPATIENT
Start: 2020-01-01 | End: 2020-01-01 | Stop reason: HOSPADM

## 2020-01-01 RX ORDER — MIRTAZAPINE 15 MG/1
30 TABLET, FILM COATED ORAL NIGHTLY
Status: DISCONTINUED | OUTPATIENT
Start: 2020-01-01 | End: 2020-01-01 | Stop reason: HOSPADM

## 2020-01-01 RX ORDER — MAGNESIUM SULFATE HEPTAHYDRATE 40 MG/ML
2 INJECTION, SOLUTION INTRAVENOUS
Status: DISCONTINUED | OUTPATIENT
Start: 2020-01-01 | End: 2020-01-01 | Stop reason: HOSPADM

## 2020-01-01 RX ORDER — AZITHROMYCIN 250 MG/1
500 TABLET, FILM COATED ORAL ONCE
Status: COMPLETED | OUTPATIENT
Start: 2020-01-01 | End: 2020-01-01

## 2020-01-01 RX ORDER — ALBUTEROL SULFATE 90 UG/1
1 AEROSOL, METERED RESPIRATORY (INHALATION) EVERY 6 HOURS PRN
Qty: 18 G | Refills: 0 | Status: SHIPPED | OUTPATIENT
Start: 2020-01-01

## 2020-01-01 RX ORDER — PREDNISONE 20 MG/1
40 TABLET ORAL DAILY
Qty: 8 TABLET | Refills: 0 | Status: SHIPPED | OUTPATIENT
Start: 2020-01-01 | End: 2020-01-01

## 2020-01-01 RX ORDER — AZITHROMYCIN 250 MG/1
250 TABLET, FILM COATED ORAL DAILY
Qty: 3 TABLET | Refills: 0 | Status: SHIPPED | OUTPATIENT
Start: 2020-01-01 | End: 2020-01-01

## 2020-01-01 RX ORDER — CIPROFLOXACIN HYDROCHLORIDE 3 MG/ML
1 SOLUTION/ DROPS OPHTHALMIC EVERY 6 HOURS
Qty: 5 ML | Refills: 0 | Status: SHIPPED | OUTPATIENT
Start: 2020-01-01 | End: 2020-01-01

## 2020-01-01 RX ORDER — DEXTROSE MONOHYDRATE AND SODIUM CHLORIDE 5; .45 G/100ML; G/100ML
125 INJECTION, SOLUTION INTRAVENOUS CONTINUOUS PRN
Status: DISCONTINUED | OUTPATIENT
Start: 2020-01-01 | End: 2020-01-01

## 2020-01-01 RX ORDER — FAMOTIDINE 20 MG/1
20 TABLET, FILM COATED ORAL 2 TIMES DAILY
Status: DISCONTINUED | OUTPATIENT
Start: 2020-01-01 | End: 2020-01-01

## 2020-01-01 RX ORDER — ATORVASTATIN CALCIUM 40 MG/1
80 TABLET, FILM COATED ORAL DAILY
Status: DISCONTINUED | OUTPATIENT
Start: 2020-01-01 | End: 2020-01-01

## 2020-01-01 RX ORDER — POTASSIUM CHLORIDE 29.8 MG/ML
80 INJECTION INTRAVENOUS
Status: DISCONTINUED | OUTPATIENT
Start: 2020-01-01 | End: 2020-01-01

## 2020-01-01 RX ORDER — METOPROLOL TARTRATE 25 MG/1
12.5 TABLET, FILM COATED ORAL DAILY
Qty: 45 TABLET | Refills: 3 | Status: SHIPPED | OUTPATIENT
Start: 2020-01-01 | End: 2020-01-01

## 2020-01-01 RX ORDER — ALBUTEROL SULFATE 0.83 MG/ML
2.5 SOLUTION RESPIRATORY (INHALATION) EVERY 4 HOURS PRN
Status: DISCONTINUED | OUTPATIENT
Start: 2020-01-01 | End: 2020-01-01

## 2020-01-01 RX ORDER — CALCIUM GLUCONATE 20 MG/ML
1 INJECTION, SOLUTION INTRAVENOUS
Status: DISCONTINUED | OUTPATIENT
Start: 2020-01-01 | End: 2020-01-01

## 2020-01-01 RX ORDER — MAGNESIUM SULFATE HEPTAHYDRATE 40 MG/ML
4 INJECTION, SOLUTION INTRAVENOUS
Status: DISCONTINUED | OUTPATIENT
Start: 2020-01-01 | End: 2020-01-01

## 2020-01-01 RX ORDER — 3% SODIUM CHLORIDE 3 G/100ML
30 INJECTION, SOLUTION INTRAVENOUS CONTINUOUS
Status: DISCONTINUED | OUTPATIENT
Start: 2020-01-01 | End: 2020-01-01

## 2020-01-01 RX ORDER — METOPROLOL TARTRATE 25 MG/1
12.5 TABLET ORAL DAILY
Status: DISCONTINUED | OUTPATIENT
Start: 2020-01-01 | End: 2020-01-01 | Stop reason: HOSPADM

## 2020-01-01 RX ORDER — ALBUTEROL SULFATE 90 UG/1
2 AEROSOL, METERED RESPIRATORY (INHALATION) EVERY 6 HOURS
Status: DISCONTINUED | OUTPATIENT
Start: 2020-01-01 | End: 2020-01-01 | Stop reason: HOSPADM

## 2020-01-01 RX ORDER — PROPOFOL 10 MG/ML
VIAL (ML) INTRAVENOUS CONTINUOUS PRN
Status: DISCONTINUED | OUTPATIENT
Start: 2020-01-01 | End: 2020-01-01

## 2020-01-01 RX ORDER — PREDNISONE 20 MG/1
40 TABLET ORAL DAILY
Qty: 10 TABLET | Refills: 0 | Status: SHIPPED | OUTPATIENT
Start: 2020-01-01 | End: 2020-01-01

## 2020-01-01 RX ORDER — POTASSIUM CHLORIDE 29.8 MG/ML
40 INJECTION INTRAVENOUS
Status: DISCONTINUED | OUTPATIENT
Start: 2020-01-01 | End: 2020-01-01

## 2020-01-01 RX ORDER — INSULIN ASPART 100 [IU]/ML
0-5 INJECTION, SOLUTION INTRAVENOUS; SUBCUTANEOUS EVERY 6 HOURS PRN
Status: DISCONTINUED | OUTPATIENT
Start: 2020-01-01 | End: 2020-01-01

## 2020-01-01 RX ORDER — ATORVASTATIN CALCIUM 80 MG/1
80 TABLET, FILM COATED ORAL DAILY
Qty: 90 TABLET | Refills: 3 | Status: SHIPPED | OUTPATIENT
Start: 2020-01-01

## 2020-01-01 RX ORDER — ESOMEPRAZOLE MAGNESIUM 40 MG/1
40 CAPSULE, DELAYED RELEASE ORAL
Qty: 180 CAPSULE | Refills: 3 | Status: SHIPPED | OUTPATIENT
Start: 2020-01-01 | End: 2021-10-12

## 2020-01-01 RX ORDER — SODIUM CHLORIDE 0.9 % (FLUSH) 0.9 %
10 SYRINGE (ML) INJECTION
Status: DISCONTINUED | OUTPATIENT
Start: 2020-01-01 | End: 2020-01-01

## 2020-01-01 RX ORDER — ONDANSETRON 2 MG/ML
4 INJECTION INTRAMUSCULAR; INTRAVENOUS EVERY 6 HOURS PRN
Status: DISCONTINUED | OUTPATIENT
Start: 2020-01-01 | End: 2020-01-01

## 2020-01-01 RX ORDER — ESOMEPRAZOLE MAGNESIUM 40 MG/1
40 CAPSULE, DELAYED RELEASE ORAL
Qty: 90 CAPSULE | Refills: 3 | Status: SHIPPED | OUTPATIENT
Start: 2020-01-01 | End: 2020-01-01 | Stop reason: SDUPTHER

## 2020-01-01 RX ORDER — PANTOPRAZOLE SODIUM 40 MG/1
40 TABLET, DELAYED RELEASE ORAL DAILY
Status: DISCONTINUED | OUTPATIENT
Start: 2020-01-01 | End: 2020-01-01

## 2020-01-01 RX ORDER — METHYLPREDNISOLONE 4 MG/1
TABLET ORAL
COMMUNITY
Start: 2020-01-01

## 2020-01-01 RX ORDER — ONDANSETRON 2 MG/ML
4 INJECTION INTRAMUSCULAR; INTRAVENOUS EVERY 6 HOURS PRN
Status: DISCONTINUED | OUTPATIENT
Start: 2020-01-01 | End: 2020-01-01 | Stop reason: HOSPADM

## 2020-01-01 RX ORDER — MAGNESIUM SULFATE HEPTAHYDRATE 40 MG/ML
4 INJECTION, SOLUTION INTRAVENOUS
Status: DISCONTINUED | OUTPATIENT
Start: 2020-01-01 | End: 2020-01-01 | Stop reason: HOSPADM

## 2020-01-01 RX ORDER — POTASSIUM CHLORIDE 20 MEQ/1
20 TABLET, EXTENDED RELEASE ORAL DAILY
Qty: 90 TABLET | Refills: 3 | Status: SHIPPED | OUTPATIENT
Start: 2020-01-01

## 2020-01-01 RX ORDER — TRIAMCINOLONE ACETONIDE 5 MG/G
1 OINTMENT TOPICAL 2 TIMES DAILY
COMMUNITY
End: 2020-01-01 | Stop reason: SDUPTHER

## 2020-01-01 RX ORDER — PROPOFOL 10 MG/ML
VIAL (ML) INTRAVENOUS
Status: DISCONTINUED | OUTPATIENT
Start: 2020-01-01 | End: 2020-01-01

## 2020-01-01 RX ORDER — PANTOPRAZOLE SODIUM 40 MG/10ML
40 INJECTION, POWDER, LYOPHILIZED, FOR SOLUTION INTRAVENOUS DAILY
Status: DISCONTINUED | OUTPATIENT
Start: 2020-01-01 | End: 2020-01-01

## 2020-01-01 RX ORDER — DEXTROSE 4 G
TABLET,CHEWABLE ORAL
Qty: 1 EACH | Refills: 0 | Status: SHIPPED | OUTPATIENT
Start: 2020-01-01

## 2020-01-01 RX ORDER — LORAZEPAM 2 MG/ML
1 INJECTION INTRAMUSCULAR EVERY 30 MIN PRN
Status: DISCONTINUED | OUTPATIENT
Start: 2020-01-01 | End: 2020-01-01 | Stop reason: HOSPADM

## 2020-01-01 RX ORDER — TRIAMCINOLONE ACETONIDE 5 MG/G
1 OINTMENT TOPICAL 2 TIMES DAILY
Qty: 15 G | Refills: 2 | Status: SHIPPED | OUTPATIENT
Start: 2020-01-01

## 2020-01-01 RX ORDER — ALBUTEROL SULFATE 0.83 MG/ML
2.5 SOLUTION RESPIRATORY (INHALATION) EVERY 4 HOURS PRN
Status: DISCONTINUED | OUTPATIENT
Start: 2020-01-01 | End: 2020-01-01 | Stop reason: HOSPADM

## 2020-01-01 RX ORDER — BISACODYL 10 MG
10 SUPPOSITORY, RECTAL RECTAL DAILY PRN
Status: DISCONTINUED | OUTPATIENT
Start: 2020-01-01 | End: 2020-01-01 | Stop reason: HOSPADM

## 2020-01-01 RX ORDER — MIRTAZAPINE 30 MG/1
30 TABLET, FILM COATED ORAL NIGHTLY
Qty: 90 TABLET | Refills: 3 | Status: SHIPPED | OUTPATIENT
Start: 2020-01-01

## 2020-01-01 RX ORDER — LEVOFLOXACIN 5 MG/ML
750 INJECTION, SOLUTION INTRAVENOUS
Status: DISCONTINUED | OUTPATIENT
Start: 2020-01-01 | End: 2020-01-01 | Stop reason: HOSPADM

## 2020-01-01 RX ORDER — ALBUTEROL SULFATE 5 MG/ML
2.5 SOLUTION RESPIRATORY (INHALATION) EVERY 4 HOURS PRN
Qty: 20 ML | Refills: 0 | Status: SHIPPED | OUTPATIENT
Start: 2020-01-01

## 2020-01-01 RX ORDER — METOPROLOL TARTRATE 25 MG/1
12.5 TABLET, FILM COATED ORAL DAILY
Qty: 45 TABLET | Refills: 0 | Status: SHIPPED | OUTPATIENT
Start: 2020-01-01 | End: 2020-01-01 | Stop reason: SDUPTHER

## 2020-01-01 RX ORDER — SODIUM CHLORIDE 9 MG/ML
INJECTION, SOLUTION INTRAVENOUS CONTINUOUS
Status: DISCONTINUED | OUTPATIENT
Start: 2020-01-01 | End: 2020-01-01

## 2020-01-01 RX ORDER — SODIUM CHLORIDE 9 MG/ML
1000 INJECTION, SOLUTION INTRAVENOUS
Status: COMPLETED | OUTPATIENT
Start: 2020-01-01 | End: 2020-01-01

## 2020-01-01 RX ORDER — SODIUM CHLORIDE 9 MG/ML
125 INJECTION, SOLUTION INTRAVENOUS CONTINUOUS
Status: DISCONTINUED | OUTPATIENT
Start: 2020-01-01 | End: 2020-01-01

## 2020-01-01 RX ORDER — IBUPROFEN 200 MG
16 TABLET ORAL
Status: DISCONTINUED | OUTPATIENT
Start: 2020-01-01 | End: 2020-01-01 | Stop reason: HOSPADM

## 2020-01-01 RX ORDER — LEVETIRACETAM 5 MG/ML
500 INJECTION INTRAVASCULAR EVERY 12 HOURS
Status: DISCONTINUED | OUTPATIENT
Start: 2020-01-01 | End: 2020-01-01

## 2020-01-01 RX ORDER — MUPIROCIN 20 MG/G
OINTMENT TOPICAL 2 TIMES DAILY
Status: DISCONTINUED | OUTPATIENT
Start: 2020-01-01 | End: 2020-01-01

## 2020-01-01 RX ORDER — CLINDAMYCIN PHOSPHATE 600 MG/50ML
600 INJECTION, SOLUTION INTRAVENOUS
Status: DISCONTINUED | OUTPATIENT
Start: 2020-01-01 | End: 2020-01-01

## 2020-01-01 RX ORDER — SCOLOPAMINE TRANSDERMAL SYSTEM 1 MG/1
1 PATCH, EXTENDED RELEASE TRANSDERMAL
Status: DISCONTINUED | OUTPATIENT
Start: 2020-01-01 | End: 2020-01-01 | Stop reason: HOSPADM

## 2020-01-01 RX ADMIN — GABAPENTIN 300 MG: 300 CAPSULE ORAL at 08:05

## 2020-01-01 RX ADMIN — EPINEPHRINE: 0.1 INJECTION, SOLUTION ENDOTRACHEAL; INTRACARDIAC; INTRAVENOUS at 05:11

## 2020-01-01 RX ADMIN — FLUTICASONE PROPIONATE 50 MCG: 50 SPRAY, METERED NASAL at 08:04

## 2020-01-01 RX ADMIN — IPRATROPIUM BROMIDE AND ALBUTEROL SULFATE 3 ML: .5; 2.5 SOLUTION RESPIRATORY (INHALATION) at 01:11

## 2020-01-01 RX ADMIN — PROPOFOL 20 MCG/KG/MIN: 10 INJECTION, EMULSION INTRAVENOUS at 07:11

## 2020-01-01 RX ADMIN — POLYETHYLENE GLYCOL 3350 17 G: 17 POWDER, FOR SOLUTION ORAL at 09:04

## 2020-01-01 RX ADMIN — ALBUTEROL SULFATE 2 PUFF: 90 AEROSOL, METERED RESPIRATORY (INHALATION) at 02:04

## 2020-01-01 RX ADMIN — MUPIROCIN: 20 OINTMENT TOPICAL at 10:11

## 2020-01-01 RX ADMIN — METHYLPREDNISOLONE SODIUM SUCCINATE 125 MG: 125 INJECTION, POWDER, FOR SOLUTION INTRAMUSCULAR; INTRAVENOUS at 08:05

## 2020-01-01 RX ADMIN — CEFTRIAXONE 1 G: 1 INJECTION, SOLUTION INTRAVENOUS at 03:11

## 2020-01-01 RX ADMIN — CYANOCOBALAMIN 1000 MCG: 1000 INJECTION, SOLUTION INTRAMUSCULAR; SUBCUTANEOUS at 09:07

## 2020-01-01 RX ADMIN — ATORVASTATIN CALCIUM 80 MG: 40 TABLET, FILM COATED ORAL at 10:05

## 2020-01-01 RX ADMIN — FLUTICASONE PROPIONATE 50 MCG: 50 SPRAY, METERED NASAL at 10:05

## 2020-01-01 RX ADMIN — SODIUM CHLORIDE 1000 ML: 0.9 INJECTION, SOLUTION INTRAVENOUS at 04:04

## 2020-01-01 RX ADMIN — IPRATROPIUM BROMIDE AND ALBUTEROL SULFATE 3 ML: .5; 2.5 SOLUTION RESPIRATORY (INHALATION) at 07:11

## 2020-01-01 RX ADMIN — EPINEPHRINE 1 MG: 0.1 INJECTION, SOLUTION ENDOTRACHEAL; INTRACARDIAC; INTRAVENOUS at 05:11

## 2020-01-01 RX ADMIN — CHLORHEXIDINE GLUCONATE 0.12% ORAL RINSE 15 ML: 1.2 LIQUID ORAL at 09:11

## 2020-01-01 RX ADMIN — BUSPIRONE HYDROCHLORIDE 30 MG: 10 TABLET ORAL at 07:11

## 2020-01-01 RX ADMIN — VANCOMYCIN HYDROCHLORIDE 500 MG: 500 INJECTION, POWDER, LYOPHILIZED, FOR SOLUTION INTRAVENOUS at 08:11

## 2020-01-01 RX ADMIN — SODIUM CHLORIDE 1000 ML: 0.9 INJECTION, SOLUTION INTRAVENOUS at 05:11

## 2020-01-01 RX ADMIN — ALBUTEROL SULFATE 2 PUFF: 90 AEROSOL, METERED RESPIRATORY (INHALATION) at 07:05

## 2020-01-01 RX ADMIN — CYANOCOBALAMIN 1000 MCG: 1000 INJECTION, SOLUTION INTRAMUSCULAR; SUBCUTANEOUS at 10:09

## 2020-01-01 RX ADMIN — PANTOPRAZOLE SODIUM 40 MG: 40 INJECTION, POWDER, FOR SOLUTION INTRAVENOUS at 10:11

## 2020-01-01 RX ADMIN — LIDOCAINE HYDROCHLORIDE 20 MG: 20 INJECTION, SOLUTION INTRAVENOUS at 09:05

## 2020-01-01 RX ADMIN — PROPOFOL 5 MCG/KG/MIN: 10 INJECTION, EMULSION INTRAVENOUS at 08:11

## 2020-01-01 RX ADMIN — CHLORHEXIDINE GLUCONATE 0.12% ORAL RINSE 15 ML: 1.2 LIQUID ORAL at 10:11

## 2020-01-01 RX ADMIN — MIDAZOLAM 1 MG/HR: 5 INJECTION INTRAMUSCULAR; INTRAVENOUS at 09:11

## 2020-01-01 RX ADMIN — ALBUTEROL SULFATE 2 PUFF: 90 AEROSOL, METERED RESPIRATORY (INHALATION) at 01:05

## 2020-01-01 RX ADMIN — MUPIROCIN: 20 OINTMENT TOPICAL at 09:11

## 2020-01-01 RX ADMIN — SODIUM CHLORIDE 1000 ML: 0.9 INJECTION, SOLUTION INTRAVENOUS at 07:11

## 2020-01-01 RX ADMIN — PHENYLEPHRINE HYDROCHLORIDE 100 MCG: 10 INJECTION INTRAVENOUS at 09:05

## 2020-01-01 RX ADMIN — SODIUM BICARBONATE 50 MEQ: 84 INJECTION, SOLUTION INTRAVENOUS at 06:11

## 2020-01-01 RX ADMIN — ACETAMINOPHEN 1000 MG: 10 INJECTION, SOLUTION INTRAVENOUS at 06:11

## 2020-01-01 RX ADMIN — MIRTAZAPINE 30 MG: 15 TABLET, FILM COATED ORAL at 09:05

## 2020-01-01 RX ADMIN — ACETAMINOPHEN 1000 MG: 10 INJECTION, SOLUTION INTRAVENOUS at 01:11

## 2020-01-01 RX ADMIN — MORPHINE SULFATE 2 MG/HR: 10 INJECTION INTRAVENOUS at 11:11

## 2020-01-01 RX ADMIN — CYANOCOBALAMIN 1000 MCG: 1000 INJECTION, SOLUTION INTRAMUSCULAR; SUBCUTANEOUS at 12:02

## 2020-01-01 RX ADMIN — ALBUTEROL SULFATE 2 PUFF: 90 AEROSOL, METERED RESPIRATORY (INHALATION) at 09:05

## 2020-01-01 RX ADMIN — LEVETIRACETAM INJECTION 500 MG: 5 INJECTION INTRAVENOUS at 03:11

## 2020-01-01 RX ADMIN — IPRATROPIUM BROMIDE AND ALBUTEROL SULFATE 3 ML: .5; 3 SOLUTION RESPIRATORY (INHALATION) at 10:05

## 2020-01-01 RX ADMIN — CIPROFLOXACIN 400 MG: 2 INJECTION, SOLUTION INTRAVENOUS at 04:11

## 2020-01-01 RX ADMIN — FLUTICASONE PROPIONATE 50 MCG: 50 SPRAY, METERED NASAL at 09:05

## 2020-01-01 RX ADMIN — VANCOMYCIN HYDROCHLORIDE 500 MG: 500 INJECTION, POWDER, LYOPHILIZED, FOR SOLUTION INTRAVENOUS at 01:11

## 2020-01-01 RX ADMIN — DEXTROSE AND SODIUM CHLORIDE 125 ML/HR: 5; .45 INJECTION, SOLUTION INTRAVENOUS at 01:11

## 2020-01-01 RX ADMIN — PANTOPRAZOLE SODIUM 40 MG: 40 INJECTION, POWDER, FOR SOLUTION INTRAVENOUS at 09:11

## 2020-01-01 RX ADMIN — ALBUTEROL SULFATE 2 PUFF: 90 AEROSOL, METERED RESPIRATORY (INHALATION) at 07:04

## 2020-01-01 RX ADMIN — IPRATROPIUM BROMIDE AND ALBUTEROL SULFATE 3 ML: .5; 2.5 SOLUTION RESPIRATORY (INHALATION) at 06:11

## 2020-01-01 RX ADMIN — IPRATROPIUM BROMIDE AND ALBUTEROL SULFATE 3 ML: .5; 2.5 SOLUTION RESPIRATORY (INHALATION) at 12:11

## 2020-01-01 RX ADMIN — PROPOFOL 30 MCG/KG/MIN: 10 INJECTION, EMULSION INTRAVENOUS at 04:11

## 2020-01-01 RX ADMIN — GABAPENTIN 300 MG: 300 CAPSULE ORAL at 11:05

## 2020-01-01 RX ADMIN — CEFTRIAXONE 1 G: 1 INJECTION, SOLUTION INTRAVENOUS at 04:11

## 2020-01-01 RX ADMIN — IPRATROPIUM BROMIDE AND ALBUTEROL SULFATE 3 ML: .5; 3 SOLUTION RESPIRATORY (INHALATION) at 07:05

## 2020-01-01 RX ADMIN — METHYLPREDNISOLONE SODIUM SUCCINATE 80 MG: 40 INJECTION, POWDER, FOR SOLUTION INTRAMUSCULAR; INTRAVENOUS at 05:05

## 2020-01-01 RX ADMIN — MIRTAZAPINE 30 MG: 15 TABLET, FILM COATED ORAL at 02:05

## 2020-01-01 RX ADMIN — ENOXAPARIN SODIUM 40 MG: 100 INJECTION SUBCUTANEOUS at 04:05

## 2020-01-01 RX ADMIN — CHLORHEXIDINE GLUCONATE 15 ML: 1.2 RINSE ORAL at 08:04

## 2020-01-01 RX ADMIN — GABAPENTIN 300 MG: 300 CAPSULE ORAL at 08:04

## 2020-01-01 RX ADMIN — ALBUTEROL SULFATE 2 PUFF: 90 AEROSOL, METERED RESPIRATORY (INHALATION) at 01:04

## 2020-01-01 RX ADMIN — CLINDAMYCIN IN 5 PERCENT DEXTROSE 600 MG: 12 INJECTION, SOLUTION INTRAVENOUS at 07:11

## 2020-01-01 RX ADMIN — CYANOCOBALAMIN 1000 MCG: 1000 INJECTION, SOLUTION INTRAMUSCULAR; SUBCUTANEOUS at 02:06

## 2020-01-01 RX ADMIN — CHLORHEXIDINE GLUCONATE 0.12% ORAL RINSE 15 ML: 1.2 LIQUID ORAL at 08:11

## 2020-01-01 RX ADMIN — IPRATROPIUM BROMIDE AND ALBUTEROL SULFATE 3 ML: .5; 3 SOLUTION RESPIRATORY (INHALATION) at 12:05

## 2020-01-01 RX ADMIN — LEVOFLOXACIN 750 MG: 750 INJECTION, SOLUTION INTRAVENOUS at 09:04

## 2020-01-01 RX ADMIN — PANTOPRAZOLE SODIUM 40 MG: 40 TABLET, DELAYED RELEASE ORAL at 05:04

## 2020-01-01 RX ADMIN — MAGNESIUM SULFATE IN WATER 2 G: 40 INJECTION, SOLUTION INTRAVENOUS at 05:11

## 2020-01-01 RX ADMIN — METHYLPREDNISOLONE SODIUM SUCCINATE 80 MG: 40 INJECTION, POWDER, FOR SOLUTION INTRAMUSCULAR; INTRAVENOUS at 02:05

## 2020-01-01 RX ADMIN — CALCIUM GLUCONATE 1000 MG: 20 INJECTION, SOLUTION INTRAVENOUS at 11:11

## 2020-01-01 RX ADMIN — CYANOCOBALAMIN 1000 MCG: 1000 INJECTION, SOLUTION INTRAMUSCULAR; SUBCUTANEOUS at 01:11

## 2020-01-01 RX ADMIN — AZITHROMYCIN 250 MG: 250 TABLET, FILM COATED ORAL at 09:04

## 2020-01-01 RX ADMIN — SODIUM CHLORIDE 1000 ML: 0.9 INJECTION, SOLUTION INTRAVENOUS at 01:11

## 2020-01-01 RX ADMIN — SODIUM CHLORIDE 30 ML/HR: 3 INJECTION, SOLUTION INTRAVENOUS at 02:11

## 2020-01-01 RX ADMIN — FLUTICASONE PROPIONATE 50 MCG: 50 SPRAY, METERED NASAL at 09:04

## 2020-01-01 RX ADMIN — SCOPALAMINE 1 PATCH: 1 PATCH, EXTENDED RELEASE TRANSDERMAL at 11:11

## 2020-01-01 RX ADMIN — LEVETIRACETAM INJECTION 500 MG: 5 INJECTION INTRAVENOUS at 04:11

## 2020-01-01 RX ADMIN — PANTOPRAZOLE SODIUM 40 MG: 40 TABLET, DELAYED RELEASE ORAL at 11:05

## 2020-01-01 RX ADMIN — Medication 0.08 MCG/KG/MIN: at 05:11

## 2020-01-01 RX ADMIN — CHLORHEXIDINE GLUCONATE 15 ML: 1.2 RINSE ORAL at 09:04

## 2020-01-01 RX ADMIN — PROPOFOL 50 MCG/KG/MIN: 10 INJECTION, EMULSION INTRAVENOUS at 11:11

## 2020-01-01 RX ADMIN — LEVOFLOXACIN 750 MG: 5 INJECTION, SOLUTION INTRAVENOUS at 10:05

## 2020-01-01 RX ADMIN — CLINDAMYCIN IN 5 PERCENT DEXTROSE 600 MG: 12 INJECTION, SOLUTION INTRAVENOUS at 10:11

## 2020-01-01 RX ADMIN — GABAPENTIN 300 MG: 300 CAPSULE ORAL at 09:05

## 2020-01-01 RX ADMIN — ENOXAPARIN SODIUM 40 MG: 100 INJECTION SUBCUTANEOUS at 04:04

## 2020-01-01 RX ADMIN — CYANOCOBALAMIN 1000 MCG: 1000 INJECTION, SOLUTION INTRAMUSCULAR; SUBCUTANEOUS at 11:10

## 2020-01-01 RX ADMIN — METHYLPREDNISOLONE SODIUM SUCCINATE 80 MG: 40 INJECTION, POWDER, FOR SOLUTION INTRAMUSCULAR; INTRAVENOUS at 10:05

## 2020-01-01 RX ADMIN — LEVOFLOXACIN 750 MG: 5 INJECTION, SOLUTION INTRAVENOUS at 11:05

## 2020-01-01 RX ADMIN — IPRATROPIUM BROMIDE AND ALBUTEROL SULFATE 3 ML: .5; 3 SOLUTION RESPIRATORY (INHALATION) at 08:05

## 2020-01-01 RX ADMIN — MUPIROCIN: 20 OINTMENT TOPICAL at 09:04

## 2020-01-01 RX ADMIN — SODIUM CHLORIDE 1000 ML: 0.9 INJECTION, SOLUTION INTRAVENOUS at 12:11

## 2020-01-01 RX ADMIN — METHYLPREDNISOLONE SODIUM SUCCINATE 80 MG: 40 INJECTION, POWDER, FOR SOLUTION INTRAMUSCULAR; INTRAVENOUS at 06:05

## 2020-01-01 RX ADMIN — IOHEXOL 75 ML: 350 INJECTION, SOLUTION INTRAVENOUS at 08:11

## 2020-01-01 RX ADMIN — GABAPENTIN 300 MG: 300 CAPSULE ORAL at 09:04

## 2020-01-01 RX ADMIN — SODIUM CHLORIDE 125 ML/HR: 0.9 INJECTION, SOLUTION INTRAVENOUS at 10:11

## 2020-01-01 RX ADMIN — PROPOFOL 50 MCG/KG/MIN: 10 INJECTION, EMULSION INTRAVENOUS at 01:11

## 2020-01-01 RX ADMIN — ONDANSETRON 4 MG: 2 INJECTION INTRAMUSCULAR; INTRAVENOUS at 12:11

## 2020-01-01 RX ADMIN — FLUTICASONE PROPIONATE 50 MCG: 50 SPRAY, METERED NASAL at 08:05

## 2020-01-01 RX ADMIN — MIRTAZAPINE 30 MG: 15 TABLET, FILM COATED ORAL at 10:05

## 2020-01-01 RX ADMIN — CIPROFLOXACIN AND DEXAMETHASONE 4 DROP: 3; 1 SUSPENSION/ DROPS AURICULAR (OTIC) at 10:05

## 2020-01-01 RX ADMIN — CEFTRIAXONE 1 G: 1 INJECTION, SOLUTION INTRAVENOUS at 01:11

## 2020-01-01 RX ADMIN — AZITHROMYCIN 500 MG: 250 TABLET, FILM COATED ORAL at 01:04

## 2020-01-01 RX ADMIN — VANCOMYCIN HYDROCHLORIDE 1500 MG: 1.5 INJECTION, POWDER, LYOPHILIZED, FOR SOLUTION INTRAVENOUS at 01:11

## 2020-01-01 RX ADMIN — CYANOCOBALAMIN 1000 MCG: 1000 INJECTION, SOLUTION INTRAMUSCULAR; SUBCUTANEOUS at 09:03

## 2020-01-01 RX ADMIN — ACETAMINOPHEN 1000 MG: 10 INJECTION, SOLUTION INTRAVENOUS at 05:11

## 2020-01-01 RX ADMIN — SUCRALFATE 1 G: 1 SUSPENSION ORAL at 11:05

## 2020-01-01 RX ADMIN — ACETAMINOPHEN 650 MG: 160 SOLUTION ORAL at 07:11

## 2020-01-01 RX ADMIN — SODIUM CHLORIDE 1000 ML: 0.9 INJECTION, SOLUTION INTRAVENOUS at 08:11

## 2020-01-01 RX ADMIN — ONDANSETRON 4 MG: 2 INJECTION INTRAMUSCULAR; INTRAVENOUS at 07:11

## 2020-01-01 RX ADMIN — PROPOFOL 0.92 MCG/KG/MIN: 10 INJECTION, EMULSION INTRAVENOUS at 10:11

## 2020-01-01 RX ADMIN — METHYLPREDNISOLONE SODIUM SUCCINATE 80 MG: 40 INJECTION, POWDER, FOR SOLUTION INTRAMUSCULAR; INTRAVENOUS at 09:05

## 2020-01-01 RX ADMIN — PREDNISONE 40 MG: 20 TABLET ORAL at 01:04

## 2020-01-01 RX ADMIN — MIRTAZAPINE 30 MG: 15 TABLET, FILM COATED ORAL at 08:04

## 2020-01-01 RX ADMIN — ACETAMINOPHEN 650 MG: 325 TABLET ORAL at 08:05

## 2020-01-01 RX ADMIN — LORAZEPAM 1 MG: 2 INJECTION INTRAMUSCULAR; INTRAVENOUS at 01:11

## 2020-01-01 RX ADMIN — CYANOCOBALAMIN 1000 MCG: 1000 INJECTION, SOLUTION INTRAMUSCULAR; SUBCUTANEOUS at 11:01

## 2020-01-01 RX ADMIN — PROPOFOL 60 MG: 10 INJECTION, EMULSION INTRAVENOUS at 09:05

## 2020-01-01 RX ADMIN — FAMOTIDINE 20 MG: 20 TABLET, FILM COATED ORAL at 08:04

## 2020-01-01 RX ADMIN — PROPOFOL 75 MCG/KG/MIN: 10 INJECTION, EMULSION INTRAVENOUS at 09:05

## 2020-01-01 RX ADMIN — CETIRIZINE HYDROCHLORIDE 10 MG: 10 TABLET, FILM COATED ORAL at 10:05

## 2020-01-01 RX ADMIN — CYANOCOBALAMIN 1000 MCG: 1000 INJECTION, SOLUTION INTRAMUSCULAR; SUBCUTANEOUS at 09:08

## 2020-01-01 RX ADMIN — MUPIROCIN: 20 OINTMENT TOPICAL at 08:11

## 2020-01-01 RX ADMIN — PANTOPRAZOLE SODIUM 40 MG: 40 TABLET, DELAYED RELEASE ORAL at 06:05

## 2020-01-01 RX ADMIN — POTASSIUM CHLORIDE 40 MEQ: 20 TABLET, EXTENDED RELEASE ORAL at 12:04

## 2020-01-01 RX ADMIN — GABAPENTIN 300 MG: 300 CAPSULE ORAL at 10:05

## 2020-01-01 RX ADMIN — LEVETIRACETAM INJECTION 1000 MG: 10 INJECTION INTRAVENOUS at 02:11

## 2020-01-01 RX ADMIN — IPRATROPIUM BROMIDE AND ALBUTEROL SULFATE 3 ML: .5; 2.5 SOLUTION RESPIRATORY (INHALATION) at 08:11

## 2020-01-01 RX ADMIN — SODIUM CHLORIDE 1000 ML: 0.9 INJECTION, SOLUTION INTRAVENOUS at 10:11

## 2020-01-01 RX ADMIN — PROPOFOL 50 MCG/KG/MIN: 10 INJECTION, EMULSION INTRAVENOUS at 07:11

## 2020-01-01 RX ADMIN — SODIUM CHLORIDE 7 UNITS/HR  (ORDERING DOSE ONLY,MUST KEEP AS DOSE RANGE.ONLY CHANGE IF INITIAL DOSE EXCEEDS SUGGESTED RANGE): 9 INJECTION, SOLUTION INTRAVENOUS at 03:11

## 2020-01-01 RX ADMIN — ATORVASTATIN CALCIUM 80 MG: 40 TABLET, FILM COATED ORAL at 09:05

## 2020-01-01 RX ADMIN — ATORVASTATIN CALCIUM 80 MG: 40 TABLET, FILM COATED ORAL at 09:04

## 2020-01-01 RX ADMIN — MAGNESIUM OXIDE 800 MG: 400 TABLET ORAL at 10:05

## 2020-01-01 RX ADMIN — PROPOFOL 40 MCG/KG/MIN: 10 INJECTION, EMULSION INTRAVENOUS at 06:11

## 2020-01-01 RX ADMIN — CETIRIZINE HYDROCHLORIDE 10 MG: 10 TABLET, FILM COATED ORAL at 09:04

## 2020-01-01 RX ADMIN — CETIRIZINE HYDROCHLORIDE 10 MG: 10 TABLET, FILM COATED ORAL at 11:05

## 2020-01-01 RX ADMIN — POTASSIUM CHLORIDE 40 MEQ: 29.8 INJECTION, SOLUTION INTRAVENOUS at 12:11

## 2020-01-01 RX ADMIN — PANTOPRAZOLE SODIUM 40 MG: 40 INJECTION, POWDER, FOR SOLUTION INTRAVENOUS at 08:11

## 2020-01-01 RX ADMIN — DIPHENHYDRAMINE HYDROCHLORIDE 50 MG: 50 INJECTION INTRAMUSCULAR; INTRAVENOUS at 07:11

## 2020-01-01 RX ADMIN — METOPROLOL TARTRATE 12.5 MG: 25 TABLET, FILM COATED ORAL at 11:05

## 2020-01-01 RX ADMIN — EPINEPHRINE 0.1 MG: 0.1 INJECTION, SOLUTION ENDOTRACHEAL; INTRACARDIAC; INTRAVENOUS at 05:11

## 2020-01-01 RX ADMIN — SODIUM CHLORIDE 1000 ML: 0.9 INJECTION, SOLUTION INTRAVENOUS at 11:11

## 2020-01-01 RX ADMIN — Medication 0.25 MCG/KG/MIN: at 07:11

## 2020-01-01 RX ADMIN — METOPROLOL TARTRATE 12.5 MG: 25 TABLET, FILM COATED ORAL at 10:05

## 2020-01-01 RX ADMIN — CIPROFLOXACIN AND DEXAMETHASONE 4 DROP: 3; 1 SUSPENSION/ DROPS AURICULAR (OTIC) at 11:05

## 2020-01-01 RX ADMIN — METOPROLOL TARTRATE 12.5 MG: 25 TABLET, FILM COATED ORAL at 08:05

## 2020-01-01 RX ADMIN — CETIRIZINE HYDROCHLORIDE 10 MG: 10 TABLET, FILM COATED ORAL at 08:05

## 2020-01-01 RX ADMIN — Medication 0.16 MCG/KG/MIN: at 01:11

## 2020-01-01 RX ADMIN — ATORVASTATIN CALCIUM 80 MG: 40 TABLET, FILM COATED ORAL at 09:11

## 2020-01-01 RX ADMIN — FLUTICASONE PROPIONATE 50 MCG: 50 SPRAY, METERED NASAL at 11:05

## 2020-01-01 RX ADMIN — SODIUM CHLORIDE 30 ML/HR: 3 INJECTION, SOLUTION INTRAVENOUS at 10:11

## 2020-01-01 RX ADMIN — FAMOTIDINE 20 MG: 20 TABLET, FILM COATED ORAL at 09:04

## 2020-01-01 RX ADMIN — SODIUM CHLORIDE: 0.9 INJECTION, SOLUTION INTRAVENOUS at 08:05

## 2020-01-01 RX ADMIN — ONDANSETRON 4 MG: 2 INJECTION INTRAMUSCULAR; INTRAVENOUS at 03:05

## 2020-01-01 RX ADMIN — ASPIRIN 325 MG ORAL TABLET 325 MG: 325 PILL ORAL at 06:11

## 2020-01-01 RX ADMIN — Medication 0.05 MCG/KG/MIN: at 11:11

## 2020-01-01 RX ADMIN — ALBUTEROL SULFATE 2 PUFF: 90 AEROSOL, METERED RESPIRATORY (INHALATION) at 09:04

## 2020-01-13 NOTE — PROGRESS NOTES
[facility] History & Physical    Subjective:      Patient ID:   Yocasta Almonte  75 y.o. female  1945  MD Yuri, MD Agustin.      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  Denies fever, night sweats or weight loss.  She does not have palpable lymphadenopathy.  CBC is improved after B12 and iron administration.  Will increase her Ibrutinib to 140 mg, 3 p.o. Daily.    On B 12 injections monthly, increased energy.  S/P  Injectafer weekly x's 2 weeks to replenish Fe stores.    Hx includes DM, type 2, HTN, GERD, COPD, cholesterol, thyroid dx,  and clinical lung cancer treated with empirical  Stereotactic Rad Rx. To the right upper lung area.    PET scan now shows enlarging left upper lobe nodule consistent with primary lung malignancy.  Will refer her to Dr. Velez of Radiation therapy for consideration of local treatment to the left upper lobe mass.    She has chronic low back pain. PET scan supports degenerative disc disease at the back area. She is to follow-up with Dr. Welch for evaluation of this complaint.    Surgery LN bx R axilla, neck surgery for infection.  Smoke 1/2 ppd x's 50 years.  ETOH no.  Allergy iodine.  Mom cancer, Dad ? Health.    ROS:   GEN: normal without any fever, night sweats or weight loss  HEENT: normal with no HA's, sore throat, stiff neck, changes in vision  CV: normal with no CP, SOB, PND, VALENCIA or orthopnea  PULM: see HPI  GI: normal with no abdominal pain, nausea, vomiting, constipation, diarrhea, melanotic stools, BRBPR, or hematemesis  : normal with no hematuria, dysuria  BREAST: normal with no mass, discharge, pain  SKIN: normal with no rash, erythema, bruising, or swelling     Past Medical History:   Diagnosis Date    Cancer     lymphoma    COPD (chronic obstructive pulmonary disease)     Diabetes mellitus     GERD (gastroesophageal reflux disease)     Hyperlipidemia     Hypertension     Thyroid disease      Past Surgical  History:   Procedure Laterality Date    NECK SURGERY      infection in neck removed       Review of patient's allergies indicates:   Allergen Reactions    Iodine and iodide containing products            Current Outpatient Medications:     albuterol (PROVENTIL) 5 mg/mL nebulizer solution, Take 2.5 mg by nebulization 2 (two) times daily. Rescue , Disp: , Rfl:     atorvastatin (LIPITOR) 80 MG tablet, Take 80 mg by mouth once daily., Disp: , Rfl:     ciprofloxacin-hydrocortisone 0.2-1% (CIPRO HC OTIC) otic suspension, Place 3 drops into the left ear 2 (two) times daily. Take for 16 days., Disp: , Rfl:     clobetasol 0.05% (TEMOVATE) 0.05 % Oint, Apply topically 2 (two) times daily. For rash, Disp: 30 g, Rfl: 2    fluticasone (FLONASE) 50 mcg/actuation nasal spray, 1 spray (50 mcg total) by Each Nare route 2 (two) times daily., Disp: 1 Bottle, Rfl: 3    FLUTICASONE/VILANTEROL (BREO ELLIPTA INHL), Inhale 1 puff into the lungs once daily., Disp: , Rfl:     gabapentin (NEURONTIN) 300 MG capsule, Take 300 mg by mouth 2 (two) times daily. , Disp: , Rfl:     glimepiride (AMARYL) 2 MG tablet, Take 2 mg by mouth 2 (two) times daily with meals., Disp: , Rfl:     ibrutinib (IMBRUVICA) 140 mg Cap, Take 420 mg by mouth once daily., Disp: , Rfl:     lisinopril (PRINIVIL,ZESTRIL) 20 MG tablet, Take 20 mg by mouth once daily., Disp: , Rfl:     metoprolol tartrate (LOPRESSOR) 25 MG tablet, Take 12.5 mg by mouth once daily. , Disp: , Rfl:     mirtazapine (REMERON) 30 MG tablet, Take 1 tablet by mouth every evening., Disp: , Rfl:     potassium chloride SA (K-DUR,KLOR-CON) 20 MEQ tablet, Take 20 mEq by mouth once daily. , Disp: , Rfl:     ranitidine (ZANTAC) 150 MG tablet, Take 1 tablet (150 mg total) by mouth 2 (two) times daily. For heartburn, Disp: 180 tablet, Rfl: 3    Current Facility-Administered Medications:     cyanocobalamin injection 1,000 mcg, 1,000 mcg, Subcutaneous, Q30 Days, R Harinder Kuo MD, 1,000 mcg at  01/13/20 1131          Objective:   Vitals:  Blood pressure (!) 154/73, pulse (!) 118, temperature 98.7 °F (37.1 °C), temperature source Oral, resp. rate 18, weight 66.5 kg (146 lb 9.6 oz).    Physical Examination:   GEN: no apparent distress, comfortable  HEAD: atraumatic and normocephalic  EYES: no pallor, no icterus  ENT:  no pharyngeal erythema, external ear skin excoriated on R ear.; no nasal discharge; no thrush  NECK: no masses, thyroid normal, trachea midline, no LAD/LN's, supple  CV: RRR with no murmur; normal pulse; normal S1 and S2; no pedal edema  CHEST: Normal respiratory effort; CTAB; normal breath sounds; no wheeze or crackles  ABDOM: nontender and nondistended; soft,  no rebound/guarding, L/S NP  MUSC/Skeletal: ROM normal; no crepitus; joints normal; no deformities   EXTREM: I do not palpate left supraclavicular Adenopathy at this time.  SKIN: no rashes, lesions, ulcers, petechiae  : no CVAT  NEURO: grossly intact; motor/sensory WNL;  no tremors  PSYCH: normal mood, affect and behavior  LYMPH: normal cervical, supraclavicular, axillary and groin LN's  BREASTS: no palpable mass L or R breast.    Labs:     Hemoglobin is 11.3. MCV is 84. B-12 is low at 285, and ferritin is low at 10.  Started on B 12 shots and injectafer previously.    Now Hgb 11.  Ferritin 15.  Better now Hgb 13.9., Ferritin up at 158.    PET scan showed increased opacity at the right upper lung area at 2.5 cm. This could be postradiation change. There is a 13 mm nodule at the left upper lobe area. Will check her CAT scan again of the chest in 2 months and discuss with Dr. Culp  if there is any progressive enlargement.    Follow-up PET scan now shows an enlarging left upper lobe mass with hypermetabolism concerning for new primary lung malignancy.    She had a colonoscopy 1 year ago per Dr. Streeter, polyps were removed.  Diverticulosis, hemorrhoids.  EGD gastritis.      Assessment:   (1) 75 y.o. female with diagnosis of  Small  lymphocytic NHL/CLL, on ibrutinib po daily.  CBC is stable.  Increase to 140 mg 3 p.o. Daily of Ibrutinib.    (2) abnormal PET scan with enlarging nodule in left upper lobe and increased opacity at 2.5 cm and right upper lobe, possibly post radiation change.   Referred back to Dr. Velez for radiation consideration to left upper lobe mass area.    (3) anemia secondary to B-12 deficiency and iron deficiency. On B12 injections monthly. S/P Injectafer.  Hgb better 12.    (4) low back pain symptoms ×2 years, probably secondary to degenerative disc disease. Refer back to Dr. Welch, her PMD, for management or disposition.    (4)Clinical Stage 1 R lung cancer hx, treated with stereotactic Rad Rx.     Return to clinic here in 3-4 months

## 2020-01-17 NOTE — PROGRESS NOTES
Yocasta Almonte  [id number]  1945 1/17/2020  No referring provider defined for this encounter.    DIAGNOSIS: Presumed stage IA3, [id number] NSCLC of LISSY     TREATMENT SITE(S): left upper lobe    1/31/20 PFTs  FEV1 0.92L  DLCO 35%    INTENT: CURATIVE    TREATMENT SETTING: RT ALONE     MODALITY: PHOTON    TECHNIQUE:  STEREOTACTIC BODY RADIOTHERAPY (SBRT)    IMRT MEDICAL NECESSITY: Target volume irregular shape/proximate to critical structures, Narrow margins needed to protect adjacent structures, Target volume abuts previously treated area, High precision necessary, Conventional planning maneuvers insufficient, Concave target volume with critical tissues in concavity and Dose escalation exceeds conventional treatment planning    I have personally performed treatment planning for the patient, reviewing relevant history/physical and imaging. I have defined GTV, CTV, PTV and organs at risk.     In order to accomplish this plan, I am ordering:  SIMULATION: CT SIM + 4D RESPIRATORY MIPP    CONTRAST: none    TO ACCOMPLISH REPRODUCIBLE POSITION: VACLOC, WING BOARD and PROLOCK (SBRT)    DEVICES FOR BEAM SHAPING: CUSTOMIZED MLC    CUSTOMIZED BOLUS: none    IMAGING: CBCT DAILY    I have ordered a weekly physics check.    SPECIAL PHYSICS CONSULT: YES  REASON: NEAR PRIOR FIELD; SBRT    SPECIAL TREATMENT CIRCUMSTANCE: YES  Concurrent or recent administration of chemotherapeutic agents which are known potent radiosensitizers and thus will require vigilant monitoring for exaggerated radiation toxicities.    LABS: NONE    ANTICIPATED PRESCRIPTION TO ISOCENTER: 55Gy/5frx    ENERGY: 6X    TREATMENT: DAILY    PHYSICIAN: Cricket Velez Jr, MD

## 2020-01-17 NOTE — PROGRESS NOTES
Yocasta OJEDA Gage  [id number]  1945 1/17/2020  HCRISTOPHER Kuo Md  [address]  [address]  [place] [address]    REASON FOR CONSULTATION: Presumed stage IA3, [id number] NSCLC of LISSY    TREATMENT GOAL: primary    HISTORY OF PRESENT ILLNESS:   75F with diagnosis of small CLL (on Ibrutinib) following with Dr. Kuo. She previously completed empiric stereotactic body radiation therapy, 54 Gy in 3 fractions to RUL 10/19/16. This nodule could not be bxd due to her poor lung function and risk for PTX.    She has been followed with routine surveillance imaging with March 2019 PET-CT scan demonstrating increased central consolidative component of prior right upper lobe GGO, 2.5 x 1.8cm (stable dimensions) with SUV 2.5 (up from 2.0) and new 1.3cm LISSY nodule, SUV 4.0 with no other signs of disease.    Follow-up PET-CT from January 2020 demonstrates the left upper lobe nodule to not be 2.1 x 1.6 cm in size with SUV of 9.1.  The right upper lobe findings measures 3.4 x 2.9 cm in size with stable SUV of 2.5.  FDG avid prominent lymph nodes were appreciated in the mediastinum, right hilum, pelvis and inguinal regions suspicious for progressive lymphoproliferative process.    Dr. Kuo has increased dose of ibrutinib and referred the patient for consideration of empiric radiotherapy to the left upper lobe nodule.    Today she denies fever, chills, chest pain, shortness of breath, cough or hemoptysis.  Her main complaints are longstanding tinnitus as well as GERD.    Review of Systems   Constitutional: Negative for appetite change, chills, fever and unexpected weight change.   HENT:   Positive for tinnitus. Negative for lump/mass, mouth sores, sore throat, trouble swallowing and voice change.    Eyes: Negative for eye problems and icterus.   Respiratory: Negative for cough, hemoptysis and shortness of breath.    Cardiovascular: Negative for chest pain and leg swelling.   Gastrointestinal: Negative for abdominal pain,  constipation, diarrhea, nausea and vomiting.   Genitourinary: Negative for dysuria, frequency, hematuria, nocturia and vaginal bleeding.    Musculoskeletal: Negative for back pain, gait problem, neck pain and neck stiffness.   Neurological: Negative for extremity weakness, gait problem, headaches, numbness and seizures.   Hematological: Negative for adenopathy.     Past Medical History:   Diagnosis Date    Cancer     lymphoma    COPD (chronic obstructive pulmonary disease)     Diabetes mellitus     GERD (gastroesophageal reflux disease)     Hyperlipidemia     Hypertension     Thyroid disease      Past Surgical History:   Procedure Laterality Date    NECK SURGERY      infection in neck removed     Social History     Socioeconomic History    Marital status:      Spouse name: Not on file    Number of children: Not on file    Years of education: Not on file    Highest education level: Not on file   Occupational History    Not on file   Social Needs    Financial resource strain: Not on file    Food insecurity:     Worry: Not on file     Inability: Not on file    Transportation needs:     Medical: Not on file     Non-medical: Not on file   Tobacco Use    Smoking status: Current Every Day Smoker     Packs/day: 0.50     Years: 50.00     Pack years: 25.00    Smokeless tobacco: Never Used    Tobacco comment: says she is trying to quit   Substance and Sexual Activity    Alcohol use: No     Comment: hx of alochol abuse but does not drink at all now    Drug use: No     Comment: none    Sexual activity: Not Currently     Birth control/protection: None   Lifestyle    Physical activity:     Days per week: Not on file     Minutes per session: Not on file    Stress: Not on file   Relationships    Social connections:     Talks on phone: Not on file     Gets together: Not on file     Attends Congregational service: Not on file     Active member of club or organization: Not on file     Attends meetings of  clubs or organizations: Not on file     Relationship status: Not on file   Other Topics Concern    Not on file   Social History Narrative    Not on file     Family History   Problem Relation Age of Onset    Cancer Mother     No Known Problems Father        PRIOR HISTORY OF CHEMOTHERAPY OR RADIOTHERAPY: Please see HPI for patients prior oncologic history.    Medication List with Changes/Refills   Current Medications    ALBUTEROL (PROVENTIL) 5 MG/ML NEBULIZER SOLUTION    Take 2.5 mg by nebulization 2 (two) times daily. Rescue     ATORVASTATIN (LIPITOR) 80 MG TABLET    Take 80 mg by mouth once daily.    CIPROFLOXACIN-HYDROCORTISONE 0.2-1% (CIPRO HC OTIC) OTIC SUSPENSION    Place 3 drops into the left ear 2 (two) times daily. Take for 16 days.    CLOBETASOL 0.05% (TEMOVATE) 0.05 % OINT    Apply topically 2 (two) times daily. For rash    FLUTICASONE (FLONASE) 50 MCG/ACTUATION NASAL SPRAY    1 spray (50 mcg total) by Each Nare route 2 (two) times daily.    FLUTICASONE/VILANTEROL (BREO ELLIPTA INHL)    Inhale 1 puff into the lungs once daily.    GABAPENTIN (NEURONTIN) 300 MG CAPSULE    Take 300 mg by mouth 2 (two) times daily.     GLIMEPIRIDE (AMARYL) 2 MG TABLET    Take 2 mg by mouth 2 (two) times daily with meals.    IBRUTINIB (IMBRUVICA) 140 MG CAP    Take 420 mg by mouth once daily.    LISINOPRIL (PRINIVIL,ZESTRIL) 20 MG TABLET    Take 20 mg by mouth once daily.    METOPROLOL TARTRATE (LOPRESSOR) 25 MG TABLET    Take 12.5 mg by mouth once daily.     MIRTAZAPINE (REMERON) 30 MG TABLET    Take 1 tablet by mouth every evening.    POTASSIUM CHLORIDE SA (K-DUR,KLOR-CON) 20 MEQ TABLET    Take 20 mEq by mouth once daily.     RANITIDINE (ZANTAC) 150 MG TABLET    Take 1 tablet (150 mg total) by mouth 2 (two) times daily. For heartburn     Review of patient's allergies indicates:   Allergen Reactions    Iodine and iodide containing products        QUALITY OF LIFE: 80%- Normal Activity with Effort: Some Symptoms of  Disease    Vitals:    01/17/20 0927   BP: 127/74   Pulse: 94   SpO2: 97%   Weight: 65.4 kg (144 lb 1.6 oz)   PainSc: 0-No pain     Body mass index is 23.26 kg/m².    PHYSICAL EXAM:   GENERAL: alert; in no apparent distress.   HEAD: normocephalic, atraumatic.  EYES: pupils are equal, round, reactive to light and accommodation. Sclera anicteric. Conjunctiva not injected.   NOSE/THROAT: no nasal erythema or rhinorrhea. Oropharynx pink, without erythema, ulcerations or thrush.   NECK: no cervical motion rigidity; left neck asymmetry secondary to enlarged thyroid  CHEST: Patient is speaking comfortably on room air with normal work of breathing without using accessory muscles of respiration.  Faint end expiratory wheeze without crackles  CARDIOVASCULAR: regular rate and rhythm; no murmurs, rubs or gallops.  ABDOMEN: soft, nontender, nondistended. Bowel sounds present.   MUSCULOSKELETAL: no tenderness to palpation along the spine or scapulae. Normal range of motion.  NEUROLOGIC: cranial nerves II-XII intact bilaterally. Strength 5/5 in bilateral upper and lower extremities. No sensory deficits appreciated. Normal gait.  LYMPHATIC: no cervical, supraclavicular adenopathy appreciated bilaterally.   EXTREMITIES: no cyanosis, edema.  SKIN: no erythema, rashes, ulcerations noted.     REVIEW OF IMAGING/PATHOLOGY/LABS: Please see HPI. All images reviewed personally by dictating physician.     ASSESSMENT: 75 y.o. female with presumed stage IA3, [id number] NSCLC of LISSY status post prior course of stereotactic body radiation therapy to right upper lobe nodule, not a biopsy candidate, on Imbruvia for CLL.  PLAN:  Yocasta Almonte previously underwent treatment, 54 Gy in 3 fractions to a right upper lobe nodule delivered empirically as she was a poor biopsy candidate into date this is demonstrated a complete response without viability on scans.  She has been maintained on ibrutinib for diagnosis of CLL with updated PET-CT scan  demonstrating progressive lymphadenopathy so Dr. Kuo increased her dosing.  PET-CT also demonstrated a previously visualized left upper lobe nodule to be increased in size now 2.1 cm with an SUV of 9.1 concerning for lung malignancy.  Again she is a poor candidate for biopsy or surgery and has been referred for definitive empiric radiotherapy.    I reviewed the films with the patient today and feel that she is a candidate for stereotactic body radiation therapy which I recommend to 55 Gy in 5 fractions using rigid immobilization, 4D CT simulation to monitor respiratory excursion as well as abdominal compression to minimize respiratory excursion with daily advanced image guidance of cone beam CT to ensure treatment delivery to the target.  I will send her for pulmonary function testing prior to treatment as a baseline.  I expect minimal sequelae from treatment but did discuss fatigue and the potential dosing of the adjacent rib which may cause pain at a later date.  After our discussion today the patient would like to proceed with stereotactic body radiation therapy.    I will carbon copy Dr. Kuo to coordinate care and I have asked patient to hold ibrutinib during treatment course as I am uncertain of the consequence of concurrent treatment.      Today she is complaining of tinnitus as well as reflux and this is been worked up by both ENT (no notes available to me today) as well as her PCP, Dr. Welch and I note previously being on Nexium now Zantac which she says has been changed to a 3rd Rx.  I also note borderline abnormal TSH level on October laboratory assessment and radiographic findings consistent with left-sided thyromegaly.  The patient is uncertain if she has met with an endocrinologist.  Again I will carbon copy her primary care physician to coordinate care.    I carefully explained the process of simulation and treatment delivery with weekly physician visits.     We discussed the risks and  benefits of the above treatment and have gone over in detail the acute and late toxicities of radiation therapy to the left upper lobe. The patient expressed  understanding and has signed a consent form which is included in the patients chart.     The patient has our contact information and understands that they are free to contact us at any time with questions or concerns regarding radiation therapy.    DISPOSITION: RTC FOR CT SIM    I have personally seen and evaluated this patient. Greater than 50% of this time was spent discussing coordination of care and/or counseling.    PHYSICIAN: Cricket Velez Jr, MD    Thank you for the opportunity to meet and consult with Yocasta Almonte.   Please feel free to contact me to discuss the above recommendation further.

## 2020-02-04 NOTE — PROGRESS NOTES
Yocasta Almonte  [id number]  1945 2/4/2020  No referring provider defined for this encounter.    DIAGNOSIS:  Presumed stage IA3, [id number] NSCLC of LISSY   TODAY'S DOSE (cGy): 1100    CURRENT DOSE (cGy): 1100/5500    SITE:  Left upper lobe    TITLE OF PROCEDURE: STEREOTACTIC BODY RADIATION THERAPY    PROCEDURE IN DETAIL:  Patient arrived as an outpatient to the [facility] Radiation Oncology department and was escorted to the [facility] Linear Accelerator vault and placed on the treatment couch and immobilized in customized Prolock device with respiratory compression. Once found to be in good alignment with in-room GPS positioning system, cone beam CT was completed in axial, coronal and sagittal planes. I personally confirmed agreement with planning CT scan and treatment was delivered using 6MV photons prescribed to isocenter using an IMRT algorithm. At completion of treatment, patient developed no ill sequelae and was released from Prolock immobilization and given instructions for next fraction/follow-up.    PHYSICIAN: Cricket Velez Jr, MD

## 2020-02-06 NOTE — PROGRESS NOTES
Yocasta Almonte  [id number]  1945 2/6/2020  No referring provider defined for this encounter.    DIAGNOSIS:  Presumed stage IA3, [id number] NSCLC of LISSY   TODAY'S DOSE (cGy): 1100    CURRENT DOSE (cGy): 2200/5500    SITE:  Left upper lobe    TITLE OF PROCEDURE: STEREOTACTIC BODY RADIATION THERAPY    PROCEDURE IN DETAIL:  Patient arrived as an outpatient to the [facility] Radiation Oncology department and was escorted to the [facility] Linear Accelerator vault and placed on the treatment couch and immobilized in customized Prolock device with respiratory compression. Once found to be in good alignment with in-room GPS positioning system, cone beam CT was completed in axial, coronal and sagittal planes. I personally confirmed agreement with planning CT scan and treatment was delivered using 6MV photons prescribed to isocenter using an IMRT algorithm. At completion of treatment, patient developed no ill sequelae and was released from Prolock immobilization and given instructions for next fraction/follow-up.    PHYSICIAN: Cricket Velez Jr, MD

## 2020-02-10 NOTE — PROGRESS NOTES
Yocasta Almonte  [id number]  1945  2/10/2020  No referring provider defined for this encounter.    DIAGNOSIS:  Presumed stage IA3, [id number] NSCLC of LISSY   TODAY'S DOSE (cGy): 1100    CURRENT DOSE (cGy): 3300/5500    SITE:  Left upper lobe    TITLE OF PROCEDURE: STEREOTACTIC BODY RADIATION THERAPY    PROCEDURE IN DETAIL:  Patient arrived as an outpatient to the [facility] Radiation Oncology department and was escorted to the [facility] Linear Accelerator vault and placed on the treatment couch and immobilized in customized Prolock device with respiratory compression. Once found to be in good alignment with in-room GPS positioning system, cone beam CT was completed in axial, coronal and sagittal planes. I personally confirmed agreement with planning CT scan and treatment was delivered using 6MV photons prescribed to isocenter using an IMRT algorithm. At completion of treatment, patient developed no ill sequelae and was released from Prolock immobilization and given instructions for next fraction/follow-up.    PHYSICIAN: Cricket Velez Jr, MD

## 2020-02-12 NOTE — PROGRESS NOTES
Yocasta Almonte  [id number]  1945 2/12/2020  No referring provider defined for this encounter.    DIAGNOSIS:  Presumed stage IA3, [id number] NSCLC of LISSY   TODAY'S DOSE (cGy): 1100    CURRENT DOSE (cGy): 4400/5500    SITE:  Left upper lobe    TITLE OF PROCEDURE: STEREOTACTIC BODY RADIATION THERAPY    PROCEDURE IN DETAIL:  Patient arrived as an outpatient to the [facility] Radiation Oncology department and was escorted to the [facility] Linear Accelerator vault and placed on the treatment couch and immobilized in customized Prolock device with respiratory compression. Once found to be in good alignment with in-room GPS positioning system, cone beam CT was completed in axial, coronal and sagittal planes. I personally confirmed agreement with planning CT scan and treatment was delivered using 6MV photons prescribed to isocenter using an IMRT algorithm. At completion of treatment, patient developed no ill sequelae and was released from Prolock immobilization and given instructions for next fraction/follow-up.    PHYSICIAN: Cricket Velez Jr, MD

## 2020-02-14 NOTE — PROGRESS NOTES
Yocasta Almonte  [id number]  1945 2/14/2020  No referring provider defined for this encounter.    DIAGNOSIS:  Presumed stage IA3, [id number] NSCLC of LISSY   TODAY'S DOSE (cGy): 1100    CURRENT DOSE (cGy): 5500/5500    SITE:  Left upper lobe    TITLE OF PROCEDURE: STEREOTACTIC BODY RADIATION THERAPY    PROCEDURE IN DETAIL:  Patient arrived as an outpatient to the [facility] department and was escorted to the [facility] vault and placed on the treatment couch and immobilized in customized Prolock device with respiratory compression. Once found to be in good alignment with in-room GPS positioning system, cone beam CT was completed in axial, coronal and sagittal planes. I personally confirmed agreement with planning CT scan and treatment was delivered using 6MV photons prescribed to isocenter using an IMRT algorithm. At completion of treatment, patient developed no ill sequelae and was released from Prolock immobilization and given instructions for next fraction/follow-up.    PHYSICIAN: Cricket Velez Jr, MD

## 2020-02-17 PROBLEM — H60.543 ECZEMA OF EXTERNAL EAR, BILATERAL: Status: ACTIVE | Noted: 2020-01-01

## 2020-02-17 NOTE — PROGRESS NOTES
Subjective:       Patient ID: Yocasta Almonte is a 75 y.o. female.    Chief Complaint: DM Check Up / A1C / Foot Check and R Lung Nodule    Patient with past medical history of well-controlled hypertension and diabetes presents for visit.  She has no problems with her medications.  She has COPD and her breathing has been stable.  She is currently receiving radiation therapy for a new primary lung cancer.  A nodule was noted in her left upper lobe for some time.  She also has CLL and was receiving chemotherapy for this as well.  She has anemia chronic disease which is being followed by Hematology as well and this has been stable.  Vitals are stable.  She reports no current problems with her medications.  She does have some recurring issues with eczema in her ears and has had some significant problems with inflammation and cellulitis.  She is managing to keep some of this control by using moisturizer to her ears to prevent some of the dryness.  She has not had any falls.  She continues to manage her activities of daily living and does continue to she has been having some recent problems with her toenails and has an upcoming appointment with podiatrist for trimming.    Office Visit on 02/17/2020   Component Date Value Ref Range Status    Hemoglobin A1C 02/17/2020 5.8  % Final   Hospital Outpatient Visit on 01/09/2020   Component Date Value Ref Range Status    POC Glucose 01/09/2020 89  70 - 110 Final   Orders Only on 10/07/2019   Component Date Value Ref Range Status    Glucose 10/07/2019 94  65 - 139 mg/dL Final    BUN, Bld 10/07/2019 11  7 - 25 mg/dL Final    Creatinine 10/07/2019 0.53* 0.60 - 0.93 mg/dL Final    eGFR if non African American 10/07/2019 93  > OR = 60 mL/min/1.73m2 Final    eGFR if  10/07/2019 108  > OR = 60 mL/min/1.73m2 Final    BUN/Creatinine Ratio 10/07/2019 21  6 - 22 (calc) Final    Sodium 10/07/2019 143  135 - 146 mmol/L Final    Potassium 10/07/2019 3.8  3.5 - 5.3 mmol/L  Final    Chloride 10/07/2019 104  98 - 110 mmol/L Final    CO2 10/07/2019 29  20 - 32 mmol/L Final    Calcium 10/07/2019 9.5  8.6 - 10.4 mg/dL Final    Total Protein 10/07/2019 7.3  6.1 - 8.1 g/dL Final    Albumin 10/07/2019 4.3  3.6 - 5.1 g/dL Final    Globulin, Total 10/07/2019 3.0  1.9 - 3.7 g/dL (calc) Final    Albumin/Globulin Ratio 10/07/2019 1.4  1.0 - 2.5 (calc) Final    Total Bilirubin 10/07/2019 0.4  0.2 - 1.2 mg/dL Final    Alkaline Phosphatase 10/07/2019 116  33 - 130 U/L Final    AST 10/07/2019 9* 10 - 35 U/L Final    ALT 10/07/2019 6  6 - 29 U/L Final    Cholesterol 10/07/2019 247* <200 mg/dL Final    HDL 10/07/2019 65  >50 mg/dL Final    Triglycerides 10/07/2019 151* <150 mg/dL Final    LDL Cholesterol 10/07/2019 154* mg/dL (calc) Final    Hdl/Cholesterol Ratio 10/07/2019 3.8  <5.0 (calc) Final    Non HDL Chol. (LDL+VLDL) 10/07/2019 182* <130 mg/dL (calc) Final    TSH w/reflex to FT4 10/07/2019 0.41  0.40 - 4.50 mIU/L Final   Lab Visit on 10/07/2019   Component Date Value Ref Range Status    WBC 10/07/2019 4.84  3.90 - 12.70 K/uL Final    RBC 10/07/2019 4.95  4.00 - 5.40 M/uL Final    Hemoglobin 10/07/2019 13.9  12.0 - 16.0 g/dL Final    Hematocrit 10/07/2019 42.5  37.0 - 48.5 % Final    Mean Corpuscular Volume 10/07/2019 86  82 - 98 fL Final    Mean Corpuscular Hemoglobin 10/07/2019 28.1  27.0 - 31.0 pg Final    Mean Corpuscular Hemoglobin Conc 10/07/2019 32.7  32.0 - 36.0 g/dL Final    RDW 10/07/2019 15.5* 11.5 - 14.5 % Final    Platelets 10/07/2019 157  150 - 350 K/uL Final    MPV 10/07/2019 11.1  9.2 - 12.9 fL Final    Immature Granulocytes 10/07/2019 0.6* 0.0 - 0.5 % Final    Gran # (ANC) 10/07/2019 2.5  1.8 - 7.7 K/uL Final    Immature Grans (Abs) 10/07/2019 0.03  0.00 - 0.04 K/uL Final    Lymph # 10/07/2019 1.7  1.0 - 4.8 K/uL Final    Mono # 10/07/2019 0.4  0.3 - 1.0 K/uL Final    Eos # 10/07/2019 0.2  0.0 - 0.5 K/uL Final    Baso # 10/07/2019 0.05  0.00 -  0.20 K/uL Final    nRBC 10/07/2019 0  0 /100 WBC Final    Gran% 10/07/2019 51.7  38.0 - 73.0 % Final    Lymph% 10/07/2019 34.3  18.0 - 48.0 % Final    Mono% 10/07/2019 8.7  4.0 - 15.0 % Final    Eosinophil% 10/07/2019 3.7  0.0 - 8.0 % Final    Basophil% 10/07/2019 1.0  0.0 - 1.9 % Final    Differential Method 10/07/2019 Automated   Final    Ferritin 10/07/2019 158  20.0 - 300.0 ng/mL Final     Past Medical History:   Diagnosis Date    Cancer     lymphoma    COPD (chronic obstructive pulmonary disease)     Diabetes mellitus     GERD (gastroesophageal reflux disease)     Hyperlipidemia     Hypertension     Thyroid disease      Past Surgical History:   Procedure Laterality Date    NECK SURGERY      infection in neck removed     Family History   Problem Relation Age of Onset    Cancer Mother     No Known Problems Father        Marital Status:   Alcohol History:  reports that she does not drink alcohol.  Tobacco History:  reports that she has been smoking. She has a 25.00 pack-year smoking history. She has never used smokeless tobacco.  Drug History:  reports that she does not use drugs.    Review of patient's allergies indicates:   Allergen Reactions    Iodine and iodide containing products        Current Outpatient Medications:     atorvastatin (LIPITOR) 80 MG tablet, Take 1 tablet (80 mg total) by mouth once daily. For cholesterol, Disp: 90 tablet, Rfl: 3    gabapentin (NEURONTIN) 300 MG capsule, Take 1 capsule (300 mg total) by mouth 2 (two) times daily. 1 cap AM, 2 caps NOON, 1 capPM, Disp: 180 capsule, Rfl: 3    glimepiride (AMARYL) 2 MG tablet, Take 1 tablet (2 mg total) by mouth daily with breakfast., Disp: 90 tablet, Rfl: 3    ibrutinib (IMBRUVICA) 140 mg Cap, Take 240 mg by mouth once daily ., Disp: , Rfl:     metoprolol tartrate (LOPRESSOR) 25 MG tablet, Take 0.5 tablets (12.5 mg total) by mouth once daily., Disp: 45 tablet, Rfl: 3    mirtazapine (REMERON) 30 MG tablet, Take 1  "tablet (30 mg total) by mouth every evening., Disp: 90 tablet, Rfl: 3    potassium chloride SA (K-DUR,KLOR-CON) 20 MEQ tablet, Take 1 tablet (20 mEq total) by mouth once daily., Disp: 90 tablet, Rfl: 3    ranitidine (ZANTAC) 150 MG tablet, Take 1 tablet (150 mg total) by mouth 2 (two) times daily. For heartburn, Disp: 180 tablet, Rfl: 3    albuterol (PROVENTIL) 5 mg/mL nebulizer solution, Take 2.5 mg by nebulization 2 (two) times daily. Rescue , Disp: , Rfl:     fluticasone (FLONASE) 50 mcg/actuation nasal spray, 1 spray (50 mcg total) by Each Nare route 2 (two) times daily., Disp: 1 Bottle, Rfl: 3    FLUTICASONE/VILANTEROL (BREO ELLIPTA INHL), Inhale 1 puff into the lungs once daily., Disp: , Rfl:     Current Facility-Administered Medications:     cyanocobalamin injection 1,000 mcg, 1,000 mcg, Subcutaneous, Q30 Days, CHRISTOPHER Kuo MD, 1,000 mcg at 02/10/20 1208    Review of Systems   Constitutional: Negative for activity change, fatigue and unexpected weight change.   HENT: Negative for hearing loss, postnasal drip, sinus pressure, sore throat and voice change.    Eyes: Negative for photophobia and visual disturbance.   Respiratory: Negative for cough, shortness of breath and wheezing.    Cardiovascular: Negative for chest pain and palpitations.   Gastrointestinal: Negative for constipation, diarrhea and nausea.   Genitourinary: Negative for difficulty urinating, frequency, hematuria and urgency.   Musculoskeletal: Positive for arthralgias. Negative for back pain.   Skin: Negative for rash.   Neurological: Negative for weakness, light-headedness and headaches.   Hematological: Negative for adenopathy. Does not bruise/bleed easily.   Psychiatric/Behavioral: The patient is not nervous/anxious.           Objective:      Vitals:    02/17/20 0936   BP: 136/74   Pulse: 76   Weight: 65.8 kg (145 lb)   Height: 5' 6" (1.676 m)     Body mass index is 23.4 kg/m².  Physical Exam   Constitutional: She is oriented to " person, place, and time. Vital signs are normal. She appears well-developed and well-nourished. No distress.   Ambulates with cane   HENT:   Head: Normocephalic and atraumatic.   Right Ear: Tympanic membrane and external ear normal.   Left Ear: Tympanic membrane and external ear normal.   Eyes: Pupils are equal, round, and reactive to light. Conjunctivae, EOM and lids are normal.   Neck: Full passive range of motion without pain. Neck supple. No JVD present. No tracheal deviation present. No thyromegaly present.   Cardiovascular: Normal rate and regular rhythm. PMI is not displaced.   Pulmonary/Chest: Effort normal and breath sounds normal.   Abdominal: Soft. Bowel sounds are normal. There is no hepatosplenomegaly. There is no tenderness. There is no rebound and no guarding.   Musculoskeletal: She exhibits no edema or tenderness.   Neurological: She is alert and oriented to person, place, and time.   Skin: Skin is warm and dry. Rash noted.   Some dryness to ears   Psychiatric: She has a normal mood and affect.   Vitals reviewed.        LEFT: normal DP and PT pulses, no trophic changes or ulcerative lesions, normal sensory exam and onychomycosis    RIGHT: normal DP and PT pulses and no trophic changes or ulcerative lesions    Assessment:       1. Type 2 diabetes mellitus with other specified complication, without long-term current use of insulin    2. Primary osteoarthritis involving multiple joints    3. Anemia of decreased vitamin B12 absorption    4. Chronic obstructive pulmonary disease, unspecified COPD type    5. Essential hypertension    6. Malignant neoplasm of upper lobe of right lung    7. Tobacco user    8. Chronic lymphocytic leukemia    9. Eczema of external ear, bilateral    10. Mixed hyperlipidemia    11. Gastroesophageal reflux disease without esophagitis    12. Hypokalemia    13. Neuropathy    14. Other insomnia        Plan:       Type 2 diabetes mellitus with other specified complication, without  long-term current use of insulin  Comments:  well controlled no hypoglycemia.  Reduce glimepiride to once daily  Orders:  -     Hemoglobin A1C, POCT  -     glimepiride (AMARYL) 2 MG tablet; Take 1 tablet (2 mg total) by mouth daily with breakfast.  Dispense: 90 tablet; Refill: 3    Primary osteoarthritis involving multiple joints    Anemia of decreased vitamin B12 absorption    Chronic obstructive pulmonary disease, unspecified COPD type    Essential hypertension  Comments:  well controlled  Orders:  -     metoprolol tartrate (LOPRESSOR) 25 MG tablet; Take 0.5 tablets (12.5 mg total) by mouth once daily.  Dispense: 45 tablet; Refill: 3    Malignant neoplasm of upper lobe of right lung  Comments:  Continue radiation therapy    Tobacco user  Comments:  no desire to quit    Chronic lymphocytic leukemia  Comments:  Chemotherapy medication recently increased    Eczema of external ear, bilateral  Comments:  continue use of vaseline for moisture     Mixed hyperlipidemia  Comments:  Intermittent compliance with medication.  Orders:  -     atorvastatin (LIPITOR) 80 MG tablet; Take 1 tablet (80 mg total) by mouth once daily. For cholesterol  Dispense: 90 tablet; Refill: 3    Gastroesophageal reflux disease without esophagitis  -     ranitidine (ZANTAC) 150 MG tablet; Take 1 tablet (150 mg total) by mouth 2 (two) times daily. For heartburn  Dispense: 180 tablet; Refill: 3    Hypokalemia  Comments:  Stable  Orders:  -     potassium chloride SA (K-DUR,KLOR-CON) 20 MEQ tablet; Take 1 tablet (20 mEq total) by mouth once daily.  Dispense: 90 tablet; Refill: 3    Neuropathy  Comments:  Symptoms stable.  Using reduced medication  Orders:  -     gabapentin (NEURONTIN) 300 MG capsule; Take 1 capsule (300 mg total) by mouth 2 (two) times daily. 1 cap AM, 2 caps NOON, 1 capPM  Dispense: 180 capsule; Refill: 3    Other insomnia  -     mirtazapine (REMERON) 30 MG tablet; Take 1 tablet (30 mg total) by mouth every evening.  Dispense: 90  tablet; Refill: 3      Follow up in about 4 months (around 6/17/2020) for Diabetic Check-Up.        2/17/2020 Rani Welch M.D.

## 2020-04-20 NOTE — TELEPHONE ENCOUNTER
Received call from patient requesting to be seen secondary to shortness of breath while at rest and chronic cough for one week. Instructed patient we would not be the best department to call and requested she call her primary care physician or lung doctor. Patient verbalized feeling anxious about who to call.  Stated I would discuss with Dr. Velez as to the best course of action for her. Patient verbalized understanding.

## 2020-04-20 NOTE — TELEPHONE ENCOUNTER
----- Message from Kristie Norwood sent at 4/20/2020  3:04 PM CDT -----  Contact: N.P. FROM ASHLEY REYES FROM [employer] CALLED IN AND STATES MS. CHEPE SQUIRES IS STILL HAVING SOB, WHEEZING AND OTHER SYMPTOMS. IF YOU COULD CALL HIM -[phone].

## 2020-04-20 NOTE — TELEPHONE ENCOUNTER
"Contacted patient after reviewing progress note from   Lakeshia Loya NP with [facility] who had a tele visit with patient on 04/17/2020.  Reviewed with patient that she has taken both her prednisone and azithromycin as prescribed and using her nebulizer treatments as ordered.  Patient verbalized that she was following all orders but still was having shortness of breath.  Patient stated she is only smoking "every now and then".  Instructed patient that she may need further evaluation per ER personnel as her symptoms have not improved with prescriptions.  Patient verbalized she would call someone to bring her to the hospital.  Dr. Velez aware.  "

## 2020-04-20 NOTE — TELEPHONE ENCOUNTER
Spoke with Sachin.   patient called multiple times while he was off.  States as explained by patient, sounds as if she had COPD exacerbation.  Sachin sun patient started on zpack, prednisone, and instructed to continue breathing treatments.  Repeat cxr shows no changes from previous cxr.  Is now at home, feels more comfortable at home now that her niece is staying with her.  States dr caceres is aware of current therapy -DN

## 2020-04-22 PROBLEM — J96.01 ACUTE RESPIRATORY FAILURE WITH HYPOXIA: Status: ACTIVE | Noted: 2020-01-01

## 2020-04-22 PROBLEM — R10.13 EPIGASTRIC PAIN: Status: ACTIVE | Noted: 2020-01-01

## 2020-04-22 NOTE — NURSING
Am rounds, received patient for er, awake and alert, vitals stable, moving andres arms and andres legs, pupils equal and reactive, changed from bipap to 2ln/c, then oxi mask, per patient request, nares are sore. No sob noted, does get slightly sob with activity. Requesting to eat.

## 2020-04-22 NOTE — PLAN OF CARE
Recommend mechanical soft solids (no rice, no bread, and meats finely chopped) with thin liquids. Would benefit from meds buried in puree. Insure Pt is on nasal cannula and HOB is elevated for meals. Pt does present with increased risk for aspiration secondary to COPD and lung CA. Will continue to follow.     Problem: SLP Goal  Goal: SLP Goal  Description  1. Tolerate thin liquids and mechanical soft solids w/out overt s/s of airway compromise   2. Recall aspiration precautions including HOB elevation, slow rate and need for oxygen via NC   3. Ongoing assessment of swallow safety with oral feeds pending clinical presentation    Outcome: Ongoing, Progressing

## 2020-04-22 NOTE — SUBJECTIVE & OBJECTIVE
Past Medical History:   Diagnosis Date    Cancer     lymphoma    COPD (chronic obstructive pulmonary disease)     Diabetes mellitus     GERD (gastroesophageal reflux disease)     Hyperlipidemia     Hypertension     Thyroid disease        Past Surgical History:   Procedure Laterality Date    NECK SURGERY      infection in neck removed       Review of patient's allergies indicates:   Allergen Reactions    Iodine and iodide containing products     Penicillins        Current Facility-Administered Medications on File Prior to Encounter   Medication    cyanocobalamin injection 1,000 mcg     Current Outpatient Medications on File Prior to Encounter   Medication Sig    albuterol (PROVENTIL) 5 mg/mL nebulizer solution Take 2.5 mg by nebulization 2 (two) times daily. Rescue     atorvastatin (LIPITOR) 80 MG tablet Take 1 tablet (80 mg total) by mouth once daily. For cholesterol    esomeprazole (NEXIUM) 40 mg GrPS Take 40 mg by mouth before breakfast.    fluticasone (FLONASE) 50 mcg/actuation nasal spray 1 spray (50 mcg total) by Each Nare route 2 (two) times daily.    gabapentin (NEURONTIN) 300 MG capsule Take 1 capsule (300 mg total) by mouth 2 (two) times daily. 1 cap AM, 2 caps NOON, 1 capPM    glimepiride (AMARYL) 2 MG tablet Take 1 tablet (2 mg total) by mouth daily with breakfast.    ibrutinib (IMBRUVICA) 140 mg Cap Take 240 mg by mouth once daily .    loratadine (CLARITIN) 10 mg tablet Take 10 mg by mouth once daily.    metoprolol tartrate (LOPRESSOR) 25 MG tablet Take 0.5 tablets (12.5 mg total) by mouth once daily.    mirtazapine (REMERON) 30 MG tablet Take 1 tablet (30 mg total) by mouth every evening.    potassium chloride SA (K-DUR,KLOR-CON) 20 MEQ tablet Take 1 tablet (20 mEq total) by mouth once daily.    ranitidine (ZANTAC) 150 MG tablet Take 1 tablet (150 mg total) by mouth 2 (two) times daily. For heartburn    [DISCONTINUED] FLUTICASONE/VILANTEROL (BREO ELLIPTA INHL) Inhale 1 puff into  the lungs once daily.     Family History     Problem Relation (Age of Onset)    Cancer Mother    No Known Problems Father        Tobacco Use    Smoking status: Current Every Day Smoker     Packs/day: 0.50     Years: 50.00     Pack years: 25.00    Smokeless tobacco: Never Used    Tobacco comment: says she is trying to quit   Substance and Sexual Activity    Alcohol use: No     Comment: hx of alochol abuse but does not drink at all now    Drug use: No     Comment: none    Sexual activity: Not Currently     Birth control/protection: None     Review of Systems   All other systems reviewed and are negative.    Objective:     Vital Signs (Most Recent):  Temp: 98.4 °F (36.9 °C) (04/22/20 0539)  Pulse: 84 (04/22/20 0601)  Resp: (!) 21 (04/22/20 0530)  BP: 120/73 (04/22/20 0500)  SpO2: 96 % (04/22/20 0602) Vital Signs (24h Range):  Temp:  [98.4 °F (36.9 °C)] 98.4 °F (36.9 °C)  Pulse:  [] 84  Resp:  [21-35] 21  SpO2:  [96 %-100 %] 96 %  BP: (115-197)/() 120/73     Weight: 67.1 kg (148 lb)  Body mass index is 23.89 kg/m².    Physical Exam   Constitutional: She is oriented to person, place, and time. She appears well-developed and well-nourished.   HENT:   Head: Normocephalic and atraumatic.   Eyes: Right eye exhibits no discharge. Left eye exhibits no discharge.   Neck: No JVD present. No tracheal deviation present.   Cardiovascular: Normal rate and regular rhythm.   No murmur heard.  Pulmonary/Chest:   On BiPAP, diminished breath sounds with mild expiratory wheezes   Abdominal: Soft. Bowel sounds are normal. There is no tenderness.   Genitourinary:   Genitourinary Comments: No CVA tenderness   Musculoskeletal: She exhibits no edema or deformity.   Neurological: She is alert and oriented to person, place, and time.   No focal deficits   Skin: Skin is warm and dry. Capillary refill takes less than 2 seconds. She is not diaphoretic.   Psychiatric: She has a normal mood and affect.   Vitals reviewed.           Significant Labs:   CBC:   Recent Labs   Lab 04/22/20 0418   WBC 12.63   HGB 12.2   HCT 37.6        CMP:   Recent Labs   Lab 04/22/20 0418      K 4.1      CO2 25   *   BUN 13   CREATININE 0.7   CALCIUM 8.6*   PROT 6.7   ALBUMIN 3.7   BILITOT 0.4   ALKPHOS 80   AST 42*   ALT 26   ANIONGAP 9   EGFRNONAA >60.0     Cardiac Markers:   Recent Labs   Lab 04/22/20 0418   BNP 72     Lactic Acid:   Recent Labs   Lab 04/22/20 0418   LACTATE 2.9*     Magnesium: No results for input(s): MG in the last 48 hours.  Urine Studies:   Recent Labs   Lab 04/22/20  0550   COLORU Yellow   APPEARANCEUA Clear   PHUR 6.0   SPECGRAV 1.015   PROTEINUA 1+*   GLUCUA 3+*   KETONESU Negative   BILIRUBINUA Negative   OCCULTUA 1+*   NITRITE Negative   UROBILINOGEN Negative   LEUKOCYTESUR Negative   RBCUA 8*   WBCUA 1   BACTERIA Negative   SQUAMEPITHEL 2   HYALINECASTS 4*       Significant Imaging: I have reviewed all pertinent imaging results/findings within the past 24 hours.   Ct Head Without Contrast    Result Date: 4/22/2020  CMS MANDATED QUALITY DATA - CT RADIATION - 436 All CT scans at this facility utilize dose modulation, iterative reconstruction, and/or weight based dosing when appropriate to reduce radiation dose to as low as reasonably achievable. EXAMINATION: CT HEAD WITHOUT CONTRAST CLINICAL HISTORY: Confusion/delirium, altered LOC, unexplained; TECHNIQUE: Head CT without IV contrast. COMPARISON: 05/17/2016 FINDINGS: Gray-white differentiation is maintained without hemorrhage, midline shift, or mass effect. Minor chronic small vessel ischemic changes remain unchanged. The ventricles and cisterns are maintained. Calvarium is intact. Visualized sinuses are clear.     No acute intracranial abnormality. Electronically signed by: Ye Huang MD Date:    04/22/2020 Time:    06:05    X-ray Chest Ap Portable    Result Date: 4/22/2020  EXAMINATION: XR CHEST AP PORTABLE CLINICAL HISTORY: Suspected Covid-19 Virus  Infection;  personal history of malignancy, dyspnea, hypoxia FINDINGS: Portable chest at 401 compared with 01/09/2020 PET-CT shows normal cardiomediastinal silhouette. Nodular opacities in the right superior lung zone remains similar to that on the prior PET-CT.  Known left lung nodular opacity from prior PET-CT is somewhat inconspicuous on this radiograph.  No new alveolar consolidation or pleural effusion.  Pulmonary vasculature is normal. No acute osseous abnormality.     No significant change of right upper lobe pulmonary nodule since 01/09/2020 with no new or acute cardiopulmonary abnormality. Electronically signed by: Ye Huang MD Date:    04/22/2020 Time:    06:13

## 2020-04-22 NOTE — H&P
"[facility] Medicine  History & Physical  Date of service: 4/22/2020  At 0615    Patient Name: Yocasta Almonte  MRN: [id number]  Admission Date: 4/22/2020  Attending Physician: Benjamin Richter MD  Primary Care Provider: Rani Welch MD         Patient information was obtained from patient, past medical records and ER records.     Subjective:     Principal Problem:Acute respiratory failure with hypoxia    Chief Complaint:   Chief Complaint   Patient presents with    Respiratory Distress     pt c/o shortness of breath at home with sats of 64% upon EMS arrival.pt placed on CPAP with improvment of symptoms upon arrival to ER.         HPI: The patient is a 75 year old smoker female with history of hypertension, diabetes mellitus, COPD - not on home O2, lung cancer, CLL.  She presented to the emergency department with shortness of breath. Per ED physician, her daughter reported that the patient was tachypneic and confused at 2 am today. EMS noted to she had GCS of 6 and SPO2 60% on room air. She was placed on bipap and subsequently brought to the ED.    She is currently on bipap. ROS is limited. The patient states that she has progressive shortness of breath for a week, associated with wheezing and coughing with white sputum. She denies fever or chills. She denies edema or weight gain. She reports intermittent epigastric discomfort and food "hung up" - improved after vomiting. She denies abdominal pain or urinary symptoms.        In the ED:  Tachycardic, tachypneic, hypertensive on arrival, improved  WBC 12.63  Hemoglobin 12.2  Hematocrit 37.6  CMP unremarkable except glucose of 163  CRP 0.61  BNP 72      Troponin less than 0.030  Lactic acid 2.9  Procalcitonin < 0.05  Ferritin 139  COVID-19 Negative  Chest radiograph, personally reviewed, no obvious infiltrates or overt heart failure, right hilar adenopathy  CT head Negative  EKG, personally reviewed, sinus rhythm with sinus " arrhythmia, QTc 426 ms, no ST elevation    Past Medical History:   Diagnosis Date    Cancer     lymphoma    COPD (chronic obstructive pulmonary disease)     Diabetes mellitus     GERD (gastroesophageal reflux disease)     Hyperlipidemia     Hypertension     Thyroid disease        Past Surgical History:   Procedure Laterality Date    NECK SURGERY      infection in neck removed       Review of patient's allergies indicates:   Allergen Reactions    Iodine and iodide containing products     Penicillins        Current Facility-Administered Medications on File Prior to Encounter   Medication    cyanocobalamin injection 1,000 mcg     Current Outpatient Medications on File Prior to Encounter   Medication Sig    albuterol (PROVENTIL) 5 mg/mL nebulizer solution Take 2.5 mg by nebulization 2 (two) times daily. Rescue     atorvastatin (LIPITOR) 80 MG tablet Take 1 tablet (80 mg total) by mouth once daily. For cholesterol    esomeprazole (NEXIUM) 40 mg GrPS Take 40 mg by mouth before breakfast.    fluticasone (FLONASE) 50 mcg/actuation nasal spray 1 spray (50 mcg total) by Each Nare route 2 (two) times daily.    gabapentin (NEURONTIN) 300 MG capsule Take 1 capsule (300 mg total) by mouth 2 (two) times daily. 1 cap AM, 2 caps NOON, 1 capPM    glimepiride (AMARYL) 2 MG tablet Take 1 tablet (2 mg total) by mouth daily with breakfast.    ibrutinib (IMBRUVICA) 140 mg Cap Take 240 mg by mouth once daily .    loratadine (CLARITIN) 10 mg tablet Take 10 mg by mouth once daily.    metoprolol tartrate (LOPRESSOR) 25 MG tablet Take 0.5 tablets (12.5 mg total) by mouth once daily.    mirtazapine (REMERON) 30 MG tablet Take 1 tablet (30 mg total) by mouth every evening.    potassium chloride SA (K-DUR,KLOR-CON) 20 MEQ tablet Take 1 tablet (20 mEq total) by mouth once daily.    ranitidine (ZANTAC) 150 MG tablet Take 1 tablet (150 mg total) by mouth 2 (two) times daily. For heartburn    [DISCONTINUED]  FLUTICASONE/VILANTEROL (BREO ELLIPTA INHL) Inhale 1 puff into the lungs once daily.     Family History     Problem Relation (Age of Onset)    Cancer Mother    No Known Problems Father        Tobacco Use    Smoking status: Current Every Day Smoker     Packs/day: 0.50     Years: 50.00     Pack years: 25.00    Smokeless tobacco: Never Used    Tobacco comment: says she is trying to quit   Substance and Sexual Activity    Alcohol use: No     Comment: hx of alochol abuse but does not drink at all now    Drug use: No     Comment: none    Sexual activity: Not Currently     Birth control/protection: None     Review of Systems   All other systems reviewed and are negative.    Objective:     Vital Signs (Most Recent):  Temp: 98.4 °F (36.9 °C) (04/22/20 0539)  Pulse: 84 (04/22/20 0601)  Resp: (!) 21 (04/22/20 0530)  BP: 120/73 (04/22/20 0500)  SpO2: 96 % (04/22/20 0602) Vital Signs (24h Range):  Temp:  [98.4 °F (36.9 °C)] 98.4 °F (36.9 °C)  Pulse:  [] 84  Resp:  [21-35] 21  SpO2:  [96 %-100 %] 96 %  BP: (115-197)/() 120/73     Weight: 67.1 kg (148 lb)  Body mass index is 23.89 kg/m².    Physical Exam   Constitutional: She is oriented to person, place, and time. She appears well-developed and well-nourished.   HENT:   Head: Normocephalic and atraumatic.   Eyes: Right eye exhibits no discharge. Left eye exhibits no discharge.   Neck: No JVD present. No tracheal deviation present.   Cardiovascular: Normal rate and regular rhythm.   No murmur heard.  Pulmonary/Chest:   On BiPAP, diminished breath sounds with mild expiratory wheezes   Abdominal: Soft. Bowel sounds are normal. There is no tenderness.   Genitourinary:   Genitourinary Comments: No CVA tenderness   Musculoskeletal: She exhibits no edema or deformity.   Neurological: She is alert and oriented to person, place, and time.   No focal deficits   Skin: Skin is warm and dry. Capillary refill takes less than 2 seconds. She is not diaphoretic.   Psychiatric:  She has a normal mood and affect.   Vitals reviewed.          Significant Labs:   CBC:   Recent Labs   Lab 04/22/20 0418   WBC 12.63   HGB 12.2   HCT 37.6        CMP:   Recent Labs   Lab 04/22/20 0418      K 4.1      CO2 25   *   BUN 13   CREATININE 0.7   CALCIUM 8.6*   PROT 6.7   ALBUMIN 3.7   BILITOT 0.4   ALKPHOS 80   AST 42*   ALT 26   ANIONGAP 9   EGFRNONAA >60.0     Cardiac Markers:   Recent Labs   Lab 04/22/20 0418   BNP 72     Lactic Acid:   Recent Labs   Lab 04/22/20 0418   LACTATE 2.9*     Magnesium: No results for input(s): MG in the last 48 hours.  Urine Studies:   Recent Labs   Lab 04/22/20  0550   COLORU Yellow   APPEARANCEUA Clear   PHUR 6.0   SPECGRAV 1.015   PROTEINUA 1+*   GLUCUA 3+*   KETONESU Negative   BILIRUBINUA Negative   OCCULTUA 1+*   NITRITE Negative   UROBILINOGEN Negative   LEUKOCYTESUR Negative   RBCUA 8*   WBCUA 1   BACTERIA Negative   SQUAMEPITHEL 2   HYALINECASTS 4*       Significant Imaging: I have reviewed all pertinent imaging results/findings within the past 24 hours.   Ct Head Without Contrast    Result Date: 4/22/2020  CMS MANDATED QUALITY DATA - CT RADIATION - 436 All CT scans at this facility utilize dose modulation, iterative reconstruction, and/or weight based dosing when appropriate to reduce radiation dose to as low as reasonably achievable. EXAMINATION: CT HEAD WITHOUT CONTRAST CLINICAL HISTORY: Confusion/delirium, altered LOC, unexplained; TECHNIQUE: Head CT without IV contrast. COMPARISON: 05/17/2016 FINDINGS: Gray-white differentiation is maintained without hemorrhage, midline shift, or mass effect. Minor chronic small vessel ischemic changes remain unchanged. The ventricles and cisterns are maintained. Calvarium is intact. Visualized sinuses are clear.     No acute intracranial abnormality. Electronically signed by: Ye Huang MD Date:    04/22/2020 Time:    06:05    X-ray Chest Ap Portable    Result Date: 4/22/2020  EXAMINATION: XR  CHEST AP PORTABLE CLINICAL HISTORY: Suspected Covid-19 Virus Infection;  personal history of malignancy, dyspnea, hypoxia FINDINGS: Portable chest at 401 compared with 01/09/2020 PET-CT shows normal cardiomediastinal silhouette. Nodular opacities in the right superior lung zone remains similar to that on the prior PET-CT.  Known left lung nodular opacity from prior PET-CT is somewhat inconspicuous on this radiograph.  No new alveolar consolidation or pleural effusion.  Pulmonary vasculature is normal. No acute osseous abnormality.     No significant change of right upper lobe pulmonary nodule since 01/09/2020 with no new or acute cardiopulmonary abnormality. Electronically signed by: Ye Huang MD Date:    04/22/2020 Time:    06:13      Assessment/Plan:     * Acute respiratory failure with hypoxia  Reports progressive shortness of breath with white sputum for a week  Denies fever  Afebrile  Reports confusion by her daughter, improved with bipap  SPO2 60s, per EMS report, improved on bipap  Currently on bipap with FiO2 40%, continue bipap  Diminished breath sounds with mild expiratory wheezes noted  A smoker  Advise smoking cessation  COVID-19 Negative  No obvious infiltrates/overt heart failure  Suspect COPD exacerbation  IV steroids  Nebulizers  Add IV levofloxacin given mild leukocytosis      Lactic acidosis  Likely due to hypoxia  Low suspicious for sepsis given normal procalcitonin  Trend lactic acid      Epigastric pain  Likely due to dysphagia/GERD  EKG showed no ischemic change  Trend troponin for completeness  Cardiac monitor      Chronic lymphocytic leukemia  Chronic medical condition  Resume home medication, Ibrutinib      Malignant neoplasm of upper lobe, bronchus or lung  Chronic medical condition      Dysphagia  Chronic medical condition  Monitor      Tobacco user  Advised smoking cessation      Type 2 diabetes mellitus with other specified complication  Chronic medical condition  Hemoglobin A1c 5.8  about 2 months ago  Monitor blood glucose  Sliding scale insulin    Anemia of decreased vitamin B12 absorption  Stable  Monitor      COPD (chronic obstructive pulmonary disease)  Chronic medical condition  Continue home medication  Monitor      HLD (hyperlipidemia)  Chronic medical condition  Continue home medication      Essential hypertension  Chronic medical condition  Continue home medication  Monitor      VTE Risk Mitigation (From admission, onward)         Ordered     enoxaparin injection 40 mg  Daily      04/22/20 0623     IP VTE HIGH RISK PATIENT  Once      04/22/20 0623                   RAJESH Chicas  Department of Hospital Medicine   [facility]

## 2020-04-22 NOTE — ASSESSMENT & PLAN NOTE
Reports progressive shortness of breath with white sputum for a week  Denies fever  Afebrile  Reports confusion by her daughter, improved with bipap  SPO2 60s, per EMS report, improved on bipap  Currently on bipap with FiO2 40%, continue bipap  Diminished breath sounds with mild expiratory wheezes noted  A smoker  Advise smoking cessation  COVID-19 Negative  No obvious infiltrates/overt heart failure  Suspect COPD exacerbation  IV steroids  Nebulizers  Add IV levofloxacin given mild leukocytosis

## 2020-04-22 NOTE — PT/OT/SLP EVAL
Speech Language Pathology Evaluation  Bedside Swallow    Patient Name:  Yocasta Almonte   MRN:  [id number]  Admitting Diagnosis: Acute respiratory failure with hypoxia    Recommendations:                 General Recommendations:  Follow to monitor, educate and provide recommendations as needed  Diet recommendations:  Mechanical soft, Finely chopped meat, No Bread, No Rice, Thin   Aspiration Precautions:   · O2 via nasal cannula w/ all meals  · HOB to 90 degrees  · Promote slow rate   · Meds in puree    General Precautions: Standard, aspiration, airborne, contact, droplet, special contact  Communication strategies:  none    History:     Past Medical History:   Diagnosis Date    Cancer     lymphoma    COPD (chronic obstructive pulmonary disease)     Diabetes mellitus     GERD (gastroesophageal reflux disease)     Hyperlipidemia     Hypertension     Thyroid disease        Past Surgical History:   Procedure Laterality Date    NECK SURGERY      infection in neck removed       Prior Intubation HX:  None; BiPAP on admit. Transferred to [facility] in ICU.    Modified Barium Swallow: None    Imaging Results          CT Head Without Contrast (Final result)  Result time 04/22/20 06:05:13    Final result by Ye Huang MD (04/22/20 06:05:13)                 Impression:      No acute intracranial abnormality.      Electronically signed by: Ye Huang MD  Date:    04/22/2020  Time:    06:05             Narrative:      CMS MANDATED QUALITY DATA - CT RADIATION - 436    All CT scans at this facility utilize dose modulation, iterative reconstruction, and/or weight based dosing when appropriate to reduce radiation dose to as low as reasonably achievable.    EXAMINATION:  CT HEAD WITHOUT CONTRAST    CLINICAL HISTORY:  Confusion/delirium, altered LOC, unexplained;    TECHNIQUE:  Head CT without IV contrast.    COMPARISON:  05/17/2016    FINDINGS:  Gray-white differentiation is maintained without hemorrhage, midline shift, or mass  "effect. Minor chronic small vessel ischemic changes remain unchanged.    The ventricles and cisterns are maintained.    Calvarium is intact. Visualized sinuses are clear.                               X-Ray Chest AP Portable (Final result)  Result time 04/22/20 06:13:11    Final result by Ye Huang MD (04/22/20 06:13:11)                 Impression:      No significant change of right upper lobe pulmonary nodule since 01/09/2020 with no new or acute cardiopulmonary abnormality.      Electronically signed by: Ye Huang MD  Date:    04/22/2020  Time:    06:13             Narrative:    EXAMINATION:  XR CHEST AP PORTABLE    CLINICAL HISTORY:  Suspected Covid-19 Virus Infection;  personal history of malignancy, dyspnea, hypoxia    FINDINGS:  Portable chest at 401 compared with 01/09/2020 PET-CT shows normal cardiomediastinal silhouette.    Nodular opacities in the right superior lung zone remains similar to that on the prior PET-CT.  Known left lung nodular opacity from prior PET-CT is somewhat inconspicuous on this radiograph.  No new alveolar consolidation or pleural effusion.  Pulmonary vasculature is normal. No acute osseous abnormality.                                  Prior diet: unrestricted.    Subjective     "Hard things get hung up"  Patient goals: none stated      Pain/Comfort:  ·  0/10    Objective:     Cognitive Communication: Alert and oriented x4. Able to follow commands within unstructured context. Fluent and appropriate at the conversational level.     Pt promotes globus sensation consistent with documented hx of esophageal stricture and esophageal dysphagia. Pt also states, "sometimes I have to hold the liquid before I swallow it so I don't choke".       Oral Musculature Evaluation  · Oral Musculature: WFL  · Dentition: edentulous  · Secretion Management: adequate  · Mucosal Quality: good  · Mandibular Strength and Mobility: WFL  · Oral Labial Strength and Mobility: WFL  · Lingual Strength and " Mobility: WFL  · Volitional Cough: Present  · Volitional Swallow: Hyolaryngeal movement present to digital palpation  · Voice Prior to PO Intake: clear    Bedside Swallow Eval:   Consistencies Assessed:  · Thin liquids 3oz via sequential cup sips, isolated cup edge x5   · Puree x3  · Soft solid x3     Oral Phase:   · Pt with adequate oral containment and coordination across consistencies. Timely mastication, adequate bolus formation and oral clearance which resulted in no evidence of appreciable oral residue with solid consistencies.    Pharyngeal Phase:   · Adequate hyolaryngeal movement to digital palpation. Pt tolerated 3oz thin liquid via sequential sips with no overt s/s of aspiration or changes in vocal quality, passing 3oz water challenge. Therefore, risk of aspiration not indicated. Multiple swallows not observed across consistencies trialled.     Compensatory Strategies  · None       Assessment:     Yocasta Almonte is a 75 y.o. female with an SLP diagnosis of increased risk for aspiration secondary to COPD further complicated by lung CA. COVID workup pending. Should Pt demonstrate decline in pulmonary function, please hold PO. Will follow to monitor.     Goals:   Multidisciplinary Problems     SLP Goals        Problem: SLP Goal    Goal Priority Disciplines Outcome   SLP Goal     SLP Ongoing, Progressing   Description:  1. Tolerate thin liquids and mechanical soft solids w/out overt s/s of airway compromise   2. Recall aspiration precautions including HOB elevation, slow rate and need for oxygen via NC   3. Ongoing assessment of swallow safety with oral feeds pending clinical presentation                     Plan:     · Patient to be seen:  (2-5x/week)   · Plan of Care expires:     · Plan of Care reviewed with:  patient   · SLP Follow-Up:  Yes       Discharge recommendations:  (pending)     Time Tracking:     SLP Treatment Date:   04/22/20  Speech Start Time:  1008  Speech Stop Time:  1038     Speech Total  Time (min):  30 min    Billable Minutes: Eval 15  and Treatment Swallowing Dysfunction 15    Marc Arteaga CCC-SLP  04/22/2020

## 2020-04-22 NOTE — ASSESSMENT & PLAN NOTE
Likely due to dysphagia/GERD  EKG showed no ischemic change  Trend troponin for completeness  Cardiac monitor

## 2020-04-22 NOTE — PROGRESS NOTES
Pt admitted this AM.  See H&P for further details.    Metabolic Encephalopathy due to Acute hypoxemic hypercapnic respiratory failure  Due to COPD exacerbation  Complicated by suspected COVID infection  Complicated by tobacco usage  Complicated by CLL + lung neoplasm  - rapid COVID negative  - COVID PCR testing in progress  - levaquin, IV steroids (started on admission; now holding for COVID PCR as pt is already improving without steroids but will restart if COVID PCR negative), nebs  - oncology consulted, thank you - pt continued on ibrutinib now?  - wean oxygen as tolerated  - continue monitoring in ICU  - smoking cessation counseling     Epigastric pain, Dysphagia  Likely GERD  Minor Concern for ACS  - trending troponin; EKG unchanged from previous  - SLP evaluation    DM  - SSI    Other chronic conditions: home meds as appropriate  Electrolyte derangement:  Replacement prn  VTE ppx: lovenox  FULL CODE

## 2020-04-22 NOTE — ASSESSMENT & PLAN NOTE
Chronic medical condition  Hemoglobin A1c 5.8 about 2 months ago  Monitor blood glucose  Sliding scale insulin

## 2020-04-22 NOTE — RESPIRATORY THERAPY
04/22/20 0530   Patient Assessment/Suction   Level of Consciousness (AVPU) alert   PRE-TX-O2   O2 Device (Oxygen Therapy) BiPAP   $ Is the patient on Low Flow Oxygen? Yes   Oxygen Concentration (%) 35   SpO2 100 %   Pulse Oximetry Type Continuous   $ Pulse Oximetry - Multiple Charge Pulse Oximetry - Multiple   Pulse 70   Resp (!) 21   Ready to Wean/Extubation Screen   FIO2<=50 (chart decimal) 0.35   Preset CPAP/BiPAP Settings   Mode Of Delivery BiPAP S/T   $ Is patient using? Yes   Transport Patient   $ Transport Tech Time Charge 15 min   Oxygen Method BiPAP   Transport to ct   Toleration Good   Respiratory Evaluation   $ Care Plan Tech Time 15 min

## 2020-04-22 NOTE — ED PROVIDER NOTES
Encounter Date: 4/22/2020       History     Chief Complaint   Patient presents with    Respiratory Distress     pt c/o shortness of breath at home with sats of 64% upon EMS arrival.pt placed on CPAP with improvment of symptoms upon arrival to ER.      75-year-old female with a history of COPD, diabetes, hypertension, hyperlipidemia, lymphoma presents today with shortness of breath, patient's daughter noted that patient was tachypneic and confused at 2:00 a.m. this morning, patient found by EMS to have a GCS of 6 and to be satting 60% on room air patient was started on CPAP and oxygen saturations improved, mental status improved to GCS 13.  Currently patient GCS is 15 she is following commands and answering all questions appropriately patient reports that she became short of breath and she is not sure why, patient reports some mild epigastric discomfort otherwise no complaints she denies chest pain she denies headache patient has no other complaints at this time        Review of patient's allergies indicates:   Allergen Reactions    Iodine and iodide containing products     Penicillins      Past Medical History:   Diagnosis Date    Cancer     lymphoma    COPD (chronic obstructive pulmonary disease)     Diabetes mellitus     GERD (gastroesophageal reflux disease)     Hyperlipidemia     Hypertension     Thyroid disease      Past Surgical History:   Procedure Laterality Date    NECK SURGERY      infection in neck removed     Family History   Problem Relation Age of Onset    Cancer Mother     No Known Problems Father      Social History     Tobacco Use    Smoking status: Current Every Day Smoker     Packs/day: 0.50     Years: 50.00     Pack years: 25.00    Smokeless tobacco: Never Used    Tobacco comment: says she is trying to quit   Substance Use Topics    Alcohol use: No     Comment: hx of alochol abuse but does not drink at all now    Drug use: No     Comment: none     Review of Systems   Unable to  perform ROS: Acuity of condition   Constitutional: Negative for fever.   Respiratory: Positive for cough and shortness of breath.    Cardiovascular: Negative for chest pain.   Neurological: Negative for speech difficulty, weakness and headaches.       Physical Exam     Initial Vitals   BP Pulse Resp Temp SpO2   04/22/20 0331 04/22/20 0331 04/22/20 0331 04/22/20 0539 04/22/20 0331   (!) 197/101 (!) 119 (!) 35 98.4 °F (36.9 °C) 99 %      MAP       --                Physical Exam    Nursing note and vitals reviewed.  Constitutional: She appears well-developed and well-nourished. She is not diaphoretic. No distress.   HENT:   Head: Normocephalic and atraumatic.   Right Ear: External ear normal.   Left Ear: External ear normal.   Nose: Nose normal.   Mouth/Throat: Oropharynx is clear and moist. No oropharyngeal exudate.   Eyes: Conjunctivae and EOM are normal. Pupils are equal, round, and reactive to light. Right eye exhibits no discharge. Left eye exhibits no discharge. No scleral icterus.   Neck: Normal range of motion. Neck supple. No thyromegaly present. No tracheal deviation present. No JVD present.   Cardiovascular: Normal rate, regular rhythm, normal heart sounds and intact distal pulses. Exam reveals no gallop and no friction rub.    No murmur heard.  Pulmonary/Chest: No stridor. No respiratory distress. She has no wheezes. She has no rhonchi. She has no rales. She exhibits no tenderness.   Coarse breath sounds noted bilaterally   Abdominal: Soft. Bowel sounds are normal. She exhibits no distension and no mass. There is no tenderness. There is no rebound and no guarding.   Musculoskeletal: Normal range of motion. She exhibits no edema or tenderness.   Lymphadenopathy:     She has no cervical adenopathy.   Neurological: She is alert. She has normal strength. She displays normal reflexes. No cranial nerve deficit or sensory deficit. GCS score is 15. GCS eye subscore is 4. GCS verbal subscore is 5. GCS motor  subscore is 6.   Oriented to person and situation   Skin: Skin is warm and dry. No rash and no abscess noted. No erythema. No pallor.         ED Course   Procedures  Labs Reviewed   CBC W/ AUTO DIFFERENTIAL - Abnormal; Notable for the following components:       Result Value    RDW 14.6 (*)     Immature Granulocytes 1.3 (*)     Gran # (ANC) 9.6 (*)     Immature Grans (Abs) 0.16 (*)     Gran% 75.8 (*)     Lymph% 14.6 (*)     All other components within normal limits   COMPREHENSIVE METABOLIC PANEL - Abnormal; Notable for the following components:    Glucose 163 (*)     Calcium 8.6 (*)     AST 42 (*)     All other components within normal limits   LACTIC ACID, PLASMA - Abnormal; Notable for the following components:    Lactate (Lactic Acid) 2.9 (*)     All other components within normal limits    Narrative:     Lactic Acid  critical result(s) repeated. Called and verbal readback   obtained from Milo Gomez Rn/ER by AS2 04/22/2020 05:21   URINALYSIS, REFLEX TO URINE CULTURE - Abnormal; Notable for the following components:    Protein, UA 1+ (*)     Glucose, UA 3+ (*)     Occult Blood UA 1+ (*)     All other components within normal limits    Narrative:     Preferred Collection Type->Urine, Clean Catch  Specimen Source->Urine   URINALYSIS MICROSCOPIC - Abnormal; Notable for the following components:    RBC, UA 8 (*)     Hyaline Casts, UA 4 (*)     All other components within normal limits    Narrative:     Preferred Collection Type->Urine, Clean Catch  Specimen Source->Urine   ISTAT PROCEDURE - Abnormal; Notable for the following components:    POC PH 7.333 (*)     POC PCO2 47.1 (*)     POC PO2 197 (*)     All other components within normal limits   C-REACTIVE PROTEIN   FERRITIN   LACTATE DEHYDROGENASE   CK   TROPONIN I   SARS-COV-2 RNA AMPLIFICATION, QUAL    Narrative:     What symptom criteria does the patient meet?->Cough  What symptom criteria does the patient meet?->Difficulty  breathing   PROCALCITONIN   B-TYPE  NATRIURETIC PEPTIDE     EKG Readings: (Independently Interpreted)   Initial Reading: No STEMI. Rhythm: Normal Sinus Rhythm. Heart Rate: 79. Ectopy: No Ectopy. Conduction: Normal. Axis: Normal.     ECG Results          EKG 12-lead (Final result)  Result time 04/22/20 18:17:52    Final result by Interface, Lab In [facility] (04/22/20 18:17:52)                 Narrative:    Test Reason : R68.89,    Vent. Rate : 079 BPM     Atrial Rate : 090 BPM     P-R Int : 152 ms          QRS Dur : 084 ms      QT Int : 372 ms       P-R-T Axes : 079 013 030 degrees     QTc Int : 426 ms    Sinus rhythm with marked sinus arrythmia  Otherwise normal ECG  When compared with ECG of 25-JUN-2013 08:30,  No significant change was found  Confirmed by Masoud Gusman MD ([id number]) on 4/22/2020 6:17:40 PM    Referred By: [email or web address]   SELF           Confirmed By:Masoud Gusman MD                            Imaging Results          CT Head Without Contrast (Final result)  Result time 04/22/20 06:05:13    Final result by Ye Huang MD (04/22/20 06:05:13)                 Impression:      No acute intracranial abnormality.      Electronically signed by: Ye Huang MD  Date:    04/22/2020  Time:    06:05             Narrative:      CMS MANDATED QUALITY DATA - CT RADIATION - 436    All CT scans at this facility utilize dose modulation, iterative reconstruction, and/or weight based dosing when appropriate to reduce radiation dose to as low as reasonably achievable.    EXAMINATION:  CT HEAD WITHOUT CONTRAST    CLINICAL HISTORY:  Confusion/delirium, altered LOC, unexplained;    TECHNIQUE:  Head CT without IV contrast.    COMPARISON:  05/17/2016    FINDINGS:  Gray-white differentiation is maintained without hemorrhage, midline shift, or mass effect. Minor chronic small vessel ischemic changes remain unchanged.    The ventricles and cisterns are maintained.    Calvarium is intact. Visualized sinuses are clear.                               X-Ray Chest AP  Portable (Final result)  Result time 04/22/20 06:13:11    Final result by Ye Huang MD (04/22/20 06:13:11)                 Impression:      No significant change of right upper lobe pulmonary nodule since 01/09/2020 with no new or acute cardiopulmonary abnormality.      Electronically signed by: Ye Huang MD  Date:    04/22/2020  Time:    06:13             Narrative:    EXAMINATION:  XR CHEST AP PORTABLE    CLINICAL HISTORY:  Suspected Covid-19 Virus Infection;  personal history of malignancy, dyspnea, hypoxia    FINDINGS:  Portable chest at 401 compared with 01/09/2020 PET-CT shows normal cardiomediastinal silhouette.    Nodular opacities in the right superior lung zone remains similar to that on the prior PET-CT.  Known left lung nodular opacity from prior PET-CT is somewhat inconspicuous on this radiograph.  No new alveolar consolidation or pleural effusion.  Pulmonary vasculature is normal. No acute osseous abnormality.                              X-Rays:   Independently Interpreted Readings:   Other Readings:  X-ray chest:  Patient has patchy opacities bilaterally, patient has mass noted to right lung this is presumably known neoplasm.    Medical Decision Making:   History:   Old Medical Records: I decided to obtain old medical records.  Initial Assessment:   Emergent evaluation of a 75-year-old female presenting in respiratory distress differential diagnosis is broad but includes infection, electrolyte abnormality, endocrine dysfunction, ACS, intracranial abnormality, progression of cancer              Attending Attestation:         Attending Critical Care:   Critical Care Times:   Direct Patient Care (initial evaluation, reassessments, and time considering the case)................................................................20 minutes.   Additional History from reviewing old medical records or taking additional history from the family, EMS, PCP, etc.......................5 minutes.   Ordering,  Reviewing, and Interpreting Diagnostic Studies...............................................................................................................5 minutes.   Documentation..................................................................................................................................................................................10 minutes.   Consultation with other Physicians. .................................................................................................................................................10 minutes.   ==============================================================  · Total Critical Care Time - exclusive of procedural time: 50 minutes.  ==============================================================  Critical care was time spent personally by me on the following activities: obtaining history from patient or relative, examination of patient, review of old charts, ordering lab, x-rays, and/or EKG, development of treatment plan with patient or relative, ordering and performing treatments and interventions, evaluation of patient's response to treatment, discussion with consultants, interpretation of cardiac measurements and re-evaluation of patient's conition.   Critical Care Condition: potentially life-threatening       Attending ED Notes:   Patient's ABG seems to indicate improvement on BiPAP, patient is alert and oriented x3 at this time patient reports she is feeling much better, patient is satting 100% on BiPAP, patient's tachycardia has resolved and tachypnea has resolved.  I reviewed case with patient's daughter she reports that patient has been waking up at night short of breath for the past 5 days and discussing this with her insurance company nurses over the phone and patient was scheduled to see her pulmonologist.  Patient is a smoker and still smokes half a pack a day.  Daughter reports no recent illness no sick contacts.  Patient currently reports  no complaints.  EJ placed on left.    Patient continues to improve, vital signs are stable at this time tachycardia has resolved hypertension has resolved, patient does have a positive lactic acid however I believe this is secondary to patient's respiratory distress from earlier rather than sepsis.  Will initiate 130 cc per hour bolus in an attempt to fluid resuscitate patient without contributing to her shortness of breath by using large boluses.  Patient has adequate profusion of extremities on re-evaluation GCS remains 15 patient reports that she is feeling well.    Patient consulted to Internal Medicine for further evaluation and management.                        Clinical Impression:       ICD-10-CM ICD-9-CM   1. Acute respiratory failure with hypoxia J96.01 518.81   2. Suspected Covid-19 Virus Infection R68.89    3. SOB (shortness of breath) R06.02 786.05   4. COPD exacerbation J44.1 491.21   5. Respiratory distress R06.03 786.09   6. Chronic lymphocytic leukemia C91.10 204.10   7. Essential hypertension I10 401.9   8. Weakness R53.1 780.79   9. YESICA (obstructive sleep apnea) G47.33 327.23   10. Ear problem, unspecified laterality H93.90 V41.3   11. Dysphagia, unspecified type R13.10 787.20             ED Disposition Condition    Admit                           Sony Baxter MD  04/29/20 0846

## 2020-04-22 NOTE — ED NOTES
Pt came in in respiratory distress. Pt is now on Bipap stating 100%. Pt is alert and oriented. Granddaughter stated she passed out earlier. Pt states she doesn't feel like she hit her head on anything,

## 2020-04-22 NOTE — PLAN OF CARE
04/22/20 1325   Patient Assessment/Suction   Level of Consciousness (AVPU) alert   Respiratory Effort Normal;Unlabored   Expansion/Accessory Muscles/Retractions no use of accessory muscles;no retractions   All Lung Fields Breath Sounds coarse;wheezes, expiratory   Rhythm/Pattern, Respiratory unlabored;pattern regular;depth regular   PRE-TX-O2   O2 Device (Oxygen Therapy) Oxymask  (TAKEN OFF PATIENT)   $ Is the patient on Low Flow Oxygen? Yes   Flow (L/min) 2   SpO2 99 %   Pulse Oximetry Type Continuous   $ Pulse Oximetry - Single Charge Pulse Oximetry - Single   Pulse 90   Resp (!) 25   Inhaler   $ Inhaler Charges MDI (Metered Dose Inahler) Treatment;Given With Spacer   Daily Review of Necessity (Inhaler) completed   Respiratory Treatment Status (Inhaler) given   Treatment Route (Inhaler) mouthpiece   Patient Position (Inhaler) HOB elevated   Post Treatment Assessment (Inhaler) breath sounds unchanged   Signs of Intolerance (Inhaler) none   Respiratory Evaluation   $ Care Plan Tech Time 15 min   Evaluation For   (care plan)

## 2020-04-22 NOTE — HPI
"The patient is a 75 year old smoker female with history of hypertension, diabetes mellitus, COPD - not on home O2, lung cancer, CLL.  She presented to the emergency department with shortness of breath. Per ED physician, her daughter reported that the patient was tachypneic and confused at 2 am today. EMS noted to she had GCS of 6 and SPO2 60% on room air. She was placed on bipap and subsequently brought to the ED.    She is currently on bipap. ROS is limited. The patient states that she has progressive shortness of breath for a week, associated with wheezing and coughing with white sputum. She denies fever or chills. She denies edema or weight gain. She reports intermittent epigastric discomfort and food "hung up" - improved after vomiting. She denies abdominal pain or urinary symptoms.        In the ED:  Tachycardic, tachypneic, hypertensive on arrival, improved  WBC 12.63  Hemoglobin 12.2  Hematocrit 37.6  CMP unremarkable except glucose of 163  CRP 0.61  BNP 72      Troponin less than 0.030  Lactic acid 2.9  Procalcitonin < 0.05  Ferritin 139  COVID-19 Negative  Chest radiograph, personally reviewed, no obvious infiltrates or overt heart failure, right hilar adenopathy  CT head Negative  EKG, personally reviewed, sinus rhythm with sinus arrhythmia, QTc 426 ms, no ST elevation  "

## 2020-04-23 PROBLEM — E87.20 LACTIC ACIDOSIS: Status: RESOLVED | Noted: 2018-07-25 | Resolved: 2020-01-01

## 2020-04-23 PROBLEM — J44.1 COPD EXACERBATION: Status: ACTIVE | Noted: 2018-07-24

## 2020-04-23 PROBLEM — J96.01 ACUTE RESPIRATORY FAILURE WITH HYPOXIA: Status: RESOLVED | Noted: 2020-01-01 | Resolved: 2020-01-01

## 2020-04-23 PROBLEM — J44.1 COPD WITH ACUTE EXACERBATION: Status: ACTIVE | Noted: 2020-01-01

## 2020-04-23 PROBLEM — Z20.822 COVID-19 VIRUS NOT DETECTED: Status: ACTIVE | Noted: 2020-01-01

## 2020-04-23 NOTE — PROGRESS NOTES
[facility]  Progress Note    Patient Name: Yocasta Almonte  MRN: [id number]  Patient Class: IP- Inpatient   Admission Date: 4/22/2020  3:36 AM  Attending Physician: Leila Hernandez MD  Primary Care Provider: Rani Welch MD  Face-to-Face encounter date: 04/23/2020    Assessment & Plan:   Yocasta Almonte is a 75 y.o. female with:    Metabolic Encephalopathy due to Acute hypoxemic hypercapnic respiratory failure, improved  Due to COPD exacerbation  Complicated by tobacco usage  Complicated by CLL + lung neoplasm  - rapid and PCR COVID tests negative  - levaquin, prednisone, nebs  - oncology following, thank you - ibrutinib held per their recs  - walking trial for home oxygen assessment   - smoking cessation counseling   - metoprolol discontinued; monitoring HR and BP     Epigastric pain, Dysphagia  Likely GERD  Minor Concern for ACS, resolved  - SLP evaluation completed  - dysphagia diet    Otitis Media  Complicated by PCN allergy  - azithromycin     DM  - SSI     Other chronic conditions: home meds as appropriate  Electrolyte derangement:  Replacement prn  VTE ppx: lovenox  FULL CODE    Discharge Planning:     PT/OT evaluating.  Dysphagia diet.  Pt will need follow up with oncology and ENT.  Will also need YESICA evaluation.    Subjective:      HPI:    The patient is a 75 year old smoker female with history of hypertension, diabetes mellitus, COPD - not on home O2, lung cancer, CLL.  She presented to the emergency department with shortness of breath. Per ED physician, her daughter reported that the patient was tachypneic and confused at 2 am today. EMS noted to she had GCS of 6 and SPO2 60% on room air. She was placed on bipap and subsequently brought to the ED.     She is currently on bipap. ROS is limited. The patient states that she has progressive shortness of breath for a week, associated with wheezing and coughing with white sputum. She denies fever or chills. She denies edema  "or weight gain. She reports intermittent epigastric discomfort and food "hung up" - improved after vomiting. She denies abdominal pain or urinary symptoms.    Hospital Course:    4/23:  Pt reports no SOB while on RA.  Transferred out of ICU.  Negative COVID PCR testing.  Walking trail ordered.  Pt describes symptoms of YESICA but has never had PSG.  Agrees to specialist follow-up.  No CP.  No palpitations.  Likely discharge home today.    Review of Systems   All systems reviewed and are negative except as noted per above.  Objective:     Physical Exam  /71   Pulse 84   Temp 97.2 °F (36.2 °C) (Axillary)   Resp 16   Ht 5' 6" (1.676 m)   Wt 66.7 kg (147 lb 0.8 oz)   SpO2 100%   Breastfeeding? No   BMI 23.73 kg/m²     Gen: alert, responsive   HEENT:  Eyes - no pallor  External ears with no lesions  Nares patent  Mouth - lips chapped  CV: RRR  Lungs: WHEEZING  Abd: +BS, soft, NT, ND  Ext: no atrophy or edema  Skin: warm, dry  Neuro: grossly intact  Psych: pleasant    Recent Labs   Lab 04/23/20  1307   WBC 9.22   HGB 12.5   HCT 39.2        Recent Labs   Lab 04/23/20  1129   CALCIUM 8.7   ALBUMIN 3.5   PROT 6.3      K 3.9   CO2 27      BUN 10   CREATININE 0.8   ALKPHOS 81   ALT 25   AST 20   BILITOT 0.6     No results found for: POCTGLUCOSE   Microbiology Results (last 7 days)     Procedure Component Value Units Date/Time    Blood culture x two cultures. Draw prior to antibiotics. [[id number]] Collected:  04/22/20 0436    Order Status:  Completed Specimen:  Blood from Line, Jugular, External Left Updated:  04/23/20 0632     Blood Culture, Routine No Growth to date      No Growth to date    Narrative:       Aerobic and anaerobic    Blood culture x two cultures. Draw prior to antibiotics. [[id number]] Collected:  04/22/20 0413    Order Status:  Completed Specimen:  Blood from Peripheral, Jugular, External Left Updated:  04/23/20 0632     Blood Culture, Routine No Growth to date      No Growth to " date    Narrative:       Aerobic and anaerobic        X-Ray Chest AP Portable   Final Result      CT Head Without Contrast   Final Result      No acute intracranial abnormality.         Electronically signed by: Ye Huang MD   Date:    04/22/2020   Time:    06:05      X-Ray Chest AP Portable   Final Result      No significant change of right upper lobe pulmonary nodule since 01/09/2020 with no new or acute cardiopulmonary abnormality.         Electronically signed by: Ye Huang MD   Date:    04/22/2020   Time:    06:13          All labs, images, and other studies - including those not included in this note -- were reviewed personally by me.    Inpatient medications  Scheduled Meds:   albuterol  2 puff Inhalation Q6H    atorvastatin  80 mg Oral Daily    azithromycin  250 mg Oral Daily    cetirizine  10 mg Oral Daily    chlorhexidine  15 mL Mouth/Throat BID    enoxaparin  40 mg Subcutaneous Daily    fluticasone propionate  1 spray Each Nostril BID    gabapentin  300 mg Oral BID    levoFLOXacin  750 mg Intravenous Q24H    mirtazapine  30 mg Oral QHS    mupirocin   Nasal BID    pantoprazole  40 mg Oral Before breakfast    polyethylene glycol  17 g Oral Daily    predniSONE  40 mg Oral Daily     Continuous Infusions:  PRN Meds:.acetaminophen, albuterol **AND** MDI Q6H PRN, bisacodyL, calcium chloride IVPB, calcium chloride IVPB, calcium chloride IVPB, dextrose 50%, dextrose 50%, glucagon (human recombinant), glucose, glucose, insulin aspart U-100, ipratropium **AND** MDI Q6H PRN, magnesium oxide, magnesium sulfate IVPB, magnesium sulfate IVPB, magnesium sulfate IVPB, magnesium sulfate IVPB, melatonin, potassium chloride in water, potassium chloride in water, potassium chloride in water, potassium chloride in water, potassium chloride, potassium chloride, potassium chloride, potassium chloride, sodium chloride 0.9%, sodium phosphate IVPB, sodium phosphate IVPB, sodium phosphate IVPB, sodium phosphate  IVPB, sodium phosphate IVPB    Above encounter included review of the medical records, interviewing and examining the patient face-to-face, discussion with family and other health care providers, ordering and interpreting lab/test results and formulating a plan of care.     Leila Hernandez MD  [facility] Hospitalist

## 2020-04-23 NOTE — NURSING
Patient received to floor from ICU via wheelchair and transport.  Patient on room air, vital signs obtained. No open wounds noted. No complaints or concerns voiced. Patient ambulated to bathroom and assisted into bed.  Bed low, locked, HOB at 30-45 degrees, call light and phone within reach.  Patient tolerated activity well.

## 2020-04-23 NOTE — PT/OT/SLP EVAL
"Occupational Therapy   Evaluation and Discharge Note    Name: Yocasta Almonte  MRN: [id number]  Admitting Diagnosis:  COPD exacerbation      Recommendations:     Discharge Recommendations: home health OT, home with home health  Discharge Equipment Recommendations:  none  Barriers to discharge:       Assessment:     Yocasta Almonte is a 75 y.o. female with a medical diagnosis of COPD exacerbation. At this time, patient is functioning at their prior level of function and does not require further acute OT services.     Plan:     During this hospitalization, patient does not require further acute OT services.  Please re-consult if situation changes.    · Plan of Care Reviewed with: patient    Subjective     Chief Complaint: "I'm ready to go home."  Patient/Family Comments/goals: to return home    Occupational Profile:  Living Environment: Pt lives alone in 1-story house, 4 steps to enter with rail on right.  Previous level of function: Mod I for ADLs and ambulation with cane  Roles and Routines: cooking, cleaning  Equipment Used at home:  none  Assistance upon Discharge: from brother and granddaughter who is currently staying with pt    Pain/Comfort:  · Pain Rating 1: 0/10    Patients cultural, spiritual, Gnosticist conflicts given the current situation:      Objective:     Communicated with: nurse prior to session.  Patient found up in chair with bed alarm, telemetry upon OT entry to room.    General Precautions: Standard, fall, respiratory   Orthopedic Precautions:N/A   Braces: N/A     Occupational Performance:    Functional Mobility/Transfers:  · Patient completed Sit <> Stand Transfer with stand by assistance  with  rolling walker   · Patient completed Toilet Transfer Step Transfer technique with contact guard assistance with  rolling walker  · Functional Mobility: Pt ambulated around room with RW and CGA.    Activities of Daily Living:  · Grooming: stand by assistance standing at sink  · Lower Body Dressing: supervision " to doff/don socks  · Toileting: stand by assistance     Cognitive/Visual Perceptual:  Cognitive/Psychosocial Skills:     -       Oriented to: Person, Place and Time   -       Memory: No Deficits noted  -       Safety awareness/insight to disability: impaired   -       Mood/Affect/Coping skills/emotional control: Appropriate to situation and Cooperative    Physical Exam:  Balance:    -       seated: good; standing: fair(+)  Upper Extremity Range of Motion:     -       Right Upper Extremity: WFL  -       Left Upper Extremity: WFL  Upper Extremity Strength:    -       Right Upper Extremity: WFL  -       Left Upper Extremity: WFL    AMPAC 6 Click ADL:  AMPAC Total Score: 24    Treatment & Education:  OT role/POC  Education:    Patient left up in chair with call button in reach    GOALS:   Multidisciplinary Problems     Occupational Therapy Goals     Not on file                History:     Past Medical History:   Diagnosis Date    Cancer     lymphoma    COPD (chronic obstructive pulmonary disease)     Diabetes mellitus     GERD (gastroesophageal reflux disease)     Hyperlipidemia     Hypertension     Thyroid disease        Past Surgical History:   Procedure Laterality Date    NECK SURGERY      infection in neck removed       Time Tracking:     OT Date of Treatment: 04/23/20  OT Start Time: 1426  OT Stop Time: 1438  OT Total Time (min): 12 min    Billable Minutes:Evaluation 12    Stacia Alvares OT  4/23/2020

## 2020-04-23 NOTE — DISCHARGE SUMMARY
"[facility]  Discharge Summary      Patient Name: Yocasta Almonte  MRN: [id number]  Admission Date: 4/22/2020  Discharge Date and Time:  04/23/2020 3:50 PM  Discharging Provider: Leila Hernandez MD  Primary Care Provider: Rani Welch MD    HPI:   The patient is a 75 year old smoker female with history of hypertension, diabetes mellitus, COPD - not on home O2, lung cancer, CLL.  She presented to the emergency department with shortness of breath. Per ED physician, her daughter reported that the patient was tachypneic and confused at 2 am today. EMS noted to she had GCS of 6 and SPO2 60% on room air. She was placed on bipap and subsequently brought to the ED.    She is currently on bipap. ROS is limited. The patient states that she has progressive shortness of breath for a week, associated with wheezing and coughing with white sputum. She denies fever or chills. She denies edema or weight gain. She reports intermittent epigastric discomfort and food "hung up" - improved after vomiting. She denies abdominal pain or urinary symptoms.        In the ED:  Tachycardic, tachypneic, hypertensive on arrival, improved  WBC 12.63  Hemoglobin 12.2  Hematocrit 37.6  CMP unremarkable except glucose of 163  CRP 0.61  BNP 72      Troponin less than 0.030  Lactic acid 2.9  Procalcitonin < 0.05  Ferritin 139  COVID-19 Negative  Chest radiograph, personally reviewed, no obvious infiltrates or overt heart failure, right hilar adenopathy  CT head Negative  EKG, personally reviewed, sinus rhythm with sinus arrhythmia, QTc 426 ms, no ST elevation    * No surgery found *      Hospital Course:    Pt reports no SOB while on RA.  Transferred out of ICU.  Negative COVID PCR testing.  Walking trail ordered--pt doesn't require home oxygen supplementation.  Pt describes symptoms of YESICA but has never had PSG.  Agrees to specialist follow-up.  No CP.  No palpitations.      Metabolic Encephalopathy due " "to Acute hypoxemic hypercapnic respiratory failure, RESOLVED  Due to COPD exacerbation  Complicated by tobacco usage  Complicated by CLL + lung neoplasm  - rapid and PCR COVID tests negative  - levaquin, prednisone, nebs  - oncology following, thank you - ibrutinib held per their recs during inpt stay  - smoking cessation counseling   - metoprolol discontinued; monitoring HR and BP     Epigastric pain, Dysphagia  Likely GERD  Minor Concern for ACS, resolved  - SLP evaluation completed  - dysphagia diet     Otitis Media  Complicated by PCN allergy  - azithromycin     DM  - SSI     Other chronic conditions: home meds as appropriate  Electrolyte derangement:  Replacement prn  VTE ppx: lovenox  FULL CODE    Pt considered stable for discharge home.  Pt to f/u with PCP and specialists as noted below.  Medications adjusted as per med rec below.    DISCHARGE PHYSICAL EXAM  /68   Pulse 89   Temp 98.1 °F (36.7 °C) (Oral)   Resp 18   Ht 5' 6" (1.676 m)   Wt 66.7 kg (147 lb 0.8 oz)   SpO2 96%   Breastfeeding? No   BMI 23.73 kg/m²   Gen: alert, responsive   HEENT:  Eyes - no pallor  External ears with no lesions  Nares patent  Mouth - lips chapped  CV: RRR  Lungs: WHEEZING; COMFORTABLE ON RA  Abd: +BS, soft, NT, ND  Ext: no atrophy or edema  Skin: warm, dry  Neuro: grossly intact  Psych: pleasant    Consults:   Consults (From admission, onward)        Status Ordering Provider     Inpatient consult to Hematology Oncology  Once     Provider:  CHRISTOPHER Kuo MD    Acknowledged RAISSA SELBY        Final Active Diagnoses:    Diagnosis Date Noted POA    PRINCIPAL PROBLEM:  COPD exacerbation [J44.1] 07/24/2018 Yes    COPD with acute exacerbation [J44.1] 04/23/2020 Yes    Covid-19 Virus not Detected [RUD2461] 04/23/2020 Yes    Epigastric pain [R10.13] 04/22/2020 Yes    Dysphagia [R13.10] 10/15/2019 Yes    Tobacco user [Z72.0] 10/15/2019 Yes    Type 2 diabetes mellitus with other specified complication [E11.69] " 10/15/2019 Yes    Anemia of decreased vitamin B12 absorption [D51.8] 04/01/2019 Yes    Essential hypertension [I10] 07/25/2018 Yes    HLD (hyperlipidemia) [E78.5] 07/24/2018 Yes    Chronic lymphocytic leukemia [C91.10] 04/27/2017 Yes      Problems Resolved During this Admission:    Diagnosis Date Noted Date Resolved POA    Acute respiratory failure with hypoxia [J96.01] 04/22/2020 04/23/2020 Yes    Lactic acidosis [E87.2] 07/25/2018 04/23/2020 Yes       Discharged Condition: stable    Disposition: Home-Health Care Svc         Ambulatory referral/consult to Home Health   Standing Status: Future   Referral Priority: Routine Referral Type: Home Health   Referral Reason: Specialty Services Required   Requested Specialty: Home Health Services   Number of Visits Requested: 1     Ambulatory referral/consult to Hematology / Oncology   Standing Status: Future   Referral Priority: Routine Referral Type: Consultation   Referral Reason: Specialty Services Required   Referred to Provider: CHRISTOPHER BERGER Requested Specialty: Hematology and Oncology   Number of Visits Requested: 1     Ambulatory referral/consult to Pulmonology   Standing Status: Future   Referral Priority: Routine Referral Type: Consultation   Referral Reason: Specialty Services Required   Referred to Provider: SREEKANTH LUCAS Requested Specialty: Pulmonary Disease   Number of Visits Requested: 1     Ambulatory referral/consult to ENT   Standing Status: Future   Referral Priority: Routine Referral Type: Consultation   Referral Reason: Specialty Services Required   Requested Specialty: Otolaryngology   Number of Visits Requested: 1       Pending Diagnostic Studies:     Procedure Component Value Units Date/Time    Immunofixation electrophoresis [[id number]] Collected:  04/23/20 1129    Order Status:  Sent Lab Status:  In process Updated:  04/23/20 1144    Specimen:  Blood     Narrative:       Collection has been rescheduled by MAHIN at 04/23/2020 03:27 Reason:  To   be drawn with next timed labs per pat Shah  Collection has been rescheduled by AS3 at 04/23/2020 10:59 Reason:   Unable to collect  Collection has been rescheduled by AS3 at 04/23/2020 11:29 Reason:   done    Immunoglobulins (IgG, IgA, IgM) Quantitative [[id number]] Collected:  04/23/20 1129    Order Status:  Sent Lab Status:  In process Updated:  04/23/20 1144    Specimen:  Blood     Narrative:       Collection has been rescheduled by MB4 at 04/23/2020 03:27 Reason: To   be drawn with next timed labs per pat Shah  Collection has been rescheduled by AS3 at 04/23/2020 10:59 Reason:   Unable to collect  Collection has been rescheduled by AS3 at 04/23/2020 11:29 Reason:   done    Protein electrophoresis, serum [[id number]] Collected:  04/23/20 1129    Order Status:  Sent Lab Status:  In process Updated:  04/23/20 1144    Specimen:  Blood     Narrative:       Collection has been rescheduled by MBBelkis at 04/23/2020 03:27 Reason: To   be drawn with next timed labs per pat Shah  Collection has been rescheduled by AS3 at 04/23/2020 10:59 Reason:   Unable to collect  Collection has been rescheduled by AS3 at 04/23/2020 11:29 Reason:   done         Medications:  Reconciled Home Medications:      Medication List      START taking these medications    azithromycin 250 MG tablet  Commonly known as:  Z-REINA  Take 1 tablet (250 mg total) by mouth once daily. for 3 days  Start taking on:  April 24, 2020     levoFLOXacin 750 MG tablet  Commonly known as:  LEVAQUIN  Take 1 tablet (750 mg total) by mouth once daily. for 3 days  Start taking on:  April 24, 2020     predniSONE 20 MG tablet  Commonly known as:  DELTASONE  Take 2 tablets (40 mg total) by mouth once daily. for 4 days  Start taking on:  April 24, 2020        CHANGE how you take these medications    * albuterol 5 mg/mL nebulizer solution  Commonly known as:  PROVENTIL  Take 0.5 mLs (2.5 mg total) by nebulization every 4 (four) hours as needed for Wheezing or  Shortness of Breath. Rescue  What changed:    · when to take this  · reasons to take this     * albuterol 90 mcg/actuation inhaler  Commonly known as:  PROVENTIL/VENTOLIN HFA  Inhale 1 puff into the lungs every 6 (six) hours as needed for Wheezing or Shortness of Breath. Rescue  What changed:  You were already taking a medication with the same name, and this prescription was added. Make sure you understand how and when to take each.     metoprolol tartrate 25 MG tablet  Commonly known as:  LOPRESSOR  Take 0.5 tablets (12.5 mg total) by mouth once daily.  Start taking on:  April 27, 2020  What changed:  These instructions start on April 27, 2020. If you are unsure what to do until then, ask your doctor or other care provider.         * This list has 2 medication(s) that are the same as other medications prescribed for you. Read the directions carefully, and ask your doctor or other care provider to review them with you.            CONTINUE taking these medications    atorvastatin 80 MG tablet  Commonly known as:  LIPITOR  Take 1 tablet (80 mg total) by mouth once daily. For cholesterol     esomeprazole 40 mg Grps  Commonly known as:  NEXIUM  Take 40 mg by mouth before breakfast.     fluticasone propionate 50 mcg/actuation nasal spray  Commonly known as:  FLONASE  1 spray (50 mcg total) by Each Nare route 2 (two) times daily.     gabapentin 300 MG capsule  Commonly known as:  NEURONTIN  Take 1 capsule (300 mg total) by mouth 2 (two) times daily. 1 cap AM, 2 caps NOON, 1 capPM     IMBRUVICA 140 mg Cap  Generic drug:  ibrutinib  Take 240 mg by mouth once daily .     loratadine 10 mg tablet  Commonly known as:  CLARITIN  Take 10 mg by mouth once daily.     mirtazapine 30 MG tablet  Commonly known as:  REMERON  Take 1 tablet (30 mg total) by mouth every evening.     potassium chloride SA 20 MEQ tablet  Commonly known as:  K-DUR,KLOR-CON  Take 1 tablet (20 mEq total) by mouth once daily.     ranitidine 150 MG  tablet  Commonly known as:  ZANTAC  Take 1 tablet (150 mg total) by mouth 2 (two) times daily. For heartburn        STOP taking these medications    glimepiride 2 MG tablet  Commonly known as:  ROXANNE Hernanedz MD  Department of Hospital Medicine  [facility]

## 2020-04-23 NOTE — PLAN OF CARE
04/23/20 1635   Discharge Assessment   Assessment Type Discharge Planning Assessment     Spoke with patient about Freedom of Choice Form, explained to patient that she has the right to choose any agency, and a list of agencies was provided to patient to review, she verbalized an understanding, ok with first available, pt signed form, and form scanned into CM notes.    Built and sent referral to [facility] at fax [phone] and called the office at [phone], spoke with mrs Newton and she will let intake know about pt to dc today and that referral sent, callback number [phone] on referral.

## 2020-04-23 NOTE — PLAN OF CARE
Spoke to brothnacho Pizano. He thinks she has home health seeing her but not sure of name. DEVIN ARCHIBALD. Cm to follow until Discharge from hospital.     04/23/20 1151   Discharge Assessment   Assessment Type Discharge Planning Assessment   Confirmed/corrected address and phone number on facesheet? Yes   Assessment information obtained from? Other  (Brothnacho Vazquez)   Communicated expected length of stay with patient/caregiver no   Prior to hospitilization cognitive status: Alert/Oriented   Prior to hospitalization functional status: Assistive Equipment  (Walker or cane)   Current cognitive status: Unable to Assess   Current Functional Status: Needs Assistance   Lives With alone   Able to Return to Prior Arrangements other (see comments)  (TBD)   Is patient able to care for self after discharge? Unable to determine at this time (comments)   Who are your caregiver(s) and their phone number(s)? Tabby Almonte [phone]  Harinder kahn  [phone]   Patient's perception of discharge disposition home or selfcare   Readmission Within the Last 30 Days no previous admission in last 30 days   Patient currently being followed by outpatient case management? Unable to determine (comments)   Patient currently receives any other outside agency services? No   Equipment Currently Used at Home walker, rolling;cane, straight;nebulizer   Part D Coverage [employer]   Do you have any problems affording any of your prescribed medications? No   Is the patient taking medications as prescribed? yes   Does the patient have transportation home? Yes   Transportation Anticipated family or friend will provide   Dialysis Name and Scheduled days N/A   Does the patient receive services at the [facility]mad[facility] Clinic? No   Discharge Plan A Home Health   Discharge Plan B Home   DME Needed Upon Discharge  other (see comments)  (PT/OT TBD)   Patient/Family in Agreement with Plan yes

## 2020-04-23 NOTE — PLAN OF CARE
04/22/20 1937   Patient Assessment/Suction   Level of Consciousness (AVPU) alert   Respiratory Effort Normal;Unlabored   Expansion/Accessory Muscles/Retractions no use of accessory muscles   All Lung Fields Breath Sounds crackles, coarse;diminished   Rhythm/Pattern, Respiratory unlabored   Cough Frequency frequent   Cough Type fair;nonproductive   PRE-TX-O2   O2 Device (Oxygen Therapy) Oxymask   Flow (L/min) 1   SpO2 97 %   Pulse Oximetry Type Continuous   $ Pulse Oximetry - Multiple Charge Pulse Oximetry - Multiple   Pulse 80   Resp 20   Inhaler   $ Inhaler Charges MDI (Metered Dose Inahler) Treatment   Daily Review of Necessity (Inhaler) completed   Respiratory Treatment Status (Inhaler) given   Treatment Route (Inhaler) mouthpiece   Patient Position (Inhaler) semi-Kaminski's   Post Treatment Assessment (Inhaler) breath sounds unchanged   Signs of Intolerance (Inhaler) none   Respiratory Evaluation   $ Care Plan Tech Time 15 min   Evaluation For New Orders

## 2020-04-23 NOTE — CONSULTS
[facility] Heme Onc Consult  LOCO Sanders MD  4/21/20  Requested per Dr. Hernandez    74 y/o female admitted with SOB, cough, confusion.  There is concern for corona virus infection.    Hx of COPD, DM, HTN, clinical lung cancer history, and CLL., GERD, cholesterol.    History of neck surgery for infection.    Allergy to iodine and penicillin    Smokes 1/2 pack per day times 50 years  ETOH no    Review of systems:  See HPI above    Her mother had cancer of unknown type.    She has history of left  upper lobe lung mass, clinically lung cancer, we do not have a tissue diagnosis.  She was treated empirically for presumed stage IA lung cancer with stereotactic radiation therapy in the past.  The residual lung mass has been monitored and has been stable over time.    She has history of chronic lymphocytic leukemia for several years and is treated with intermittent Imbruvica on a as needed basis.  For now improve the could can be stopped until she has recovered from her pulmonary situation and possible corona virus infection.    Individuals with CLL can have low IgG levels and also monoclonal protein spikes.  Will check her quantitative immunoglobulins, serum protein electrophoresis and serum immuno pheresis.    If IgG is determined to be low, then IV Ig infusion will be considered and discussed.    Physical examination:  She appears acutely ill  She does not have palpable lymphadenopathy  Regular rhythm, no murmur  Breath sounds are distant bilaterally with occasional wheeze  Nontender, liver spleen not palpable  Calves are nontender without edema  Neurologically grossly intact    Blood cultures are no growth thus far.  Creatinine is 0.6.  Calcium is 8.2.  Hemoglobin is 11.4, white blood cell count is 34780, platelet count is a [id number].  She has a normal differential.  Ferritin is 139.  Lactate is 2.9, rapid screen COVID-19 test is negative, follow-up testing is pending.    CT scan of head is without acute change.  Chest  x-ray shows stable nodularity at right upper lung.  No new alveolar consolidation or pleural effusion.    Assessment:    This individual is admitted with shortness of breath, exacerbation of COPD, and possible corona virus infection.    She has chronic lymphocytic leukemia being treated with oral Imbruvica on a p.r.n. or intermittent basis.  Improved with can be discontinued at this time and restarted later outpatient after she recovers from the pulmonary illness.    Her chronic lymphocytic leukemia condition appears stable at this time.    Sometimes individuals with CLL can have decreased immunoglobulin levels and monoclonal proteins.  Will check her immunoglobulins and protein studies.  If her IgG is low, then will discuss and consider IV Ig infusion.    Thank you for the consultation, will follow up on her while in the hospital.    LOCO Sanders MD

## 2020-04-23 NOTE — PROGRESS NOTES
"[facility]  Adult Nutrition   Progress Note (Initial Assessment)     SUMMARY     Recommendations  Recommendation/Intervention: 1. Continue diet as tolerated by pt and per SLP recommendations 2.  to assist with meal choices daily 3. Meal assist and encouragement provided as required  Goals: 1. Pt to meet at least 75% estimated needs via PO diet  Nutrition Goal Status: new  Communication of RD Recs: reviewed with RN    Dietitian Rounds Brief    Pt assessed 2' ICU status. Intake good per RN. Able to feed self without assistance. Noted SLP evaluation completed 4/22--recommended mechanical soft texture. Tolerating current diet texture per staff. LBM 4/22.     Reason for Assessment  Reason For Assessment: other (see comments)(ICU protocol)  Diagnosis: pulmonary disease  Relevant Medical History: DM, HTN, COPD, lung cancer, CLL, GERD    Nutrition Risk Screen  Nutrition Risk Screen: no indicators present    Nutrition/Diet History  Food Allergies: NKFA  Factors Affecting Nutritional Intake: difficulty/impaired swallowing    Anthropometrics  Temp: 97 °F (36.1 °C)  Height: 5' 6" (167.6 cm)  Height (inches): 66 in  Weight: 66.4 kg (146 lb 6.2 oz)  Weight (lb): 146.39 lb  Ideal Body Weight (IBW), Female: 130 lb  % Ideal Body Weight, Female (lb): 112.61 %  BMI (Calculated): 23.6  BMI Grade: 18.5-24.9 - normal       Weight History:  Wt Readings from Last 10 Encounters:   04/23/20 66.4 kg (146 lb 6.2 oz)   02/17/20 65.8 kg (145 lb)   01/17/20 65.4 kg (144 lb 1.6 oz)   01/13/20 66.5 kg (146 lb 9.6 oz)   01/09/20 65.8 kg (145 lb)   11/26/19 65.8 kg (145 lb)   10/15/19 66.7 kg (147 lb)   10/14/19 68 kg (150 lb)   07/09/19 67.9 kg (149 lb 12.8 oz)   05/06/19 67.3 kg (148 lb 6.4 oz)       Lab/Procedures/Meds: Pertinent Labs Reviewed  Clinical Chemistry:  Recent Labs   Lab 04/22/20  0418 04/22/20  0841    140   K 4.1 3.4*    108   CO2 25 27   * 88   BUN 13 14   CREATININE 0.7 0.6   CALCIUM 8.6* " 8.2*   PROT 6.7  --    ALBUMIN 3.7  --    BILITOT 0.4  --    ALKPHOS 80  --    AST 42*  --    ALT 26  --    ANIONGAP 9 5*   ESTGFRAFRICA >60.0 >60.0   EGFRNONAA >60.0 >60.0   MG  --  2.2   PHOS  --  3.4     CBC:   Recent Labs   Lab 04/22/20  0841   WBC 11.96   RBC 4.05   HGB 11.4*   HCT 35.2*      MCV 87   MCH 28.1   MCHC 32.4       Cardiac Profile:  Recent Labs   Lab 04/22/20  0418 04/22/20  1247 04/22/20  1836   BNP 72  --   --      --   --    TROPONINI <0.030 0.081* 0.074*     Inflammatory Labs:  Recent Labs   Lab 04/22/20 0418   CRP 0.61       Medications: Pertinent Medications reviewed  Scheduled Meds:   albuterol  2 puff Inhalation Q6H    atorvastatin  80 mg Oral Daily    azithromycin  250 mg Oral Daily    cetirizine  10 mg Oral Daily    chlorhexidine  15 mL Mouth/Throat BID    enoxaparin  40 mg Subcutaneous Daily    famotidine  20 mg Oral BID    fluticasone propionate  1 spray Each Nostril BID    gabapentin  300 mg Oral BID    levoFLOXacin  750 mg Intravenous Q24H    mirtazapine  30 mg Oral QHS    mupirocin   Nasal BID    pantoprazole  40 mg Oral Before breakfast    polyethylene glycol  17 g Oral Daily     Continuous Infusions:  PRN Meds:.acetaminophen, albuterol **AND** MDI Q6H PRN, bisacodyL, calcium chloride IVPB, calcium chloride IVPB, calcium chloride IVPB, dextrose 50%, dextrose 50%, glucagon (human recombinant), glucose, glucose, insulin aspart U-100, ipratropium **AND** MDI Q6H PRN, magnesium oxide, magnesium sulfate IVPB, magnesium sulfate IVPB, magnesium sulfate IVPB, magnesium sulfate IVPB, melatonin, potassium chloride in water, potassium chloride in water, potassium chloride in water, potassium chloride in water, potassium chloride, potassium chloride, potassium chloride, potassium chloride, sodium chloride 0.9%, sodium phosphate IVPB, sodium phosphate IVPB, sodium phosphate IVPB, sodium phosphate IVPB, sodium phosphate IVPB    Estimated/Assessed Needs  Weight Used  For Calorie Calculations: 66.4 kg (146 lb 6.2 oz)  Energy Calorie Requirements (kcal): 0052-2038 (25-30 kcal/kg)  Energy Need Method: Kcal/kg  Protein Requirements: 79-99 g (1.2-1.5 g/kg)  Weight Used For Protein Calculations: 66.4 kg (146 lb 6.2 oz)     Estimated Fluid Requirement Method: RDA Method  RDA Method (mL): 1660       Nutrition Prescription Ordered  Current Diet Order: Diabetic, mechanical soft   Nutrition Order Comments: no rice, no bread    Evaluation of Received Nutrient/Fluid Intake  Energy Calories Required: meeting needs  Protein Required: meeting needs  Fluid Required: meeting needs  Tolerance: tolerating     Intake/Output Summary (Last 24 hours) at 4/23/2020 1012  Last data filed at 4/23/2020 0600  Gross per 24 hour   Intake 1160 ml   Output 1200 ml   Net -40 ml        % Meal Intake: 75 - 100 %    Nutrition Risk  Level of Risk/Frequency of Follow-up: moderate - high     Monitor and Evaluation  Food and Nutrient Intake: food and beverage intake, energy intake  Food and Nutrient Adminstration: diet order  Physical Activity and Function: nutrition-related ADLs and IADLs  Anthropometric Measurements: weight change  Biochemical Data, Medical Tests and Procedures: gastrointestinal profile, glucose/endocrine profile, electrolyte and renal panel  Nutrition-Focused Physical Findings: overall appearance     Nutrition Follow-Up  RD Follow-up?: Yes    Rachel Guerrero RD 04/23/2020 10:12 AM

## 2020-04-23 NOTE — PT/OT/SLP EVAL
Physical Therapy Evaluation    Patient Name:  Yocasta Almonte   MRN:  [id number]    Recommendations:     Discharge Recommendations:  home health PT, home with home health   Discharge Equipment Recommendations: none   Barriers to discharge: None    Assessment:     Yocasta Almonte is a 75 y.o. female admitted with a medical diagnosis of COPD exacerbation.  She presents with the following impairments/functional limitations:  weakness, impaired functional mobilty, gait instability, impaired endurance, impaired balance . Pt resting supine, agrees to PT. Pt on room air 98%, during ambulation 91-95% no symptoms, no sob.     Rehab Prognosis: Good; patient would benefit from acute skilled PT services to address these deficits and reach maximum level of function.    Recent Surgery: * No surgery found *      Plan:     During this hospitalization, patient to be seen 5 x/week to address the identified rehab impairments via gait training, therapeutic activities, therapeutic exercises and progress toward the following goals:    · Plan of Care Expires:  05/23/20    Subjective     Chief Complaint: fatigue  Patient/Family Comments/goals: to go home   Pain/Comfort:  · Pain Rating 1: 0/10    Patients cultural, spiritual, Jew conflicts given the current situation:      Living Environment:  Pt lives alone, niece can help, pt drives.   Prior to admission, patients level of function was modified independent with cane.  Equipment used at home: walker, rolling, cane, straight.  DME owned (not currently used): rolling walker.  Upon discharge, patient will have assistance from family.    Objective:     Communicated with rn prior to session.  Patient found supine with bed alarm, telemetry  upon PT entry to room.    General Precautions: Standard, fall, respiratory   Orthopedic Precautions:N/A   Braces: N/A     Exams:  · Cognitive Exam:  Patient is oriented to Person, Place, Time and Situation  · Gross Motor Coordination:  WFL  · RUE ROM:  WFL  · LUE ROM: WFL  · RLE ROM: WFL  · RLE Strength: WFL  · LLE ROM: WFL  · LLE Strength: WFL    Functional Mobility:  · Bed Mobility:     · Rolling Left:  modified independence  · Scooting: modified independence  · Supine to Sit: modified independence  · Transfers:     · Sit to Stand:  contact guard assistance with rolling walker  · Gait: Pt able to ambulate with  ft cga for safety. Cues needed for walker use. Attempted to ambulate w/o RW and pt unsteady, PT rec use of RW at home, pt reports understanding. O2 assessment performed  · Balance: pt is unsteady without RW.      Therapeutic Activities and Exercises:   education, dc planning, poc, fall prevention, O2 assessment.     AM-PAC 6 CLICK MOBILITY  Total Score:22     Patient left up in chair with all lines intact, call button in reach and rn notified.    GOALS:   Multidisciplinary Problems     Physical Therapy Goals        Problem: Physical Therapy Goal    Goal Priority Disciplines Outcome Goal Variances Interventions   Physical Therapy Goal     PT, PT/OT      Description:  Goals to be met by: discharge     Patient will increase functional independence with mobility by performin. Gait  x 150 feet with Supervision using Rolling Walker maintaining O2 sats >88%.                       History:     Past Medical History:   Diagnosis Date    Cancer     lymphoma    COPD (chronic obstructive pulmonary disease)     Diabetes mellitus     GERD (gastroesophageal reflux disease)     Hyperlipidemia     Hypertension     Thyroid disease        Past Surgical History:   Procedure Laterality Date    NECK SURGERY      infection in neck removed       Time Tracking:     PT Received On: 20  PT Start Time: 1330     PT Stop Time: 1345  PT Total Time (min): 15 min     Billable Minutes: Evaluation 5 and Gait Training 10      Shama Dukes PT  2020

## 2020-04-28 NOTE — TELEPHONE ENCOUNTER
----- Message from David Edwards sent at 4/28/2020 10:51 AM CDT -----  Contact: Sarai bruce/ [facility] homehealth  ##2Different Pharmacy##  Needs refills on Needs lancets, test strips and new Glucometer. triamcinolone acetonide 0.1%  Send to [facility] [address] # [phone]    Ranitidine Needs to be replaced because [employer] won't cover it. Fax# [phone]

## 2020-04-29 NOTE — TELEPHONE ENCOUNTER
Other meds sent. Have patient reduce ranitidien to 1 tablet daily and then consider weaning off. She has nexium already and may no longer need the extra heartburn medication

## 2020-04-29 NOTE — TELEPHONE ENCOUNTER
After Hours Call:  Sachin with [facility] calling to have patient set up for a HFU.  Please call Sachin - [phone]

## 2020-04-29 NOTE — TELEPHONE ENCOUNTER
Spoke with giovani notified of message per dr richards. Patient verbalized understanding.  Informed to return call with questions/concerns -DN

## 2020-04-30 NOTE — TELEPHONE ENCOUNTER
----- Message from Jina London sent at 4/30/2020  7:27 AM CDT -----  - [facility] is calling for the 3rd time. She needs a hfu as she was in the hospital, she also needs a sleep study ordered  Sachin [phone]

## 2020-04-30 NOTE — TELEPHONE ENCOUNTER
Spoke with Sachin at [place] and pt is scheduled for hospital follow up with Dr. Welch. Pt needs a sleep study per Sachin. SAAD

## 2020-04-30 NOTE — TELEPHONE ENCOUNTER
----- Message from Anita Campos MA sent at 4/30/2020  8:44 AM CDT -----  Contact: [facility]  Suzie called in to advise that pt does not have Nexium, so they are requesting an Rx be sent to [facility].    Suzie - [phone]

## 2020-05-05 NOTE — PROGRESS NOTES
SUBJECTIVE:    Patient ID: Yocasta Almonte is a 75 y.o. female.    Chief Complaint: HFU and check lymph nodes    Patient COPD and CLL was admitted to the hospital for acute respiratory failure and COPD exacerbation.  She was treated with appropriate steroids and antibiotics.  And her glipizide was discontinued secondary to normal A1c of 5.8.   She has since completed her antibiotics and a COVID 19 test was performed that was negative.   The patient now complains persistent issues with ear irritation.  This has been a chronic ongoing problem and she was referred to ENT but has not seen. Reports no trouble with hearing but has itching and drainage. She also has some pain runny down her jaw  Reports her food gets stuck in her chest and at times has to vomit. This has been for the past 2 weeks,. Taking zantac but has been out of nexium for a while.       Admit Date: 4/22/20   Discharge Date: 4/23/20  Discharge Facility: Hospital    Medication Reconciliation:  Yes. Glipizide d/c   New Prescriptions filled after discharge: not applicable  Discharge summary reviewed:  yes  Pending test results at discharge reviewed:   not applicable  Follow up appointments scheduled:  yes   with Pulmonology and Heme/Onc  Follow up labs/tests ordered:   not applicable  Home Health ordered on discharge:   yes  Home Health [facility] name: [facility]  DME ordered at discharge:   no  How patient is feeling since discharge from the hospital?  better         Admission on 04/22/2020, Discharged on 04/23/2020   Component Date Value Ref Range Status    WBC 04/22/2020 12.63  3.90 - 12.70 K/uL Final    RBC 04/22/2020 4.27  4.00 - 5.40 M/uL Final    Hemoglobin 04/22/2020 12.2  12.0 - 16.0 g/dL Final    Hematocrit 04/22/2020 37.6  37.0 - 48.5 % Final    Mean Corpuscular Volume 04/22/2020 88  82 - 98 fL Final    Mean Corpuscular Hemoglobin 04/22/2020 28.6  27.0 - 31.0 pg Final    Mean Corpuscular Hemoglobin Conc 04/22/2020 32.4  32.0 -  36.0 g/dL Final    RDW 04/22/2020 14.6* 11.5 - 14.5 % Final    Platelets 04/22/2020 217  150 - 350 K/uL Final    MPV 04/22/2020 10.4  9.2 - 12.9 fL Final    Immature Granulocytes 04/22/2020 1.3* 0.0 - 0.5 % Final    Gran # (ANC) 04/22/2020 9.6* 1.8 - 7.7 K/uL Final    Immature Grans (Abs) 04/22/2020 0.16* 0.00 - 0.04 K/uL Final    Lymph # 04/22/2020 1.9  1.0 - 4.8 K/uL Final    Mono # 04/22/2020 0.9  0.3 - 1.0 K/uL Final    Eos # 04/22/2020 0.1  0.0 - 0.5 K/uL Final    Baso # 04/22/2020 0.02  0.00 - 0.20 K/uL Final    nRBC 04/22/2020 0  0 /100 WBC Final    Gran% 04/22/2020 75.8* 38.0 - 73.0 % Final    Lymph% 04/22/2020 14.6* 18.0 - 48.0 % Final    Mono% 04/22/2020 7.2  4.0 - 15.0 % Final    Eosinophil% 04/22/2020 0.9  0.0 - 8.0 % Final    Basophil% 04/22/2020 0.2  0.0 - 1.9 % Final    Differential Method 04/22/2020 Automated   Final    Sodium 04/22/2020 141  136 - 145 mmol/L Final    Potassium 04/22/2020 4.1  3.5 - 5.1 mmol/L Final    Chloride 04/22/2020 107  95 - 110 mmol/L Final    CO2 04/22/2020 25  23 - 29 mmol/L Final    Glucose 04/22/2020 163* 70 - 110 mg/dL Final    BUN, Bld 04/22/2020 13  8 - 23 mg/dL Final    Creatinine 04/22/2020 0.7  0.5 - 1.4 mg/dL Final    Calcium 04/22/2020 8.6* 8.7 - 10.5 mg/dL Final    Total Protein 04/22/2020 6.7  6.0 - 8.4 g/dL Final    Albumin 04/22/2020 3.7  3.5 - 5.2 g/dL Final    Total Bilirubin 04/22/2020 0.4  0.1 - 1.0 mg/dL Final    Alkaline Phosphatase 04/22/2020 80  55 - 135 U/L Final    AST 04/22/2020 42* 10 - 40 U/L Final    ALT 04/22/2020 26  10 - 44 U/L Final    Anion Gap 04/22/2020 9  8 - 16 mmol/L Final    eGFR if African American 04/22/2020 >60.0  >60 mL/min/1.73 m^2 Final    eGFR if non African American 04/22/2020 >60.0  >60 mL/min/1.73 m^2 Final    CRP 04/22/2020 0.61  0.00 - 0.75 mg/dL Final    Ferritin 04/22/2020 139  20.0 - 300.0 ng/mL Final    LD 04/22/2020 240  110 - 260 U/L Final    CPK 04/22/2020 111  20 - 180 U/L Final     Lactate (Lactic Acid) 04/22/2020 2.9* 0.5 - 1.9 mmol/L Final    Troponin I 04/22/2020 <0.030  <=0.040 ng/mL Final    SARS-CoV-2 RNA, Amplification, Qual 04/22/2020 Negative  Negative Final    Procalcitonin 04/22/2020 <0.05  0.00 - 0.50 ng/mL Final    Blood Culture, Routine 04/22/2020 No growth after 5 days.   Final    Blood Culture, Routine 04/22/2020 No growth after 5 days.   Final    BNP 04/22/2020 72  0 - 99 pg/mL Final    Specimen UA 04/22/2020 Urine, Clean Catch   Final    Color, UA 04/22/2020 Yellow  Yellow, Straw, Erika Final    Appearance, UA 04/22/2020 Clear  Clear Final    pH, UA 04/22/2020 6.0  5.0 - 8.0 Final    Specific Gravity, UA 04/22/2020 1.015  1.005 - 1.030 Final    Protein, UA 04/22/2020 1+* Negative Final    Glucose, UA 04/22/2020 3+* Negative Final    Ketones, UA 04/22/2020 Negative  Negative Final    Bilirubin (UA) 04/22/2020 Negative  Negative Final    Occult Blood UA 04/22/2020 1+* Negative Final    Nitrite, UA 04/22/2020 Negative  Negative Final    Urobilinogen, UA 04/22/2020 Negative  Negative EU/dL Final    Leukocytes, UA 04/22/2020 Negative  Negative Final    POC PH 04/22/2020 7.333* 7.35 - 7.45 Final    POC PCO2 04/22/2020 47.1* 35 - 45 mmHg Final    POC PO2 04/22/2020 197* 80 - 100 mmHg Final    POC HCO3 04/22/2020 25.0  24 - 28 mmol/L Final    POC BE 04/22/2020 -1  -2 to 2 mmol/L Final    POC SATURATED O2 04/22/2020 100  95 - 100 % Final    POC TCO2 04/22/2020 26  23 - 27 mmol/L Final    Rate 04/22/2020 16   Final    Sample 04/22/2020 ARTERIAL   Final    Site 04/22/2020 LR   Final    Allens Test 04/22/2020 Pass   Final    DelSys 04/22/2020 CPAP/BiPAP   Final    Mode 04/22/2020 BiPAP   Final    FiO2 04/22/2020 50   Final    Spont Rate 04/22/2020 22   Final    Sp02 04/22/2020 99   Final    IP 04/22/2020 16   Final    EP 04/22/2020 8   Final    Lactate (Lactic Acid) 04/22/2020 0.8  0.5 - 1.9 mmol/L Final    RBC, UA 04/22/2020 8* 0 - 4 /hpf Final     WBC, UA 04/22/2020 1  0 - 5 /hpf Final    Bacteria 04/22/2020 Negative  None-Occ /hpf Final    Yeast, UA 04/22/2020 None  None Final    Squam Epithel, UA 04/22/2020 2  /hpf Final    Hyaline Casts, UA 04/22/2020 4* 0-1/lpf /lpf Final    Microscopic Comment 04/22/2020 SEE COMMENT   Final    Sodium 04/22/2020 140  136 - 145 mmol/L Final    Potassium 04/22/2020 3.4* 3.5 - 5.1 mmol/L Final    Chloride 04/22/2020 108  95 - 110 mmol/L Final    CO2 04/22/2020 27  23 - 29 mmol/L Final    Glucose 04/22/2020 88  70 - 110 mg/dL Final    BUN, Bld 04/22/2020 14  8 - 23 mg/dL Final    Creatinine 04/22/2020 0.6  0.5 - 1.4 mg/dL Final    Calcium 04/22/2020 8.2* 8.7 - 10.5 mg/dL Final    Anion Gap 04/22/2020 5* 8 - 16 mmol/L Final    eGFR if African American 04/22/2020 >60.0  >60 mL/min/1.73 m^2 Final    eGFR if non African American 04/22/2020 >60.0  >60 mL/min/1.73 m^2 Final    Magnesium 04/22/2020 2.2  1.6 - 2.6 mg/dL Final    Phosphorus 04/22/2020 3.4  2.7 - 4.5 mg/dL Final    WBC 04/22/2020 11.96  3.90 - 12.70 K/uL Final    RBC 04/22/2020 4.05  4.00 - 5.40 M/uL Final    Hemoglobin 04/22/2020 11.4* 12.0 - 16.0 g/dL Final    Hematocrit 04/22/2020 35.2* 37.0 - 48.5 % Final    Mean Corpuscular Volume 04/22/2020 87  82 - 98 fL Final    Mean Corpuscular Hemoglobin 04/22/2020 28.1  27.0 - 31.0 pg Final    Mean Corpuscular Hemoglobin Conc 04/22/2020 32.4  32.0 - 36.0 g/dL Final    RDW 04/22/2020 14.5  11.5 - 14.5 % Final    Platelets 04/22/2020 193  150 - 350 K/uL Final    MPV 04/22/2020 10.1  9.2 - 12.9 fL Final    Immature Granulocytes 04/22/2020 0.6* 0.0 - 0.5 % Final    Gran # (ANC) 04/22/2020 7.2  1.8 - 7.7 K/uL Final    Immature Grans (Abs) 04/22/2020 0.07* 0.00 - 0.04 K/uL Final    Lymph # 04/22/2020 3.2  1.0 - 4.8 K/uL Final    Mono # 04/22/2020 1.2* 0.3 - 1.0 K/uL Final    Eos # 04/22/2020 0.2  0.0 - 0.5 K/uL Final    Baso # 04/22/2020 0.03  0.00 - 0.20 K/uL Final    nRBC 04/22/2020 0  0  /100 WBC Final    Gran% 04/22/2020 60.5  38.0 - 73.0 % Final    Lymph% 04/22/2020 26.9  18.0 - 48.0 % Final    Mono% 04/22/2020 10.1  4.0 - 15.0 % Final    Eosinophil% 04/22/2020 1.6  0.0 - 8.0 % Final    Basophil% 04/22/2020 0.3  0.0 - 1.9 % Final    Differential Method 04/22/2020 Automated   Final    Troponin I 04/22/2020 0.081* <=0.040 ng/mL Final    SARS-CoV2 (COVID-19) Qualitative P* 04/22/2020 Not Detected  Not Detected Final    Troponin I 04/22/2020 0.074* <=0.040 ng/mL Final    POC Glucose 04/22/2020 99  70 - 110 Final    POC Glucose 04/22/2020 85  70 - 110 Final    IgG - Serum 04/23/2020 776  586 - 1602 mg/dL Final    IgA 04/23/2020 271  64 - 422 mg/dL Final    IgM 04/23/2020 48  26 - 217 mg/dL Final    Total Protein 04/23/2020 6.0  6.0 - 8.5 g/dL Final    Albumin grams/dl 04/23/2020 3.3  2.9 - 4.4 g/dL Final    Alpha-1 grams/dl 04/23/2020 0.2  0.0 - 0.4 g/dL Final    Alpha-2 grams/dl 04/23/2020 0.7  0.4 - 1.0 g/dL Final    Beta grams/dl 04/23/2020 1.0  0.7 - 1.3 g/dL Final    Gamma grams/dl 04/23/2020 0.7  0.4 - 1.8 g/dL Final    M-SPIKE, PROT ELECTRO 04/23/2020 Not Observed  Not Observed g/dL Final    Globulin, Total 04/23/2020 2.7  2.2 - 3.9 g/dL Final    A/G Ratio 04/23/2020 1.2  0.7 - 1.7 Final    Please Note: 04/23/2020 Comment   Final    Immunofix Interp. 04/23/2020 Comment   Final    IgA 04/23/2020 271  64 - 422 mg/dL Final    IgG - Serum 04/23/2020 776  586 - 1602 mg/dL Final    IgM 04/23/2020 48  26 - 217 mg/dL Final    Sodium 04/23/2020 140  136 - 145 mmol/L Final    Potassium 04/23/2020 3.9  3.5 - 5.1 mmol/L Final    Chloride 04/23/2020 106  95 - 110 mmol/L Final    CO2 04/23/2020 27  23 - 29 mmol/L Final    Glucose 04/23/2020 80  70 - 110 mg/dL Final    BUN, Bld 04/23/2020 10  8 - 23 mg/dL Final    Creatinine 04/23/2020 0.8  0.5 - 1.4 mg/dL Final    Calcium 04/23/2020 8.7  8.7 - 10.5 mg/dL Final    Total Protein 04/23/2020 6.3  6.0 - 8.4 g/dL Final     Albumin 04/23/2020 3.5  3.5 - 5.2 g/dL Final    Total Bilirubin 04/23/2020 0.6  0.1 - 1.0 mg/dL Final    Alkaline Phosphatase 04/23/2020 81  55 - 135 U/L Final    AST 04/23/2020 20  10 - 40 U/L Final    ALT 04/23/2020 25  10 - 44 U/L Final    Anion Gap 04/23/2020 7* 8 - 16 mmol/L Final    eGFR if African American 04/23/2020 >60.0  >60 mL/min/1.73 m^2 Final    eGFR if non African American 04/23/2020 >60.0  >60 mL/min/1.73 m^2 Final    WBC 04/23/2020 9.22  3.90 - 12.70 K/uL Final    RBC 04/23/2020 4.47  4.00 - 5.40 M/uL Final    Hemoglobin 04/23/2020 12.5  12.0 - 16.0 g/dL Final    Hematocrit 04/23/2020 39.2  37.0 - 48.5 % Final    Mean Corpuscular Volume 04/23/2020 88  82 - 98 fL Final    Mean Corpuscular Hemoglobin 04/23/2020 28.0  27.0 - 31.0 pg Final    Mean Corpuscular Hemoglobin Conc 04/23/2020 31.9* 32.0 - 36.0 g/dL Final    RDW 04/23/2020 14.6* 11.5 - 14.5 % Final    Platelets 04/23/2020 207  150 - 350 K/uL Final    MPV 04/23/2020 10.4  9.2 - 12.9 fL Final    Immature Granulocytes 04/23/2020 0.7* 0.0 - 0.5 % Final    Gran # (ANC) 04/23/2020 4.1  1.8 - 7.7 K/uL Final    Immature Grans (Abs) 04/23/2020 0.06* 0.00 - 0.04 K/uL Final    Lymph # 04/23/2020 3.2  1.0 - 4.8 K/uL Final    Mono # 04/23/2020 0.8  0.3 - 1.0 K/uL Final    Eos # 04/23/2020 1.0* 0.0 - 0.5 K/uL Final    Baso # 04/23/2020 0.08  0.00 - 0.20 K/uL Final    nRBC 04/23/2020 0  0 /100 WBC Final    Gran% 04/23/2020 43.9  38.0 - 73.0 % Final    Lymph% 04/23/2020 34.2  18.0 - 48.0 % Final    Mono% 04/23/2020 9.0  4.0 - 15.0 % Final    Eosinophil% 04/23/2020 11.3* 0.0 - 8.0 % Final    Basophil% 04/23/2020 0.9  0.0 - 1.9 % Final    Differential Method 04/23/2020 Automated   Final    Magnesium 04/23/2020 2.0  1.6 - 2.6 mg/dL Final    POC Glucose 04/23/2020 71  70 - 110 Final    POC Glucose 04/23/2020 134* 70 - 110 Final   Office Visit on 02/17/2020   Component Date Value Ref Range Status    Hemoglobin A1C 02/17/2020 5.8   % Final   Hospital Outpatient Visit on 01/09/2020   Component Date Value Ref Range Status    POC Glucose 01/09/2020 89  70 - 110 Final       Past Medical History:   Diagnosis Date    Cancer     lymphoma    COPD (chronic obstructive pulmonary disease)     Diabetes mellitus     GERD (gastroesophageal reflux disease)     Hyperlipidemia     Hypertension     Thyroid disease      Past Surgical History:   Procedure Laterality Date    NECK SURGERY      infection in neck removed     Family History   Problem Relation Age of Onset    Cancer Mother     No Known Problems Father        Marital Status:   Alcohol History:  reports that she does not drink alcohol.  Tobacco History:  reports that she has been smoking. She has a 25.00 pack-year smoking history. She has never used smokeless tobacco.  Drug History:  reports that she does not use drugs.    Review of patient's allergies indicates:   Allergen Reactions    Iodine and iodide containing products     Penicillins        Current Outpatient Medications:     albuterol (PROVENTIL) 5 mg/mL nebulizer solution, Take 0.5 mLs (2.5 mg total) by nebulization every 4 (four) hours as needed for Wheezing or Shortness of Breath. Rescue, Disp: 20 mL, Rfl: 0    albuterol (PROVENTIL/VENTOLIN HFA) 90 mcg/actuation inhaler, Inhale 1 puff into the lungs every 6 (six) hours as needed for Wheezing or Shortness of Breath. Rescue, Disp: 18 g, Rfl: 0    atorvastatin (LIPITOR) 80 MG tablet, Take 1 tablet (80 mg total) by mouth once daily. For cholesterol, Disp: 90 tablet, Rfl: 3    blood sugar diagnostic (TRUE METRIX GLUCOSE TEST STRIP) Strp, Test blood sugar in vitro BID, Disp: 200 each, Rfl: 5    blood-glucose meter (TRUE METRIX GLUCOSE METER) Misc, Test blood sugar in vitro BID, Disp: 1 each, Rfl: 0    esomeprazole (NEXIUM) 40 mg GrPS, Take 40 mg by mouth before breakfast., Disp: 90 each, Rfl: 1    fluticasone (FLONASE) 50 mcg/actuation nasal spray, 1 spray (50 mcg total) by  "Each Nare route 2 (two) times daily., Disp: 1 Bottle, Rfl: 3    gabapentin (NEURONTIN) 300 MG capsule, Take 1 capsule (300 mg total) by mouth 2 (two) times daily. 1 cap AM, 2 caps NOON, 1 capPM, Disp: 180 capsule, Rfl: 3    ibrutinib (IMBRUVICA) 140 mg Cap, Take 240 mg by mouth once daily ., Disp: , Rfl:     lancets 32 gauge Misc, Test blood sugar in vitro BID, Disp: 200 each, Rfl: 5    loratadine (CLARITIN) 10 mg tablet, Take 10 mg by mouth once daily., Disp: , Rfl:     metoprolol tartrate (LOPRESSOR) 25 MG tablet, Take 0.5 tablets (12.5 mg total) by mouth once daily., Disp: 45 tablet, Rfl: 0    mirtazapine (REMERON) 30 MG tablet, Take 1 tablet (30 mg total) by mouth every evening., Disp: 90 tablet, Rfl: 3    potassium chloride SA (K-DUR,KLOR-CON) 20 MEQ tablet, Take 1 tablet (20 mEq total) by mouth once daily., Disp: 90 tablet, Rfl: 3    triamcinolone (KENALOG) 0.5 % ointment, Apply 1 application topically 2 (two) times daily., Disp: 15 g, Rfl: 2    ciprofloxacin HCl (CILOXAN) 0.3 % ophthalmic solution, Place 1 drop into both eyes every 6 (six) hours. for 7 days, Disp: 5 mL, Rfl: 0    Current Facility-Administered Medications:     cyanocobalamin injection 1,000 mcg, 1,000 mcg, Subcutaneous, Q30 Days, CHRISTOPHER Kuo MD, 1,000 mcg at 03/09/20 0942    Review of Systems     Objective:      Vitals:    05/05/20 1205   BP: (!) 154/80   Pulse: 80   Temp: 98.3 °F (36.8 °C)   Weight: 65.8 kg (145 lb)   Height: 5' 6" (1.676 m)     Physical Exam   Constitutional: She is oriented to person, place, and time. She appears well-developed and well-nourished.   Using a cane   HENT:   Right Ear: There is drainage, swelling and tenderness. Tympanic membrane is not erythematous.   Left Ear: There is drainage, swelling and tenderness. There is mastoid tenderness. Tympanic membrane is not erythematous.   Ears:    Eyes: Pupils are equal, round, and reactive to light. EOM are normal.   Cardiovascular: Normal rate and regular " rhythm.   Pulmonary/Chest: Effort normal. She has wheezes.   Abdominal: Soft.   Musculoskeletal: She exhibits no edema.   Neurological: She is alert and oriented to person, place, and time.   Psychiatric: She has a normal mood and affect.   Vitals reviewed.      Assessment:       1. Acute hypoxemic respiratory failure    2. Essential hypertension    3. COPD with acute exacerbation    4. Chronic lymphocytic leukemia    5. Hospital discharge follow-up    6. Tobacco user    7. Chronic otitis externa of both ears, unspecified type    8. Pharyngoesophageal dysphagia         Plan:       Acute hypoxemic respiratory failure  Comments:  resolving    Essential hypertension    COPD with acute exacerbation  Comments:  continue meds. f/u with pulm scheduled    Chronic lymphocytic leukemia    Hospital discharge follow-up    Tobacco user    Chronic otitis externa of both ears, unspecified type  -     ciprofloxacin HCl (CILOXAN) 0.3 % ophthalmic solution; Place 1 drop into both eyes every 6 (six) hours. for 7 days  Dispense: 5 mL; Refill: 0  -     Ambulatory referral/consult to ENT; Future; Expected date: 05/12/2020    Pharyngoesophageal dysphagia  Comments:  resume nexium       Follow up if symptoms worsen or fail to improve.

## 2020-05-07 PROBLEM — C34.12 MALIGNANT NEOPLASM OF UPPER LOBE OF LEFT LUNG: Status: ACTIVE | Noted: 2017-08-08

## 2020-05-07 PROBLEM — J44.1 COPD WITH ACUTE EXACERBATION: Status: RESOLVED | Noted: 2020-01-01 | Resolved: 2020-01-01

## 2020-05-07 PROBLEM — J44.1 COPD EXACERBATION: Status: RESOLVED | Noted: 2018-07-24 | Resolved: 2020-01-01

## 2020-05-07 NOTE — PROGRESS NOTES
SUBJECTIVE:    Patient ID: Yocasta Almonte is a 75 y.o. female.    Chief Complaint: Establish Care and Apnea    HPI  The patient is here for obstructive sleep apnea.  She awakens coughing and is short of breath at 3-4 am.  She also has severe COPD and is still smoking.  She doesn't snore.  She is sometimes sleepy during the day.  She is depressed.  She doesn't have a libido.  Occasional morning headaches.    She is only on albuterol for her COPD as she continues to smoke.  She is not on oxygen despite being severely hypoxemic on her last hospital admission.    She has left upper lobe bronchogenic carcinoma which is had radiation therapy and she is awaiting another PET scan.  Past Medical History:   Diagnosis Date    Cancer     lymphoma    COPD (chronic obstructive pulmonary disease)     Diabetes mellitus     GERD (gastroesophageal reflux disease)     Hyperlipidemia     Hypertension     Thyroid disease      Past Surgical History:   Procedure Laterality Date    LYMPH NODE DISSECTION       R axillary    NECK SURGERY      infection in neck removed     Family History   Problem Relation Age of Onset    Cancer Mother     No Known Problems Father         Social History:   Marital Status:   Occupation: retired from restaurant work  Alcohol History:  reports that she does not drink alcohol.  Tobacco History:  reports that she has been smoking cigarettes. She has a 29.00 pack-year smoking history. She has never used smokeless tobacco.  Drug History:  reports that she does not use drugs.    Review of patient's allergies indicates:   Allergen Reactions    Iodine and iodide containing products     Penicillins        Current Outpatient Medications   Medication Sig Dispense Refill    albuterol (PROVENTIL) 5 mg/mL nebulizer solution Take 0.5 mLs (2.5 mg total) by nebulization every 4 (four) hours as needed for Wheezing or Shortness of Breath. Rescue 20 mL 0    albuterol (PROVENTIL/VENTOLIN HFA) 90  mcg/actuation inhaler Inhale 1 puff into the lungs every 6 (six) hours as needed for Wheezing or Shortness of Breath. Rescue 18 g 0    atorvastatin (LIPITOR) 80 MG tablet Take 1 tablet (80 mg total) by mouth once daily. For cholesterol 90 tablet 3    blood sugar diagnostic (TRUE METRIX GLUCOSE TEST STRIP) Strp Test blood sugar in vitro  each 5    blood-glucose meter (TRUE METRIX GLUCOSE METER) Misc Test blood sugar in vitro BID 1 each 0    esomeprazole (NEXIUM) 40 mg GrPS Take 40 mg by mouth before breakfast. 90 each 1    fluticasone (FLONASE) 50 mcg/actuation nasal spray 1 spray (50 mcg total) by Each Nare route 2 (two) times daily. 1 Bottle 3    gabapentin (NEURONTIN) 300 MG capsule Take 1 capsule (300 mg total) by mouth 2 (two) times daily. 1 cap AM, 2 caps NOON, 1 capPM 180 capsule 3    ibrutinib (IMBRUVICA) 140 mg Cap Take 240 mg by mouth once daily .      loratadine (CLARITIN) 10 mg tablet Take 10 mg by mouth once daily.      metoprolol tartrate (LOPRESSOR) 25 MG tablet Take 0.5 tablets (12.5 mg total) by mouth once daily. 45 tablet 0    mirtazapine (REMERON) 30 MG tablet Take 1 tablet (30 mg total) by mouth every evening. 90 tablet 3    potassium chloride SA (K-DUR,KLOR-CON) 20 MEQ tablet Take 1 tablet (20 mEq total) by mouth once daily. 90 tablet 3    triamcinolone (KENALOG) 0.5 % ointment Apply 1 application topically 2 (two) times daily. 15 g 2    ciprofloxacin HCl (CILOXAN) 0.3 % ophthalmic solution Place 1 drop into both eyes every 6 (six) hours. for 7 days 5 mL 0    lancets 32 gauge Misc Test blood sugar in vitro  each 5     Current Facility-Administered Medications   Medication Dose Route Frequency Provider Last Rate Last Dose    cyanocobalamin injection 1,000 mcg  1,000 mcg Subcutaneous Q30 Days CHRISTOPHER Kuo MD   1,000 mcg at 03/09/20 0942       Alpha-1 Antitrypsin:  Last PFT:   Last CT:    Review of Systems  General: Feeling ok.  Eyes: Vision has floaters in her  "eyes.  ENT:  Bad sinuses.  Heart:: No chest pain or palpitations.  Lungs: Shortness of breath with exertion, wheezes, coughs and produces mucus daily.  GI: She regurgitates her food.  : No dysuria, hesitancy, or nocturia.  Musculoskeletal: bad arthritis all over her body  Skin: eczema  Neuro: L leg tingles  Lymph: No edema or adenopathy.  Psych: anxiety  Endo: losing weight, about 10 pounds in 3 months    OBJECTIVE:      /84 (BP Location: Left arm, Patient Position: Sitting, BP Method: Medium (Automatic))   Pulse 93   Temp 98.5 °F (36.9 °C)   Ht 5' 6" (1.676 m)   Wt 66.2 kg (146 lb)   SpO2 97%   BMI 23.57 kg/m²     Physical Exam  GENERAL: Older patient in no distress.  She walks with a cane.  HEENT: Pupils equal and reactive. Extraocular movements intact. Nose intact.      Pharynx moist.  NECK: Supple.   HEART: Regular rate and rhythm. No murmur or gallop auscultated.  LUNGS: There are wheezes bilaterally with prolonged expiration.  There are also some squeaks present.. Lung excursion symmetrical. No change in fremitus.  ABDOMEN: Bowel sounds present. Non-tender, no masses palpated.  EXTREMITIES: Normal muscle tone and joint movement, no cyanosis or clubbing.   LYMPHATICS: No adenopathy palpated, no edema.  SKIN: Dry, intact, no lesions.   NEURO: Cranial nerves II-XII intact. Motor strength 5/5 bilaterally, upper and lower extremities.  PSYCH: Appropriate affect.    [place] [address]    Assessment:       1. Nocturnal dyspnea    2. YESICA (obstructive sleep apnea)    3. Insufficient sleep syndrome     4. Chronic obstructive pulmonary disease, unspecified COPD type    5. Shortness of breath    6. Tobacco abuse    7. Bronchogenic carcinoma of left lung        History of major depression, ruling out the use of Chantix  Plan:   Home sleep study  QUIT SMOKING     Follow up in about 2 months (around 7/7/2020).        "

## 2020-05-07 NOTE — PATIENT INSTRUCTIONS
Kicking the Smoking Habit  If you smoke, quitting is one of the best changes you can make for your heart and your overall health. Your risk of heart attack goes down within one day of putting out that last cigarette. As you go longer without smoking, your risk goes down even more. Quitting isnt easy, but millions of people have done it. You can, too. Its never too late to quit.  Getting started  Boost your chances of success by deciding on your quit plan. Your health care provider and cardiac rehab team can help you develop this plan. Even if youve already quit, its easy to slip back into smoking.  Your plan can help you avoid and recover from relapse.  In any case, start by setting a date to quit within a month, and do it.    Keys to your quit plan  · Talk to your healthcare provider about prescription medicines and nicotine replacement products that help stop the urge to smoke.   · Join a support group or quit smoking program. Talking with others about the challenges of quitting can help you get through them.  · Ask other smokers in your household to quit with you.  · Look for the cues in your life that you associate with smoking and avoid them.  Track your triggers  What gives you that W-fxic-j-cigarette feeling? List all the situations that make you want a cigarette. Then think of other ways to deal with these situations. Here are some examples:  Situation How I'll handle it   Finishing a meal Get up from the table and take a walk   Having an argument Find a quiet place and breathe deeply   Feeling lonely or bored Call a friend to talk         Tips for quitting successfully  · List the benefits of quitting such as reducing heart risks and saving money. Keep this list and review it whenever you feel like smoking.  · Get support. Let your friends know you may call them to chat when you have an urge to smoke.  · If youve tried to quit before without success, this time avoid the triggers that may cause  the relapse.  · Make the most of slip-ups. Try to learn from them, and then get back on track.  · Be accountable to your friends and your calendar so that you stay on track.  For family and friends  · Be supportive and patient. Quitting smoking can be difficult and stressful.  · If you smoke, nows a great time to quit. Even if you dont quit, never smoke around your loved one. Secondhand smoke is dangerous to his or her heart.  · The best goals are accomplished in teams. Remember that when your loved one states he or she wants to stop smoking.  Date Last Reviewed: 7/1/2016  © 6569-6549 [employer]. [address] [place] [address], [place] [address]. All rights reserved. This information is not intended as a substitute for professional medical care. Always follow your healthcare professional's instructions.    Home sleep study  QUIT SMOKING     Follow up in about 2 months (around 7/7/2020).

## 2020-05-07 NOTE — LETTER
May 7, 2020      Leila Hernandez MD  [address]  [address]  [place] [address]           [facility] - Pulmonology  [address]  [place] [address]  Phone: [phone]  Fax: [phone]          Patient: Yocasta Almonte   MR Number: [id number]   YOB: 1945   Date of Visit: 5/7/2020       Dear Dr. Leila Hernandez:    Thank you for referring Yocasta Almonte to me for evaluation. Attached you will find relevant portions of my assessment and plan of care.    If you have questions, please do not hesitate to call me. I look forward to following Yocasta Almonte along with you.    Sincerely,    Betsy Marrero MD    Enclosure  CC:  No Recipients    If you would like to receive this communication electronically, please contact [email or web address] or [phone] to request more information on [employer] Link access.    For providers and/or their staff who would like to refer a patient to [facility], please contact us through our one-stop-shop provider referral line, [facility], at [phone].    If you feel you have received this communication in error or would no longer like to receive these types of communications, please e-mail [email or web address]

## 2020-05-11 PROBLEM — J44.1 COPD EXACERBATION: Status: ACTIVE | Noted: 2020-01-01

## 2020-05-11 NOTE — H&P
[facility]  History & Physical  Date of service: 5/11/2020  At 0015    Patient Name: Yocasta Almonte  MRN: [id number]  Admission Date: 5/10/2020  Attending Physician: Jamar Laws MD   Primary Care Provider: Rani Welch MD         Patient information was obtained from patient, past medical records and ER records.     Subjective:     Principal Problem:COPD exacerbation    Chief Complaint:   Chief Complaint   Patient presents with    Shortness of Breath        HPI:  The patient is a 75 year old female with history of nicotine abuse, COPD, diabetes mellitus, GERD, hyperlipidemia, hypertension, hypothyroidism, CLL, DVT.  She presented to the emergency department with shortness of breath.  She reports having cough with productive white sputum for 2-3 days.  Prior to arrival, she developed sudden onset of severe shortness of breath, associated  with wheezing while walking to a neighbor's house.  Symptoms persisted after resting.  She subsequently came to the ED.    She denies fever, chills, or urinary symptoms.  She reports having dysphagia and occasional vomiting.  She reports some weight loss.  She has no recent EGD due to COVID-19 pandemic.    In the ED, she was noted to be afebrile.  Per ED physician she has significant wheezes.  Oxygen saturation has been in the low 90s.  She received 2 rounds of DuoNeb treatments.  She also received IV Solu-Medrol.  She continues to have significant wheezes, tachycardic and tachypneic with minimal exertion. Chest radiograph, personally reviewed, showed no obvious infiltrates.    Past Medical History:   Diagnosis Date    Cancer     lymphoma    COPD (chronic obstructive pulmonary disease)     Diabetes mellitus     GERD (gastroesophageal reflux disease)     Hyperlipidemia     Hypertension     Thyroid disease        Past Surgical History:   Procedure Laterality Date    LYMPH NODE DISSECTION       R axillary    NECK SURGERY      infection  in neck removed       Review of patient's allergies indicates:   Allergen Reactions    Iodine and iodide containing products     Penicillins        Current Facility-Administered Medications on File Prior to Encounter   Medication    cyanocobalamin injection 1,000 mcg     Current Outpatient Medications on File Prior to Encounter   Medication Sig    albuterol (PROVENTIL) 5 mg/mL nebulizer solution Take 0.5 mLs (2.5 mg total) by nebulization every 4 (four) hours as needed for Wheezing or Shortness of Breath. Rescue    albuterol (PROVENTIL/VENTOLIN HFA) 90 mcg/actuation inhaler Inhale 1 puff into the lungs every 6 (six) hours as needed for Wheezing or Shortness of Breath. Rescue    atorvastatin (LIPITOR) 80 MG tablet Take 1 tablet (80 mg total) by mouth once daily. For cholesterol    ciprofloxacin HCl (CILOXAN) 0.3 % ophthalmic solution Place 1 drop into both eyes every 6 (six) hours. for 7 days    fluticasone (FLONASE) 50 mcg/actuation nasal spray 1 spray (50 mcg total) by Each Nare route 2 (two) times daily.    gabapentin (NEURONTIN) 300 MG capsule Take 1 capsule (300 mg total) by mouth 2 (two) times daily. 1 cap AM, 2 caps NOON, 1 capPM    ibrutinib (IMBRUVICA) 140 mg Cap Take 240 mg by mouth once daily .    loratadine (CLARITIN) 10 mg tablet Take 10 mg by mouth once daily.    metoprolol tartrate (LOPRESSOR) 25 MG tablet Take 0.5 tablets (12.5 mg total) by mouth once daily.    mirtazapine (REMERON) 30 MG tablet Take 1 tablet (30 mg total) by mouth every evening.    potassium chloride SA (K-DUR,KLOR-CON) 20 MEQ tablet Take 1 tablet (20 mEq total) by mouth once daily.    blood sugar diagnostic (TRUE METRIX GLUCOSE TEST STRIP) Strp Test blood sugar in vitro BID    blood-glucose meter (TRUE METRIX GLUCOSE METER) Misc Test blood sugar in vitro BID    esomeprazole (NEXIUM) 40 mg GrPS Take 40 mg by mouth before breakfast.    lancets 32 gauge Misc Test blood sugar in vitro BID    triamcinolone (KENALOG)  0.5 % ointment Apply 1 application topically 2 (two) times daily.     Family History     Problem Relation (Age of Onset)    Cancer Mother    No Known Problems Father        Tobacco Use    Smoking status: Current Every Day Smoker     Packs/day: 0.50     Years: 58.00     Pack years: 29.00     Types: Cigarettes    Smokeless tobacco: Never Used    Tobacco comment: says she is trying to quit (6 daily now)   Substance and Sexual Activity    Alcohol use: No     Comment: hx of alochol abuse but does not drink at all now    Drug use: No     Comment: none    Sexual activity: Not Currently     Birth control/protection: None     Review of Systems   All other systems reviewed and are negative.    Objective:     Vital Signs (Most Recent):  Temp: 98 °F (36.7 °C) (05/10/20 2026)  Pulse: (!) 111(Simultaneous filing. User may not have seen previous data.) (05/11/20 0040)  Resp: 18 (05/11/20 0040)  BP: (!) 109/57 (05/11/20 0030)  SpO2: 97 % (05/11/20 0040) Vital Signs (24h Range):  Temp:  [98 °F (36.7 °C)] 98 °F (36.7 °C)  Pulse:  [] 111  Resp:  [18-32] 18  SpO2:  [92 %-100 %] 97 %  BP: (100-139)/(53-73) 109/57     Weight: 65.8 kg (145 lb)  Body mass index is 23.4 kg/m².    Physical Exam   Constitutional: She is oriented to person, place, and time.   HENT:   Head: Normocephalic and atraumatic.   Eyes: Right eye exhibits no discharge. Left eye exhibits no discharge.   Neck: Normal range of motion. Neck supple. No JVD present.   Cardiovascular: Normal rate and regular rhythm. Exam reveals no friction rub.   No murmur heard.  Pulmonary/Chest:   Slightly tachypneic with expiratory wheezes throughout   Abdominal: Soft. Bowel sounds are normal.   Genitourinary: Vagina normal.   Genitourinary Comments: No CVA tenderness   Musculoskeletal: Normal range of motion. She exhibits no edema or deformity.   Neurological: She is alert and oriented to person, place, and time.   Skin: Skin is warm and dry. Capillary refill takes less than 2  seconds. She is not diaphoretic. No erythema.   Psychiatric:   Anxious   Vitals reviewed.          Significant Labs:   CBC:   Recent Labs   Lab 05/10/20  2110   WBC 9.33   HGB 13.0   HCT 41.1        CMP:   Recent Labs   Lab 05/10/20  2051      K 4.9      CO2 24      BUN 8   CREATININE 0.7   CALCIUM 9.0   PROT 7.2   ALBUMIN 3.9   BILITOT 0.8   ALKPHOS 70   AST 19   ALT 12   ANIONGAP 9   EGFRNONAA >60.0     Cardiac Markers:   Recent Labs   Lab 05/10/20  2051   BNP 55     Coagulation:   Recent Labs   Lab 05/10/20  2051   INR 0.9     Troponin:   Recent Labs   Lab 05/10/20  2051 05/10/20  2357   TROPONINI <0.030 <0.030       Significant Imaging: I have reviewed all pertinent imaging results/findings within the past 24 hours.    Assessment/Plan:     * COPD exacerbation  Current smoker  Expiratory wheezes throughout  Tachycardic and tachypneic with minimal exertion, moving in bed  O2 supplement  Respiratory treatments  IV steroids  Monitor SpO2  Advised smoking cessation  No antibiotics for now given afebrile and normal white count      Chronic lymphocytic leukemia  Chronic medical condition  Continue home medication      Dysphagia  Chronic medical condition  Continue PPI  Monitor      GERD (gastroesophageal reflux disease)  Continue PPI  Monitor      Essential hypertension  Chronic medical condition  Continue home medication  Monitor      Type 2 diabetes mellitus without complication  Hemoglobin A1c is 5.8 about 3 months ago  Taken off glyburide recently. The patient reports taking it.  Expect blood glucose to go up with IV steroids  Monitor blood glucose  No glyburide  Sliding scale insulin      VTE Risk Mitigation (From admission, onward)         Ordered     enoxaparin injection 40 mg  Daily      05/11/20 0022     IP VTE HIGH RISK PATIENT  Once      05/11/20 0022     Place sequential compression device  Until discontinued      05/11/20 0022                   RAJESH Chicas  Department  of [facility] Medicine   [facility]

## 2020-05-11 NOTE — NURSING
Patient arrived to unit via stretcher from the ED.  Patient AAOX3, c/o nausea.  Telemetry in place.  RR even and unlabored BBS, CTA.  Oriented patient to room, how to use call bell, to call for assistance to get out of bed.

## 2020-05-11 NOTE — PLAN OF CARE
05/11/20 0740   Patient Assessment/Suction   Level of Consciousness (AVPU) alert   Respiratory Effort Normal;Unlabored   All Lung Fields Breath Sounds diminished   PRE-TX-O2   O2 Device (Oxygen Therapy) nasal cannula   $ Is the patient on Low Flow Oxygen? Yes   Flow (L/min) 2   SpO2 95 %   Pulse Oximetry Type Intermittent   $ Pulse Oximetry - Multiple Charge Pulse Oximetry - Multiple   Pulse 71   Resp 18   Aerosol Therapy   $ Aerosol Therapy Charges Aerosol Treatment   Daily Review of Necessity (SVN) completed   Respiratory Treatment Status (SVN) given   Treatment Route (SVN) mask   Patient Position (SVN) HOB elevated   Post Treatment Assessment (SVN) breath sounds improved   Signs of Intolerance (SVN) none   Breath Sounds Post-Respiratory Treatment   Throughout All Fields Post-Treatment All Fields   Throughout All Fields Post-Treatment aeration increased   Post-treatment Heart Rate (beats/min) 80   Post-treatment Resp Rate (breaths/min) 18   Respiratory Evaluation   $ Care Plan Tech Time 15 min

## 2020-05-11 NOTE — SUBJECTIVE & OBJECTIVE
Past Medical History:   Diagnosis Date    Cancer     lymphoma    COPD (chronic obstructive pulmonary disease)     Diabetes mellitus     GERD (gastroesophageal reflux disease)     Hyperlipidemia     Hypertension     Thyroid disease        Past Surgical History:   Procedure Laterality Date    LYMPH NODE DISSECTION       R axillary    NECK SURGERY      infection in neck removed       Review of patient's allergies indicates:   Allergen Reactions    Iodine and iodide containing products     Penicillins        Current Facility-Administered Medications on File Prior to Encounter   Medication    cyanocobalamin injection 1,000 mcg     Current Outpatient Medications on File Prior to Encounter   Medication Sig    albuterol (PROVENTIL) 5 mg/mL nebulizer solution Take 0.5 mLs (2.5 mg total) by nebulization every 4 (four) hours as needed for Wheezing or Shortness of Breath. Rescue    albuterol (PROVENTIL/VENTOLIN HFA) 90 mcg/actuation inhaler Inhale 1 puff into the lungs every 6 (six) hours as needed for Wheezing or Shortness of Breath. Rescue    atorvastatin (LIPITOR) 80 MG tablet Take 1 tablet (80 mg total) by mouth once daily. For cholesterol    ciprofloxacin HCl (CILOXAN) 0.3 % ophthalmic solution Place 1 drop into both eyes every 6 (six) hours. for 7 days    fluticasone (FLONASE) 50 mcg/actuation nasal spray 1 spray (50 mcg total) by Each Nare route 2 (two) times daily.    gabapentin (NEURONTIN) 300 MG capsule Take 1 capsule (300 mg total) by mouth 2 (two) times daily. 1 cap AM, 2 caps NOON, 1 capPM    ibrutinib (IMBRUVICA) 140 mg Cap Take 240 mg by mouth once daily .    loratadine (CLARITIN) 10 mg tablet Take 10 mg by mouth once daily.    metoprolol tartrate (LOPRESSOR) 25 MG tablet Take 0.5 tablets (12.5 mg total) by mouth once daily.    mirtazapine (REMERON) 30 MG tablet Take 1 tablet (30 mg total) by mouth every evening.    potassium chloride SA (K-DUR,KLOR-CON) 20 MEQ tablet Take 1 tablet (20 mEq  total) by mouth once daily.    blood sugar diagnostic (TRUE METRIX GLUCOSE TEST STRIP) Strp Test blood sugar in vitro BID    blood-glucose meter (TRUE METRIX GLUCOSE METER) Misc Test blood sugar in vitro BID    esomeprazole (NEXIUM) 40 mg GrPS Take 40 mg by mouth before breakfast.    lancets 32 gauge Misc Test blood sugar in vitro BID    triamcinolone (KENALOG) 0.5 % ointment Apply 1 application topically 2 (two) times daily.     Family History     Problem Relation (Age of Onset)    Cancer Mother    No Known Problems Father        Tobacco Use    Smoking status: Current Every Day Smoker     Packs/day: 0.50     Years: 58.00     Pack years: 29.00     Types: Cigarettes    Smokeless tobacco: Never Used    Tobacco comment: says she is trying to quit (6 daily now)   Substance and Sexual Activity    Alcohol use: No     Comment: hx of alochol abuse but does not drink at all now    Drug use: No     Comment: none    Sexual activity: Not Currently     Birth control/protection: None     Review of Systems   All other systems reviewed and are negative.    Objective:     Vital Signs (Most Recent):  Temp: 98 °F (36.7 °C) (05/10/20 2026)  Pulse: (!) 111(Simultaneous filing. User may not have seen previous data.) (05/11/20 0040)  Resp: 18 (05/11/20 0040)  BP: (!) 109/57 (05/11/20 0030)  SpO2: 97 % (05/11/20 0040) Vital Signs (24h Range):  Temp:  [98 °F (36.7 °C)] 98 °F (36.7 °C)  Pulse:  [] 111  Resp:  [18-32] 18  SpO2:  [92 %-100 %] 97 %  BP: (100-139)/(53-73) 109/57     Weight: 65.8 kg (145 lb)  Body mass index is 23.4 kg/m².    Physical Exam   Constitutional: She is oriented to person, place, and time.   HENT:   Head: Normocephalic and atraumatic.   Eyes: Right eye exhibits no discharge. Left eye exhibits no discharge.   Neck: Normal range of motion. Neck supple. No JVD present.   Cardiovascular: Normal rate and regular rhythm. Exam reveals no friction rub.   No murmur heard.  Pulmonary/Chest:   Slightly tachypneic  with expiratory wheezes throughout   Abdominal: Soft. Bowel sounds are normal.   Genitourinary: Vagina normal.   Genitourinary Comments: No CVA tenderness   Musculoskeletal: Normal range of motion. She exhibits no edema or deformity.   Neurological: She is alert and oriented to person, place, and time.   Skin: Skin is warm and dry. Capillary refill takes less than 2 seconds. She is not diaphoretic. No erythema.   Psychiatric:   Anxious   Vitals reviewed.          Significant Labs:   CBC:   Recent Labs   Lab 05/10/20  2110   WBC 9.33   HGB 13.0   HCT 41.1        CMP:   Recent Labs   Lab 05/10/20  2051      K 4.9      CO2 24      BUN 8   CREATININE 0.7   CALCIUM 9.0   PROT 7.2   ALBUMIN 3.9   BILITOT 0.8   ALKPHOS 70   AST 19   ALT 12   ANIONGAP 9   EGFRNONAA >60.0     Cardiac Markers:   Recent Labs   Lab 05/10/20  2051   BNP 55     Coagulation:   Recent Labs   Lab 05/10/20  2051   INR 0.9     Troponin:   Recent Labs   Lab 05/10/20  2051 05/10/20  2357   TROPONINI <0.030 <0.030       Significant Imaging: I have reviewed all pertinent imaging results/findings within the past 24 hours.

## 2020-05-11 NOTE — ASSESSMENT & PLAN NOTE
Current smoker  Expiratory wheezes throughout  Tachycardic and tachypneic with minimal exertion, moving in bed  O2 supplement  Respiratory treatments  IV steroids  Monitor SpO2  Advised smoking cessation  No antibiotics for now given afebrile and normal white count

## 2020-05-11 NOTE — HPI
The patient is a 75 year old female with history of nicotine abuse, COPD, diabetes mellitus, GERD, hyperlipidemia, hypertension, hypothyroidism, CLL, DVT.  She presented to the emergency department with shortness of breath.  She reports having cough with productive white sputum for 2-3 days.  Prior to arrival, she developed sudden onset of severe shortness of breath, associated  with wheezing while walking to a neighbor's house.  Symptoms persisted after resting.  She subsequently came to the ED.    She denies fever, chills, or urinary symptoms.  She reports having dysphagia and occasional vomiting.  She reports some weight loss.  She has no recent EGD due to COVID-19 pandemic.    In the ED, she was noted to be afebrile.  Per ED physician she has significant wheezes.  Oxygen saturation has been in the low 90s.  She received 2 rounds of DuoNeb treatments.  She also received IV Solu-Medrol.  She continues to have significant wheezes, tachycardic and tachypneic with minimal exertion. Chest radiograph, personally reviewed, showed no obvious infiltrates.

## 2020-05-11 NOTE — ED PROVIDER NOTES
Encounter Date: 5/10/2020       History     Chief Complaint   Patient presents with    Shortness of Breath     75-year-old female with a history of COPD, DM, GERD, HLD, HTN, hypothyroidism, CLL (on chemo, last RXN 1 mo ago), DVT (not on anticoagulation) presents to the ER with shortness of breath.  Patient states that today, she has had a cough productive of white sputum.  This evening while walking from a neighbor's house 2 houses away, she began to feel short of breath.  Did not improve when she was resting.  States this feels like her COPD.  Denies fever, nausea or vomiting, chest pain, or changes in bowel or bladder function.  Endorses some intermittent, burning, upper abdominal pain similar to her GERD. Denies pain or swelling in extremities. Denies recent long distance travel or surgery.  Denies history of MI, stroke.  States she has been taking all her medications as prescribed.        Review of patient's allergies indicates:   Allergen Reactions    Iodine and iodide containing products     Penicillins      Past Medical History:   Diagnosis Date    Cancer     lymphoma    COPD (chronic obstructive pulmonary disease)     Diabetes mellitus     GERD (gastroesophageal reflux disease)     Hyperlipidemia     Hypertension     Thyroid disease      Past Surgical History:   Procedure Laterality Date    LYMPH NODE DISSECTION       R axillary    NECK SURGERY      infection in neck removed     Family History   Problem Relation Age of Onset    Cancer Mother     No Known Problems Father      Social History     Tobacco Use    Smoking status: Current Every Day Smoker     Packs/day: 0.50     Years: 58.00     Pack years: 29.00     Types: Cigarettes    Smokeless tobacco: Never Used    Tobacco comment: says she is trying to quit (6 daily now)   Substance Use Topics    Alcohol use: No     Comment: hx of alochol abuse but does not drink at all now    Drug use: No     Comment: none     Review of Systems    Constitutional: Negative for fever.   HENT: Positive for ear pain. Negative for sore throat.    Respiratory: Positive for cough and shortness of breath.    Cardiovascular: Negative for chest pain and leg swelling.   Gastrointestinal: Negative for abdominal pain, nausea and vomiting.   Genitourinary: Negative for dysuria.   Musculoskeletal: Negative for back pain.   Skin: Negative for rash.   Neurological: Negative for weakness.   Hematological: Does not bruise/bleed easily.   All other systems reviewed and are negative.      Physical Exam     Initial Vitals   BP Pulse Resp Temp SpO2   05/10/20 2025 05/10/20 2025 05/10/20 2025 05/10/20 2026 05/10/20 2025   131/70 88 20 98 °F (36.7 °C) 95 %      MAP       --                Physical Exam    Constitutional: She appears well-developed and well-nourished. No distress.   HENT:   Head: Normocephalic and atraumatic.   Right Ear: Tympanic membrane normal. There is swelling.   Left Ear: Tympanic membrane normal. There is swelling.   Mouth/Throat: Oropharynx is clear and moist.   EAC erythematous and edematous bilaterally.   Eyes: Conjunctivae and EOM are normal. Pupils are equal, round, and reactive to light.   Neck: Normal range of motion.   Cardiovascular: Normal rate, regular rhythm, normal heart sounds and intact distal pulses.   No murmur heard.  Pulmonary/Chest: Accessory muscle usage present. Tachypnea noted. No respiratory distress. She has no decreased breath sounds. She has wheezes. She has no rhonchi. She has no rales.   Wheezes diffusely.  Speaking in complete sentences.   Abdominal: Soft. Bowel sounds are normal. She exhibits no distension. There is no tenderness. There is no rebound and no guarding.   Musculoskeletal: Normal range of motion. She exhibits no edema or tenderness.   Neurological: She is alert.   Skin: Skin is warm and dry.   Psychiatric: She has a normal mood and affect. Thought content normal.         ED Course   Procedures  Labs Reviewed   CBC  W/ AUTO DIFFERENTIAL - Abnormal; Notable for the following components:       Result Value    Mean Corpuscular Hemoglobin Conc 31.6 (*)     RDW 14.9 (*)     Eos # 0.9 (*)     Eosinophil% 9.5 (*)     All other components within normal limits    Narrative:     Recoll. [id number] by [email or web address] at 05/10/2020 21:16, reason: Specimen   clotted   B-TYPE NATRIURETIC PEPTIDE   COMPREHENSIVE METABOLIC PANEL   MAGNESIUM   LIPASE   PROTIME-INR   TROPONIN I   TSH   SARS-COV-2 RNA AMPLIFICATION, QUAL    Narrative:     What symptom criteria does the patient meet?->Cough  What symptom criteria does the patient meet?->Shortness of  breath or difficulty breathing   TROPONIN I          Imaging Results          X-Ray Chest AP Portable (Preliminary result)  Result time 05/10/20 22:15:34    ED Interpretation by Hubert Li MD (05/10/20 22:15:34, [facility], Emergency Medicine)    Mass in the right upper lobe appears stable.  No acute abnormalities.  Similar to previous.                               Medical Decision Making:   Initial Assessment:   75-year-old female with a history of COPD, DM, GERD, HLD, HTN, hypothyroidism, CLL (on chemo, last RXN 1 mo ago), DVT (not on anticoagulation) presents to the ER with shortness of breath.   ED Management:  Plan:  Afebrile, RR 20s, vital signs otherwise stable.  O2 sat 93-96% on room air.  Patient is diffusely wheezy and presentation is consistent with COPD exacerbation.  She was not given anything EN route with EMS.  Started on Solu-Medrol and DuoNebs (patient is in negative pressure room).  She tested negative for COVID-19 2 weeks ago, but we will retest her today.  EKG shows sinus rhythm without acute ST changes and intervals.  Sinus arrhythmia.  Similar to previous.    Reassessed.  Patient still having some increased work of breathing after 2 sets of DuoNebs and Solu-Medrol.  Lung still diffusely wheezy.  O2 sat remains in the low 90s.  Lab showed WBC 9.3.  BUN 8, creatinine 0.7.   Troponin less than 0.03, BNP 55.  Lipase 41.  Rapid COVID negative.  Chest x-ray shows a mass in the right upper lobe that appears stable.  No acute abnormalities.  This appears similar to previous on my read.  Because she continues to have increased work of breathing and diffuse wheezing, feel that she would benefit from more nebs and overnight observation.  Will admit to the hospitalist for further management.                                 Clinical Impression:       ICD-10-CM ICD-9-CM   1. COPD exacerbation J44.1 491.21   2. Shortness of breath R06.02 786.05             ED Disposition Condition    Observation                           Hubert Li MD  05/11/20 0006

## 2020-05-11 NOTE — ASSESSMENT & PLAN NOTE
Hemoglobin A1c is 5.8 about 3 months ago  Taken off glyburide recently. The patient reports taking it.  Expect blood glucose to go up with IV steroids  Monitor blood glucose  No glyburide  Sliding scale insulin

## 2020-05-12 NOTE — NURSING
Patient worked with therapy well, amb in room with a walker and no c/o discomfort. IV access remains in place. No Changes noted. Patient has had negative COVIT test and Dr Fairbanks is aaware.

## 2020-05-12 NOTE — PROGRESS NOTES
05/12/20 1100   Room Air SpO2 At Rest   Room Air SpO2 At Rest 96 %   Exertional SpO2 Evaluation on Room Air   Room Air SpO2 on Exertion 93 %   Pulse 98 bpm

## 2020-05-12 NOTE — PLAN OF CARE
05/12/20 1407   OVALLES Message   Medicare Outpatient and Observation Notification regarding financial responsibility Given to patient/caregiver;Explained to patient/caregiver;Signed/date by patient/caregiver   Date OVALLES was signed 05/12/20   Time OVALLES was signed [id number]

## 2020-05-12 NOTE — CONSULTS
Chief Complaint:  Dysphagia, gerd    HPI:  75 F with history of COPD admitted with progressive shortness of breath.  Treating for copd exacerbation on solumedrol.  Reports intermittent moderate progressive chronic dysphagia present for years associated with weight loss.      Review of Systems:  Complete ROS performed and negative unless stated above in HPI      Past Medical History:   Diagnosis Date    Cancer     lymphoma    COPD (chronic obstructive pulmonary disease)     Diabetes mellitus     GERD (gastroesophageal reflux disease)     Hyperlipidemia     Hypertension     Thyroid disease        Past Surgical History:   Procedure Laterality Date    LYMPH NODE DISSECTION       R axillary    NECK SURGERY      infection in neck removed       Family History   Problem Relation Age of Onset    Cancer Mother     No Known Problems Father        Social History     Socioeconomic History    Marital status:      Spouse name: Not on file    Number of children: Not on file    Years of education: Not on file    Highest education level: Not on file   Occupational History    Not on file   Social Needs    Financial resource strain: Not on file    Food insecurity:     Worry: Not on file     Inability: Not on file    Transportation needs:     Medical: Not on file     Non-medical: Not on file   Tobacco Use    Smoking status: Current Every Day Smoker     Packs/day: 0.50     Years: 58.00     Pack years: 29.00     Types: Cigarettes    Smokeless tobacco: Never Used    Tobacco comment: says she is trying to quit (6 daily now)   Substance and Sexual Activity    Alcohol use: No     Comment: hx of alochol abuse but does not drink at all now    Drug use: No     Comment: none    Sexual activity: Not Currently     Birth control/protection: None   Lifestyle    Physical activity:     Days per week: Not on file     Minutes per session: Not on file    Stress: Not on file   Relationships    Social connections:      Talks on phone: Not on file     Gets together: Not on file     Attends [employer] service: Not on file     Active member of club or organization: Not on file     Attends meetings of clubs or organizations: Not on file     Relationship status: Not on file   Other Topics Concern    Not on file   Social History Narrative    Not on file       Review of patient's allergies indicates:   Allergen Reactions    Iodine and iodide containing products     Penicillins        No current facility-administered medications on file prior to encounter.      Current Outpatient Medications on File Prior to Encounter   Medication Sig Dispense Refill    albuterol (PROVENTIL) 5 mg/mL nebulizer solution Take 0.5 mLs (2.5 mg total) by nebulization every 4 (four) hours as needed for Wheezing or Shortness of Breath. Rescue 20 mL 0    albuterol (PROVENTIL/VENTOLIN HFA) 90 mcg/actuation inhaler Inhale 1 puff into the lungs every 6 (six) hours as needed for Wheezing or Shortness of Breath. Rescue 18 g 0    atorvastatin (LIPITOR) 80 MG tablet Take 1 tablet (80 mg total) by mouth once daily. For cholesterol 90 tablet 3    ciprofloxacin HCl (CILOXAN) 0.3 % ophthalmic solution Place 1 drop into both eyes every 6 (six) hours. for 7 days 5 mL 0    fluticasone (FLONASE) 50 mcg/actuation nasal spray 1 spray (50 mcg total) by Each Nare route 2 (two) times daily. 1 Bottle 3    gabapentin (NEURONTIN) 300 MG capsule Take 1 capsule (300 mg total) by mouth 2 (two) times daily. 1 cap AM, 2 caps NOON, 1 capPM 180 capsule 3    ibrutinib (IMBRUVICA) 140 mg Cap Take 240 mg by mouth once daily .      loratadine (CLARITIN) 10 mg tablet Take 10 mg by mouth once daily.      metoprolol tartrate (LOPRESSOR) 25 MG tablet Take 0.5 tablets (12.5 mg total) by mouth once daily. 45 tablet 0    mirtazapine (REMERON) 30 MG tablet Take 1 tablet (30 mg total) by mouth every evening. 90 tablet 3    potassium chloride SA (K-DUR,KLOR-CON) 20 MEQ tablet Take 1 tablet (20  "mEq total) by mouth once daily. 90 tablet 3    blood sugar diagnostic (TRUE METRIX GLUCOSE TEST STRIP) Strp Test blood sugar in vitro  each 5    blood-glucose meter (TRUE METRIX GLUCOSE METER) Misc Test blood sugar in vitro BID 1 each 0    esomeprazole (NEXIUM) 40 mg GrPS Take 40 mg by mouth before breakfast. 90 each 1    lancets 32 gauge Misc Test blood sugar in vitro  each 5    triamcinolone (KENALOG) 0.5 % ointment Apply 1 application topically 2 (two) times daily. 15 g 2       Objective:  /64   Pulse 77   Temp 97.8 °F (36.6 °C) (Oral)   Resp 18   Ht 5' 6" (1.676 m)   Wt 57 kg (125 lb 10.6 oz)   LMP  (LMP Unknown)   SpO2 95%   Breastfeeding? No   BMI 20.28 kg/m²   General: wd, wn, nad  HE: ncat, perrl, eomi  ENT: op pink and moist without lesions or exudates  CV: +s1s2, no mrg, rrr  Resp: ctapb, no wrr  GI: bs active, abd soft, nt, nd  Skin: no lesions, no rash  Neuro: cn 2-12 in tact, no focal deficits, no asterixis  Psych: regular rate speech, normal affect    Labs:  Recent Results (from the past 2688 hour(s))   Hemoglobin A1C, POCT    Collection Time: 02/17/20 10:17 AM   Result Value Ref Range    Hemoglobin A1C 5.8 %   ISTAT PROCEDURE    Collection Time: 04/22/20  4:05 AM   Result Value Ref Range    POC PH 7.333 (L) 7.35 - 7.45    POC PCO2 47.1 (H) 35 - 45 mmHg    POC PO2 197 (H) 80 - 100 mmHg    POC HCO3 25.0 24 - 28 mmol/L    POC BE -1 -2 to 2 mmol/L    POC SATURATED O2 100 95 - 100 %    POC TCO2 26 23 - 27 mmol/L    Rate 16     Sample ARTERIAL     Site LR     Allens Test Pass     DelSys CPAP/BiPAP     Mode BiPAP     FiO2 50     Spont Rate 22     Sp02 99     IP 16     EP 8    Blood culture x two cultures. Draw prior to antibiotics.    Collection Time: 04/22/20  4:13 AM   Result Value Ref Range    Blood Culture, Routine No growth after 5 days.    CBC auto differential    Collection Time: 04/22/20  4:18 AM   Result Value Ref Range    WBC 12.63 3.90 - 12.70 K/uL    RBC 4.27 " 4.00 - 5.40 M/uL    Hemoglobin 12.2 12.0 - 16.0 g/dL    Hematocrit 37.6 37.0 - 48.5 %    Mean Corpuscular Volume 88 82 - 98 fL    Mean Corpuscular Hemoglobin 28.6 27.0 - 31.0 pg    Mean Corpuscular Hemoglobin Conc 32.4 32.0 - 36.0 g/dL    RDW 14.6 (H) 11.5 - 14.5 %    Platelets 217 150 - 350 K/uL    MPV 10.4 9.2 - 12.9 fL    Immature Granulocytes 1.3 (H) 0.0 - 0.5 %    Gran # (ANC) 9.6 (H) 1.8 - 7.7 K/uL    Immature Grans (Abs) 0.16 (H) 0.00 - 0.04 K/uL    Lymph # 1.9 1.0 - 4.8 K/uL    Mono # 0.9 0.3 - 1.0 K/uL    Eos # 0.1 0.0 - 0.5 K/uL    Baso # 0.02 0.00 - 0.20 K/uL    nRBC 0 0 /100 WBC    Gran% 75.8 (H) 38.0 - 73.0 %    Lymph% 14.6 (L) 18.0 - 48.0 %    Mono% 7.2 4.0 - 15.0 %    Eosinophil% 0.9 0.0 - 8.0 %    Basophil% 0.2 0.0 - 1.9 %    Differential Method Automated    Comprehensive metabolic panel    Collection Time: 04/22/20  4:18 AM   Result Value Ref Range    Sodium 141 136 - 145 mmol/L    Potassium 4.1 3.5 - 5.1 mmol/L    Chloride 107 95 - 110 mmol/L    CO2 25 23 - 29 mmol/L    Glucose 163 (H) 70 - 110 mg/dL    BUN, Bld 13 8 - 23 mg/dL    Creatinine 0.7 0.5 - 1.4 mg/dL    Calcium 8.6 (L) 8.7 - 10.5 mg/dL    Total Protein 6.7 6.0 - 8.4 g/dL    Albumin 3.7 3.5 - 5.2 g/dL    Total Bilirubin 0.4 0.1 - 1.0 mg/dL    Alkaline Phosphatase 80 55 - 135 U/L    AST 42 (H) 10 - 40 U/L    ALT 26 10 - 44 U/L    Anion Gap 9 8 - 16 mmol/L    eGFR if African American >60.0 >60 mL/min/1.73 m^2    eGFR if non African American >60.0 >60 mL/min/1.73 m^2   C-Reactive Protein    Collection Time: 04/22/20  4:18 AM   Result Value Ref Range    CRP 0.61 0.00 - 0.75 mg/dL   Ferritin    Collection Time: 04/22/20  4:18 AM   Result Value Ref Range    Ferritin 139 20.0 - 300.0 ng/mL   Lactate Dehydrogenase    Collection Time: 04/22/20  4:18 AM   Result Value Ref Range     110 - 260 U/L   CK    Collection Time: 04/22/20  4:18 AM   Result Value Ref Range     20 - 180 U/L   Lactic Acid, Plasma    Collection Time: 04/22/20  4:18  AM   Result Value Ref Range    Lactate (Lactic Acid) 2.9 (HH) 0.5 - 1.9 mmol/L   Troponin I    Collection Time: 04/22/20  4:18 AM   Result Value Ref Range    Troponin I <0.030 <=0.040 ng/mL   COVID-19 Rapid Screening    Collection Time: 04/22/20  4:18 AM   Result Value Ref Range    SARS-CoV-2 RNA, Amplification, Qual Negative Negative   Procalcitonin    Collection Time: 04/22/20  4:18 AM   Result Value Ref Range    Procalcitonin <0.05 0.00 - 0.50 ng/mL   Brain Natriuretic Peptide    Collection Time: 04/22/20  4:18 AM   Result Value Ref Range    BNP 72 0 - 99 pg/mL   Blood culture x two cultures. Draw prior to antibiotics.    Collection Time: 04/22/20  4:36 AM   Result Value Ref Range    Blood Culture, Routine No growth after 5 days.    Urinalysis, Reflex to Urine Culture Urine, Clean Catch    Collection Time: 04/22/20  5:50 AM   Result Value Ref Range    Specimen UA Urine, Clean Catch     Color, UA Yellow Yellow, Straw, Erika    Appearance, UA Clear Clear    pH, UA 6.0 5.0 - 8.0    Specific Gravity, UA 1.015 1.005 - 1.030    Protein, UA 1+ (A) Negative    Glucose, UA 3+ (A) Negative    Ketones, UA Negative Negative    Bilirubin (UA) Negative Negative    Occult Blood UA 1+ (A) Negative    Nitrite, UA Negative Negative    Urobilinogen, UA Negative Negative EU/dL    Leukocytes, UA Negative Negative   Urinalysis Microscopic    Collection Time: 04/22/20  5:50 AM   Result Value Ref Range    RBC, UA 8 (H) 0 - 4 /hpf    WBC, UA 1 0 - 5 /hpf    Bacteria Negative None-Occ /hpf    Yeast, UA None None    Squam Epithel, UA 2 /hpf    Hyaline Casts, UA 4 (A) 0-1/lpf /lpf    Microscopic Comment SEE COMMENT    Lactic acid, plasma    Collection Time: 04/22/20  8:41 AM   Result Value Ref Range    Lactate (Lactic Acid) 0.8 0.5 - 1.9 mmol/L   Basic metabolic panel    Collection Time: 04/22/20  8:41 AM   Result Value Ref Range    Sodium 140 136 - 145 mmol/L    Potassium 3.4 (L) 3.5 - 5.1 mmol/L    Chloride 108 95 - 110 mmol/L    CO2 27  23 - 29 mmol/L    Glucose 88 70 - 110 mg/dL    BUN, Bld 14 8 - 23 mg/dL    Creatinine 0.6 0.5 - 1.4 mg/dL    Calcium 8.2 (L) 8.7 - 10.5 mg/dL    Anion Gap 5 (L) 8 - 16 mmol/L    eGFR if African American >60.0 >60 mL/min/1.73 m^2    eGFR if non African American >60.0 >60 mL/min/1.73 m^2   Magnesium    Collection Time: 04/22/20  8:41 AM   Result Value Ref Range    Magnesium 2.2 1.6 - 2.6 mg/dL   Phosphorus    Collection Time: 04/22/20  8:41 AM   Result Value Ref Range    Phosphorus 3.4 2.7 - 4.5 mg/dL   CBC auto differential    Collection Time: 04/22/20  8:41 AM   Result Value Ref Range    WBC 11.96 3.90 - 12.70 K/uL    RBC 4.05 4.00 - 5.40 M/uL    Hemoglobin 11.4 (L) 12.0 - 16.0 g/dL    Hematocrit 35.2 (L) 37.0 - 48.5 %    Mean Corpuscular Volume 87 82 - 98 fL    Mean Corpuscular Hemoglobin 28.1 27.0 - 31.0 pg    Mean Corpuscular Hemoglobin Conc 32.4 32.0 - 36.0 g/dL    RDW 14.5 11.5 - 14.5 %    Platelets 193 150 - 350 K/uL    MPV 10.1 9.2 - 12.9 fL    Immature Granulocytes 0.6 (H) 0.0 - 0.5 %    Gran # (ANC) 7.2 1.8 - 7.7 K/uL    Immature Grans (Abs) 0.07 (H) 0.00 - 0.04 K/uL    Lymph # 3.2 1.0 - 4.8 K/uL    Mono # 1.2 (H) 0.3 - 1.0 K/uL    Eos # 0.2 0.0 - 0.5 K/uL    Baso # 0.03 0.00 - 0.20 K/uL    nRBC 0 0 /100 WBC    Gran% 60.5 38.0 - 73.0 %    Lymph% 26.9 18.0 - 48.0 %    Mono% 10.1 4.0 - 15.0 %    Eosinophil% 1.6 0.0 - 8.0 %    Basophil% 0.3 0.0 - 1.9 %    Differential Method Automated    COVID-19 Routine Screening    Collection Time: 04/22/20 11:25 AM   Result Value Ref Range    SARS-CoV2 (COVID-19) Qualitative PCR Not Detected Not Detected   Troponin I    Collection Time: 04/22/20 12:47 PM   Result Value Ref Range    Troponin I 0.081 (HH) <=0.040 ng/mL   POCT glucose    Collection Time: 04/22/20  3:57 PM   Result Value Ref Range    POC Glucose 99 70 - 110   Troponin I    Collection Time: 04/22/20  6:36 PM   Result Value Ref Range    Troponin I 0.074 (HH) <=0.040 ng/mL   POCT glucose    Collection Time:  04/22/20  8:20 PM   Result Value Ref Range    POC Glucose 85 70 - 110   POCT glucose    Collection Time: 04/23/20 11:28 AM   Result Value Ref Range    POC Glucose 71 70 - 110   Immunoglobulins (IgG, IgA, IgM) Quantitative    Collection Time: 04/23/20 11:29 AM   Result Value Ref Range    IgG - Serum 776 586 - 1602 mg/dL    IgA 271 64 - 422 mg/dL    IgM 48 26 - 217 mg/dL   Protein electrophoresis, serum    Collection Time: 04/23/20 11:29 AM   Result Value Ref Range    Total Protein 6.0 6.0 - 8.5 g/dL    Albumin grams/dl 3.3 2.9 - 4.4 g/dL    Alpha-1 grams/dl 0.2 0.0 - 0.4 g/dL    Alpha-2 grams/dl 0.7 0.4 - 1.0 g/dL    Beta grams/dl 1.0 0.7 - 1.3 g/dL    Gamma grams/dl 0.7 0.4 - 1.8 g/dL    M-SPIKE, PROT ELECTRO Not Observed Not Observed g/dL    Globulin, Total 2.7 2.2 - 3.9 g/dL    A/G Ratio 1.2 0.7 - 1.7    Please Note: Comment    Immunofixation electrophoresis    Collection Time: 04/23/20 11:29 AM   Result Value Ref Range    Immunofix Interp. Comment     IgA 271 64 - 422 mg/dL    IgG - Serum 776 586 - 1602 mg/dL    IgM 48 26 - 217 mg/dL   Comprehensive metabolic panel    Collection Time: 04/23/20 11:29 AM   Result Value Ref Range    Sodium 140 136 - 145 mmol/L    Potassium 3.9 3.5 - 5.1 mmol/L    Chloride 106 95 - 110 mmol/L    CO2 27 23 - 29 mmol/L    Glucose 80 70 - 110 mg/dL    BUN, Bld 10 8 - 23 mg/dL    Creatinine 0.8 0.5 - 1.4 mg/dL    Calcium 8.7 8.7 - 10.5 mg/dL    Total Protein 6.3 6.0 - 8.4 g/dL    Albumin 3.5 3.5 - 5.2 g/dL    Total Bilirubin 0.6 0.1 - 1.0 mg/dL    Alkaline Phosphatase 81 55 - 135 U/L    AST 20 10 - 40 U/L    ALT 25 10 - 44 U/L    Anion Gap 7 (L) 8 - 16 mmol/L    eGFR if African American >60.0 >60 mL/min/1.73 m^2    eGFR if non African American >60.0 >60 mL/min/1.73 m^2   Magnesium    Collection Time: 04/23/20 11:29 AM   Result Value Ref Range    Magnesium 2.0 1.6 - 2.6 mg/dL   CBC auto differential    Collection Time: 04/23/20  1:07 PM   Result Value Ref Range    WBC 9.22 3.90 - 12.70  K/uL    RBC 4.47 4.00 - 5.40 M/uL    Hemoglobin 12.5 12.0 - 16.0 g/dL    Hematocrit 39.2 37.0 - 48.5 %    Mean Corpuscular Volume 88 82 - 98 fL    Mean Corpuscular Hemoglobin 28.0 27.0 - 31.0 pg    Mean Corpuscular Hemoglobin Conc 31.9 (L) 32.0 - 36.0 g/dL    RDW 14.6 (H) 11.5 - 14.5 %    Platelets 207 150 - 350 K/uL    MPV 10.4 9.2 - 12.9 fL    Immature Granulocytes 0.7 (H) 0.0 - 0.5 %    Gran # (ANC) 4.1 1.8 - 7.7 K/uL    Immature Grans (Abs) 0.06 (H) 0.00 - 0.04 K/uL    Lymph # 3.2 1.0 - 4.8 K/uL    Mono # 0.8 0.3 - 1.0 K/uL    Eos # 1.0 (H) 0.0 - 0.5 K/uL    Baso # 0.08 0.00 - 0.20 K/uL    nRBC 0 0 /100 WBC    Gran% 43.9 38.0 - 73.0 %    Lymph% 34.2 18.0 - 48.0 %    Mono% 9.0 4.0 - 15.0 %    Eosinophil% 11.3 (H) 0.0 - 8.0 %    Basophil% 0.9 0.0 - 1.9 %    Differential Method Automated    POCT glucose    Collection Time: 04/23/20  4:05 PM   Result Value Ref Range    POC Glucose 134 (H) 70 - 110   Brain natriuretic peptide    Collection Time: 05/10/20  8:51 PM   Result Value Ref Range    BNP 55 0 - 99 pg/mL   Comprehensive metabolic panel    Collection Time: 05/10/20  8:51 PM   Result Value Ref Range    Sodium 138 136 - 145 mmol/L    Potassium 4.9 3.5 - 5.1 mmol/L    Chloride 105 95 - 110 mmol/L    CO2 24 23 - 29 mmol/L    Glucose 107 70 - 110 mg/dL    BUN, Bld 8 8 - 23 mg/dL    Creatinine 0.7 0.5 - 1.4 mg/dL    Calcium 9.0 8.7 - 10.5 mg/dL    Total Protein 7.2 6.0 - 8.4 g/dL    Albumin 3.9 3.5 - 5.2 g/dL    Total Bilirubin 0.8 0.1 - 1.0 mg/dL    Alkaline Phosphatase 70 55 - 135 U/L    AST 19 10 - 40 U/L    ALT 12 10 - 44 U/L    Anion Gap 9 8 - 16 mmol/L    eGFR if African American >60.0 >60 mL/min/1.73 m^2    eGFR if non African American >60.0 >60 mL/min/1.73 m^2   Magnesium    Collection Time: 05/10/20  8:51 PM   Result Value Ref Range    Magnesium 2.0 1.6 - 2.6 mg/dL   Lipase    Collection Time: 05/10/20  8:51 PM   Result Value Ref Range    Lipase 41 4 - 60 U/L   Protime-INR    Collection Time: 05/10/20  8:51  PM   Result Value Ref Range    PT 12.0 10.6 - 14.8 sec    INR 0.9    Troponin I    Collection Time: 05/10/20  8:51 PM   Result Value Ref Range    Troponin I <0.030 <=0.040 ng/mL   TSH    Collection Time: 05/10/20  8:51 PM   Result Value Ref Range    TSH 1.270 0.340 - 5.600 uIU/mL   COVID-19 Rapid Screening    Collection Time: 05/10/20  8:51 PM   Result Value Ref Range    SARS-CoV-2 RNA, Amplification, Qual Negative Negative   CBC auto differential    Collection Time: 05/10/20  9:10 PM   Result Value Ref Range    WBC 9.33 3.90 - 12.70 K/uL    RBC 4.62 4.00 - 5.40 M/uL    Hemoglobin 13.0 12.0 - 16.0 g/dL    Hematocrit 41.1 37.0 - 48.5 %    Mean Corpuscular Volume 89 82 - 98 fL    Mean Corpuscular Hemoglobin 28.1 27.0 - 31.0 pg    Mean Corpuscular Hemoglobin Conc 31.6 (L) 32.0 - 36.0 g/dL    RDW 14.9 (H) 11.5 - 14.5 %    Platelets 212 150 - 350 K/uL    MPV 11.6 9.2 - 12.9 fL    Immature Granulocytes 0.4 0.0 - 0.5 %    Gran # (ANC) 4.6 1.8 - 7.7 K/uL    Immature Grans (Abs) 0.04 0.00 - 0.04 K/uL    Lymph # 2.9 1.0 - 4.8 K/uL    Mono # 0.9 0.3 - 1.0 K/uL    Eos # 0.9 (H) 0.0 - 0.5 K/uL    Baso # 0.06 0.00 - 0.20 K/uL    nRBC 0 0 /100 WBC    Gran% 49.6 38.0 - 73.0 %    Lymph% 30.7 18.0 - 48.0 %    Mono% 9.2 4.0 - 15.0 %    Eosinophil% 9.5 (H) 0.0 - 8.0 %    Basophil% 0.6 0.0 - 1.9 %    Differential Method Automated    Troponin I    Collection Time: 05/10/20 11:57 PM   Result Value Ref Range    Troponin I <0.030 <=0.040 ng/mL   CBC auto differential    Collection Time: 05/11/20  5:19 AM   Result Value Ref Range    WBC 5.75 3.90 - 12.70 K/uL    RBC 4.19 4.00 - 5.40 M/uL    Hemoglobin 12.0 12.0 - 16.0 g/dL    Hematocrit 37.0 37.0 - 48.5 %    Mean Corpuscular Volume 88 82 - 98 fL    Mean Corpuscular Hemoglobin 28.6 27.0 - 31.0 pg    Mean Corpuscular Hemoglobin Conc 32.4 32.0 - 36.0 g/dL    RDW 14.8 (H) 11.5 - 14.5 %    Platelets 190 150 - 350 K/uL    MPV 11.7 9.2 - 12.9 fL    Immature Granulocytes 0.3 0.0 - 0.5 %    Gran #  (ANC) 5.3 1.8 - 7.7 K/uL    Immature Grans (Abs) 0.02 0.00 - 0.04 K/uL    Lymph # 0.4 (L) 1.0 - 4.8 K/uL    Mono # 0.0 (L) 0.3 - 1.0 K/uL    Eos # 0.0 0.0 - 0.5 K/uL    Baso # 0.01 0.00 - 0.20 K/uL    nRBC 0 0 /100 WBC    Gran% 91.7 (H) 38.0 - 73.0 %    Lymph% 7.3 (L) 18.0 - 48.0 %    Mono% 0.5 (L) 4.0 - 15.0 %    Eosinophil% 0.0 0.0 - 8.0 %    Basophil% 0.2 0.0 - 1.9 %    Differential Method Automated    Basic metabolic panel    Collection Time: 05/11/20  5:19 AM   Result Value Ref Range    Sodium 140 136 - 145 mmol/L    Potassium 4.1 3.5 - 5.1 mmol/L    Chloride 105 95 - 110 mmol/L    CO2 23 23 - 29 mmol/L    Glucose 180 (H) 70 - 110 mg/dL    BUN, Bld 10 8 - 23 mg/dL    Creatinine 0.7 0.5 - 1.4 mg/dL    Calcium 8.7 8.7 - 10.5 mg/dL    Anion Gap 12 8 - 16 mmol/L    eGFR if African American >60.0 >60 mL/min/1.73 m^2    eGFR if non African American >60.0 >60 mL/min/1.73 m^2   COVID-19 Routine Screening    Collection Time: 05/11/20 11:26 AM   Result Value Ref Range    SARS-CoV2 (COVID-19) Qualitative PCR Not Detected Not Detected   Basic metabolic panel    Collection Time: 05/12/20  5:12 AM   Result Value Ref Range    Sodium 140 136 - 145 mmol/L    Potassium 4.4 3.5 - 5.1 mmol/L    Chloride 107 95 - 110 mmol/L    CO2 24 23 - 29 mmol/L    Glucose 171 (H) 70 - 110 mg/dL    BUN, Bld 21 8 - 23 mg/dL    Creatinine 0.8 0.5 - 1.4 mg/dL    Calcium 8.8 8.7 - 10.5 mg/dL    Anion Gap 9 8 - 16 mmol/L    eGFR if African American >60.0 >60 mL/min/1.73 m^2    eGFR if non African American >60.0 >60 mL/min/1.73 m^2       Diagnostic Studies:  EGD 2015 Cory  Findings:       Distal esophageal circumferential ulceration.brushed for cytology       Tight smooth esophageal stricture had to be navigated into by a GIF        180 XP       gastroscope.       Antrum and body ulcerated several cold bx #1       Normal duodenal sweep       No dilation attempted due to presence of esophageal ulcers.    Assessment:  Dysphagia    Plan:  egd

## 2020-05-12 NOTE — PLAN OF CARE
05/12/20 1350   Discharge Assessment   Assessment Type Discharge Planning Assessment   Confirmed/corrected address and phone number on facesheet? Yes   Assessment information obtained from? Patient   Communicated expected length of stay with patient/caregiver yes   Prior to hospitilization cognitive status: Alert/Oriented   Prior to hospitalization functional status: Independent   Current cognitive status: Alert/Oriented   Current Functional Status: Independent   Lives With alone  (Granddaughter stay a couple of weeks after discharge.)   Able to Return to Prior Arrangements yes   Is patient able to care for self after discharge? Yes   Patient's perception of discharge disposition home or selfcare   Readmission Within the Last 30 Days previous discharge plan unsuccessful   If yes, most recent facility name: [facility]   Patient currently receives any other outside agency services? Yes   Name and contact number of agency or person providing outside services [facility]   Is it the patient/care giver preference to resume care with the current outside agency? Yes   Equipment Currently Used at Home walker, standard   Part D Coverage [employer]   Do you have any problems affording any of your prescribed medications? No   Is the patient taking medications as prescribed? yes   Does the patient have transportation home? Yes   Transportation Anticipated car, drives self   Dialysis Name and Scheduled days N/A   Does the patient receive services at the [facility]? No   Discharge Plan A Home with family   Discharge Plan B Home   DME Needed Upon Discharge  none   Patient/Family in Agreement with Plan yes   Readmission Questionnaire   At the time of your discharge, did someone talk to you about what your health problems were? Yes   At the time of discharge, did someone talk to you about what to watch out for regarding worsening of your health problem? Yes   At the time of discharge, did someone  talk to you about what to do if you experienced worsening of your health problem? No   At the time of discharge, did someone talk to you about which medication to take when you left the hospital and which ones to stop taking? Yes   At the time of discharge, did someone talk to you about when and where to follow up with a doctor after you left the hospital? No   What do you believe caused you to be sick enough to be re-admitted? couldn't breathe   How often do you need to have someone help you when you read instructions, pamphlets, or other written material from your doctor or pharmacy? Often   Do you have problems taking your medications as prescribed? No   Do you have any problems affording any of  your prescribed medications? No   Do you have problems obtaining/receiving your medications? No   Does the patient have transportation to healthcare appointments? Yes   Living Arrangements house   Does the patient have family/friends to help with healtcare needs after discharge? yes   Does your caregiver provide all the help you need? Yes   Are you currently feeling confused? No   Are you currently having problems thinking? Yes   Are you currently having memory problems? No   Have you felt down, depressed, or hopeless? 1   Have you felt little interest or pleasure in doing things? Unable to assess   In the last 7 days, my sleep quality was: poor

## 2020-05-12 NOTE — PT/OT/SLP EVAL
"Occupational Therapy   Evaluation    Name: Yocasta Almonte  MRN: [id number]  Admitting Diagnosis:  COPD exacerbation      Recommendations:     Discharge Recommendations: home with home health, home health PT, home health OT  Discharge Equipment Recommendations:  none  Barriers to discharge:  None    Assessment:     Yocasta Almonte is a 75 y.o. female with a medical diagnosis of COPD exacerbation.  She presents with agreeable attitude to OT evaluation sitting on the side of the bed with her granddaughter in the room being very supportive. Performance deficits affecting function: weakness, impaired endurance, impaired self care skills, impaired functional mobilty, decreased safety awareness, impaired cardiopulmonary response to activity, gait instability, impaired balance, decreased lower extremity function.      Rehab Prognosis: Good; patient would benefit from acute skilled OT services to address these deficits and reach maximum level of function.       Plan:     Patient to be seen 3 x/week to address the above listed problems via self-care/home management, therapeutic exercises, therapeutic activities  · Plan of Care Expires: 06/12/20  · Plan of Care Reviewed with: patient, grandchild(titi)    Subjective     Chief Complaint: 'I had therapy already this morning" pt needed education on role of OT being different that PT before agreeing to OT session   Patient/Family Comments/goals: home with granddaughter assisting; "I need to help her get into a routine"    Occupational Profile:  Living Environment: one story house with 4 steps to enter, handrail on both sides  Previous level of function: independent with RW; continues to smoke, HH PTA  Roles and Routines: lives alone, no driving  Equipment Used at Home:  walker, rolling(shower stool )  Assistance upon Discharge: granddaughter to support pt at discharge     Pain/Comfort:  · Pain Rating 1: 0/10  · Pain Rating Post-Intervention 1: 0/10    Patients cultural, spiritual, " "Gnosticism conflicts given the current situation: no    Objective:     Communicated with: Nursing prior to session.  Patient found sitting on the side of the bed  with bed alarm, oxygen, telemetry, peripheral IV upon OT entry to room.    General Precautions: Standard, fall   Orthopedic Precautions:N/A   Braces: N/A     Occupational Performance:    Bed Mobility:    · Patient completed Sit to Supine with modified independence    Functional Mobility/Transfers:  · Patient completed Sit <> Stand Transfer with contact guard assistance  with  rolling walker   · Patient completed Bed <> Chair Transfer using Step Transfer technique with contact guard assistance and cues for hand placement  with rolling walker  · Functional Mobility: room ambulation with RW from bed to window x ~ 15 feet and then back to bedside x ~15 feet  with CG and cues for stepping inside the RW; educated pt and granddaughter on setting RW properly    Activities of Daily Living:  · Feeding:  independence    · Grooming: stand by assistance    · Lower Body Dressing: supervision sitting on the side of the bed with good flexibility of crossing her legs to doff/nataliia socks   · Toileting: stand by assistance      Cognitive/Visual Perceptual:  Cognitive/Psychosocial Skills:     -       Oriented to: Person, Place, Time and Situation   -       Follows Commands/attention:Follows two-step commands  -       Communication: clear/fluent  -       Memory: No Deficits noted  -       Safety awareness/insight to disability: intact and however, questionable with lifestyle choice, smoking and COPD exacerbations   -       Mood/Affect/Coping skills/emotional control: irritable; pt pleasant however, pt "yanks" things from the OT's hands when given objects     Physical Exam:  Balance: -       good static and dynamic sitting balance; fair + static standing, poor dynamic standing    Upper Extremity Range of Motion:     -       Right Upper Extremity: WNL  -       Left Upper " Extremity: WNL  Upper Extremity Strength:    -       Right Upper Extremity: WNL  -       Left Upper Extremity: WNL   Strength:    -       Right Upper Extremity: WNL  -       Left Upper Extremity: WNL  Fine Motor Coordination:    -       Intact  Gross motor coordination:   WFL    AMPAC 6 Click ADL:  AMPAC Total Score: 19    Treatment & Education: role of OT, call don't fall; hand placement with use of RW, transfer training at RW level; setting RW; importance of compliance with disease management with pt's granddaughter agreeing and stating she will go home with the pt to establish a routine with the pt as was admitted her 2 weeks ago for the same problem  Education:    Patient left HOB elevated with all lines intact, call button in reach, bed alarm on, nurse  notified and granddaughter  present    GOALS:   Multidisciplinary Problems     Occupational Therapy Goals        Problem: Occupational Therapy Goal    Goal Priority Disciplines Outcome Interventions   Occupational Therapy Goal     OT, PT/OT     Description:  Goals to be met by: discharge    Patient will increase functional independence with ADLs by performing:    LE Dressing with Supervision.  Grooming while seated with [place].  Toileting from toilet with Supervision for hygiene and clothing management.   Toilet transfer to toilet with Supervision.                      History:     Past Medical History:   Diagnosis Date    Cancer     lymphoma    COPD (chronic obstructive pulmonary disease)     Diabetes mellitus     GERD (gastroesophageal reflux disease)     Hyperlipidemia     Hypertension     Thyroid disease        Past Surgical History:   Procedure Laterality Date    LYMPH NODE DISSECTION       R axillary    NECK SURGERY      infection in neck removed       Time Tracking:     OT Date of Treatment: 05/12/20  OT Start Time: 1138  OT Stop Time: 1156  OT Total Time (min): 18 min    Billable Minutes:Evaluation 8  Therapeutic Activity 10    Marjorie  STU Joyce  5/12/2020.4:02 PM

## 2020-05-12 NOTE — PLAN OF CARE
05/12/20 0859   Patient Assessment/Suction   Level of Consciousness (AVPU) alert   Respiratory Effort Normal;Unlabored   Expansion/Accessory Muscles/Retractions expansion symmetric;no retractions;no use of accessory muscles   All Lung Fields Breath Sounds coarse;diminished   Cough Frequency frequent   Cough Type good;productive   Secretions Amount small   Secretions Color yellow   Secretions Characteristics thick   PRE-TX-O2   O2 Device (Oxygen Therapy) nasal cannula   $ Is the patient on Low Flow Oxygen? Yes   Flow (L/min) 2   SpO2 97 %   Pulse Oximetry Type Intermittent   $ Pulse Oximetry - Multiple Charge Pulse Oximetry - Multiple   Pulse 81   Resp 18   Inhaler   $ Inhaler Charges MDI (Metered Dose Inahler) Treatment;Given With Spacer   Daily Review of Necessity (Inhaler) completed   Respiratory Treatment Status (Inhaler) given   Treatment Route (Inhaler) spacer/holding chamber   Patient Position (Inhaler) HOB elevated   Post Treatment Assessment (Inhaler) increased aeration   Signs of Intolerance (Inhaler) none

## 2020-05-12 NOTE — NURSING
Pt states she wants the doctor to know she is having trouble with her esophagus tightening up and keeping her from eating all her food.  Pt states she vomited all night last night.

## 2020-05-12 NOTE — PLAN OF CARE
Problem: Skin Injury Risk Increased  Goal: Skin Health and Integrity  Outcome: Ongoing, Progressing  Intervention: Promote and Optimize Oral Intake  Flowsheets (Taken 5/12/2020 1114)  Oral Nutrition Promotion: calorie dense liquids provided     Problem: Oral Intake Inadequate  Goal: Improved Oral Intake  Outcome: Ongoing, Progressing  Intervention: Promote and Optimize Oral Intake  Flowsheets (Taken 5/12/2020 1114)  Oral Nutrition Promotion: calorie dense liquids provided     Problem: Swallowing Impairment  Goal: Improved Swallowing Without Aspiration  Outcome: Ongoing, Progressing

## 2020-05-12 NOTE — PROGRESS NOTES
"[facility]  Adult Nutrition   Consult Note    SUMMARY     Recommendations  Recommendation/Intervention:   1. RD to change texture to mechanical soft to aid in PO intake   2. Add Glucerna TID (660 kcal, 30 g pro) to aid in meeting needs   Goals: 1. Pt to meet at least 75% estimated needs via PO diet/supplements   Nutrition Goal Status: new    Dietitian Rounds Brief    Consult noted for dysphagia. Pt tolerated bkfst w/o difficulty. Able to consume softer foods. Plans for EGD tomrw per GI. No N/V at this time. LBM 5/10.  Poor dentition noted as well. Agreeable to mechanical soft diet. Offered supplement, pt accepted.     Reason for Assessment  Reason For Assessment: consult  Diagnosis: other (see comments)(COPD exacerbation )  Relevant Medical History: COPD, DM, GERD, hyperlipidemia, HTN, hypothyroidism, DVT, CLL, + tobacco use     Nutrition Risk Screen  Nutrition Risk Screen: dysphagia or difficulty swallowing     MST Score: 2  Have you recently lost weight without trying?: Yes: 2-13 lbs  Weight loss score: 1  Have you been eating poorly because of a decreased appetite?: Yes  Appetite score: 1       Nutrition/Diet History  Patient Reported Diet/Restrictions/Preferences: diabetic diet, soft  Food Allergies: NKFA  Factors Affecting Nutritional Intake: difficulty/impaired swallowing, chewing difficulties/inability to chew food    Anthropometrics  Temp: 97.8 °F (36.6 °C)  Height Method: Stated  Height: 5' 6" (167.6 cm)  Height (inches): 66 in  Weight Method: Bed Scale  Weight: 57 kg (125 lb 10.6 oz)  Weight (lb): 125.66 lb  Ideal Body Weight (IBW), Female: 130 lb  % Ideal Body Weight, Female (lb): 96.66 %  BMI (Calculated): 20.3  BMI Grade: 18.5-24.9 - normal       Weight History:  Wt Readings from Last 10 Encounters:   05/12/20 57 kg (125 lb 10.6 oz)   05/07/20 66.2 kg (146 lb)   05/05/20 65.8 kg (145 lb)   04/23/20 66.7 kg (147 lb 0.8 oz)   02/17/20 65.8 kg (145 lb)   01/17/20 65.4 kg (144 lb 1.6 oz) "   01/13/20 66.5 kg (146 lb 9.6 oz)   01/09/20 65.8 kg (145 lb)   11/26/19 65.8 kg (145 lb)   10/15/19 66.7 kg (147 lb)     RD interpretation of weight history: 20# wt loss over past 3 mths per weight history: 13.7% wt loss (severe)      Lab/Procedures/Meds: Pertinent Labs Reviewed  Clinical Chemistry:  Recent Labs   Lab 05/10/20  2051  05/12/20  0512      < > 140   K 4.9   < > 4.4      < > 107   CO2 24   < > 24      < > 171*   BUN 8   < > 21   CREATININE 0.7   < > 0.8   CALCIUM 9.0   < > 8.8   PROT 7.2  --   --    ALBUMIN 3.9  --   --    BILITOT 0.8  --   --    ALKPHOS 70  --   --    AST 19  --   --    ALT 12  --   --    ANIONGAP 9   < > 9   ESTGFRAFRICA >60.0   < > >60.0   EGFRNONAA >60.0   < > >60.0   MG 2.0  --   --    LIPASE 41  --   --     < > = values in this interval not displayed.     CBC:   Recent Labs   Lab 05/11/20  0519   WBC 5.75   RBC 4.19   HGB 12.0   HCT 37.0      MCV 88   MCH 28.6   MCHC 32.4     Cardiac Profile:  Recent Labs   Lab 05/10/20  2051 05/10/20  2357   BNP 55  --    TROPONINI <0.030 <0.030     Thyroid & Parathyroid:  Recent Labs   Lab 05/10/20  2051   TSH 1.270       Medications: Pertinent Medications reviewed  Scheduled Meds:   albuterol  2 puff Inhalation Q6H    atorvastatin  80 mg Oral Daily    cetirizine  10 mg Oral Daily    ciprofloxacin HCl  1 drop Both Eyes Q6H    enoxaparin  40 mg Subcutaneous Daily    fluticasone propionate  1 spray Each Nostril BID    gabapentin  300 mg Oral BID    levoFLOXacin  750 mg Intravenous Q24H    methylPREDNISolone sodium succinate  80 mg Intravenous Q8H    metoprolol tartrate  12.5 mg Oral Daily    mirtazapine  30 mg Oral QHS    pantoprazole  40 mg Oral Daily     Continuous Infusions:  PRN Meds:.acetaminophen, albuterol sulfate, bisacodyL, calcium chloride IVPB, calcium chloride IVPB, calcium chloride IVPB, magnesium oxide, magnesium sulfate IVPB, magnesium sulfate IVPB, magnesium sulfate IVPB, magnesium sulfate  IVPB, melatonin, ondansetron, potassium chloride in water, potassium chloride in water, potassium chloride in water, potassium chloride in water, potassium chloride, potassium chloride, potassium chloride, potassium chloride, sodium chloride 0.9%, sodium phosphate IVPB, sodium phosphate IVPB, sodium phosphate IVPB, sodium phosphate IVPB, sodium phosphate IVPB    Estimated/Assessed Needs  Weight Used For Calorie Calculations: 57 kg (125 lb 10.6 oz)  Energy Calorie Requirements (kcal): 5435-9174 (25-30 kcal/kg)   Energy Need Method: Kcal/kg  Protein Requirements: 68-86 g (1.2-1.5 g/kg)  Weight Used For Protein Calculations: 57 kg (125 lb 10.6 oz)     Estimated Fluid Requirement Method: RDA Method  RDA Method (mL): 1425       Nutrition Prescription Ordered  Current Diet Order: 1800 ADA    Evaluation of Received Nutrient/Fluid Intake  Energy Calories Required: not meeting needs  Protein Required: not meeting needs  Fluid Required: meeting needs  Tolerance: tolerating     Intake/Output Summary (Last 24 hours) at 5/12/2020 1110  Last data filed at 5/12/2020 0800  Gross per 24 hour   Intake --   Output 700 ml   Net -700 ml        % Meal Intake: 50 - 75 %    Nutrition Risk  Level of Risk/Frequency of Follow-up: high     Monitor and Evaluation  Food and Nutrient Intake: food and beverage intake, energy intake  Food and Nutrient Adminstration: diet order  Physical Activity and Function: nutrition-related ADLs and IADLs  Anthropometric Measurements: weight change  Biochemical Data, Medical Tests and Procedures: gastrointestinal profile, electrolyte and renal panel, glucose/endocrine profile  Nutrition-Focused Physical Findings: overall appearance     Nutrition Follow-Up  RD Follow-up?: Yes    Rachel Guerrero RD 05/12/2020 11:10 AM

## 2020-05-12 NOTE — PT/OT/SLP EVAL
Physical Therapy Evaluation    Patient Name:  Yocasta Almonte   MRN:  [id number]    Recommendations:     Discharge Recommendations:  home with home health   Discharge Equipment Recommendations: none   Barriers to discharge: None    Assessment:     Yocasta Almonte is a 75 y.o. female admitted with a medical diagnosis of COPD exacerbation.  She presents with the following impairments/functional limitations:  weakness, impaired functional mobilty, gait instability, impaired endurance, impaired balance . Pt agrees to PT. Pt resting on 2L O2 98%. O2 assessment performed. Pt reports feeling much better, no sob. Pt may have esophogeal dilation tomorrow.     Rehab Prognosis: Good; patient would benefit from acute skilled PT services to address these deficits and reach maximum level of function.    Recent Surgery: Procedure(s) (LRB):  EGD (ESOPHAGOGASTRODUODENOSCOPY) (Left)      Plan:     During this hospitalization, patient to be seen 5 x/week to address the identified rehab impairments via gait training, therapeutic activities, therapeutic exercises and progress toward the following goals:    · Plan of Care Expires:  06/12/20    Subjective     Chief Complaint: unable to get food down.  Patient/Family Comments/goals: to go home   Pain/Comfort:  · Pain Rating 1: 0/10    Patients cultural, spiritual, Roman Catholic conflicts given the current situation:      Living Environment:  Pt lives alone, uses RW at home, no O2, still smokes. Pt was receiving HH. Pt can drive but not right now.   Prior to admission, patients level of function was modified independent.  Equipment used at home: walker, rolling.  DME owned (not currently used): none.  Upon discharge, patient will have assistance from family.    Objective:     Communicated with rn prior to session.  Patient found supine with bed alarm, oxygen, telemetry  upon PT entry to room.    General Precautions: Standard, fall   Orthopedic Precautions:N/A   Braces: N/A     Exams:  · Cognitive  Exam:  Patient is oriented to Person, Place, Time and Situation  · Gross Motor Coordination:  WFL  · RUE ROM: WFL  · LUE ROM: WFL  · RLE ROM: WFL  · RLE Strength: WFL  · LLE ROM: WFL  · LLE Strength: WFL    Functional Mobility:  · Bed Mobility:     · Rolling Right: modified independence  · Scooting: modified independence  · Supine to Sit: modified independence  · Transfers:     · Sit to Stand:  contact guard assistance with rolling walker  · Gait: pt able to ambulate in room CGA RW 40 ft, mild unsteadiness but no sob, O2 sats good on room air. Education provided       Therapeutic Activities and Exercises:   education, fall prevention, dc planning, poc.     AM-PAC 6 CLICK MOBILITY  Total Score:21     Patient left HOB elevated with all lines intact, call button in reach, bed alarm on, rn notified and scd's intact.    GOALS:   Multidisciplinary Problems     Physical Therapy Goals        Problem: Physical Therapy Goal    Goal Priority Disciplines Outcome Goal Variances Interventions   Physical Therapy Goal     PT, PT/OT      Description:  Goals to be met by: discharge     Patient will increase functional independence with mobility by performin. Gait  x 100 feet with Supervision using Rolling Walker.                       History:     Past Medical History:   Diagnosis Date    Cancer     lymphoma    COPD (chronic obstructive pulmonary disease)     Diabetes mellitus     GERD (gastroesophageal reflux disease)     Hyperlipidemia     Hypertension     Thyroid disease        Past Surgical History:   Procedure Laterality Date    LYMPH NODE DISSECTION       R axillary    NECK SURGERY      infection in neck removed       Time Tracking:     PT Received On: 20  PT Start Time: 0950     PT Stop Time: 1011  PT Total Time (min): 21 min     Billable Minutes: Evaluation 7 and Gait Training 14      Shama Dukes, MARY ANN  2020

## 2020-05-13 NOTE — DISCHARGE SUMMARY
[facility]  Discharge Summary      Patient Name: Yocasta Almonte  MRN: [id number]  Admission Date: 5/10/2020  Discharge Date and Time:  05/13/2020 3:02 PM  Attending Physician: Fernando Fairbanks MD   Primary Care Provider: Rani Welch MD    HPI:  The patient is a 75 year old female with history of nicotine abuse, COPD, diabetes mellitus, GERD, hyperlipidemia, hypertension, hypothyroidism, CLL, DVT.  She presented to the emergency department with shortness of breath.  She reports having cough with productive white sputum for 2-3 days.  Prior to arrival, she developed sudden onset of severe shortness of breath, associated  with wheezing while walking to a neighbor's house.  Symptoms persisted after resting.  She subsequently came to the ED.  She denies fever, chills, or urinary symptoms.  She reports having dysphagia and occasional vomiting.  She reports some weight loss.  She has no recent EGD due to COVID-19 pandemic.  In the ED, she was noted to be afebrile.  Per ED physician she has significant wheezes.  Oxygen saturation has been in the low 90s.  She received 2 rounds of DuoNeb treatments.  She also received IV Solu-Medrol.  She continues to have significant wheezes, tachycardic and tachypneic with minimal exertion. Chest radiograph, personally reviewed, showed no obvious infiltrates.     Hospital course:  Patient was admitted to the hospital for workup and treatment including serial bronchodilators, IV steroids, and mucolytic/antitussives.  The patient's respiratory status improved with treatment.  Due to dysphagia she underwent GI evaluation for endoscopy.  Endoscopy revealed esophagitis as well as esophageal stenosis that was dilated.  The patient will increase PPI to twice daily.  She will follow up with GI for follow-up of biopsies and repeat EGD as outpatient.  At this point the patient is tolerating diet without difficulty.  She is at baseline respiratory status.   She is oxygenating normally on room air.  She is mobilizing without difficulty.  The patient is medically stable for discharge home.  She will follow up with PCP as well as GI.  Of note the patient continued Cipro otic drops for otitis media during hospital stay which are continued at discharge per home regimen.  The patient is in understanding and agreement with discharge plan today.    EGD Impression:              - LA Grade D reflux esophagitis. Rule out Mahmood's esophagus. Biopsied.                        - Benign-appearing esophageal stenosis. Dilated.                        - Gastric mucosal atrophy. Biopsied.                        - Medium-sized hiatal hernia.                        - Normal duodenal bulb and second portion of the duodenum.  Recommendation:       - Patient has a contact number available for emergencies. The signs and symptoms of potential delayed complications were discussed with the patient. Return to normal activities tomorrow. Written discharge instructions were provided to the patient.                        - Resume previous diet.                        - Continue present medications.                        - Await pathology results.                        - PPI BID X 6 weeks                        - Carafate qid while inpatient                        - Repeat EGD in 6-8 weeks                        - Return patient to hospital [facility] for ongoing care.  Consults:   Consults (From admission, onward)        Status Ordering Provider     Inpatient consult to Gastroenterology  Once     Provider:  Cricket Montoya III, MD    Acknowledged RAISSA SELBY     Inpatient consult to Hospitalist  Once     Provider:  Bartolo Laws MD    Acknowledged ELA ASHRAF     Inpatient consult to Registered Dietitian/Nutritionist  Once     Provider:  (Not yet assigned)    Completed RAISSA SELBY     IP consult to case management  Once     Provider:  (Not yet assigned)    Completed BARTOLO LAWS         Discharge diagnoses:    Diagnosis Date Noted POA    PRINCIPAL PROBLEM:  Acute COPD exacerbation [J44.1] 05/11/2020 Yes    Dysphagia [R13.10] secondary to esophageal stenosis status post dilation 10/15/2019 Yes    Essential hypertension [I10] 07/25/2018 Yes    Type 2 diabetes mellitus without complication [E11.9] 07/25/2018 Yes    GERD (gastroesophageal reflux disease) [K21.9] 07/24/2018 Yes    Chronic lymphocytic leukemia [C91.10] 04/27/2017 Yes   Otitis media left  Esophagitis  Esophageal stenosis status post dilation  Hiatal hernia  Chronic active smoker  Diabetes mellitus type 2  Hyperlipidemia  Hypothyroidism  CLL  History of DVT    Discharge Physical exam:  General:  Comfortable appearing, nontoxic, no apparent distress  Pulmonary:  Comfortable work breathing at rest, diminished breath sounds at bases, no wheezing  Cardiovascular:  Regular rate and rhythm, 2+ radial pulses  ENT:  Moist mucous membranes    Discharged Condition: stable    Disposition: Home-Health Care Svc    Follow Up:  Follow-up Information     Rani Welch MD In 2 weeks.    Specialty:  Family Medicine  Contact information:  [address]  [address]  [facility] [address]  [phone]             Rogelio Harris MD In 3 weeks.    Specialty:  Gastroenterology  Contact information:  [address] [facility] [address]  [phone]                 Patient Instructions:      Ambulatory referral/consult to Home Health   Standing Status: Future   Referral Priority: Routine Referral Type: Home Health   Referral Reason: Specialty Services Required   Requested Specialty: Home Health Services   Number of Visits Requested: 1     Diet diabetic     Notify your health care provider if you experience any of the following:  temperature >100.4     Notify your health care provider if you experience any of the following:  severe uncontrolled pain     Notify your health care provider if you experience any of the following:  persistent nausea and  vomiting or diarrhea     Notify your health care provider if you experience any of the following:  difficulty breathing or increased cough     Notify your health care provider if you experience any of the following:  persistent dizziness, light-headedness, or visual disturbances     Activity as tolerated       Pending Diagnostic Studies:     Procedure Component Value Units Date/Time    Specimen to Pathology - Surgery [[id number]] Collected:  05/13/20 0932    Order Status:  Sent Lab Status:  No result     Specimen:  Tissue          Medications:  Reconciled Home Medications:      Medication List      START taking these medications    azithromycin 250 MG tablet  Commonly known as:  ZITHROMAX Z-REINA  Take 2 tabs p.o. daily on day 1.  Followed by 1 tab p.o. daily for days 2 through 5     predniSONE 20 MG tablet  Commonly known as:  DELTASONE  Take 2 tablets (40 mg total) by mouth once daily. for 5 days        CHANGE how you take these medications    ciprofloxacin HCl 0.3 % ophthalmic solution  Commonly known as:  CILOXAN  Place 1 drop into the left ear every 6 (six) hours. for 7 days  What changed:  how to take this     esomeprazole 40 mg Grps  Commonly known as:  NEXIUM  Take 40 mg by mouth 2 (two) times daily before meals.  What changed:  when to take this        CONTINUE taking these medications    * albuterol 5 mg/mL nebulizer solution  Commonly known as:  PROVENTIL  Take 0.5 mLs (2.5 mg total) by nebulization every 4 (four) hours as needed for Wheezing or Shortness of Breath. Rescue     * albuterol 90 mcg/actuation inhaler  Commonly known as:  PROVENTIL/VENTOLIN HFA  Inhale 1 puff into the lungs every 6 (six) hours as needed for Wheezing or Shortness of Breath. Rescue     atorvastatin 80 MG tablet  Commonly known as:  LIPITOR  Take 1 tablet (80 mg total) by mouth once daily. For cholesterol     blood sugar diagnostic Strp  Commonly known as:  TRUE METRIX GLUCOSE TEST STRIP  Test blood sugar in vitro BID      blood-glucose meter Misc  Commonly known as:  TRUE METRIX GLUCOSE METER  Test blood sugar in vitro BID     fluticasone propionate 50 mcg/actuation nasal spray  Commonly known as:  FLONASE  1 spray (50 mcg total) by Each Nare route 2 (two) times daily.     gabapentin 300 MG capsule  Commonly known as:  NEURONTIN  Take 1 capsule (300 mg total) by mouth 2 (two) times daily. 1 cap AM, 2 caps NOON, 1 capPM     IMBRUVICA 140 mg Cap  Generic drug:  ibrutinib  Take 240 mg by mouth once daily .     lancets 32 gauge Misc  Test blood sugar in vitro BID     loratadine 10 mg tablet  Commonly known as:  CLARITIN  Take 10 mg by mouth once daily.     metoprolol tartrate 25 MG tablet  Commonly known as:  LOPRESSOR  Take 0.5 tablets (12.5 mg total) by mouth once daily.     mirtazapine 30 MG tablet  Commonly known as:  REMERON  Take 1 tablet (30 mg total) by mouth every evening.     potassium chloride SA 20 MEQ tablet  Commonly known as:  K-DUR,KLOR-CON  Take 1 tablet (20 mEq total) by mouth once daily.     triamcinolone 0.5 % ointment  Commonly known as:  KENALOG  Apply 1 application topically 2 (two) times daily.         * This list has 2 medication(s) that are the same as other medications prescribed for you. Read the directions carefully, and ask your doctor or other care provider to review them with you.                Indwelling Lines/Drains at time of discharge:   Lines/Drains/Airways     None                 Time spent on the discharge of patient: 36 minutes  Patient was seen and examined on the date of discharge and determined to be suitable for discharge.         Fernando Fairbanks MD  Department of Hospital Medicine  [facility]

## 2020-05-13 NOTE — PLAN OF CARE
05/13/20 1306   Patient Assessment/Suction   Level of Consciousness (AVPU) alert   Respiratory Effort Normal;Unlabored   Expansion/Accessory Muscles/Retractions no use of accessory muscles;no retractions;expansion symmetric   All Lung Fields Breath Sounds clear   Rhythm/Pattern, Respiratory unlabored;pattern regular;depth regular;chest wiggle adequate;no shortness of breath reported   Cough Frequency infrequent   Cough Type good   PRE-TX-O2   O2 Device (Oxygen Therapy) nasal cannula   Flow (L/min) 2   SpO2 97 %   Pulse Oximetry Type Intermittent   $ Pulse Oximetry - Multiple Charge Pulse Oximetry - Multiple   Pulse 78   Resp 18   Inhaler   $ Inhaler Charges MDI (Metered Dose Inahler) Treatment   Daily Review of Necessity (Inhaler) completed   Respiratory Treatment Status (Inhaler) given   Treatment Route (Inhaler) spacer/holding chamber;mouthpiece   Patient Position (Inhaler) HOB elevated   Post Treatment Assessment (Inhaler) increased aeration   Signs of Intolerance (Inhaler) none   Breath Sounds Post-Respiratory Treatment   Throughout All Fields Post-Treatment All Fields   Throughout All Fields Post-Treatment aeration increased   Post-treatment Heart Rate (beats/min) 82   Post-treatment Resp Rate (breaths/min) 18   Respiratory Evaluation   $ Care Plan Tech Time 15 min

## 2020-05-13 NOTE — PLAN OF CARE
Problem: Occupational Therapy Goal  Goal: Occupational Therapy Goal  Description  Goals to be met by: discharge    Patient will increase functional independence with ADLs by performing:    LE Dressing with Supervision.  Grooming while seated with [place].  Toileting from toilet with Supervision for hygiene and clothing management.  Met 5/13  Toilet transfer to toilet with Supervision.  Met 5/13     Outcome: Ongoing, Progressing

## 2020-05-13 NOTE — [employer] PATIENT INSTRUCTIONS
Discharge Summary/Instructions after an Endoscopic Procedure  Patient Name: Yocasta Almonte  Patient MRN: [id number]  Patient YOB: 1945  Wednesday, May 13, 2020  Rogelio Harris MD  RESTRICTIONS:  During your procedure today, you received medications for sedation.  These   medications may affect your judgment, balance and coordination.  Therefore,   for 24 hours, you have the following restrictions:   - DO NOT drive a car, operate machinery, make legal/financial decisions,   sign important papers or drink alcohol.    ACTIVITY:  Today: no heavy lifting, straining or running due to procedural   sedation/anesthesia.  The following day: return to full activity including work.  DIET:  Eat and drink normally unless instructed otherwise.     TREATMENT FOR COMMON SIDE EFFECTS:  - Mild abdominal pain, nausea, belching, bloating or excessive gas:  rest,   eat lightly and use a heating pad.  - Sore Throat: treat with throat lozenges and/or gargle with warm salt   water.  - Because air was used during the procedure, expelling large amounts of air   from your rectum or belching is normal.  - If a bowel prep was taken, you may not have a bowel movement for 1-3 days.    This is normal.  SYMPTOMS TO WATCH FOR AND REPORT TO YOUR PHYSICIAN:  1. Abdominal pain or bloating, other than gas cramps.  2. Chest pain.  3. Back pain.  4. Signs of infection such as: chills or fever occurring within 24 hours   after the procedure.  5. Rectal bleeding, which would show as bright red, maroon, or black stools.   (A tablespoon of blood from the rectum is not serious, especially if   hemorrhoids are present.)  6. Vomiting.  7. Weakness or dizziness.  GO DIRECTLY TO THE NEAREST EMERGENCY ROOM IF YOU HAVE ANY OF THE FOLLOWING:      Difficulty breathing              Chills and/or fever over 101 F   Persistent vomiting and/or vomiting blood   Severe abdominal pain   Severe chest pain   Black, tarry stools   Bleeding- more than one tablespoon   Any  other symptom or condition that you feel may need urgent attention  Your doctor recommends these additional instructions:  If any biopsies were taken, your doctors clinic will contact you in 1 to 2   weeks with any results.  - Patient has a contact number available for emergencies.  The signs and   symptoms of potential delayed complications were discussed with the   patient.  Return to normal activities tomorrow.  Written discharge   instructions were provided to the patient.   - Resume previous diet.   - Continue present medications.   - Await pathology results.   - PPI BID X 6 weeks  - Carafate qid while inpatient  - Repeat EGD in 6-8 weeks  - Return patient to hospital [facility] for ongoing care.  For questions, problems or results please call your physician - Rogelio Harris MD at Work:  [phone].  [facility], EMERGENCY ROOM PHONE NUMBER: [phone]  IF A COMPLICATION OR EMERGENCY SITUATION ARISES AND YOU ARE UNABLE TO REACH   YOUR PHYSICIAN - GO DIRECTLY TO THE EMERGENCY ROOM.  MD Rogelio Corrales MD  5/13/2020 9:41:52 AM  This report has been verified and signed electronically.  [employer]

## 2020-05-13 NOTE — BRIEF OP NOTE
EGD-  LA D erosive esophagitis with distal stricturing and likely barretts disease  Dilated to 15mmHg    Hiatal hernia  Atrophic gastritis    Plan  ppi bid  carafate qid while inpatient  MUST repeat EGD in 6-8 weeks

## 2020-05-13 NOTE — PT/OT/SLP PROGRESS
Physical Therapy      Patient Name:  Yocasta Almonte   MRN:  [id number]    Patient not seen today secondary to Other (Comment)(patient out of room for procedure). Will follow-up 05/14/20.    Nataliia Mercado PTA

## 2020-05-13 NOTE — ANESTHESIA POSTPROCEDURE EVALUATION
Anesthesia Post Evaluation    Patient: Yocasta Almonte    Procedure(s) Performed: Procedure(s) (LRB):  EGD (ESOPHAGOGASTRODUODENOSCOPY) (Left)    Final Anesthesia Type: MAC    Patient location during evaluation: GI PACU  Patient participation: Yes- Able to Participate  Level of consciousness: awake and alert and oriented  Post-procedure vital signs: reviewed and stable  Pain management: adequate  Airway patency: patent    PONV status at discharge: No PONV  Anesthetic complications: no      Cardiovascular status: blood pressure returned to baseline, hemodynamically stable and stable  Respiratory status: unassisted, spontaneous ventilation and room air  Hydration status: euvolemic  Follow-up not needed.          Vitals Value Taken Time   /61 5/13/2020 10:00 AM   Temp 36.4 °C (97.5 °F) 5/13/2020  9:50 AM   Pulse 54 5/13/2020 10:00 AM   Resp 16 5/13/2020 10:00 AM   SpO2 97 % 5/13/2020 10:00 AM         No case tracking events are documented in the log.      Pain/Stan Score: Stan Score: 9 (5/13/2020  9:46 AM)

## 2020-05-13 NOTE — TRANSFER OF CARE
"Anesthesia Transfer of Care Note    Patient: Yocasta Almonte    Procedure(s) Performed: Procedure(s) (LRB):  EGD (ESOPHAGOGASTRODUODENOSCOPY) (Left)    Patient location: GI    Anesthesia Type: MAC    Transport from OR: Transported from OR on 2-3 L/min O2 by NC with adequate spontaneous ventilation    Post pain: adequate analgesia    Post assessment: no apparent anesthetic complications    Post vital signs: stable    Level of consciousness: sedated and responds to stimulation    Nausea/Vomiting: no nausea/vomiting    Complications: none    Transfer of care protocol was followed      Last vitals:   Visit Vitals  BP (!) 134/56   Pulse (!) 51   Temp 36.5 °C (97.7 °F) (Oral)   Resp 15   Ht 5' 6" (1.676 m)   Wt 57 kg (125 lb 10.6 oz)   LMP  (LMP Unknown)   SpO2 97%   Breastfeeding? No   BMI 20.28 kg/m²     "

## 2020-05-13 NOTE — NURSING
Pt discharged home per MD order in NAD, VSS. Discharge instructions reviewed, noted follow up times and prescriptions, pt verbalized understanding, no concerns noted. Pt off unit via wheelchair x1 CNA, tolerated well.     Sabrina Lawton  3:58 PM  05/13/2020

## 2020-05-13 NOTE — PLAN OF CARE
Problem: Adult Inpatient Plan of Care  Goal: Plan of Care Review  Outcome: Ongoing, Progressing  Goal: Patient-Specific Goal (Individualization)  Outcome: Ongoing, Progressing  Goal: Absence of Hospital-Acquired Illness or Injury  Outcome: Ongoing, Progressing  Goal: Optimal Comfort and Wellbeing  Outcome: Ongoing, Progressing  Goal: Readiness for Transition of Care  Outcome: Ongoing, Progressing  Goal: Rounds/Family Conference  Outcome: Ongoing, Progressing     Problem: Diabetes Comorbidity  Goal: Blood Glucose Level Within Desired Range  Outcome: Ongoing, Progressing     Problem: Skin Injury Risk Increased  Goal: Skin Health and Integrity  Outcome: Ongoing, Progressing     Problem: Fall Injury Risk  Goal: Absence of Fall and Fall-Related Injury  Outcome: Ongoing, Progressing     Problem: Oral Intake Inadequate  Goal: Improved Oral Intake  Outcome: Ongoing, Progressing     Problem: Swallowing Impairment  Goal: Improved Swallowing Without Aspiration  Outcome: Ongoing, Progressing

## 2020-05-13 NOTE — PROGRESS NOTES
[facility]  Progress Note    Patient Name: Yocasta Almonte  MRN: [id number]  Admission Date: 5/10/2020  Attending Physician: Fernando Fairbanks MD   Primary Care Provider: Rani Welch MD  Date of service:  05/12/2020    Subjective:     Principal Problem:COPD exacerbation    Chief Complaint:   Chief Complaint   Patient presents with    Shortness of Breath        HPI:  The patient is a 75 year old female with history of nicotine abuse, COPD, diabetes mellitus, GERD, hyperlipidemia, hypertension, hypothyroidism, CLL, DVT.  She presented to the emergency department with shortness of breath.  She reports having cough with productive white sputum for 2-3 days.  Prior to arrival, she developed sudden onset of severe shortness of breath, associated  with wheezing while walking to a neighbor's house.  Symptoms persisted after resting.  She subsequently came to the ED.    She denies fever, chills, or urinary symptoms.  She reports having dysphagia and occasional vomiting.  She reports some weight loss.  She has no recent EGD due to COVID-19 pandemic.    In the ED, she was noted to be afebrile.  Per ED physician she has significant wheezes.  Oxygen saturation has been in the low 90s.  She received 2 rounds of DuoNeb treatments.  She also received IV Solu-Medrol.  She continues to have significant wheezes, tachycardic and tachypneic with minimal exertion. Chest radiograph, personally reviewed, showed no obvious infiltrates.    Interval history:  Today the patient reports persistent shortness of breath, constant timing, mild intensity at rest, worsens with exertion, slowly improving with medical treatment.  No fever or chills.  No chest pain.  Eating okay without nausea or vomiting.  Reports some persistent pain of left ear from recently diagnosed your infection.    Physical exam:  Vital signs reviewed  General:  Comfortable appearing, nontoxic, no apparent distress  Pulmonary:   Comfortable work breathing at rest, diminished breath sounds at bases, no wheezing  Cardiovascular:  Regular rate and rhythm, 2+ radial pulses  ENT:  Moist mucous membranes    Assessment/Plan:     * COPD exacerbation  Current smoker  Expiratory wheezes throughout  Tachycardic and tachypneic with minimal exertion, moving in bed  O2 supplement  Respiratory treatments  IV steroids  Monitor SpO2  Advised smoking cessation  No antibiotics for now given afebrile and normal white count      Chronic lymphocytic leukemia  Chronic medical condition  Continue home medication      Dysphagia  Chronic medical condition  Continue PPI  Monitor      GERD (gastroesophageal reflux disease)  Continue PPI  Monitor      Essential hypertension  Chronic medical condition  Continue home medication  Monitor      Type 2 diabetes mellitus without complication  Hemoglobin A1c is 5.8 about 3 months ago  Taken off glyburide recently. The patient reports taking it.  Expect blood glucose to go up with IV steroids  Monitor blood glucose  No glyburide  Sliding scale insulin      Plan update today:  Continue care  Continue IV steroids, serial bronchodilators, and supplemental oxygen  Wean oxygen.  May need 6 min walk test prior to discharge  Resume ciprofloxacin otic drops left side  Mobilize with PT/OT  NPO after midnight for endoscopy tomorrow.  Appreciate GI  Repeat a.m. labs  Sliding scale insulin.  Monitor glucose and titrate regimen as needed  Continue medications for chronic issues as able  Moderate risk secondary to moderate exacerbation of chronic illness; prescription drug management    VTE Risk Mitigation (From admission, onward)         Ordered     enoxaparin injection 40 mg  Daily      05/11/20 0022     IP VTE HIGH RISK PATIENT  Once      05/11/20 0022     Place sequential compression device  Until discontinued      05/11/20 0022                   Fernando Fairbanks MD  Department of Hospital Medicine   [facility]

## 2020-05-13 NOTE — ANESTHESIA PREPROCEDURE EVALUATION
05/12/2020  Yocasta Almonte is a 75 y.o., female.  Patient Active Problem List   Diagnosis    Malignant neoplasm of upper lobe of left lung    Cellulitis    Type 2 diabetes mellitus without complication    Essential hypertension    HLD (hyperlipidemia)    Lymphoma    GERD (gastroesophageal reflux disease)    Anemia of decreased vitamin B12 absorption    Iron deficiency anemia due to chronic blood loss    Type 2 diabetes mellitus with other specified complication    Vitamin D deficiency    Tobacco user    Osteoarthritis    Nicotine dependence, unspecified, uncomplicated    Neuropathy    Mild recurrent major depression    Hypokalemia    Loss of appetite    Dysphagia    Malignant neoplasm of upper lobe, bronchus or lung    Coronary artery disease    Chronic lymphocytic leukemia    Allergic rhinitis    Eczema of external ear, bilateral    Epigastric pain    Covid-19 Virus not Detected    COPD exacerbation       Past Surgical History:   Procedure Laterality Date    LYMPH NODE DISSECTION       R axillary    NECK SURGERY      infection in neck removed        Tobacco Use:  The patient  reports that she has been smoking cigarettes. She has a 29.00 pack-year smoking history. She has never used smokeless tobacco.     Results for orders placed or performed during the hospital encounter of 05/10/20   EKG 12-lead    Collection Time: 05/10/20  8:31 PM    Narrative    Test Reason : R06.02,    Vent. Rate : 082 BPM     Atrial Rate : 082 BPM     P-R Int : 148 ms          QRS Dur : 086 ms      QT Int : 366 ms       P-R-T Axes : 061 011 059 degrees     QTc Int : 427 ms    Normal sinus rhythm with sinus arrhythmia  Nonspecific T wave abnormality  Abnormal ECG  When compared with ECG of 22-APR-2020 04:25,  Nonspecific T wave abnormality now evident in Lateral leads    Referred By: [email or web address]   SELF            Confirmed By:         Imaging Results          X-Ray Chest AP Portable (Final result)  Result time 05/11/20 06:29:30    Final result by Steven Ramirez MD (05/11/20 06:29:30)                 Impression:      No evidence of acute cardiopulmonary disease.      Electronically signed by: Steven Ramirez MD  Date:    05/11/2020  Time:    06:29             Narrative:    EXAMINATION:  XR CHEST AP PORTABLE    CLINICAL HISTORY:  Acute dyspnea, COPD exacerbation.    FINDINGS:  Portable chest radiograph at 20:32 hours compared to multiple prior exams shows the cardiomediastinal silhouette and pulmonary vasculature are stable and within normal limits, with aortic vascular calcifications.  There are calcified mediastinal lymph nodes.    The lungs are normally expanded, with stable oval opacity in the right upper lobe medially, and adjacent calcified nodule in the right upper lobe laterally.  There is no consolidation, large pleural effusion, evidence of pulmonary edema, or pneumothorax.  The bones are diffusely osteopenic, with no acute osseous abnormality.                                 Lab Results   Component Value Date    WBC 5.75 05/11/2020    HGB 12.0 05/11/2020    HCT 37.0 05/11/2020    MCV 88 05/11/2020     05/11/2020     BMP  Lab Results   Component Value Date     05/12/2020    K 4.4 05/12/2020     05/12/2020    CO2 24 05/12/2020    BUN 21 05/12/2020    CREATININE 0.8 05/12/2020    CALCIUM 8.8 05/12/2020    ANIONGAP 9 05/12/2020    ESTGFRAFRICA >60.0 05/12/2020    EGFRNONAA >60.0 05/12/2020         Anesthesia Evaluation    I have reviewed the Patient Summary Reports.    I have reviewed the Nursing Notes.   I have reviewed the Medications.   Prednisone    Review of Systems  Anesthesia Hx:  No problems with previous Anesthesia  Denies Family Hx of Anesthesia complications.   Denies Personal Hx of Anesthesia complications.   Social:  No Alcohol Use, Smoker    Hematology/Oncology:         -- Cancer in past  history: Oncology Comments: Lymphoma     Cardiovascular:   Hypertension CAD   ECG has been reviewed.    Pulmonary:   COPD Asthma    Hepatic/GI:   GERD    Musculoskeletal:   Arthritis     Endocrine:   Diabetes, type 2    Psych:   Psychiatric History depression          Physical Exam  General:  Well nourished    Airway/Jaw/Neck:  Airway Findings: Mouth Opening: Normal Tongue: Normal  General Airway Assessment: Adult  Mallampati: II  Improves to II with phonation.  TM Distance: Normal, at least 6 cm  Jaw/Neck Findings:  Neck ROM: Normal ROM      Dental:  Dental Findings: Upper Dentures, Lower Dentures   Chest/Lungs:  Chest/Lungs Findings: Normal Respiratory Rate, Expiratory Wheezes, Mild, Decreased Breath Sounds Bilateral     Heart/Vascular:  Heart Findings: Rate: Normal  Rhythm: Regular Rhythm  Sounds: Normal        Mental Status:  Mental Status Findings:  Cooperative, Alert and Oriented         Anesthesia Plan  Type of Anesthesia, risks & benefits discussed:  Anesthesia Type:  MAC  Patient's Preference:   Intra-op Monitoring Plan: standard ASA monitors  Intra-op Monitoring Plan Comments:   Post Op Pain Control Plan:   Post Op Pain Control Plan Comments:   Induction:   IV  Beta Blocker:  Patient is not currently on a Beta-Blocker (No further documentation required).       Informed Consent: Patient understands risks and agrees with Anesthesia plan.  Questions answered. Anesthesia consent signed with patient.  ASA Score: 3     Day of Surgery Review of History & Physical:    H&P update referred to the provider.         Ready For Surgery From Anesthesia Perspective.

## 2020-05-13 NOTE — PT/OT/SLP PROGRESS
Occupational Therapy   Treatment    Name: Yocasta Almonte   MRN: [id number]  Admitting Diagnosis:  COPD exacerbation  Day of Surgery    Recommendations:     Discharge Recommendations: home health OT  Discharge Equipment Recommendations:  none  Barriers to discharge:  None    Assessment:     Yocasta Almonte is a 75 y.o. female with a medical diagnosis of COPD exacerbation.  She presents with good progress towards goals.  Able to complete ADLs (including sponge bathing) and functional mobility on room air with 02 saturation in mid to high 90s throughout.   Performance deficits affecting function are weakness, impaired endurance, impaired self care skills, impaired functional mobilty, impaired cardiopulmonary response to activity, gait instability.     Rehab Prognosis:  Good; patient would benefit from acute skilled OT services to address these deficits and reach maximum level of function.       Plan:     Patient to be seen 3 x/week to address the above listed problems via self-care/home management, therapeutic activities, therapeutic exercises  · Plan of Care Expires: 06/12/20  · Plan of Care Reviewed with: patient    Subjective     Pain/Comfort:  · Pain Rating 1: 0/10  · Pain Rating Post-Intervention 1: 0/10    Objective:     Communicated with: RN prior to session.  Patient found seated EOB with bed alarm, oxygen, telemetry, peripheral IV upon OT entry to room.    General Precautions: Standard, fall   Orthopedic Precautions:N/A   Braces: N/A     Occupational Performance:     Functional Mobility/Transfers:  · Patient completed Sit <> Stand Transfer with supervision  with  rolling walker   · Patient completed Toilet Transfer Stand Pivot technique with supervision with  grab bars  · Functional Mobility: SPV in hospital room with RW    Activities of Daily Living:  · Grooming: supervision standing at sink  · Bathing: minimum assistance to wash backside in standing (sponge bath at EOB)  · Toileting: supervision on toilet in  bathroom      Patient left up in chair with all lines intact, call button in reach and chair alarm onEducation:      GOALS:   Multidisciplinary Problems     Occupational Therapy Goals        Problem: Occupational Therapy Goal    Goal Priority Disciplines Outcome Interventions   Occupational Therapy Goal     OT, PT/OT Ongoing, Progressing    Description:  Goals to be met by: discharge    Patient will increase functional independence with ADLs by performing:    LE Dressing with Supervision.  Grooming while seated with [place].  Toileting from toilet with Supervision for hygiene and clothing management.   Toilet transfer to toilet with Supervision.                      Time Tracking:     OT Date of Treatment: 05/13/20  OT Start Time: 1110  OT Stop Time: 1149  OT Total Time (min): 39 min    Billable Minutes:Self Care/Home Management 39    Sammy Lizarraga OT  5/13/2020

## 2020-05-19 NOTE — PT/OT/SLP DISCHARGE
Occupational Therapy Discharge Summary    Yocasta Almonte  MRN: [id number]   Principal Problem: COPD exacerbation      Patient Discharged from acute Occupational Therapy on 5/13/2020.  Please refer to prior OT note dated 5/13/2020 for functional status.    Assessment:      Patient has not met goals.    Objective:     GOALS:   Multidisciplinary Problems     Occupational Therapy Goals     Not on file          Multidisciplinary Problems (Resolved)        Problem: Occupational Therapy Goal    Goal Priority Disciplines Outcome Interventions   Occupational Therapy Goal   (Resolved)     OT, PT/OT Met    Description:  Goals to be met by: discharge    Patient will increase functional independence with ADLs by performing:    LE Dressing with Supervision.  Grooming while seated with [place].  Toileting from toilet with Supervision for hygiene and clothing management.   Toilet transfer to toilet with Supervision.                      Reasons for Discontinuation of Therapy Services  Patient d/c home.      Plan:     Patient Discharged to: Home with Home Health Service    Ronaldo Wesley, OT  5/19/2020

## 2020-06-11 NOTE — TELEPHONE ENCOUNTER
This patient has YESICA on her HST.  She is symptomatic and HST always underestimate the issue.  Will order CPAP and proceed from there.

## 2020-06-17 NOTE — PROGRESS NOTES
[facility] hematology Oncology in office encounter progress note  6/10/20     Patient ID:   Yocasta Almonte  75 y.o. female  1945  MD Yuri, MD Agustin.      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  Blood counts are improved.  We are stopping ibrutinib at this time and will go with observation.      Denies fever, night sweats or weight loss.  She does not have palpable lymphadenopathy.  CBC is improved after B12 and iron administration.  She had been on ibrutinib 140 mg 2 p.o. Daily.    She was recently hospitalized with exacerbation of COPD, COVID-19 testing was negative.    On B 12 injections monthly, increased energy.  S/P  Injectafer weekly x's 2 weeks to replenish Fe stores.    Hx includes DM, type 2, HTN, GERD, COPD, cholesterol, thyroid dx,  and clinical lung cancer treated with empirical  Stereotactic Rad Rx. To the right upper lung area.    PET scan now shows enlarging left upper lobe nodule consistent with primary lung malignancy.  Will refer her to Dr. Velez of Radiation therapy for consideration of local treatment to the left upper lobe mass.    She has chronic low back pain. PET scan supports degenerative disc disease at the back area. She is to follow-up with Dr. Welch for evaluation of this complaint.    Surgery LN bx R axilla, neck surgery for infection.  Smoke 1/2 ppd x's 50 years.  ETOH no.  Allergy iodine.  Mom cancer, Dad ? Health.    ROS:   GEN: normal without any fever, night sweats or weight loss  HEENT: normal with no HA's, sore throat, stiff neck, changes in vision  CV: normal with no CP, SOB, PND, VALENCIA or orthopnea  PULM: see HPI  GI: normal with no abdominal pain, nausea, vomiting, constipation, diarrhea, melanotic stools, BRBPR, or hematemesis  : normal with no hematuria, dysuria  BREAST: normal with no mass, discharge, pain  SKIN: normal with no rash, erythema, bruising, or swelling     Past  Medical History:   Diagnosis Date    Cancer     lymphoma    COPD (chronic obstructive pulmonary disease)     Diabetes mellitus     GERD (gastroesophageal reflux disease)     Hyperlipidemia     Hypertension     Thyroid disease      Past Surgical History:   Procedure Laterality Date    ESOPHAGOGASTRODUODENOSCOPY Left 5/13/2020    Procedure: EGD (ESOPHAGOGASTRODUODENOSCOPY);  Surgeon: Rogelio Harris MD;  Location: [facility];  Service: Endoscopy;  Laterality: Left;    LYMPH NODE DISSECTION       R axillary    NECK SURGERY      infection in neck removed       Review of patient's allergies indicates:   Allergen Reactions    Iodine and iodide containing products            Current Outpatient Medications:     albuterol (PROVENTIL) 5 mg/mL nebulizer solution, Take 0.5 mLs (2.5 mg total) by nebulization every 4 (four) hours as needed for Wheezing or Shortness of Breath. Rescue, Disp: 20 mL, Rfl: 0    albuterol (PROVENTIL/VENTOLIN HFA) 90 mcg/actuation inhaler, Inhale 1 puff into the lungs every 6 (six) hours as needed for Wheezing or Shortness of Breath. Rescue, Disp: 18 g, Rfl: 0    atorvastatin (LIPITOR) 80 MG tablet, Take 1 tablet (80 mg total) by mouth once daily. For cholesterol, Disp: 90 tablet, Rfl: 3    azithromycin (ZITHROMAX Z-REINA) 250 MG tablet, Take 2 tabs p.o. daily on day 1.  Followed by 1 tab p.o. daily for days 2 through 5, Disp: 6 tablet, Rfl: 0    blood sugar diagnostic (TRUE METRIX GLUCOSE TEST STRIP) Strp, Test blood sugar in vitro BID, Disp: 200 each, Rfl: 5    blood-glucose meter (TRUE METRIX GLUCOSE METER) Misc, Test blood sugar in vitro BID, Disp: 1 each, Rfl: 0    esomeprazole (NEXIUM) 40 mg GrPS, Take 40 mg by mouth 2 (two) times daily before meals., Disp: 90 each, Rfl: 3    fluticasone (FLONASE) 50 mcg/actuation nasal spray, 1 spray (50 mcg total) by Each Nare route 2 (two) times daily., Disp: 1 Bottle, Rfl: 3    gabapentin (NEURONTIN) 300 MG capsule, Take 1 capsule (300 mg  total) by mouth 2 (two) times daily. 1 cap AM, 2 caps NOON, 1 capPM, Disp: 180 capsule, Rfl: 3    ibrutinib (IMBRUVICA) 140 mg Cap, Take 240 mg by mouth once daily ., Disp: , Rfl:     lancets 32 gauge Misc, Test blood sugar in vitro BID, Disp: 200 each, Rfl: 5    loratadine (CLARITIN) 10 mg tablet, Take 10 mg by mouth once daily., Disp: , Rfl:     metoprolol tartrate (LOPRESSOR) 25 MG tablet, Take 0.5 tablets (12.5 mg total) by mouth once daily., Disp: 45 tablet, Rfl: 0    mirtazapine (REMERON) 30 MG tablet, Take 1 tablet (30 mg total) by mouth every evening., Disp: 90 tablet, Rfl: 3    potassium chloride SA (K-DUR,KLOR-CON) 20 MEQ tablet, Take 1 tablet (20 mEq total) by mouth once daily., Disp: 90 tablet, Rfl: 3    triamcinolone (KENALOG) 0.5 % ointment, Apply 1 application topically 2 (two) times daily., Disp: 15 g, Rfl: 2    Current Facility-Administered Medications:     cyanocobalamin injection 1,000 mcg, 1,000 mcg, Subcutaneous, Q30 Days, CHRISTOPHER Kuo MD, 1,000 mcg at 06/10/20 1450          Objective:   Vitals:  Blood pressure (!) 155/74, pulse 103, temperature 98.3 °F (36.8 °C), temperature source Oral, resp. rate 16, weight 66 kg (145 lb 8 oz).    Physical Examination:   GEN: no apparent distress, comfortable  HEAD: atraumatic and normocephalic  EYES: no pallor, no icterus  ENT:  no pharyngeal erythema, external ear skin excoriated on R ear.; no nasal discharge; no thrush  NECK: no masses, thyroid normal, trachea midline, no LAD/LN's, supple  CV: RRR with no murmur; normal pulse; normal S1 and S2; no pedal edema  CHEST: Normal respiratory effort; CTAB; normal breath sounds; no wheeze or crackles  ABDOM: nontender and nondistended; soft,  no rebound/guarding, L/S NP  MUSC/Skeletal: ROM normal; no crepitus; joints normal; no deformities   EXTREM: I do not palpate left supraclavicular Adenopathy at this time.  SKIN: no rashes, lesions, ulcers, petechiae  : no CVAT  NEURO: grossly intact;  motor/sensory WNL;  no tremors  PSYCH: normal mood, affect and behavior  LYMPH: normal cervical, supraclavicular, axillary and groin LN's  BREASTS: no palpable mass L or R breast.    This is the in office exam from our last of visit    Labs:     Hemoglobin is 11.3. MCV is 84. B-12 is low at 285, and ferritin is low at 10.  Started on B 12 shots and injectafer previously.    Now Hgb 11.  Ferritin 15.  Better now Hgb 13.9., Ferritin up at 158.    PET scan showed increased opacity at the right upper lung area at 2.5 cm. This could be postradiation change. There is a 13 mm nodule at the left upper lobe area. Will check her CAT scan again of the chest in 2 months and discuss with Dr. Culp  if there is any progressive enlargement.    Follow-up PET scan now shows an enlarging left upper lobe mass with hypermetabolism concerning for new primary lung malignancy.    She had a colonoscopy 1 year ago per Dr. Streeter, polyps were removed.  Diverticulosis, hemorrhoids.  EGD gastritis.    Scan at this time shows decreased mass in left upper lobe suggesting favorable response to therapy.  There is  questionable increase in right upper lobe mass which may be due to technique.  Lymphadenopathy is stable.      Assessment:   (1) 75 y.o. female with diagnosis of  Small lymphocytic NHL/CLL, off  ibrutinib po daily.  CBC is stable.  Hold ibrutinib for now.    (2) enlarging left upper lobe mass, status post radiation therapy, decreased size on present study.    (3) anemia secondary to B-12 deficiency and iron deficiency. On B12 injections monthly. S/P Injectafer.  Hgb better 12.    (4) low back pain symptoms ×2 years, probably secondary to degenerative disc disease.    (4)Clinical Stage 1 R lung cancer hx, treated with stereotactic Rad Rx.     Repeat CT scan of chest and CBC in October 2020 and return to clinic in 4 months.

## 2020-06-25 PROBLEM — Z20.822 COVID-19 VIRUS NOT DETECTED: Status: RESOLVED | Noted: 2020-01-01 | Resolved: 2020-01-01

## 2020-06-25 PROBLEM — R63.0 LOSS OF APPETITE: Status: RESOLVED | Noted: 2017-05-08 | Resolved: 2020-01-01

## 2020-06-25 NOTE — PROGRESS NOTES
60 Day Recert Order # [id number]  New Cert Period: 06/23 to 08/21/2020 with [facility] [place] - Dr. Rani Welch

## 2020-06-25 NOTE — PROGRESS NOTES
Established Patient - Audio Only Telehealth Visit     The patient location is: home  The chief complaint leading to consultation is: refills  Visit type: Virtual visit with audio only (telephone)  Total time spent with patient: 10 min       The reason for the audio only service rather than synchronous audio and video virtual visit was related to technical difficulties or patient preference/necessity.     Each patient to whom I provide medical services by telemedicine is:  (1) informed of the relationship between the physician and patient and the respective role of any other health care provider with respect to management of the patient; and (2) notified that they may decline to receive medical services by telemedicine and may withdraw from such care at any time. Patient verbally consented to receive this service via voice-only telephone call.       HPI:   Patient with CLL and chemo has been held until August. She feels she is doing well with the exception of cough . Sghe has COPD and recurrent bronchitis. Notes some cough She also continues to smoke. Asking for refills of DM meds, had to remind her that these were stopped last month due to low BS. Has gerd and was having some trouble with nausea and swallowing. Zantac stopped and nexium started. She states this helps. BP is doing well.       Assessment and plan:    Diagnoses and all orders for this visit:    Type 2 diabetes mellitus without complication, without long-term current use of insulin  Comments:  continue to monitor sugars and diet    Gastroesophageal reflux disease without esophagitis  -     esomeprazole (NEXIUM) 40 MG capsule; Take 1 capsule (40 mg total) by mouth before breakfast.    Essential hypertension  Comments:  well controlled  Orders:  -     metoprolol tartrate (LOPRESSOR) 25 MG tablet; Take 0.5 tablets (12.5 mg total) by mouth once daily.    Chronic rhinitis  -     loratadine (CLARITIN) 10 mg tablet; Take 1 tablet (10 mg total) by mouth once  daily. For allergies  -     fluticasone propionate (FLONASE) 50 mcg/actuation nasal spray; 1 spray (50 mcg total) by Each Nostril route 2 (two) times daily.    Chronic lymphocytic leukemia  Comments:  therapy per heme-onc    rtc in 3 months for DM                        This service was not originating from a related E/M service provided within the previous 7 days nor will  to an E/M service or procedure within the next 24 hours or my soonest available appointment.  Prevailing standard of care was able to be met in this audio-only visit.

## 2020-07-13 NOTE — TELEPHONE ENCOUNTER
----- Message from Jina London sent at 7/13/2020  1:27 PM CDT -----  Carlos A nevarez with [facility] home health nurse would like a call back   [phone]

## 2020-07-16 NOTE — TELEPHONE ENCOUNTER
----- Message from Jina London sent at 7/16/2020  1:13 PM CDT -----  Madhav- steph major with [facility] is calling  She has been treated for copd exhab. She is on prednisone and doxy for a week. She is also tachycardiac her hr is 115-120 all other vitals are good. He would like her to increase metoprolol to 1 tab daily   [phone]

## 2020-07-27 NOTE — PROGRESS NOTES
Established Patient - Audio Only Telehealth Visit     The patient location is:  She is on isolation at home because of corona virus epidemic  The chief complaint leading to consultation is:  Lymphoma history  Visit type: Virtual visit with audio only (telephone)  Total time spent with patient:  10 min       The reason for the audio only service rather than synchronous audio and video virtual visit was related to technical difficulties or patient preference/necessity.     Each patient to whom I provide medical services by telemedicine is:  (1) informed of the relationship between the physician and patient and the respective role of any other health care provider with respect to management of the patient; and (2) notified that they may decline to receive medical services by telemedicine and may withdraw from such care at any time. Patient verbally consented to receive this service via voice-only telephone call.    [facility] audio phone encounter progress note  May 14, 2020    Patient is in isolation at home because of corona virus epidemic.    This is an audio phone encounter in lieu of in-person encounter because of corona virus emergency.  Patient acknowledges and consents to the audio phone encounter today.  Subjective:      Patient ID:   Yocasta Almonte  75 y.o. female  1945  MD Yuri, MD Agustin.      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  Blood counts are improved.  We are stopping ibrutinib at this time and will go with observation.      Denies fever, night sweats or weight loss.  She does not have palpable lymphadenopathy.  CBC is improved after B12 and iron administration.  She had been on ibrutinib 140 mg 2 p.o. Daily.    She was recently hospitalized with exacerbation of COPD, COVID-19 testing was negative.    On B 12 injections monthly, increased energy.  S/P  Injectafer weekly x's 2 weeks to replenish Fe  stores.    Hx includes DM, type 2, HTN, GERD, COPD, cholesterol, thyroid dx,  and clinical lung cancer treated with empirical  Stereotactic Rad Rx. To the right upper lung area.    PET scan now shows enlarging left upper lobe nodule consistent with primary lung malignancy.  Will refer her to Dr. Velez of Radiation therapy for consideration of local treatment to the left upper lobe mass.    She has chronic low back pain. PET scan supports degenerative disc disease at the back area. She is to follow-up with Dr. Welch for evaluation of this complaint.    Surgery LN bx R axilla, neck surgery for infection.  Smoke 1/2 ppd x's 50 years.  ETOH no.  Allergy iodine.  Mom cancer, Dad ? Health.    ROS:   GEN: normal without any fever, night sweats or weight loss  HEENT: normal with no HA's, sore throat, stiff neck, changes in vision  CV: normal with no CP, SOB, PND, VALENCIA or orthopnea  PULM: see HPI  GI: normal with no abdominal pain, nausea, vomiting, constipation, diarrhea, melanotic stools, BRBPR, or hematemesis  : normal with no hematuria, dysuria  BREAST: normal with no mass, discharge, pain  SKIN: normal with no rash, erythema, bruising, or swelling     Past Medical History:   Diagnosis Date    Cancer     lymphoma    COPD (chronic obstructive pulmonary disease)     Diabetes mellitus     GERD (gastroesophageal reflux disease)     Hyperlipidemia     Hypertension     Thyroid disease      Past Surgical History:   Procedure Laterality Date    ESOPHAGOGASTRODUODENOSCOPY Left 5/13/2020    Procedure: EGD (ESOPHAGOGASTRODUODENOSCOPY);  Surgeon: Rogelio Harirs MD;  [facility];  Service: Endoscopy;  Laterality: Left;    LYMPH NODE DISSECTION       R axillary    NECK SURGERY      infection in neck removed       Review of patient's allergies indicates:   Allergen Reactions    Iodine and iodide containing products            Current Outpatient Medications:     albuterol (PROVENTIL) 5 mg/mL nebulizer  solution, Take 0.5 mLs (2.5 mg total) by nebulization every 4 (four) hours as needed for Wheezing or Shortness of Breath. Rescue, Disp: 20 mL, Rfl: 0    albuterol (PROVENTIL/VENTOLIN HFA) 90 mcg/actuation inhaler, Inhale 1 puff into the lungs every 6 (six) hours as needed for Wheezing or Shortness of Breath. Rescue, Disp: 18 g, Rfl: 0    atorvastatin (LIPITOR) 80 MG tablet, Take 1 tablet (80 mg total) by mouth once daily. For cholesterol, Disp: 90 tablet, Rfl: 3    azithromycin (ZITHROMAX Z-REINA) 250 MG tablet, Take 2 tabs p.o. daily on day 1.  Followed by 1 tab p.o. daily for days 2 through 5, Disp: 6 tablet, Rfl: 0    blood sugar diagnostic (TRUE METRIX GLUCOSE TEST STRIP) Strp, Test blood sugar in vitro BID, Disp: 200 each, Rfl: 5    blood-glucose meter (TRUE METRIX GLUCOSE METER) Misc, Test blood sugar in vitro BID, Disp: 1 each, Rfl: 0    ciprofloxacin HCl (CILOXAN) 0.3 % ophthalmic solution, Place 1 drop into the left ear every 6 (six) hours. for 7 days, Disp: 5 mL, Rfl: 0    esomeprazole (NEXIUM) 40 mg GrPS, Take 40 mg by mouth 2 (two) times daily before meals., Disp: 90 each, Rfl: 1    fluticasone (FLONASE) 50 mcg/actuation nasal spray, 1 spray (50 mcg total) by Each Nare route 2 (two) times daily., Disp: 1 Bottle, Rfl: 3    gabapentin (NEURONTIN) 300 MG capsule, Take 1 capsule (300 mg total) by mouth 2 (two) times daily. 1 cap AM, 2 caps NOON, 1 capPM, Disp: 180 capsule, Rfl: 3    ibrutinib (IMBRUVICA) 140 mg Cap, Take 240 mg by mouth once daily ., Disp: , Rfl:     lancets 32 gauge Misc, Test blood sugar in vitro BID, Disp: 200 each, Rfl: 5    loratadine (CLARITIN) 10 mg tablet, Take 10 mg by mouth once daily., Disp: , Rfl:     metoprolol tartrate (LOPRESSOR) 25 MG tablet, Take 0.5 tablets (12.5 mg total) by mouth once daily., Disp: 45 tablet, Rfl: 0    mirtazapine (REMERON) 30 MG tablet, Take 1 tablet (30 mg total) by mouth every evening., Disp: 90 tablet, Rfl: 3    potassium chloride SA  (K-DUR,KLOR-CON) 20 MEQ tablet, Take 1 tablet (20 mEq total) by mouth once daily., Disp: 90 tablet, Rfl: 3    predniSONE (DELTASONE) 20 MG tablet, Take 2 tablets (40 mg total) by mouth once daily. for 5 days, Disp: 10 tablet, Rfl: 0    triamcinolone (KENALOG) 0.5 % ointment, Apply 1 application topically 2 (two) times daily., Disp: 15 g, Rfl: 2    Current Facility-Administered Medications:     cyanocobalamin injection 1,000 mcg, 1,000 mcg, Subcutaneous, Q30 Days, R Harinder Kuo MD, 1,000 mcg at 03/09/20 0942          Objective:   Vitals:  There were no vitals taken for this visit.    Physical Examination:   GEN: no apparent distress, comfortable  HEAD: atraumatic and normocephalic  EYES: no pallor, no icterus  ENT:  no pharyngeal erythema, external ear skin excoriated on R ear.; no nasal discharge; no thrush  NECK: no masses, thyroid normal, trachea midline, no LAD/LN's, supple  CV: RRR with no murmur; normal pulse; normal S1 and S2; no pedal edema  CHEST: Normal respiratory effort; CTAB; normal breath sounds; no wheeze or crackles  ABDOM: nontender and nondistended; soft,  no rebound/guarding, L/S NP  MUSC/Skeletal: ROM normal; no crepitus; joints normal; no deformities   EXTREM: I do not palpate left supraclavicular Adenopathy at this time.  SKIN: no rashes, lesions, ulcers, petechiae  : no CVAT  NEURO: grossly intact; motor/sensory WNL;  no tremors  PSYCH: normal mood, affect and behavior  LYMPH: normal cervical, supraclavicular, axillary and groin LN's  BREASTS: no palpable mass L or R breast.    This is the in office exam from our last of visit    Labs:     Hemoglobin is 11.3. MCV is 84. B-12 is low at 285, and ferritin is low at 10.  Started on B 12 shots and injectafer previously.    Now Hgb 11.  Ferritin 15.  Better now Hgb 13.9., Ferritin up at 158.    PET scan showed increased opacity at the right upper lung area at 2.5 cm. This could be postradiation change. There is a 13 mm nodule at the left upper  lobe area. Will check her CAT scan again of the chest in 2 months and discuss with Dr. Culp  if there is any progressive enlargement.    Follow-up PET scan now shows an enlarging left upper lobe mass with hypermetabolism concerning for new primary lung malignancy.    She had a colonoscopy 1 year ago per Dr. Streeter, polyps were removed.  Diverticulosis, hemorrhoids.  EGD gastritis.      Assessment:   (1) 75 y.o. female with diagnosis of  Small lymphocytic NHL/CLL, on ibrutinib po daily.  CBC is stable.  Hold and stop ibrutinib now.    (2) abnormal PET scan with enlarging nodule in left upper lobe and increased opacity at 2.5 cm and right upper lobe, possibly post radiation change.   Referred back to Dr. Velez for radiation consideration to left upper lobe mass area.    Repeat PET scan will be done October 3, 2020    (3) anemia secondary to B-12 deficiency and iron deficiency. On B12 injections monthly. S/P Injectafer.  Hgb better 12.    (4) low back pain symptoms ×2 years, probably secondary to degenerative disc disease. Refer back to Dr. Welch, her PMD, for management or disposition.    (4)Clinical Stage 1 R lung cancer hx, treated with stereotactic Rad Rx.     Return to clinic here 6/2020.                    This service was not originating from a related E/M service provided within the previous 7 days nor will  to an E/M service or procedure within the next 24 hours or my soonest available appointment.  Prevailing standard of care was able to be met in this audio-only visit.       Karen Alcantar

## 2020-07-28 NOTE — LETTER
July 28, 2020      Leila Hernandez MD  [address]  [address]  [place] [address]           [facility] - Pulmonology  [address]  [place] [address]  Phone: [phone]  Fax: [phone]          Patient: Yocasta Almonte   MR Number: [id number]   YOB: 1945   Date of Visit: 7/28/2020       Dear Dr. Leila Hernandez:    Thank you for referring Yocasta Almonte to me for evaluation. Attached you will find relevant portions of my assessment and plan of care.    If you have questions, please do not hesitate to call me. I look forward to following Yocasta Almonte along with you.    Sincerely,    Betsy Marrero MD    Enclosure  CC:  No Recipients    If you would like to receive this communication electronically, please contact [email or web address] or [phone] to request more information on [employer] access.    For providers and/or their staff who would like to refer a patient to [facility], please contact us through our one-stop-shop provider referral line, [facility], at [phone].    If you feel you have received this communication in error or would no longer like to receive these types of communications, please e-mail [email or web address]

## 2020-08-03 PROBLEM — K21.00 GASTROESOPHAGEAL REFLUX DISEASE WITH ESOPHAGITIS: Status: ACTIVE | Noted: 2018-07-24

## 2020-08-10 NOTE — TELEPHONE ENCOUNTER
Change is ok , however I do not have lisinopril on her current med list so please verify that she is taking.

## 2020-08-10 NOTE — TELEPHONE ENCOUNTER
----- Message from Gela Padron sent at 8/10/2020 10:30 AM CDT -----   9:43  Sachin Nurse Practitioner with [employer]. The patient has a    chronic cough he would like to take her off lisinopril and put her on Losartan 25 mg 1 a day.  He will see if that helps her stop the chronic.  She feels like something is in her throat or a tickle Please call Sachin  [phone] GH

## 2020-08-10 NOTE — TELEPHONE ENCOUNTER
Spoke with steph, states he does not have information available to him at this time to confirm. Informed will return call tomorrow -DN

## 2020-08-11 NOTE — TELEPHONE ENCOUNTER
----- Message from Jina London sent at 8/11/2020 12:58 PM CDT -----  Sachin with [facility] verified with patient and she does not take lisinopril, she was asking about when her cpap will be delivered  [phone]

## 2020-08-11 NOTE — TELEPHONE ENCOUNTER
CT chest ordered by Dr. Wesley for December.  Dr. Kuo is requesting this to be done in October.  Messages sent to [facility] and  asking that scan be deferred to September not November.

## 2020-08-14 NOTE — TELEPHONE ENCOUNTER
----- Message from Madeleine Ingram sent at 8/14/2020  8:53 AM CDT -----  Destinee Dobbs called to report the pt has a productive cough, sob, increased wheezing times 5 days.  She no fever she does have a hx of COPD she is a smoker.  They can send there PA out to see her this afternoon but they are asking if we can see her this morning.    # [phone]

## 2020-08-14 NOTE — TELEPHONE ENCOUNTER
Per dr jones can schedule for 11am or may send PA.  Spoke with raul, appt offered.  raul will contact pt and return call -DN

## 2020-08-14 NOTE — TELEPHONE ENCOUNTER
----- Message from David Edwards sent at 8/14/2020  9:58 AM CDT -----  Regarding: Notifying nurse  Contact: Yocasta bruce/ [facility] says that the patient declined coming into the office today for 11 and she will just wait until someone comes out to see her this evening.

## 2020-10-05 NOTE — PROGRESS NOTES
[facility] hematology Oncology in office encounter progress note  10/5/20     Patient ID:   Yocasta Almonte  75 y.o. female  1945  MD Yuri, MD Agustin.      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  Blood counts are improved.  We are stopping ibrutinib at this time and will go with observation.    C/O pain in L shoulder and scapular area, new sx for her.  Denies fever, night sweats or weight loss.  She does not have palpable lymphadenopathy.    CBC is improved after B12 and iron administration.  She had been on ibrutinib 140 mg 2 p.o. Daily.    She was recently hospitalized with exacerbation of COPD, COVID-19 testing was negative.    On B 12 injections monthly, increased energy.  S/P  Injectafer weekly x's 2 weeks to replenish Fe stores.    Hx includes DM, type 2, HTN, GERD, COPD, cholesterol, thyroid dx,  and clinical lung cancer treated with empirical  Stereotactic Rad Rx. To the right upper lung area.    PET scan showed enlarging left upper lobe nodule consistent with primary lung malignancy.   Dr. Velez of Radiation therapy for  local treatment to the left upper lobe mass.    She has chronic low back pain. PET scan supports degenerative disc disease at the back area. She is to follow-up with Dr. Welch for evaluation of this complaint.    Surgery LN bx R axilla, neck surgery for infection.  Smoke 1/2 ppd x's 50 years.  ETOH no.  Allergy iodine.  Mom cancer, Dad ? Health.    ROS:   GEN: normal without any fever, night sweats or weight loss  HEENT: normal with no HA's, sore throat, stiff neck, changes in vision  CV: normal with no CP, SOB, PND, VALENCIA or orthopnea  PULM: see HPI  GI: normal with no abdominal pain, nausea, vomiting, constipation, diarrhea, melanotic stools, BRBPR, or hematemesis  : normal with no hematuria, dysuria  BREAST: normal with no mass, discharge, pain  SKIN: normal with no rash, erythema, bruising,  or swelling     Past Medical History:   Diagnosis Date    Cancer     lymphoma    COPD (chronic obstructive pulmonary disease)     Diabetes mellitus     GERD (gastroesophageal reflux disease)     Hyperlipidemia     Hypertension     YESICA (obstructive sleep apnea) 06/2020    by home sleep study    Thyroid disease      Past Surgical History:   Procedure Laterality Date    ESOPHAGOGASTRODUODENOSCOPY Left 5/13/2020    Procedure: EGD (ESOPHAGOGASTRODUODENOSCOPY);  Surgeon: Rogelio Harris MD;  Location: [facility];  Service: Endoscopy;  Laterality: Left;    LYMPH NODE DISSECTION       R axillary    NECK SURGERY      infection in neck removed       Review of patient's allergies indicates:   Allergen Reactions    Iodine and iodide containing products            Current Outpatient Medications:     albuterol (PROVENTIL) 5 mg/mL nebulizer solution, Take 0.5 mLs (2.5 mg total) by nebulization every 4 (four) hours as needed for Wheezing or Shortness of Breath. Rescue, Disp: 20 mL, Rfl: 0    albuterol (PROVENTIL/VENTOLIN HFA) 90 mcg/actuation inhaler, Inhale 1 puff into the lungs every 6 (six) hours as needed for Wheezing or Shortness of Breath. Rescue, Disp: 18 g, Rfl: 0    atorvastatin (LIPITOR) 80 MG tablet, Take 1 tablet (80 mg total) by mouth once daily. For cholesterol, Disp: 90 tablet, Rfl: 3    blood sugar diagnostic (TRUE METRIX GLUCOSE TEST STRIP) Strp, Test blood sugar in vitro BID, Disp: 200 each, Rfl: 5    blood-glucose meter (TRUE METRIX GLUCOSE METER) Misc, Test blood sugar in vitro BID, Disp: 1 each, Rfl: 0    esomeprazole (NEXIUM) 40 MG capsule, Take 1 capsule (40 mg total) by mouth before breakfast., Disp: 90 capsule, Rfl: 3    fluticasone propionate (FLONASE) 50 mcg/actuation nasal spray, 1 spray (50 mcg total) by Each Nostril route 2 (two) times daily., Disp: 16 g, Rfl: 3    gabapentin (NEURONTIN) 300 MG capsule, Take 1 capsule (300 mg total) by mouth 2 (two) times daily. 1 cap AM, 2 caps  NOON, 1 capPM, Disp: 180 capsule, Rfl: 3    ibrutinib (IMBRUVICA) 140 mg Cap, Take 240 mg by mouth once daily ., Disp: , Rfl:     lancets 32 gauge Misc, Test blood sugar in vitro BID, Disp: 200 each, Rfl: 5    loratadine (CLARITIN) 10 mg tablet, Take 1 tablet (10 mg total) by mouth once daily. For allergies, Disp: 90 tablet, Rfl: 3    metoprolol tartrate (LOPRESSOR) 25 MG tablet, Take 1 tablet (25 mg total) by mouth once daily., Disp: 90 tablet, Rfl: 3    mirtazapine (REMERON) 30 MG tablet, Take 1 tablet (30 mg total) by mouth every evening., Disp: 90 tablet, Rfl: 3    potassium chloride SA (K-DUR,KLOR-CON) 20 MEQ tablet, Take 1 tablet (20 mEq total) by mouth once daily., Disp: 90 tablet, Rfl: 3    triamcinolone (KENALOG) 0.5 % ointment, Apply 1 application topically 2 (two) times daily., Disp: 15 g, Rfl: 2    Current Facility-Administered Medications:     cyanocobalamin injection 1,000 mcg, 1,000 mcg, Subcutaneous, Q30 Days, CHRISTOPHER Kuo MD, 1,000 mcg at 09/09/20 1018          Objective:   Vitals:  Blood pressure 109/62, pulse 94, temperature 97 °F (36.1 °C), weight 69.4 kg (152 lb 14.4 oz).    Physical Examination:   GEN: no apparent distress, comfortable  HEAD: atraumatic and normocephalic  EYES: no pallor, no icterus  ENT:  no pharyngeal erythema, external ear skin excoriated on R ear.; no nasal discharge; no thrush  NECK: no masses, thyroid normal, trachea midline, no LAD/LN's, supple  CV: RRR with no murmur; normal pulse; normal S1 and S2; no pedal edema  CHEST: Normal respiratory effort; CTAB; normal breath sounds; no wheeze or crackles.    She has tenderness over the L superior posterior chest wall.  ABDOM: nontender and nondistended; soft,  no rebound/guarding, L/S NP  MUSC/Skeletal: ROM normal; no crepitus; joints normal; no deformities   EXTREM: I do not palpate left supraclavicular Adenopathy at this time.  SKIN: no rashes, lesions, ulcers, petechiae  : no CVAT  NEURO: grossly intact;  motor/sensory WNL;  no tremors  PSYCH: normal mood, affect and behavior  LYMPH: normal cervical, supraclavicular, axillary and groin LN's  BREASTS: no palpable mass L or R breast.      Labs:     Hemoglobin is 11.3. MCV is 84. B-12 is low at 285, and ferritin is low at 10.  Started on B 12 shots and injectafer previously.     Hgb 11.  Ferritin 15.  Better now Hgb 13.9., Ferritin up at 158.    PET scan showed increased opacity at the right upper lung area at 2.5 cm. This could be postradiation change. There is a 13 mm nodule at the left upper lobe area.    Follow-up PET scan now shows an enlarging left upper lobe mass with hypermetabolism concerning for new primary lung malignancy.    She had a colonoscopy 1 year ago per Dr. Streeter, polyps were removed.  Diverticulosis, hemorrhoids.  EGD gastritis.    Scan at this time shows decreased mass in left upper lobe suggesting favorable response to therapy.  There is  questionable increase in right upper lobe mass which may be due to technique.  Lymphadenopathy is stable.    She has a CAT ordered 12/2020 per Dr. Wesley.      Assessment:   (1) 75 y.o. female with diagnosis of  Small lymphocytic NHL/CLL, off  ibrutinib po daily.  CBC is stable.  Hold ibrutinib for now.    (2) enlarging left upper lobe mass, status post radiation therapy, decreased size on present study.    (3) anemia secondary to B-12 deficiency and iron deficiency. On B12 injections monthly. S/P Injectafer.  Hgb better 12.    (4) low back pain symptoms ×2 years, probably secondary to degenerative disc disease.    (5)Clinical Stage 1 R lung cancer hx, treated with stereotactic Rad Rx.     (6)L shoulder and chest wall pain, new onset.  Check X rays and bone scan.    RTC 3 weeks, can cancel.  RTC 12/2020.                        [facility] hematology Oncology in office encounter progress note  6/10/20     Patient ID:   Yocasta Almonte  75 y.o. female  1945  MD Yuri, MD Agustin.      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  Blood counts are improved.  We are stopping ibrutinib at this time and will go with observation.      Denies fever, night sweats or weight loss.  She does not have palpable lymphadenopathy.  CBC is improved after B12 and iron administration.  She had been on ibrutinib 140 mg 2 p.o. Daily.    She was recently hospitalized with exacerbation of COPD, COVID-19 testing was negative.    On B 12 injections monthly, increased energy.  S/P  Injectafer weekly x's 2 weeks to replenish Fe stores.    Hx includes DM, type 2, HTN, GERD, COPD, cholesterol, thyroid dx,  and clinical lung cancer treated with empirical  Stereotactic Rad Rx. To the right upper lung area.    PET scan now shows enlarging left upper lobe nodule consistent with primary lung malignancy.  Will refer her to Dr. Velez of Radiation therapy for consideration of local treatment to the left upper lobe mass.    She has chronic low back pain. PET scan supports degenerative disc disease at the back area. She is to follow-up with Dr. Welch for evaluation of this complaint.    Surgery LN bx R axilla, neck surgery for infection.  Smoke 1/2 ppd x's 50 years.  ETOH no.  Allergy iodine.  Mom cancer, Dad ? Health.    ROS:   GEN: normal without any fever, night sweats or weight loss  HEENT: normal with no HA's, sore throat, stiff neck, changes in vision  CV: normal with no CP, SOB, PND, VALENCIA or orthopnea  PULM: see HPI  GI: normal with no abdominal pain, nausea, vomiting, constipation, diarrhea, melanotic stools, BRBPR, or hematemesis  : normal with no hematuria, dysuria  BREAST: normal with no mass, discharge, pain  SKIN: normal with no rash, erythema, bruising, or swelling     Past Medical History:   Diagnosis Date    Cancer     lymphoma    COPD (chronic obstructive pulmonary disease)     Diabetes mellitus     GERD (gastroesophageal reflux disease)      Hyperlipidemia     Hypertension     YESICA (obstructive sleep apnea) 06/2020    by home sleep study    Thyroid disease      Past Surgical History:   Procedure Laterality Date    ESOPHAGOGASTRODUODENOSCOPY Left 5/13/2020    Procedure: EGD (ESOPHAGOGASTRODUODENOSCOPY);  Surgeon: Rogelio Harris MD;  Location: [facility];  Service: Endoscopy;  Laterality: Left;    LYMPH NODE DISSECTION       R axillary    NECK SURGERY      infection in neck removed       Review of patient's allergies indicates:   Allergen Reactions    Iodine and iodide containing products            Current Outpatient Medications:     albuterol (PROVENTIL) 5 mg/mL nebulizer solution, Take 0.5 mLs (2.5 mg total) by nebulization every 4 (four) hours as needed for Wheezing or Shortness of Breath. Rescue, Disp: 20 mL, Rfl: 0    albuterol (PROVENTIL/VENTOLIN HFA) 90 mcg/actuation inhaler, Inhale 1 puff into the lungs every 6 (six) hours as needed for Wheezing or Shortness of Breath. Rescue, Disp: 18 g, Rfl: 0    atorvastatin (LIPITOR) 80 MG tablet, Take 1 tablet (80 mg total) by mouth once daily. For cholesterol, Disp: 90 tablet, Rfl: 3    blood sugar diagnostic (TRUE METRIX GLUCOSE TEST STRIP) Strp, Test blood sugar in vitro BID, Disp: 200 each, Rfl: 5    blood-glucose meter (TRUE METRIX GLUCOSE METER) Misc, Test blood sugar in vitro BID, Disp: 1 each, Rfl: 0    esomeprazole (NEXIUM) 40 MG capsule, Take 1 capsule (40 mg total) by mouth before breakfast., Disp: 90 capsule, Rfl: 3    fluticasone propionate (FLONASE) 50 mcg/actuation nasal spray, 1 spray (50 mcg total) by Each Nostril route 2 (two) times daily., Disp: 16 g, Rfl: 3    gabapentin (NEURONTIN) 300 MG capsule, Take 1 capsule (300 mg total) by mouth 2 (two) times daily. 1 cap AM, 2 caps NOON, 1 capPM, Disp: 180 capsule, Rfl: 3    ibrutinib (IMBRUVICA) 140 mg Cap, Take 240 mg by mouth once daily ., Disp: , Rfl:     lancets 32 gauge Misc, Test blood sugar in vitro BID, Disp: 200 each,  Rfl: 5    loratadine (CLARITIN) 10 mg tablet, Take 1 tablet (10 mg total) by mouth once daily. For allergies, Disp: 90 tablet, Rfl: 3    metoprolol tartrate (LOPRESSOR) 25 MG tablet, Take 1 tablet (25 mg total) by mouth once daily., Disp: 90 tablet, Rfl: 3    mirtazapine (REMERON) 30 MG tablet, Take 1 tablet (30 mg total) by mouth every evening., Disp: 90 tablet, Rfl: 3    potassium chloride SA (K-DUR,KLOR-CON) 20 MEQ tablet, Take 1 tablet (20 mEq total) by mouth once daily., Disp: 90 tablet, Rfl: 3    triamcinolone (KENALOG) 0.5 % ointment, Apply 1 application topically 2 (two) times daily., Disp: 15 g, Rfl: 2    Current Facility-Administered Medications:     cyanocobalamin injection 1,000 mcg, 1,000 mcg, Subcutaneous, Q30 Days, R Harinder Kuo MD, 1,000 mcg at 09/09/20 1018          Objective:   Vitals:  Blood pressure 109/62, pulse 94, temperature 97 °F (36.1 °C), weight 69.4 kg (152 lb 14.4 oz).    Physical Examination:   GEN: no apparent distress, comfortable  HEAD: atraumatic and normocephalic  EYES: no pallor, no icterus  ENT:  no pharyngeal erythema, external ear skin excoriated on R ear.; no nasal discharge; no thrush  NECK: no masses, thyroid normal, trachea midline, no LAD/LN's, supple  CV: RRR with no murmur; normal pulse; normal S1 and S2; no pedal edema  CHEST: Normal respiratory effort; CTAB; normal breath sounds; no wheeze or crackles  ABDOM: nontender and nondistended; soft,  no rebound/guarding, L/S NP  MUSC/Skeletal: ROM normal; no crepitus; joints normal; no deformities   EXTREM: I do not palpate left supraclavicular Adenopathy at this time.  SKIN: no rashes, lesions, ulcers, petechiae  : no CVAT  NEURO: grossly intact; motor/sensory WNL;  no tremors  PSYCH: normal mood, affect and behavior  LYMPH: normal cervical, supraclavicular, axillary and groin LN's  BREASTS: no palpable mass L or R breast.    This is the in office exam from our last of visit    Labs:     Hemoglobin is 11.3. MCV is  84. B-12 is low at 285, and ferritin is low at 10.  Started on B 12 shots and injectafer previously.    Now Hgb 11.  Ferritin 15.  Better now Hgb 13.9., Ferritin up at 158.    PET scan showed increased opacity at the right upper lung area at 2.5 cm. This could be postradiation change. There is a 13 mm nodule at the left upper lobe area. Will check her CAT scan again of the chest in 2 months and discuss with Dr. Culp  if there is any progressive enlargement.    Follow-up PET scan now shows an enlarging left upper lobe mass with hypermetabolism concerning for new primary lung malignancy.    She had a colonoscopy 1 year ago per Dr. Streeter, polyps were removed.  Diverticulosis, hemorrhoids.  EGD gastritis.    Scan at this time shows decreased mass in left upper lobe suggesting favorable response to therapy.  There is  questionable increase in right upper lobe mass which may be due to technique.  Lymphadenopathy is stable.      Assessment:   (1) 75 y.o. female with diagnosis of  Small lymphocytic NHL/CLL, off  ibrutinib po daily.  CBC is stable.  Hold ibrutinib for now.    (2) enlarging left upper lobe mass, status post radiation therapy, decreased size on present study.    (3) anemia secondary to B-12 deficiency and iron deficiency. On B12 injections monthly. S/P Injectafer.  Hgb better 12.    (4) low back pain symptoms ×2 years, probably secondary to degenerative disc disease.    (4)Clinical Stage 1 R lung cancer hx, treated with stereotactic Rad Rx.     Repeat CT scan of chest and CBC in October 2020 and return to clinic in 4 months.

## 2020-10-07 NOTE — TELEPHONE ENCOUNTER
----- Message from David Edwards sent at 10/7/2020  3:13 PM CDT -----  Regarding: Needs call back from nurse  Contact: Yocasta Almonte  Pt says that her Copd have worsened . Was trying to be seen tomorrow. But she uses [facility] on [address]  in [place]. Says to give Sachin, the pt's  nurse practitioner a call , he would like to know the treatment plan #[phone]  Pt# [phone]  Sachin says that he is going to order the patient Medrol Dose pack , Vertussin Cough medicine and try to get  a chest x ray for the patient today .

## 2020-10-08 NOTE — TELEPHONE ENCOUNTER
Spoke with steph notified of message per dr richards.  Steph states cxr normal, will fax results.  Pt has visit Monday with dr richards.  Informed steph to call with any questions/concerns -DN

## 2020-10-12 PROBLEM — Z72.0 TOBACCO USER: Status: RESOLVED | Noted: 2019-10-15 | Resolved: 2020-01-01

## 2020-10-12 PROBLEM — L03.90 CELLULITIS: Status: RESOLVED | Noted: 2018-07-24 | Resolved: 2020-01-01

## 2020-10-12 NOTE — PROGRESS NOTES
SUBJECTIVE:    Patient ID: Yocasta Almonte is a 75 y.o. female.    Chief Complaint: 3M DM Check, c/o hot flashes, and flu vaccine today    Patient with hypertension, COPD and well-controlled diabetes presents for visit.  She has been having some COPD exacerbations.  This is not uncommon with changes in the weather.  She was placed on cough syrup and steroids with improvement in her symptoms.  Patient has been a long-time smoker but reports now that she is abstaining from tobacco use.  She reports that she feels better and does not have plans on starting back on smoking.  Blood pressure is good.  Some slight weight gain is noted.  Has lymphoma that is currently in remission.  PET scan is scheduled for December.  Heartburn is doing well with the use of Nexium.  Gabapentin continues to help with neuropathic pain. Most recent  labs show normal liver and kidneys and goo blood count  Flu shot given today       Lab Visit on 10/01/2020   Component Date Value Ref Range Status    WBC 10/01/2020 7.11  3.90 - 12.70 K/uL Final    RBC 10/01/2020 4.59  4.00 - 5.40 M/uL Final    Hemoglobin 10/01/2020 13.2  12.0 - 16.0 g/dL Final    Hematocrit 10/01/2020 40.0  37.0 - 48.5 % Final    Mean Corpuscular Volume 10/01/2020 87  82 - 98 fL Final    Mean Corpuscular Hemoglobin 10/01/2020 28.8  27.0 - 31.0 pg Final    Mean Corpuscular Hemoglobin Conc 10/01/2020 33.0  32.0 - 36.0 g/dL Final    RDW 10/01/2020 14.6* 11.5 - 14.5 % Final    Platelets 10/01/2020 136* 150 - 350 K/uL Final    MPV 10/01/2020 9.9  9.2 - 12.9 fL Final    Immature Granulocytes 10/01/2020 0.3  0.0 - 0.5 % Final    Gran # (ANC) 10/01/2020 3.8  1.8 - 7.7 K/uL Final    Immature Grans (Abs) 10/01/2020 0.02  0.00 - 0.04 K/uL Final    Lymph # 10/01/2020 1.7  1.0 - 4.8 K/uL Final    Mono # 10/01/2020 0.7  0.3 - 1.0 K/uL Final    Eos # 10/01/2020 0.9* 0.0 - 0.5 K/uL Final    Baso # 10/01/2020 0.05  0.00 - 0.20 K/uL Final    nRBC 10/01/2020 0  0 /100 WBC Final     Gran% 10/01/2020 52.9  38.0 - 73.0 % Final    Lymph% 10/01/2020 23.3  18.0 - 48.0 % Final    Mono% 10/01/2020 10.3  4.0 - 15.0 % Final    Eosinophil% 10/01/2020 12.5* 0.0 - 8.0 % Final    Basophil% 10/01/2020 0.7  0.0 - 1.9 % Final    Differential Method 10/01/2020 Automated   Final    Sodium 10/01/2020 141  136 - 145 mmol/L Final    Potassium 10/01/2020 4.0  3.5 - 5.1 mmol/L Final    Chloride 10/01/2020 103  95 - 110 mmol/L Final    CO2 10/01/2020 24  23 - 29 mmol/L Final    Glucose 10/01/2020 145* 70 - 110 mg/dL Final    BUN, Bld 10/01/2020 14  8 - 23 mg/dL Final    Creatinine 10/01/2020 0.7  0.5 - 1.4 mg/dL Final    Calcium 10/01/2020 9.4  8.7 - 10.5 mg/dL Final    Total Protein 10/01/2020 6.8  6.0 - 8.4 g/dL Final    Albumin 10/01/2020 3.9  3.5 - 5.2 g/dL Final    Total Bilirubin 10/01/2020 0.7  0.1 - 1.0 mg/dL Final    Alkaline Phosphatase 10/01/2020 68  55 - 135 U/L Final    AST 10/01/2020 17  10 - 40 U/L Final    ALT 10/01/2020 13  10 - 44 U/L Final    Anion Gap 10/01/2020 14  8 - 16 mmol/L Final    eGFR if African American 10/01/2020 >60.0  >60 mL/min/1.73 m^2 Final    eGFR if non African American 10/01/2020 >60.0  >60 mL/min/1.73 m^2 Final   Admission on 05/10/2020, Discharged on 05/13/2020   Component Date Value Ref Range Status    WBC 05/10/2020 9.33  3.90 - 12.70 K/uL Final    RBC 05/10/2020 4.62  4.00 - 5.40 M/uL Final    Hemoglobin 05/10/2020 13.0  12.0 - 16.0 g/dL Final    Hematocrit 05/10/2020 41.1  37.0 - 48.5 % Final    Mean Corpuscular Volume 05/10/2020 89  82 - 98 fL Final    Mean Corpuscular Hemoglobin 05/10/2020 28.1  27.0 - 31.0 pg Final    Mean Corpuscular Hemoglobin Conc 05/10/2020 31.6* 32.0 - 36.0 g/dL Final    RDW 05/10/2020 14.9* 11.5 - 14.5 % Final    Platelets 05/10/2020 212  150 - 350 K/uL Final    MPV 05/10/2020 11.6  9.2 - 12.9 fL Final    Immature Granulocytes 05/10/2020 0.4  0.0 - 0.5 % Final    Gran # (ANC) 05/10/2020 4.6  1.8 - 7.7 K/uL  Final    Immature Grans (Abs) 05/10/2020 0.04  0.00 - 0.04 K/uL Final    Lymph # 05/10/2020 2.9  1.0 - 4.8 K/uL Final    Mono # 05/10/2020 0.9  0.3 - 1.0 K/uL Final    Eos # 05/10/2020 0.9* 0.0 - 0.5 K/uL Final    Baso # 05/10/2020 0.06  0.00 - 0.20 K/uL Final    nRBC 05/10/2020 0  0 /100 WBC Final    Gran% 05/10/2020 49.6  38.0 - 73.0 % Final    Lymph% 05/10/2020 30.7  18.0 - 48.0 % Final    Mono% 05/10/2020 9.2  4.0 - 15.0 % Final    Eosinophil% 05/10/2020 9.5* 0.0 - 8.0 % Final    Basophil% 05/10/2020 0.6  0.0 - 1.9 % Final    Differential Method 05/10/2020 Automated   Final    BNP 05/10/2020 55  0 - 99 pg/mL Final    Sodium 05/10/2020 138  136 - 145 mmol/L Final    Potassium 05/10/2020 4.9  3.5 - 5.1 mmol/L Final    Chloride 05/10/2020 105  95 - 110 mmol/L Final    CO2 05/10/2020 24  23 - 29 mmol/L Final    Glucose 05/10/2020 107  70 - 110 mg/dL Final    BUN, Bld 05/10/2020 8  8 - 23 mg/dL Final    Creatinine 05/10/2020 0.7  0.5 - 1.4 mg/dL Final    Calcium 05/10/2020 9.0  8.7 - 10.5 mg/dL Final    Total Protein 05/10/2020 7.2  6.0 - 8.4 g/dL Final    Albumin 05/10/2020 3.9  3.5 - 5.2 g/dL Final    Total Bilirubin 05/10/2020 0.8  0.1 - 1.0 mg/dL Final    Alkaline Phosphatase 05/10/2020 70  55 - 135 U/L Final    AST 05/10/2020 19  10 - 40 U/L Final    ALT 05/10/2020 12  10 - 44 U/L Final    Anion Gap 05/10/2020 9  8 - 16 mmol/L Final    eGFR if African American 05/10/2020 >60.0  >60 mL/min/1.73 m^2 Final    eGFR if non African American 05/10/2020 >60.0  >60 mL/min/1.73 m^2 Final    Magnesium 05/10/2020 2.0  1.6 - 2.6 mg/dL Final    Lipase 05/10/2020 41  4 - 60 U/L Final    PT 05/10/2020 12.0  10.6 - 14.8 sec Final    INR 05/10/2020 0.9   Final    Troponin I 05/10/2020 <0.030  <=0.040 ng/mL Final    TSH 05/10/2020 1.270  0.340 - 5.600 uIU/mL Final    SARS-CoV-2 RNA, Amplification, Qual 05/10/2020 Negative  Negative Final    Troponin I 05/10/2020 <0.030  <=0.040 ng/mL Final     WBC 05/11/2020 5.75  3.90 - 12.70 K/uL Final    RBC 05/11/2020 4.19  4.00 - 5.40 M/uL Final    Hemoglobin 05/11/2020 12.0  12.0 - 16.0 g/dL Final    Hematocrit 05/11/2020 37.0  37.0 - 48.5 % Final    Mean Corpuscular Volume 05/11/2020 88  82 - 98 fL Final    Mean Corpuscular Hemoglobin 05/11/2020 28.6  27.0 - 31.0 pg Final    Mean Corpuscular Hemoglobin Conc 05/11/2020 32.4  32.0 - 36.0 g/dL Final    RDW 05/11/2020 14.8* 11.5 - 14.5 % Final    Platelets 05/11/2020 190  150 - 350 K/uL Final    MPV 05/11/2020 11.7  9.2 - 12.9 fL Final    Immature Granulocytes 05/11/2020 0.3  0.0 - 0.5 % Final    Gran # (ANC) 05/11/2020 5.3  1.8 - 7.7 K/uL Final    Immature Grans (Abs) 05/11/2020 0.02  0.00 - 0.04 K/uL Final    Lymph # 05/11/2020 0.4* 1.0 - 4.8 K/uL Final    Mono # 05/11/2020 0.0* 0.3 - 1.0 K/uL Final    Eos # 05/11/2020 0.0  0.0 - 0.5 K/uL Final    Baso # 05/11/2020 0.01  0.00 - 0.20 K/uL Final    nRBC 05/11/2020 0  0 /100 WBC Final    Gran% 05/11/2020 91.7* 38.0 - 73.0 % Final    Lymph% 05/11/2020 7.3* 18.0 - 48.0 % Final    Mono% 05/11/2020 0.5* 4.0 - 15.0 % Final    Eosinophil% 05/11/2020 0.0  0.0 - 8.0 % Final    Basophil% 05/11/2020 0.2  0.0 - 1.9 % Final    Differential Method 05/11/2020 Automated   Final    Sodium 05/11/2020 140  136 - 145 mmol/L Final    Potassium 05/11/2020 4.1  3.5 - 5.1 mmol/L Final    Chloride 05/11/2020 105  95 - 110 mmol/L Final    CO2 05/11/2020 23  23 - 29 mmol/L Final    Glucose 05/11/2020 180* 70 - 110 mg/dL Final    BUN, Bld 05/11/2020 10  8 - 23 mg/dL Final    Creatinine 05/11/2020 0.7  0.5 - 1.4 mg/dL Final    Calcium 05/11/2020 8.7  8.7 - 10.5 mg/dL Final    Anion Gap 05/11/2020 12  8 - 16 mmol/L Final    eGFR if African American 05/11/2020 >60.0  >60 mL/min/1.73 m^2 Final    eGFR if non African American 05/11/2020 >60.0  >60 mL/min/1.73 m^2 Final    SARS-CoV2 (COVID-19) Qualitative P* 05/11/2020 Not Detected  Not Detected Final    Sodium  05/12/2020 140  136 - 145 mmol/L Final    Potassium 05/12/2020 4.4  3.5 - 5.1 mmol/L Final    Chloride 05/12/2020 107  95 - 110 mmol/L Final    CO2 05/12/2020 24  23 - 29 mmol/L Final    Glucose 05/12/2020 171* 70 - 110 mg/dL Final    BUN, Bld 05/12/2020 21  8 - 23 mg/dL Final    Creatinine 05/12/2020 0.8  0.5 - 1.4 mg/dL Final    Calcium 05/12/2020 8.8  8.7 - 10.5 mg/dL Final    Anion Gap 05/12/2020 9  8 - 16 mmol/L Final    eGFR if African American 05/12/2020 >60.0  >60 mL/min/1.73 m^2 Final    eGFR if non African American 05/12/2020 >60.0  >60 mL/min/1.73 m^2 Final    Sodium 05/13/2020 141  136 - 145 mmol/L Final    Potassium 05/13/2020 4.6  3.5 - 5.1 mmol/L Final    Chloride 05/13/2020 107  95 - 110 mmol/L Final    CO2 05/13/2020 28  23 - 29 mmol/L Final    Glucose 05/13/2020 177* 70 - 110 mg/dL Final    BUN, Bld 05/13/2020 27* 8 - 23 mg/dL Final    Creatinine 05/13/2020 0.8  0.5 - 1.4 mg/dL Final    Calcium 05/13/2020 8.9  8.7 - 10.5 mg/dL Final    Anion Gap 05/13/2020 6* 8 - 16 mmol/L Final    eGFR if African American 05/13/2020 >60.0  >60 mL/min/1.73 m^2 Final    eGFR if non African American 05/13/2020 >60.0  >60 mL/min/1.73 m^2 Final   Admission on 04/22/2020, Discharged on 04/23/2020   Component Date Value Ref Range Status    WBC 04/22/2020 12.63  3.90 - 12.70 K/uL Final    RBC 04/22/2020 4.27  4.00 - 5.40 M/uL Final    Hemoglobin 04/22/2020 12.2  12.0 - 16.0 g/dL Final    Hematocrit 04/22/2020 37.6  37.0 - 48.5 % Final    Mean Corpuscular Volume 04/22/2020 88  82 - 98 fL Final    Mean Corpuscular Hemoglobin 04/22/2020 28.6  27.0 - 31.0 pg Final    Mean Corpuscular Hemoglobin Conc 04/22/2020 32.4  32.0 - 36.0 g/dL Final    RDW 04/22/2020 14.6* 11.5 - 14.5 % Final    Platelets 04/22/2020 217  150 - 350 K/uL Final    MPV 04/22/2020 10.4  9.2 - 12.9 fL Final    Immature Granulocytes 04/22/2020 1.3* 0.0 - 0.5 % Final    Gran # (ANC) 04/22/2020 9.6* 1.8 - 7.7 K/uL Final     Immature Grans (Abs) 04/22/2020 0.16* 0.00 - 0.04 K/uL Final    Lymph # 04/22/2020 1.9  1.0 - 4.8 K/uL Final    Mono # 04/22/2020 0.9  0.3 - 1.0 K/uL Final    Eos # 04/22/2020 0.1  0.0 - 0.5 K/uL Final    Baso # 04/22/2020 0.02  0.00 - 0.20 K/uL Final    nRBC 04/22/2020 0  0 /100 WBC Final    Gran% 04/22/2020 75.8* 38.0 - 73.0 % Final    Lymph% 04/22/2020 14.6* 18.0 - 48.0 % Final    Mono% 04/22/2020 7.2  4.0 - 15.0 % Final    Eosinophil% 04/22/2020 0.9  0.0 - 8.0 % Final    Basophil% 04/22/2020 0.2  0.0 - 1.9 % Final    Differential Method 04/22/2020 Automated   Final    Sodium 04/22/2020 141  136 - 145 mmol/L Final    Potassium 04/22/2020 4.1  3.5 - 5.1 mmol/L Final    Chloride 04/22/2020 107  95 - 110 mmol/L Final    CO2 04/22/2020 25  23 - 29 mmol/L Final    Glucose 04/22/2020 163* 70 - 110 mg/dL Final    BUN, Bld 04/22/2020 13  8 - 23 mg/dL Final    Creatinine 04/22/2020 0.7  0.5 - 1.4 mg/dL Final    Calcium 04/22/2020 8.6* 8.7 - 10.5 mg/dL Final    Total Protein 04/22/2020 6.7  6.0 - 8.4 g/dL Final    Albumin 04/22/2020 3.7  3.5 - 5.2 g/dL Final    Total Bilirubin 04/22/2020 0.4  0.1 - 1.0 mg/dL Final    Alkaline Phosphatase 04/22/2020 80  55 - 135 U/L Final    AST 04/22/2020 42* 10 - 40 U/L Final    ALT 04/22/2020 26  10 - 44 U/L Final    Anion Gap 04/22/2020 9  8 - 16 mmol/L Final    eGFR if African American 04/22/2020 >60.0  >60 mL/min/1.73 m^2 Final    eGFR if non African American 04/22/2020 >60.0  >60 mL/min/1.73 m^2 Final    CRP 04/22/2020 0.61  0.00 - 0.75 mg/dL Final    Ferritin 04/22/2020 139  20.0 - 300.0 ng/mL Final    LD 04/22/2020 240  110 - 260 U/L Final    CPK 04/22/2020 111  20 - 180 U/L Final    Lactate (Lactic Acid) 04/22/2020 2.9* 0.5 - 1.9 mmol/L Final    Troponin I 04/22/2020 <0.030  <=0.040 ng/mL Final    SARS-CoV-2 RNA, Amplification, Qual 04/22/2020 Negative  Negative Final    Procalcitonin 04/22/2020 <0.05  0.00 - 0.50 ng/mL Final    Blood Culture,  Routine 04/22/2020 No growth after 5 days.   Final    Blood Culture, Routine 04/22/2020 No growth after 5 days.   Final    BNP 04/22/2020 72  0 - 99 pg/mL Final    Specimen UA 04/22/2020 Urine, Clean Catch   Final    Color, UA 04/22/2020 Yellow  Yellow, Straw, Erika Final    Appearance, UA 04/22/2020 Clear  Clear Final    pH, UA 04/22/2020 6.0  5.0 - 8.0 Final    Specific Gravity, UA 04/22/2020 1.015  1.005 - 1.030 Final    Protein, UA 04/22/2020 1+* Negative Final    Glucose, UA 04/22/2020 3+* Negative Final    Ketones, UA 04/22/2020 Negative  Negative Final    Bilirubin (UA) 04/22/2020 Negative  Negative Final    Occult Blood UA 04/22/2020 1+* Negative Final    Nitrite, UA 04/22/2020 Negative  Negative Final    Urobilinogen, UA 04/22/2020 Negative  Negative EU/dL Final    Leukocytes, UA 04/22/2020 Negative  Negative Final    POC PH 04/22/2020 7.333* 7.35 - 7.45 Final    POC PCO2 04/22/2020 47.1* 35 - 45 mmHg Final    POC PO2 04/22/2020 197* 80 - 100 mmHg Final    POC HCO3 04/22/2020 25.0  24 - 28 mmol/L Final    POC BE 04/22/2020 -1  -2 to 2 mmol/L Final    POC SATURATED O2 04/22/2020 100  95 - 100 % Final    POC TCO2 04/22/2020 26  23 - 27 mmol/L Final    Rate 04/22/2020 16   Final    Sample 04/22/2020 ARTERIAL   Final    Site 04/22/2020 LR   Final    Allens Test 04/22/2020 Pass   Final    DelSys 04/22/2020 CPAP/BiPAP   Final    Mode 04/22/2020 BiPAP   Final    FiO2 04/22/2020 50   Final    Spont Rate 04/22/2020 22   Final    Sp02 04/22/2020 99   Final    IP 04/22/2020 16   Final    EP 04/22/2020 8   Final    Lactate (Lactic Acid) 04/22/2020 0.8  0.5 - 1.9 mmol/L Final    RBC, UA 04/22/2020 8* 0 - 4 /hpf Final    WBC, UA 04/22/2020 1  0 - 5 /hpf Final    Bacteria 04/22/2020 Negative  None-Occ /hpf Final    Yeast, UA 04/22/2020 None  None Final    Squam Epithel, UA 04/22/2020 2  /hpf Final    Hyaline Casts, UA 04/22/2020 4* 0-1/lpf /lpf Final    Microscopic Comment  04/22/2020 SEE COMMENT   Final    Sodium 04/22/2020 140  136 - 145 mmol/L Final    Potassium 04/22/2020 3.4* 3.5 - 5.1 mmol/L Final    Chloride 04/22/2020 108  95 - 110 mmol/L Final    CO2 04/22/2020 27  23 - 29 mmol/L Final    Glucose 04/22/2020 88  70 - 110 mg/dL Final    BUN, Bld 04/22/2020 14  8 - 23 mg/dL Final    Creatinine 04/22/2020 0.6  0.5 - 1.4 mg/dL Final    Calcium 04/22/2020 8.2* 8.7 - 10.5 mg/dL Final    Anion Gap 04/22/2020 5* 8 - 16 mmol/L Final    eGFR if African American 04/22/2020 >60.0  >60 mL/min/1.73 m^2 Final    eGFR if non African American 04/22/2020 >60.0  >60 mL/min/1.73 m^2 Final    Magnesium 04/22/2020 2.2  1.6 - 2.6 mg/dL Final    Phosphorus 04/22/2020 3.4  2.7 - 4.5 mg/dL Final    WBC 04/22/2020 11.96  3.90 - 12.70 K/uL Final    RBC 04/22/2020 4.05  4.00 - 5.40 M/uL Final    Hemoglobin 04/22/2020 11.4* 12.0 - 16.0 g/dL Final    Hematocrit 04/22/2020 35.2* 37.0 - 48.5 % Final    Mean Corpuscular Volume 04/22/2020 87  82 - 98 fL Final    Mean Corpuscular Hemoglobin 04/22/2020 28.1  27.0 - 31.0 pg Final    Mean Corpuscular Hemoglobin Conc 04/22/2020 32.4  32.0 - 36.0 g/dL Final    RDW 04/22/2020 14.5  11.5 - 14.5 % Final    Platelets 04/22/2020 193  150 - 350 K/uL Final    MPV 04/22/2020 10.1  9.2 - 12.9 fL Final    Immature Granulocytes 04/22/2020 0.6* 0.0 - 0.5 % Final    Gran # (ANC) 04/22/2020 7.2  1.8 - 7.7 K/uL Final    Immature Grans (Abs) 04/22/2020 0.07* 0.00 - 0.04 K/uL Final    Lymph # 04/22/2020 3.2  1.0 - 4.8 K/uL Final    Mono # 04/22/2020 1.2* 0.3 - 1.0 K/uL Final    Eos # 04/22/2020 0.2  0.0 - 0.5 K/uL Final    Baso # 04/22/2020 0.03  0.00 - 0.20 K/uL Final    nRBC 04/22/2020 0  0 /100 WBC Final    Gran% 04/22/2020 60.5  38.0 - 73.0 % Final    Lymph% 04/22/2020 26.9  18.0 - 48.0 % Final    Mono% 04/22/2020 10.1  4.0 - 15.0 % Final    Eosinophil% 04/22/2020 1.6  0.0 - 8.0 % Final    Basophil% 04/22/2020 0.3  0.0 - 1.9 % Final     Differential Method 04/22/2020 Automated   Final    Troponin I 04/22/2020 0.081* <=0.040 ng/mL Final    SARS-CoV2 (COVID-19) Qualitative P* 04/22/2020 Not Detected  Not Detected Final    Troponin I 04/22/2020 0.074* <=0.040 ng/mL Final    POC Glucose 04/22/2020 99  70 - 110 Final    POC Glucose 04/22/2020 85  70 - 110 Final    IgG - Serum 04/23/2020 776  586 - 1602 mg/dL Final    IgA 04/23/2020 271  64 - 422 mg/dL Final    IgM 04/23/2020 48  26 - 217 mg/dL Final    Total Protein 04/23/2020 6.0  6.0 - 8.5 g/dL Final    Albumin grams/dl 04/23/2020 3.3  2.9 - 4.4 g/dL Final    Alpha-1 grams/dl 04/23/2020 0.2  0.0 - 0.4 g/dL Final    Alpha-2 grams/dl 04/23/2020 0.7  0.4 - 1.0 g/dL Final    Beta grams/dl 04/23/2020 1.0  0.7 - 1.3 g/dL Final    Gamma grams/dl 04/23/2020 0.7  0.4 - 1.8 g/dL Final    M-SPIKE, PROT ELECTRO 04/23/2020 Not Observed  Not Observed g/dL Final    Globulin, Total 04/23/2020 2.7  2.2 - 3.9 g/dL Final    A/G Ratio 04/23/2020 1.2  0.7 - 1.7 Final    Please Note: 04/23/2020 Comment   Final    Immunofix Interp. 04/23/2020 Comment   Final    IgA 04/23/2020 271  64 - 422 mg/dL Final    IgG - Serum 04/23/2020 776  586 - 1602 mg/dL Final    IgM 04/23/2020 48  26 - 217 mg/dL Final    Sodium 04/23/2020 140  136 - 145 mmol/L Final    Potassium 04/23/2020 3.9  3.5 - 5.1 mmol/L Final    Chloride 04/23/2020 106  95 - 110 mmol/L Final    CO2 04/23/2020 27  23 - 29 mmol/L Final    Glucose 04/23/2020 80  70 - 110 mg/dL Final    BUN, Bld 04/23/2020 10  8 - 23 mg/dL Final    Creatinine 04/23/2020 0.8  0.5 - 1.4 mg/dL Final    Calcium 04/23/2020 8.7  8.7 - 10.5 mg/dL Final    Total Protein 04/23/2020 6.3  6.0 - 8.4 g/dL Final    Albumin 04/23/2020 3.5  3.5 - 5.2 g/dL Final    Total Bilirubin 04/23/2020 0.6  0.1 - 1.0 mg/dL Final    Alkaline Phosphatase 04/23/2020 81  55 - 135 U/L Final    AST 04/23/2020 20  10 - 40 U/L Final    ALT 04/23/2020 25  10 - 44 U/L Final    Anion Gap  04/23/2020 7* 8 - 16 mmol/L Final    eGFR if African American 04/23/2020 >60.0  >60 mL/min/1.73 m^2 Final    eGFR if non African American 04/23/2020 >60.0  >60 mL/min/1.73 m^2 Final    WBC 04/23/2020 9.22  3.90 - 12.70 K/uL Final    RBC 04/23/2020 4.47  4.00 - 5.40 M/uL Final    Hemoglobin 04/23/2020 12.5  12.0 - 16.0 g/dL Final    Hematocrit 04/23/2020 39.2  37.0 - 48.5 % Final    Mean Corpuscular Volume 04/23/2020 88  82 - 98 fL Final    Mean Corpuscular Hemoglobin 04/23/2020 28.0  27.0 - 31.0 pg Final    Mean Corpuscular Hemoglobin Conc 04/23/2020 31.9* 32.0 - 36.0 g/dL Final    RDW 04/23/2020 14.6* 11.5 - 14.5 % Final    Platelets 04/23/2020 207  150 - 350 K/uL Final    MPV 04/23/2020 10.4  9.2 - 12.9 fL Final    Immature Granulocytes 04/23/2020 0.7* 0.0 - 0.5 % Final    Gran # (ANC) 04/23/2020 4.1  1.8 - 7.7 K/uL Final    Immature Grans (Abs) 04/23/2020 0.06* 0.00 - 0.04 K/uL Final    Lymph # 04/23/2020 3.2  1.0 - 4.8 K/uL Final    Mono # 04/23/2020 0.8  0.3 - 1.0 K/uL Final    Eos # 04/23/2020 1.0* 0.0 - 0.5 K/uL Final    Baso # 04/23/2020 0.08  0.00 - 0.20 K/uL Final    nRBC 04/23/2020 0  0 /100 WBC Final    Gran% 04/23/2020 43.9  38.0 - 73.0 % Final    Lymph% 04/23/2020 34.2  18.0 - 48.0 % Final    Mono% 04/23/2020 9.0  4.0 - 15.0 % Final    Eosinophil% 04/23/2020 11.3* 0.0 - 8.0 % Final    Basophil% 04/23/2020 0.9  0.0 - 1.9 % Final    Differential Method 04/23/2020 Automated   Final    Magnesium 04/23/2020 2.0  1.6 - 2.6 mg/dL Final    POC Glucose 04/23/2020 71  70 - 110 Final    POC Glucose 04/23/2020 134* 70 - 110 Final       Past Medical History:   Diagnosis Date    Cancer     lymphoma    COPD (chronic obstructive pulmonary disease)     Diabetes mellitus     GERD (gastroesophageal reflux disease)     Hyperlipidemia     Hypertension     YESICA (obstructive sleep apnea) 06/2020    by home sleep study    Thyroid disease      Past Surgical History:   Procedure Laterality  Date    ESOPHAGOGASTRODUODENOSCOPY Left 5/13/2020    Procedure: EGD (ESOPHAGOGASTRODUODENOSCOPY);  Surgeon: Rogelio Harris MD;  [facility];  Service: Endoscopy;  Laterality: Left;    LYMPH NODE DISSECTION       R axillary    NECK SURGERY      infection in neck removed     Family History   Problem Relation Age of Onset    Cancer Mother     No Known Problems Father        Marital Status:   Alcohol History:  reports no history of alcohol use.  Tobacco History:  reports that she quit smoking about 4 weeks ago. Her smoking use included cigarettes. She has a 29.00 pack-year smoking history. She has never used smokeless tobacco.  Drug History:  reports no history of drug use.    Review of patient's allergies indicates:   Allergen Reactions    Iodine and iodide containing products     Penicillins        Current Outpatient Medications:     albuterol (PROVENTIL) 5 mg/mL nebulizer solution, Take 0.5 mLs (2.5 mg total) by nebulization every 4 (four) hours as needed for Wheezing or Shortness of Breath. Rescue, Disp: 20 mL, Rfl: 0    albuterol (PROVENTIL/VENTOLIN HFA) 90 mcg/actuation inhaler, Inhale 1 puff into the lungs every 6 (six) hours as needed for Wheezing or Shortness of Breath. Rescue, Disp: 18 g, Rfl: 0    atorvastatin (LIPITOR) 80 MG tablet, Take 1 tablet (80 mg total) by mouth once daily. For cholesterol, Disp: 90 tablet, Rfl: 3    blood sugar diagnostic (TRUE METRIX GLUCOSE TEST STRIP) Strp, Test blood sugar in vitro BID, Disp: 200 each, Rfl: 5    blood-glucose meter (TRUE METRIX GLUCOSE METER) Misc, Test blood sugar in vitro BID, Disp: 1 each, Rfl: 0    esomeprazole (NEXIUM) 40 MG capsule, Take 1 capsule (40 mg total) by mouth 2 (two) times daily before meals., Disp: 180 capsule, Rfl: 3    fluticasone propionate (FLONASE) 50 mcg/actuation nasal spray, 1 spray (50 mcg total) by Each Nostril route 2 (two) times daily., Disp: 16 g, Rfl: 3    gabapentin (NEURONTIN) 300 MG capsule, 1 cap  AM, 1 caps NOON, 2 capPM, Disp: 180 capsule, Rfl: 3    lancets 32 gauge Misc, Test blood sugar in vitro BID, Disp: 200 each, Rfl: 5    methylPREDNISolone (MEDROL DOSEPACK) 4 mg tablet, TAKE 1 TABLET BY MOUTH AS DIRECTED, Disp: , Rfl:     metoprolol tartrate (LOPRESSOR) 25 MG tablet, Take 1 tablet (25 mg total) by mouth once daily., Disp: 90 tablet, Rfl: 3    potassium chloride SA (K-DUR,KLOR-CON) 20 MEQ tablet, Take 1 tablet (20 mEq total) by mouth once daily., Disp: 90 tablet, Rfl: 3    VIRTUSSIN AC  mg/5 mL syrup, TAKE 2 TEASPOONFUL BY MOUTH TWICE DAILY AS NEEDED FOR COUGH, Disp: , Rfl:     loratadine (CLARITIN) 10 mg tablet, Take 1 tablet (10 mg total) by mouth once daily. For allergies, Disp: 90 tablet, Rfl: 3    mirtazapine (REMERON) 30 MG tablet, Take 1 tablet (30 mg total) by mouth every evening., Disp: 90 tablet, Rfl: 3    triamcinolone (KENALOG) 0.5 % ointment, Apply 1 application topically 2 (two) times daily., Disp: 15 g, Rfl: 2    Current Facility-Administered Medications:     cyanocobalamin injection 1,000 mcg, 1,000 mcg, Subcutaneous, Q30 Days, CHRISTOPHER Kuo MD, 1,000 mcg at 10/05/20 1113    Review of Systems   Constitutional: Negative for activity change, fatigue and unexpected weight change.   HENT: Negative for hearing loss, postnasal drip, sinus pressure, sore throat and voice change.    Eyes: Negative for photophobia and visual disturbance.   Respiratory: Positive for cough. Negative for shortness of breath and wheezing.    Cardiovascular: Negative for chest pain and palpitations.   Gastrointestinal: Negative for constipation, diarrhea and nausea.   Genitourinary: Negative for difficulty urinating, frequency, hematuria and urgency.   Musculoskeletal: Positive for arthralgias. Negative for back pain.   Skin: Negative for rash.   Neurological: Negative for weakness, light-headedness and headaches.   Hematological: Negative for adenopathy. Does not bruise/bleed easily.  "  Psychiatric/Behavioral: The patient is not nervous/anxious.           Objective:      Vitals:    10/12/20 0910   BP: 124/74   Pulse: 75   Temp: 97.6 °F (36.4 °C)   Weight: 68 kg (150 lb)   Height: 5' 6" (1.676 m)     Physical Exam  Vitals signs reviewed.   Constitutional:       General: She is not in acute distress.     Appearance: Normal appearance. She is well-developed.      Comments: Uses cane   HENT:      Head: Normocephalic and atraumatic.      Right Ear: External ear normal.      Left Ear: External ear normal.      Nose: Nose normal.      Mouth/Throat:      Mouth: Mucous membranes are moist.   Eyes:      General: Lids are normal.      Conjunctiva/sclera: Conjunctivae normal.      Pupils: Pupils are equal, round, and reactive to light.   Neck:      Musculoskeletal: Full passive range of motion without pain and neck supple.      Thyroid: No thyromegaly.      Vascular: No JVD.      Trachea: No tracheal deviation.   Cardiovascular:      Rate and Rhythm: Normal rate and regular rhythm.      Chest Wall: PMI is not displaced.      Pulses: Normal pulses.      Heart sounds: Normal heart sounds.   Pulmonary:      Effort: Pulmonary effort is normal.      Breath sounds: Rhonchi present.   Abdominal:      General: Bowel sounds are normal.      Palpations: Abdomen is soft.      Tenderness: There is no abdominal tenderness. There is no guarding or rebound.   Musculoskeletal: Normal range of motion.         General: No tenderness.   Skin:     General: Skin is warm and dry.      Findings: No rash.   Neurological:      General: No focal deficit present.      Mental Status: She is alert and oriented to person, place, and time.   Psychiatric:         Mood and Affect: Mood normal.         Behavior: Behavior normal.           Assessment:       1. COPD exacerbation    2. Flu vaccine need    3. Neuropathy    4. Gastroesophageal reflux disease without esophagitis    5. Chronic lymphocytic leukemia    6. Hypokalemia    7. Mixed " hyperlipidemia         Plan:       COPD exacerbation  Comments:  complete steroids    Flu vaccine need  -     Influenza - Quadrivalent - High Dose (65+) (PF) (IM)    Neuropathy  Comments:  Symptoms stable.  Using reduced medication  Orders:  -     gabapentin (NEURONTIN) 300 MG capsule; 1 cap AM, 1 caps NOON, 2 capPM  Dispense: 180 capsule; Refill: 3    Gastroesophageal reflux disease without esophagitis  -     esomeprazole (NEXIUM) 40 MG capsule; Take 1 capsule (40 mg total) by mouth 2 (two) times daily before meals.  Dispense: 180 capsule; Refill: 3    Chronic lymphocytic leukemia    Hypokalemia    Mixed hyperlipidemia      Follow up in about 4 months (around 2/12/2021) for copd.

## 2020-11-02 NOTE — PROGRESS NOTES
[facility] hematology Oncology in office encounter progress note   November 2, 2020  Patient ID:   Yocasta Almonte  75 y.o. female  1945  MD Yuri, MD Agustin.      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  Blood counts are improved.  We are stopping ibrutinib at this time and will go with observation.  CBC is stable without ibrutinib now, will continue to hold her oral chemotherapy and recheck her again by following CBC.    C/O pain in L shoulder and scapular area, new sx for her.  Denies fever, night sweats or weight loss.  She does not have palpable lymphadenopathy.    CBC is improved after B12 and iron administration.  She had been on ibrutinib 140 mg 2 p.o. Daily.    X-ray and bone scan show osteo arthritic change at the left shoulder and at the lumbar spine areas.  Will ask her primary care physician Dr. Welch to manage her local pain symptoms associated with her osteoarthritis.  She is on Neurontin but says this has not helped her pain very much.    She was recently hospitalized with exacerbation of COPD, COVID-19 testing was negative.    On B 12 injections monthly, increased energy.  S/P  Injectafer weekly x's 2 weeks to replenish Fe stores.    Hx includes DM, type 2, HTN, GERD, COPD, cholesterol, thyroid dx,  and clinical lung cancer treated with empirical  Stereotactic Rad Rx. To the right upper lung area.    PET scan showed enlarging left upper lobe nodule consistent with primary lung malignancy.   Dr. Velez of Radiation therapy for  local treatment to the left upper lobe mass.  She is due for a post treatment CT scan in December 2020 per Dr. Wesley of Radiation therapy.  She has chronic low back pain. PET scan supports degenerative disc disease at the back area. She is to follow-up with Dr. Welch for evaluation of this complaint.    Surgery LN bx R axilla, neck surgery for infection.  Smoke 1/2 ppd x's 50  years.  ETOH no.  Allergy iodine.  Mom cancer, Dad ? Health.    ROS:   GEN: normal without any fever, night sweats or weight loss  HEENT: normal with no HA's, sore throat, stiff neck, changes in vision  CV: normal with no CP, SOB, PND, VALENCIA or orthopnea  PULM: see HPI  GI: normal with no abdominal pain, nausea, vomiting, constipation, diarrhea, melanotic stools, BRBPR, or hematemesis  : normal with no hematuria, dysuria  BREAST: normal with no mass, discharge, pain  SKIN: normal with no rash, erythema, bruising, or swelling     Past Medical History:   Diagnosis Date    Cancer     lymphoma    COPD (chronic obstructive pulmonary disease)     Diabetes mellitus     GERD (gastroesophageal reflux disease)     Hyperlipidemia     Hypertension     YESICA (obstructive sleep apnea) 06/2020    by home sleep study    Thyroid disease      Past Surgical History:   Procedure Laterality Date    ESOPHAGOGASTRODUODENOSCOPY Left 5/13/2020    Procedure: EGD (ESOPHAGOGASTRODUODENOSCOPY);  Surgeon: Rogelio Harris MD;  Location: [facility];  Service: Endoscopy;  Laterality: Left;    LYMPH NODE DISSECTION       R axillary    NECK SURGERY      infection in neck removed       Review of patient's allergies indicates:   Allergen Reactions    Iodine and iodide containing products            Current Outpatient Medications:     albuterol (PROVENTIL) 5 mg/mL nebulizer solution, Take 0.5 mLs (2.5 mg total) by nebulization every 4 (four) hours as needed for Wheezing or Shortness of Breath. Rescue, Disp: 20 mL, Rfl: 0    albuterol (PROVENTIL/VENTOLIN HFA) 90 mcg/actuation inhaler, Inhale 1 puff into the lungs every 6 (six) hours as needed for Wheezing or Shortness of Breath. Rescue, Disp: 18 g, Rfl: 0    atorvastatin (LIPITOR) 80 MG tablet, Take 1 tablet (80 mg total) by mouth once daily. For cholesterol, Disp: 90 tablet, Rfl: 3    blood sugar diagnostic (TRUE METRIX GLUCOSE TEST STRIP) Strp, Test blood sugar in vitro BID, Disp: 200  each, Rfl: 5    blood-glucose meter (TRUE METRIX GLUCOSE METER) Misc, Test blood sugar in vitro BID, Disp: 1 each, Rfl: 0    esomeprazole (NEXIUM) 40 MG capsule, Take 1 capsule (40 mg total) by mouth 2 (two) times daily before meals., Disp: 180 capsule, Rfl: 3    fluticasone propionate (FLONASE) 50 mcg/actuation nasal spray, 1 spray (50 mcg total) by Each Nostril route 2 (two) times daily., Disp: 16 g, Rfl: 3    gabapentin (NEURONTIN) 300 MG capsule, 1 cap AM, 1 caps NOON, 2 capPM, Disp: 180 capsule, Rfl: 3    lancets 32 gauge Misc, Test blood sugar in vitro BID, Disp: 200 each, Rfl: 5    loratadine (CLARITIN) 10 mg tablet, Take 1 tablet (10 mg total) by mouth once daily. For allergies, Disp: 90 tablet, Rfl: 3    methylPREDNISolone (MEDROL DOSEPACK) 4 mg tablet, TAKE 1 TABLET BY MOUTH AS DIRECTED, Disp: , Rfl:     metoprolol tartrate (LOPRESSOR) 25 MG tablet, Take 1 tablet (25 mg total) by mouth once daily., Disp: 90 tablet, Rfl: 3    mirtazapine (REMERON) 30 MG tablet, Take 1 tablet (30 mg total) by mouth every evening., Disp: 90 tablet, Rfl: 3    potassium chloride SA (K-DUR,KLOR-CON) 20 MEQ tablet, Take 1 tablet (20 mEq total) by mouth once daily., Disp: 90 tablet, Rfl: 3    triamcinolone (KENALOG) 0.5 % ointment, Apply 1 application topically 2 (two) times daily., Disp: 15 g, Rfl: 2    VIRTUSSIN AC  mg/5 mL syrup, TAKE 2 TEASPOONFUL BY MOUTH TWICE DAILY AS NEEDED FOR COUGH, Disp: , Rfl:     Current Facility-Administered Medications:     cyanocobalamin injection 1,000 mcg, 1,000 mcg, Subcutaneous, Q30 Days, CHRISTOPHER Kuo MD, 1,000 mcg at 10/05/20 1113          Objective:   Vitals:  Blood pressure (!) 147/79, pulse 75, temperature 97.2 °F (36.2 °C), weight 72.4 kg (159 lb 11.2 oz).    Physical Examination:   GEN: no apparent distress, comfortable  HEAD: atraumatic and normocephalic  EYES: no pallor, no icterus  ENT:  no pharyngeal erythema, external ear skin excoriated on R ear.; no nasal  discharge; no thrush  NECK: no masses, thyroid normal, trachea midline, no LAD/LN's, supple  CV: RRR with no murmur; normal pulse; normal S1 and S2; no pedal edema  CHEST: Normal respiratory effort; CTAB; normal breath sounds; no wheeze or crackles.    She has tenderness over the L superior posterior chest wall.  ABDOM: nontender and nondistended; soft,  no rebound/guarding, L/S NP  MUSC/Skeletal: ROM normal; no crepitus; joints normal; no deformities   EXTREM: I do not palpate left supraclavicular Adenopathy at this time.  SKIN: no rashes, lesions, ulcers, petechiae  : no CVAT  NEURO: grossly intact; motor/sensory WNL;  no tremors  PSYCH: normal mood, affect and behavior  LYMPH: normal cervical, supraclavicular, axillary and groin LN's  BREASTS: no palpable mass L or R breast.      Labs:     Hemoglobin is 11.3. MCV is 84. B-12 is low at 285, and ferritin is low at 10.  Started on B 12 shots and injectafer previously.  B12 injection is given today and will be given monthly per the RN.     Hgb 11.  Ferritin 15.  Better now Hgb 13.9., Ferritin up at 158.    PET scan showed increased opacity at the right upper lung area at 2.5 cm. This could be postradiation change. There is a 13 mm nodule at the left upper lobe area.  Follow-up PET scan now shows an enlarging left upper lobe mass with hypermetabolism concerning for new primary lung malignancy.  Post treatment CT scan of the chest is being done in December to check on the status of pulmonary mass.    She had a colonoscopy 1 year ago per Dr. Streeter, polyps were removed.  Diverticulosis, hemorrhoids.  EGD gastritis.    Scan at this time shows decreased mass in left upper lobe suggesting favorable response to therapy.  There is  questionable increase in right upper lobe mass which may be due to technique.  Lymphadenopathy is stable.    She has a CAT ordered 12/2020 per Dr. Wesley.    Osteo arthritis at left shoulder and lumbar spine areas, to be managed per   Yuri, PMD      Assessment:   (1) 75 y.o. female with diagnosis of  Small lymphocytic NHL/CLL, off  ibrutinib po daily.  CBC is stable.  Hold ibrutinib for now.    (2) enlarging left upper lobe mass, status post radiation therapy, decreased size on present study.    (3) anemia secondary to B-12 deficiency and iron deficiency. On B12 injections monthly. S/P Injectafer.  Hgb better 12.    (4) low back pain symptoms ×2 years, probably secondary to degenerative disc disease.    (5)Clinical Stage 1 R lung cancer hx, treated with stereotactic Rad Rx.     Return to clinic in 1 month.                        [facility] hematology Oncology in office encounter progress note  6/10/20     Patient ID:   Yocasta Almonte  75 y.o. female  1945  MD Yuri, MD Agustin.      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  Blood counts are improved.  We are stopping ibrutinib at this time and will go with observation.      Denies fever, night sweats or weight loss.  She does not have palpable lymphadenopathy.  CBC is improved after B12 and iron administration.  She had been on ibrutinib 140 mg 2 p.o. Daily.    She was recently hospitalized with exacerbation of COPD, COVID-19 testing was negative.    On B 12 injections monthly, increased energy.  S/P  Injectafer weekly x's 2 weeks to replenish Fe stores.    Hx includes DM, type 2, HTN, GERD, COPD, cholesterol, thyroid dx,  and clinical lung cancer treated with empirical  Stereotactic Rad Rx. To the right upper lung area.    PET scan now shows enlarging left upper lobe nodule consistent with primary lung malignancy.  Will refer her to Dr. Velez of Radiation therapy for consideration of local treatment to the left upper lobe mass.    She has chronic low back pain. PET scan supports degenerative disc disease at the back area. She is to follow-up with Dr. Welch for evaluation of this complaint.    Surgery  LN bx R axilla, neck surgery for infection.  Smoke 1/2 ppd x's 50 years.  ETOH no.  Allergy iodine.  Mom cancer, Dad ? Health.    ROS:   GEN: normal without any fever, night sweats or weight loss  HEENT: normal with no HA's, sore throat, stiff neck, changes in vision  CV: normal with no CP, SOB, PND, VALENCIA or orthopnea  PULM: see HPI  GI: normal with no abdominal pain, nausea, vomiting, constipation, diarrhea, melanotic stools, BRBPR, or hematemesis  : normal with no hematuria, dysuria  BREAST: normal with no mass, discharge, pain  SKIN: normal with no rash, erythema, bruising, or swelling     Past Medical History:   Diagnosis Date    Cancer     lymphoma    COPD (chronic obstructive pulmonary disease)     Diabetes mellitus     GERD (gastroesophageal reflux disease)     Hyperlipidemia     Hypertension     YESICA (obstructive sleep apnea) 06/2020    by home sleep study    Thyroid disease      Past Surgical History:   Procedure Laterality Date    ESOPHAGOGASTRODUODENOSCOPY Left 5/13/2020    Procedure: EGD (ESOPHAGOGASTRODUODENOSCOPY);  Surgeon: Rogelio Harris MD;  Location: [facility];  Service: Endoscopy;  Laterality: Left;    LYMPH NODE DISSECTION       R axillary    NECK SURGERY      infection in neck removed       Review of patient's allergies indicates:   Allergen Reactions    Iodine and iodide containing products            Current Outpatient Medications:     albuterol (PROVENTIL) 5 mg/mL nebulizer solution, Take 0.5 mLs (2.5 mg total) by nebulization every 4 (four) hours as needed for Wheezing or Shortness of Breath. Rescue, Disp: 20 mL, Rfl: 0    albuterol (PROVENTIL/VENTOLIN HFA) 90 mcg/actuation inhaler, Inhale 1 puff into the lungs every 6 (six) hours as needed for Wheezing or Shortness of Breath. Rescue, Disp: 18 g, Rfl: 0    atorvastatin (LIPITOR) 80 MG tablet, Take 1 tablet (80 mg total) by mouth once daily. For cholesterol, Disp: 90 tablet, Rfl: 3    blood sugar diagnostic (TRUE METRIX  GLUCOSE TEST STRIP) Strp, Test blood sugar in vitro BID, Disp: 200 each, Rfl: 5    blood-glucose meter (TRUE METRIX GLUCOSE METER) Misc, Test blood sugar in vitro BID, Disp: 1 each, Rfl: 0    esomeprazole (NEXIUM) 40 MG capsule, Take 1 capsule (40 mg total) by mouth 2 (two) times daily before meals., Disp: 180 capsule, Rfl: 3    fluticasone propionate (FLONASE) 50 mcg/actuation nasal spray, 1 spray (50 mcg total) by Each Nostril route 2 (two) times daily., Disp: 16 g, Rfl: 3    gabapentin (NEURONTIN) 300 MG capsule, 1 cap AM, 1 caps NOON, 2 capPM, Disp: 180 capsule, Rfl: 3    lancets 32 gauge Misc, Test blood sugar in vitro BID, Disp: 200 each, Rfl: 5    loratadine (CLARITIN) 10 mg tablet, Take 1 tablet (10 mg total) by mouth once daily. For allergies, Disp: 90 tablet, Rfl: 3    methylPREDNISolone (MEDROL DOSEPACK) 4 mg tablet, TAKE 1 TABLET BY MOUTH AS DIRECTED, Disp: , Rfl:     metoprolol tartrate (LOPRESSOR) 25 MG tablet, Take 1 tablet (25 mg total) by mouth once daily., Disp: 90 tablet, Rfl: 3    mirtazapine (REMERON) 30 MG tablet, Take 1 tablet (30 mg total) by mouth every evening., Disp: 90 tablet, Rfl: 3    potassium chloride SA (K-DUR,KLOR-CON) 20 MEQ tablet, Take 1 tablet (20 mEq total) by mouth once daily., Disp: 90 tablet, Rfl: 3    triamcinolone (KENALOG) 0.5 % ointment, Apply 1 application topically 2 (two) times daily., Disp: 15 g, Rfl: 2    VIRTUSSIN AC  mg/5 mL syrup, TAKE 2 TEASPOONFUL BY MOUTH TWICE DAILY AS NEEDED FOR COUGH, Disp: , Rfl:     Current Facility-Administered Medications:     cyanocobalamin injection 1,000 mcg, 1,000 mcg, Subcutaneous, Q30 Days, CHRISTOPHER Kuo MD, 1,000 mcg at 10/05/20 1113          Objective:   Vitals:  Blood pressure (!) 147/79, pulse 75, temperature 97.2 °F (36.2 °C), weight 72.4 kg (159 lb 11.2 oz).    Physical Examination:   GEN: no apparent distress, comfortable  HEAD: atraumatic and normocephalic  EYES: no pallor, no icterus  ENT:  no  pharyngeal erythema, external ear skin excoriated on R ear.; no nasal discharge; no thrush  NECK: no masses, thyroid normal, trachea midline, no LAD/LN's, supple  CV: RRR with no murmur; normal pulse; normal S1 and S2; no pedal edema  CHEST: Normal respiratory effort; CTAB; normal breath sounds; no wheeze or crackles  ABDOM: nontender and nondistended; soft,  no rebound/guarding, L/S NP  MUSC/Skeletal: ROM normal; no crepitus; joints normal; no deformities   EXTREM: I do not palpate left supraclavicular Adenopathy at this time.  SKIN: no rashes, lesions, ulcers, petechiae  : no CVAT  NEURO: grossly intact; motor/sensory WNL;  no tremors  PSYCH: normal mood, affect and behavior  LYMPH: normal cervical, supraclavicular, axillary and groin LN's  BREASTS: no palpable mass L or R breast.    This is the in office exam from our last of visit    Labs:     Hemoglobin is 11.3. MCV is 84. B-12 is low at 285, and ferritin is low at 10.  Started on B 12 shots and injectafer previously.    Now Hgb 11.  Ferritin 15.  Better now Hgb 13.9., Ferritin up at 158.    PET scan showed increased opacity at the right upper lung area at 2.5 cm. This could be postradiation change. There is a 13 mm nodule at the left upper lobe area. Will check her CAT scan again of the chest in 2 months and discuss with Dr. Culp  if there is any progressive enlargement.    Follow-up PET scan now shows an enlarging left upper lobe mass with hypermetabolism concerning for new primary lung malignancy.    She had a colonoscopy 1 year ago per Dr. Streeter, polyps were removed.  Diverticulosis, hemorrhoids.  EGD gastritis.    Scan at this time shows decreased mass in left upper lobe suggesting favorable response to therapy.  There is  questionable increase in right upper lobe mass which may be due to technique.  Lymphadenopathy is stable.      Assessment:   (1) 75 y.o. female with diagnosis of  Small lymphocytic NHL/CLL, off  ibrutinib po daily.  CBC is stable.   Hold ibrutinib for now.    (2) enlarging left upper lobe mass, status post radiation therapy, decreased size on present study.    (3) anemia secondary to B-12 deficiency and iron deficiency. On B12 injections monthly. S/P Injectafer.  Hgb better 12.    (4) low back pain symptoms ×2 years, probably secondary to degenerative disc disease.    (4)Clinical Stage 1 R lung cancer hx, treated with stereotactic Rad Rx.     Repeat CT scan of chest and CBC in October 2020 and return to clinic in 4 months.

## 2020-11-03 NOTE — TELEPHONE ENCOUNTER
----- Message from Esmer Kc RN sent at 11/3/2020 12:05 PM CST -----  Goodmorning,    This patient has been seen by Dr. Kuo. Dr. Kuo ordered a bone scan.  Within the results of the bone scan, it shows some degenerative changes.  Dr. Kuo is wanting to know if this is something you can address?    Thanks,  LAYA Lyman

## 2020-11-04 PROBLEM — I46.9 CARDIOPULMONARY ARREST: Status: ACTIVE | Noted: 2020-01-01

## 2020-11-04 PROBLEM — I46.9 CARDIAC ARREST: Status: ACTIVE | Noted: 2020-01-01

## 2020-11-04 NOTE — ED PROVIDER NOTES
Encounter Date: 11/4/2020    SCRIBE #1 NOTE: ILucy am scribing for, and in the presence of, Valdemar Sampson MD.   SCRIBE #2 NOTE: Bibiana SANCHEZ am scribing for, and in the presence of,  Dr. Sampson. I have scribed the remaining portions of the note not scribed by Scribe #1.     History     Chief Complaint   Patient presents with    Cardiac Arrest       Time seen by provider: 5:27 PM on 11/04/2020    Yocasta Almonte is a 75 y.o. female with DM, HTN, COPD, and lymphoma who presents to the ED via EMS with an onset of cardiac arrest. Per EMS, the patient was SOB and suddenly stopped talking while on the phone with the . When EMS arrived on scene, she vomited and a breathing treatment was in progress as well as an inhaler next to her. Her blood sugar was 331. The patient received 4 rounds of epinephrine PTA in the ED with her last one being 3 minutes PTA. RUFUS device in place upon arrival. No cardiac PSHx.    The history is provided by the EMS personnel.     Review of patient's allergies indicates:   Allergen Reactions    Iodine and iodide containing products     Penicillins      Past Medical History:   Diagnosis Date    Cancer     lymphoma    COPD (chronic obstructive pulmonary disease)     Diabetes mellitus     GERD (gastroesophageal reflux disease)     Hyperlipidemia     Hypertension     YESICA (obstructive sleep apnea) 06/2020    by home sleep study    Thyroid disease      Past Surgical History:   Procedure Laterality Date    ESOPHAGOGASTRODUODENOSCOPY Left 5/13/2020    Procedure: EGD (ESOPHAGOGASTRODUODENOSCOPY);  Surgeon: Rogelio Harris MD;  Location: [facility];  Service: Endoscopy;  Laterality: Left;    LYMPH NODE DISSECTION       R axillary    NECK SURGERY      infection in neck removed     Family History   Problem Relation Age of Onset    Cancer Mother     No Known Problems Father      Social History     Tobacco Use    Smoking status: Former Smoker     Packs/day: 0.50      Years: 58.00     Pack years: 29.00     Types: Cigarettes     Quit date: 2020     Years since quittin.1    Smokeless tobacco: Never Used    Tobacco comment: on nicoderm patches -DN  10/12/20   Substance Use Topics    Alcohol use: No     Comment: hx of alochol abuse but does not drink at all now    Drug use: No     Comment: none     Review of Systems   Unable to perform ROS: Acuity of condition     Physical Exam     Initial Vitals   BP Pulse Resp Temp SpO2   20 1758 20 1732 -- -- 20 1758   (!) 180/108 (!) 202   100 %      MAP       --                Physical Exam    Nursing note and vitals reviewed.  Constitutional: She appears well-developed and well-nourished. She is not diaphoretic. Pulses: No pulses palpable. Airway: None present. Level of Consciousness: Unresponsive.   HENT:   Head: Normocephalic. No head trauma present.   Eyes: Pupils: Fixed and Dilated.   Cardiovascular: CPR in Progress. Heart Sounds: None. There is Colin device in place.  Pulmonary/Chest: Respirations: None. Ventilations: Bag-Valve-Mask.   Musculoskeletal: No extremity trauma present.   Neurological: Unresponsive to stimuli.   Skin: Skin is dry. No rash noted.       ED Course   Intubation    Date/Time: 2020 5:43 PM  Location procedure was performed: [facility] EMERGENCY DEPARTMENT  Performed by: Valdemar Sampson MD  Authorized by: Valdemar Sampson MD   Consent Done: Emergent Situation  Indications: airway protection and  respiratory distress  Intubation method: direct  Patient status: unconscious  Preoxygenation: BVM  Pretreatment medications: none  Paralytic: none  Laryngoscope size: Mac 4  Tube size: 7.5 mm  Tube type: cuffed  Number of attempts: 5 or more  Ventilation between attempts: BVM  Cricoid pressure: yes  Cords visualized: yes  Post-procedure assessment: CO2 detector  Breath sounds: equal and absent over the epigastrium  Cuff inflated: yes  Chest x-ray interpreted by me and radiologist.  Patient  tolerance: Patient tolerated the procedure well with no immediate complications  Complications: Yes  Specimens: No  Implants: No  Comments: Difficult intubation with large amount of vomitus occluding epiglottis and glottis.  Multiple attempts at suctioning vomitus however suction continue to be repeatedly clogged with food substances.  After multiple attempts ventral visualization of the epiglottis and vocal cords allowed endotracheal intubation.  Patient received bag mask ventilation in between attempts.        Labs Reviewed   CBC W/ AUTO DIFFERENTIAL - Abnormal; Notable for the following components:       Result Value    RBC 3.57 (*)     Hemoglobin 10.2 (*)     Hematocrit 36.2 (*)      (*)     MCHC 28.2 (*)     Gran % 29.0 (*)     Lymph % 58.0 (*)     All other components within normal limits    Narrative:     1 atypical lymph out of 58   COMPREHENSIVE METABOLIC PANEL - Abnormal; Notable for the following components:    Potassium 5.2 (*)     Chloride 111 (*)     CO2 9 (*)     Glucose 432 (*)     Calcium 8.1 (*)     Total Protein 5.3 (*)     Albumin 2.9 (*)      (*)     ALT 80 (*)     Anion Gap 24 (*)     eGFR if  46 (*)     eGFR if non  40 (*)     All other components within normal limits    Narrative:        critical CO2 result(s) called and verbal readback obtained from   Geena Bruner at 1905 11/4/2020 by [facility] 11/04/2020 19:05   TROPONIN I - Abnormal; Notable for the following components:    Troponin I 0.064 (*)     All other components within normal limits   B-TYPE NATRIURETIC PEPTIDE   SARS-COV-2 RNA AMPLIFICATION, QUAL   SARS-COV-2 (COVID-19) QUALITATIVE PCR   PROCALCITONIN   TROPONIN I   BETA - HYDROXYBUTYRATE, SERUM     EKG Readings: (Independently Interpreted)   Sinus tachy 124 left axis deviation. Normal t waves and st segments.     Imaging Results          X-ray Abdomen for NG Tube Placement (Nursing should notify Radiology after placement) (No Result on File)                 CTA Chest Non-Coronary (Final result)  Result time 11/04/20 20:51:04    Final result by Edward Pathak DO (11/04/20 20:51:04)                 Impression:      1. Examination is limited by streak and motion artifact.  There is no evidence of pulmonary embolism to the level of the proximal segmental pulmonary arteries.  The distal segmental to subsegmental pulmonary arteries are not well evaluated.  2. Interval development of bilateral ground-glass and dense consolidations as described above, likely combination of aspiration, atelectasis and/or edema.  3. Acute, mildly displaced fractures of the left 2nd through 4th ribs and the right 2nd rib.  4. Right upper lobe masslike opacity and additional bilateral pulmonary nodules are not well assessed secondary to significant motion artifact.  Recommend continued attention on follow-up imaging.  5. Large left thyroid mass extending into the superior mediastinum, unchanged.  6. Stable mildly prominent mediastinal and bilateral axillary lymphadenopathy.      Electronically signed by: Edward Pathak  Date:    11/04/2020  Time:    20:51             Narrative:    EXAMINATION:  CTA CHEST NON CORONARY    CLINICAL HISTORY:  PE suspected, high pretest prob.    TECHNIQUE:  Low dose axial images, sagittal and coronal reformations were obtained from the thoracic inlet to the lung bases following the IV administration of 75 mL of Omnipaque 350.  Contrast timing was optimized to evaluate the pulmonary arteries.  Maximum intensity projection images were provided for review.    COMPARISON:  CT of the chest from 06/03/2020.    FINDINGS:  Pulmonary vasculature: Contrast bolus timing is sufficient.  There is streak and motion artifact which limits evaluation of the bilateral upper lobes.  There is no evidence of pulmonary embolism to the level of the proximal segmental pulmonary arteries.  The distal segmental and subsegmental pulmonary arteries are not well visualized due to  above mentioned artifact.  There is prominence of the main pulmonary artery which can be seen with pulmonary artery hypertension.    Aorta: Left-sided aortic arch.  No aneurysm and no significant atherosclerosis    Base of Neck: There is a large, heterogeneous left thyroid mass measuring approximately 6.5 x 5.4 cm, mildly deviating the trachea to the right.  There is extension into the superior mediastinum, unchanged.    Thoracic soft tissues: Normal.    Heart: Normal size. No effusion.    Saray/Mediastinum: There are mildly prominent bilateral axillary and mediastinal lymph nodes, not significantly changed.    Airways: There is an endotracheal tube which terminates between the thoracic inlet and the calos.  The large airways are patent.  There are multiple foci of endobronchial filling within the bilateral lower lobes compatible with aspiration.  There is bronchial wall thickening.    Lungs/Pleura: There are bilateral ground-glass opacities with a dense consolidation within the left upper lobe.  There is a masslike opacity within the right upper lobe with obscured margins.  This is difficult to evaluate secondary to motion artifact.  Previously seen left upper lobe nodule is obscured by dense consolidation.  There is a bulky calcified lesion within the right upper lobe, not significantly changed.  There are ground-glass opacities within the bilateral lower lobes, likely aspiration.  There is mild interlobular septal thickening.    Esophagus: Normal.    Upper Abdomen: There is a moderate-sized hiatal hernia.  Orogastric tube terminates in the stomach.    Bones: There are acute, mildly displaced fractures of the left 2nd through 4th ribs anterolateral aspects, and the right 2nd rib anterolateral aspect.                               CT Head Without Contrast (Final result)  Result time 11/04/20 20:31:50    Final result by Edward Pathak DO (11/04/20 20:31:50)                 Impression:      1. No acute  intracranial abnormality.  2. Bilateral paranasal sinus disease as above.      Electronically signed by: Edward Pathak  Date:    11/04/2020  Time:    20:31             Narrative:    EXAMINATION:  CT HEAD WITHOUT CONTRAST    CLINICAL HISTORY:  Altered mental status.    TECHNIQUE:  Low dose axial CT images obtained throughout the head without intravenous contrast. Sagittal and coronal reconstructions were performed.    COMPARISON:  CT of the head from 04/22/2020.    FINDINGS:  Ventricles and sulci are normal in size for age without evidence of hydrocephalus. No extra-axial blood or fluid collections.  The brain parenchyma is normal. No parenchymal mass, hemorrhage, edema or major vascular distribution infarct.    No calvarial fracture.  There is hyperostosis frontalis interna.  The scalp is unremarkable.  There is fluid within the bilateral frontal, sphenoid, ethmoid, and maxillary sinuses.  The mastoid air cells are clear.  Partially visualized orogastric tube and endotracheal tube.                               X-Ray Chest AP Portable (Final result)  Result time 11/04/20 18:34:58    Final result by Vikki Monique MD (11/04/20 18:34:58)                 Impression:      There is patchy opacification of the pulmonary parenchyma increased from prior exam wearing for pneumonia or aspiration.      Electronically signed by: Vikki Monique MD  Date:    11/04/2020  Time:    18:34             Narrative:    EXAMINATION:  XR CHEST AP PORTABLE    CLINICAL HISTORY:  Cardiac arrest, cause unspecified    TECHNIQUE:  Single frontal view of the chest was performed.    COMPARISON:  None    FINDINGS:  There is an endotracheal tube with the tip at the clavicular heads, nasogastric tube with the tip inferior to the diaphragm.  There is patchy opacification of the pulmonary parenchyma increased from prior exam.                                 Medical Decision Making:   History:   Old Medical Records: I decided to obtain old medical  records.  Initial Assessment:   75-year-old woman with history of lymphoma presents emergency department in cardiopulmonary arrest with CPR in progress.  EMS reports that patient call feeling short of breath and collapse while talking on the phone.  ACLS was continued with total of 10 administration of epinephrine in total ACLS time of around 1 hr with ROSC, difficult intubation with large amount of vomitus repeatedly clogging suction.  CT head negative.  PE study negative for pulmonary embolism.  EKG without evidence of STEMI.  Patient started on IV fluids and initiated on mechanical ventilation.  She does have electrolyte abnormalities inform of hyperkalemia without EKG changes.  She has metabolic acidosis with CO2 of 9, hyperglycemia and MARCE.  I doubt this is DKA and likely is a result of cardiac arrest and lactic acidosis/hypoperfusion.  Discussed Hospital Medicine who agrees to assume care admit to the ICU for further management.  Discussed with family members.  Independently Interpreted Test(s):   I have ordered and independently interpreted EKG Reading(s) - see prior notes  Clinical Tests:   Lab Tests: Reviewed and Ordered  Radiological Study: Ordered and Reviewed  Medical Tests: Reviewed and Ordered  ED Management:  Critical Care Time MDM    The high probability of sudden, clinically significant deterioration in the patient's condition required the highest level of my preparedness to intervene urgently.    The services I provided to this patient were to treat and/or prevent clinically significant deterioration that could result in permanent disability, chronic pain and/or death.     Services included the following: chart data review, reviewing nursing notes and/or old charts, documentation time, consultant collaboration regarding findings and treatment options, medication orders and management, direct patient care, vital sign assessments and ordering, interpreting and reviewing diagnostic studies/lab  tests.    Aggregate critical care time was 50 minutes, which includes only time during which I was engaged in work directly related to the patient's care, as described above, whether at the bedside or elsewhere in the Emergency Department.     Valdemar Sampson M.D.                   Scribe Attestation:   Scribe #1: I performed the above scribed service and the documentation accurately describes the services I performed. I attest to the accuracy of the note.    I, Adrián Abreu, personally performed the services described in this documentation. All medical record entries made by the scribe were at my direction and in my presence.  I have reviewed the chart and agree that the record reflects my personal performance and is accurate and complete. Valdemar Sampson MD.  10:00 PM 11/04/2020       DISCLAIMER: This note was prepared with [employer] voice recognition transcription software. Garbled syntax, mangled pronouns, and other bizarre constructions may be attributed to that software system.                Clinical Impression:     ICD-10-CM ICD-9-CM   1. Cardiopulmonary arrest  I46.9 427.5   2. Cardiac arrest  I46.9 427.5   3. Chest pain  R07.9 786.50                          ED Disposition Condition    Admit                             Valdemar Sampson MD  11/04/20 3167

## 2020-11-05 PROBLEM — A41.9 SEPTIC SHOCK: Status: RESOLVED | Noted: 2020-01-01 | Resolved: 2020-01-01

## 2020-11-05 PROBLEM — R65.21 SEPTIC SHOCK: Status: RESOLVED | Noted: 2020-01-01 | Resolved: 2020-01-01

## 2020-11-05 PROBLEM — G93.1 ANOXIC ENCEPHALOPATHY: Status: ACTIVE | Noted: 2020-01-01

## 2020-11-05 PROBLEM — J69.0 ASPIRATION PNEUMONIA: Status: ACTIVE | Noted: 2020-01-01

## 2020-11-05 PROBLEM — J44.9 COPD (CHRONIC OBSTRUCTIVE PULMONARY DISEASE): Status: ACTIVE | Noted: 2020-01-01

## 2020-11-05 PROBLEM — E11.9 TYPE 2 DIABETES MELLITUS WITHOUT COMPLICATION: Status: RESOLVED | Noted: 2018-07-25 | Resolved: 2020-01-01

## 2020-11-05 PROBLEM — A41.9 SEPTIC SHOCK: Status: ACTIVE | Noted: 2020-01-01

## 2020-11-05 PROBLEM — J84.10 CALCIFIED GRANULOMA OF LUNG: Status: ACTIVE | Noted: 2020-01-01

## 2020-11-05 PROBLEM — I70.0 CALCIFICATION OF AORTA: Status: ACTIVE | Noted: 2020-01-01

## 2020-11-05 PROBLEM — R65.21 SEPTIC SHOCK: Status: ACTIVE | Noted: 2020-01-01

## 2020-11-05 NOTE — CONSULTS
"  11/05/2020      Admit Date: 11/4/2020  Yocasta Almonte  New Patient Consult    Chief Complaint   Patient presents with    Cardiac Arrest       History of Present Illness:  p twiht h/o cll, ion  Ibrtinib, also had lung mass enlarging caitlin post xrt earlier this yr.  Pt with copd and h/o exacerbation in 4/2020 admitted to [facility].     Pt was followed up by Dr Kuo 11/4.    Daughter relates pt had requested more neb meds be picked up but had no unusual problems noted yesterday.  No prior bloody stools nor abd pain.  Had colonoscopy in past with ? Polyps.    Pt called ems last pm with sob and then unresponsive on phone.   Per hpi KRISTA Shukla:"Upon arrival, EMS found her with a breathing treatment in process, s/p emesis, and with an inhaler nearby. Glucose was 331. She was given 3 rounds of epinephrine after finding rhythm was asystole with pupils fixed and dilated. She had ROSC w/ the epinephrine briefly each time. Upon arrival to ED EKG showed sinus tachycardia. She was a difficult intubation as she had many partially digested beans in her airway. ED labs concerning for DKA. Given her h/o cancer, and SOB preceding her arrest, a CTA Chest was ordered to evaluate for PE and was negative for PE, but did indicate aspiration pneumonia. A CTHead was negative for acute intracranial abnormalities. A CXR indicates patchy opacification of the pulmonary parenchyma, concerning for pneumonia or aspiration. "    Pt has had blood tinge rectal drainage up to 900 cc reported.  Pt was eval by eicu and hypothermia started.     Pt had ct chest/pet scans going back to 2018 with calcified rul granuloma with soft tissue density medially with min pet activity. New pet active caitlin lesion seen pet in 1/2020 ppt xrt. F/u ct June with improved caitlin lesion post xrt with enlarging rul lesion.  Ct last pm with motion artifact, impressive consolidation caitlin and dense rul.    Pt intubated, off sedation briefly she seems to respond in non specific fashion to " "voice, but no convincing specific response       Sleep study with ahi 5 in Amanda 15, 2020.    PFSH:  Past Medical History:   Diagnosis Date    Cancer     lymphoma    COPD (chronic obstructive pulmonary disease)     Diabetes mellitus     GERD (gastroesophageal reflux disease)     Hyperlipidemia     Hypertension     YESICA (obstructive sleep apnea) 2020    by home sleep study    Thyroid disease      Past Surgical History:   Procedure Laterality Date    ESOPHAGOGASTRODUODENOSCOPY Left 2020    Procedure: EGD (ESOPHAGOGASTRODUODENOSCOPY);  Surgeon: Rogelio Harris MD;  Location: [facility];  Service: Endoscopy;  Laterality: Left;    LYMPH NODE DISSECTION       R axillary    NECK SURGERY      infection in neck removed     Social History     Tobacco Use    Smoking status: Former Smoker     Packs/day: 0.50     Years: 58.00     Pack years: 29.00     Types: Cigarettes     Quit date: 2020     Years since quittin.1    Smokeless tobacco: Never Used    Tobacco comment: on nicoderm patches -DN  10/12/20   Substance Use Topics    Alcohol use: No     Comment: hx of alochol abuse but does not drink at all now    Drug use: No     Comment: none     Family History   Problem Relation Age of Onset    Cancer Mother     No Known Problems Father      Review of patient's allergies indicates:   Allergen Reactions    Iodine and iodide containing products     Penicillins       Review of Systems:  due to neurologic status/impairments a Review of Systems could not be obtained       Exam:Comprehensive exam done. BP (!) 94/57 (BP Location: Left arm, Patient Position: Lying)   Pulse (!) 114   Temp 98.8 °F (37.1 °C) (Core Esophageal)   Resp (!) 30   Ht 5' 6" (1.676 m)   Wt 77.9 kg (171 lb 11.8 oz)   LMP  (LMP Unknown)   SpO2 (!) 92%   BMI 27.72 kg/m²   Exam included Vitals as listed, and patient's appearance and affect and alertness and mood, oral exam for yeast and hygiene and pharynx lesions and Mallapatti (M) " score, neck with inspection for jvd and masses and thyroid abnormalities and lymph nodes (supraclavicular and infraclavicular nodes also examined and noted if abn), chest exam included symmetry and effort and fremitus and percussion and auscultation, cardiac exam included rhythm and gallops and murmur and rubs and jvd and edema, abdominal exam for mass and hepatosplenomegaly and tenderness and hernias and bowel sounds, Musculoskeletal exam with muscle tone and posture and mobility/gait and  strenght, and skin for rashes and cyanosis and pallor and turgor, extremity for clubbing.  Findings were normal except as listed below:  Intubated, facial expressions noted to voice but not answering question with nods/following commands.  Rhonchi, mod distress, nl fremitus, cool skin, blood tinged rectal drainage.      Radiographs reviewed: view by direct vision  Left > right upper infiltrates, pet scans from 2018 to 2019 to 1/2020 and ct June 2020 and 11/4/2020 viewed.    Bone scan and left shoulder films from October viewed - left upper lung was reasonably clear then?    No results found for this or any previous visit.]    Labs     Recent Labs   Lab 11/04/20 1829 11/05/20  0433   WBC 12.07 7.11   HGB 10.2* 12.2   HCT 36.2* 39.4    191   BAND 1.0 19.0   MYELOPCT 1.0  --      Recent Labs   Lab 11/04/20 1829 11/05/20  0433      < > 144   K 5.2*   < > 3.6   *   < > 116*   CO2 9*   < > 16*   BUN 12   < > 18   CREATININE 1.3   < > 1.5*   *   < > 103   CALCIUM 8.1*   < > 7.4*   PHOS  --   --  4.3   *  --   --    ALT 80*  --   --    ALKPHOS 114  --   --    BILITOT 0.1  --   --    PROT 5.3*  --   --    ALBUMIN 2.9*  --   --    PROCAL  --    < > 27.60*   LACTATE  --    < > 3.5*   TROPONINI 0.064*   < > 0.982*   BNP 86  --   --     < > = values in this interval not displayed.     Recent Labs   Lab 11/05/20  0608   PH 7.223*   PCO2 37.3   PO2 85   HCO3 15.4*     Microbiology Results (last 7 days)      Procedure Component Value Units Date/Time    Culture, Respiratory with Gram Stain [[id number]]     Order Status: No result Specimen: Respiratory from Sputum     Blood culture [[id number]]     Order Status: Sent Specimen: Blood     Blood culture [[id number]]     Order Status: Sent Specimen: Blood           Impression:  Active Hospital Problems    Diagnosis  POA    *Cardiopulmonary arrest [I46.9]  Yes    COPD (chronic obstructive pulmonary disease) [J44.9]  Yes    Acute hypoxemic respiratory failure [J96.01]  Yes    Type 2 diabetes mellitus with other specified complication [E11.69]  Yes    Anemia of decreased vitamin B12 absorption [D51.8]  Yes    Essential hypertension [I10]  Yes    HLD (hyperlipidemia) [E78.5]  Yes    Malignant neoplasm of upper lobe of left lung [C34.12]  Yes    Chronic lymphocytic leukemia [C91.10]  Yes    Nicotine dependence, unspecified, uncomplicated [F17.200]  Yes    Coronary artery disease [I25.10]  Yes      Resolved Hospital Problems   No resolved problems to display.               Plan: ct no edema seen.  Neuro exam abn but severely ill.  Had resp symptoms but hard to be certain primary event except not cardiac arrest initially.      Having some gi bleeding but not isacc blood.  Would f/u hgh and stop hypothermia if hgb falls or bleeding felt to be major issue-hypothermia will worsen and pt already in shock.    Adjust vent - still very acidotic.   Abx, copd, f/u cardiac markers.    Would check tb gold and afb given chronic upper lung changes- no eval seen on current computer system.     May have ischemic gut?    Discussed with daughter.    The following were evaluated and adjusted as needed: mechanical ventilator settings and weaning status, vasopressors, intravenous fluids and nutritional status, sedation and neurologic status, antibiotics, hemodynamics, support tubes and access lines and invasive monitoring, acid base balance and oxygenation needs, input and output and renal  status and CODE STATUS/OUTLOOK DISCUSSED WITH AVAILABLE NEXT OF  KIN       Critical Care  - THE PATIENT HAS A HIGH PROBABILITY OF IMMINENT OR LIFE THREATENING DETERIORATION.  Over 50%time of encounter was in direct care at bedside.  Time was 30 to 74 minutes required for patient care.  Time needed for all of the above totaled 46 minutes.

## 2020-11-05 NOTE — CONSULTS
"  [facility]  Adult Nutrition  Consult Note    SUMMARY    Intervention: collaboration with care providers     Recommendation:  1) Advance PO diet to goal of DM 2000 kcal, cardiac ( 4 carb servings/meal) once extubated   2) weigh pt weekly , DM education when appropriate  3) If unable to advance PO diet in < 4 days intiate TF: Diabetisource AC @ 20 ml/hr advancing to goal of 55 ml/hr + 120 ml flush q 4 hr   ( provides 1584 kcal ( 86% EEN), 79 g protein ( 85% EPN), and 1082 ml free water)    Goals: 1) PO diet advanced in < 4 days of nutrition support initiated  Nutrition Goal Status: new  Communication of RD Recs: (POC, sticky note)    Reason for Assessment    Reason For Assessment: consult  Diagnosis: (cardipulmonary arrest)  Relevant Medical History: COPD, DM2, GERD, HLD, HTN, CLL, DVT, lung CA  Interdisciplinary Rounds: attended    General Information Comments: 76 y/o female admitted with cardiac arrest. NPO x 1 day + vent, on high dose pressor and low dose propfol. NFPE done, mild facial wasting seen. Wt gain per chart review. Glucose inthe 400's but not appropriate for diabetic education at this time.    Nutrition Discharge Planning: to be determined- DM 1800 kcal, cardiac diet    Nutrition Risk Screen    Nutrition Risk Screen: other (see comments)(intubated, sedated)    Nutrition/Diet History    Patient Reported Diet/Restrictions/Preferences: diabetic diet  Spiritual, Cultural Beliefs, Sikh Practices, Values that Affect Care: no  Food Allergies: NKFA    Anthropometrics    Temp: 96.4 °F (35.8 °C)  Height Method: Measured(office 20)  Height: 5' 6"  Height (inches): 66 in  Weight Method: Bed Scale  Weight: 77.6 kg (171 lb 1.2 oz)  Weight (lb): 171.08 lb  Ideal Body Weight (IBW), Female: 130 lb  % Ideal Body Weight, Female (lb): 131.6 %  BMI (Calculated): 27.6  BMI Grade: 25 - 29.9 - overweight  Usual Body Weight (UBW), k kg(6/10/20)  % Usual Body Weight: 117.82  % Weight Change " From Usual Weight: 17.57 %       Lab/Procedures/Meds    Pertinent Labs Reviewed: reviewed  BMP  Lab Results   Component Value Date     11/05/2020    K 3.8 11/05/2020     (H) 11/05/2020    CO2 13 (L) 11/05/2020    BUN 20 11/05/2020    CREATININE 1.7 (H) 11/05/2020    CALCIUM 7.3 (L) 11/05/2020    ANIONGAP 15 11/05/2020    ESTGFRAFRICA 33 (A) 11/05/2020    EGFRNONAA 29 (A) 11/05/2020     Recent Labs   Lab 11/05/20  1039   POCTGLUCOSE 154*     Lab Results   Component Value Date    HGBA1C 6.3 (H) 11/05/2020       Pertinent Medications Reviewed: reviewed  Pertinent Medications Comments: norepinephrine 0.25 mcg/kg/min, propofol 8.7 ml/hr, statin, zofran, NS @ 125 ml/hr, insulin, mg sulfate    Estimated/Assessed Needs    Weight Used For Calorie Calculations: 77.6 kg (171 lb 1.2 oz)  Energy Calorie Requirements (kcal): [place] state modified: 1835  kcal  Energy Need Method: [facility] (modified)  Protein Requirements: 1.2 g protein/kg ( 93 g protein)  Weight Used For Protein Calculations: 77.6 kg (171 lb 1.2 oz)  Fluid Requirements (mL): 1850 ml  Estimated Fluid Requirement Method: RDA Method  CHO Requirement: 206-229 g      Nutrition Prescription Ordered    Current Diet Order: NPO x 1 day    Evaluation of Received Nutrient/Fluid Intake    Energy Calories Required: not meeting needs  Protein Required: not meeting needs  Fluid Required: exceeds needs  Total Fluid Intake (mL/kg): IVF @ 125 ml/hr  Tolerance: other (see comments)(NPO)  % Intake of Estimated Energy Needs: 12% propofol  % Meal Intake: 0%    Nutrition Risk    Level of Risk/Frequency of Follow-up: moderate 2 x weekly     Assessment and Plan    Inadequate energy intake  R/t NPO   As evidenced by PO intakes < 50% EEN x 1 day  Intervention: above  new     Monitor and Evaluation    Food and Nutrient Intake: energy intake  Food and Nutrient Adminstration: diet order, enteral and parenteral nutrition administration  Anthropometric Measurements:  weight  Biochemical Data, Medical Tests and Procedures: electrolyte and renal panel, gastrointestinal profile, glucose/endocrine profile  Nutrition-Focused Physical Findings: overall appearance     Malnutrition Assessment     Skin (Micronutrient): (Stephen = 12 + NGT)   Micronutrient Evaluation: no deficiencies       Orbital Region (Subcutaneous Fat Loss): mild depletion   Magnet Region (Muscle Loss): mild depletion   Edema (Fluid Accumulation): 0-->no edema present   Subcutaneous Fat Loss (Final Summary): mild protein-calorie malnutrition  Muscle Loss Evaluation (Final Summary): mild protein-calorie malnutrition         Nutrition Follow-Up    RD Follow-up?: Yes   negative -  no rash

## 2020-11-05 NOTE — EICU
eICU Physician Virtual/Remote Brief Evaluation Note    Message from LAYA Joseph  Wants to review ABG  Chart reviewed, patient observed, discussed with RT  PH 7.22, pCO2 37, PO2 85, O2 sat 94, bicarb 15.4  Bicarb 16 on BMP  No need for ventilator change or bicarb infusion at present  Repeat lactate and troponin pending      This report has been created through the use of [employer] dictation software. Typographical and content errors may occur with this process. While efforts are made to detect and correct such errors, in some cases errors will persist. For this reason, wording in this document should be considered in the proper context and not strictly verbatim

## 2020-11-05 NOTE — ASSESSMENT & PLAN NOTE
- per history patient has B12 deficiency and has injections monthly   - H&H lower than apparent baseline:   Results for CHEPE SQUIRES (MRN [id number]) as of 11/5/2020 03:54   Ref. Range 5/10/2020 21:10 5/11/2020 05:19 10/1/2020 08:35 11/4/2020 18:29   Hemoglobin Latest Ref Range: 12.0 - 16.0 g/dL 13.0 12.0 13.2 10.2 (L)   Hematocrit Latest Ref Range: 37.0 - 48.5 % 41.1 37.0 40.0 36.2 (L)   - iron studies ordered   - no apparent blood losses at present   - monitor AM labs   - PPI ordered

## 2020-11-05 NOTE — PROGRESS NOTES
Temperature increasing to 37.3 C, spoke to Dr. Marrero, changed goal temp to 35 C on [place] sun to get closer to goal temp of 36 C.

## 2020-11-05 NOTE — SUBJECTIVE & OBJECTIVE
Past Medical History:   Diagnosis Date    Cancer     lymphoma    COPD (chronic obstructive pulmonary disease)     Diabetes mellitus     GERD (gastroesophageal reflux disease)     Hyperlipidemia     Hypertension     YESICA (obstructive sleep apnea) 06/2020    by home sleep study    Thyroid disease        Past Surgical History:   Procedure Laterality Date    ESOPHAGOGASTRODUODENOSCOPY Left 5/13/2020    Procedure: EGD (ESOPHAGOGASTRODUODENOSCOPY);  Surgeon: Rogelio Harris MD;  Location: [facility];  Service: Endoscopy;  Laterality: Left;    LYMPH NODE DISSECTION       R axillary    NECK SURGERY      infection in neck removed       Review of patient's allergies indicates:   Allergen Reactions    Iodine and iodide containing products     Penicillins        Current Facility-Administered Medications on File Prior to Encounter   Medication    cyanocobalamin injection 1,000 mcg     Current Outpatient Medications on File Prior to Encounter   Medication Sig    albuterol (PROVENTIL) 5 mg/mL nebulizer solution Take 0.5 mLs (2.5 mg total) by nebulization every 4 (four) hours as needed for Wheezing or Shortness of Breath. Rescue    albuterol (PROVENTIL/VENTOLIN HFA) 90 mcg/actuation inhaler Inhale 1 puff into the lungs every 6 (six) hours as needed for Wheezing or Shortness of Breath. Rescue    atorvastatin (LIPITOR) 80 MG tablet Take 1 tablet (80 mg total) by mouth once daily. For cholesterol    blood sugar diagnostic (TRUE METRIX GLUCOSE TEST STRIP) Strp Test blood sugar in vitro BID    blood-glucose meter (TRUE METRIX GLUCOSE METER) Misc Test blood sugar in vitro BID    esomeprazole (NEXIUM) 40 MG capsule Take 1 capsule (40 mg total) by mouth 2 (two) times daily before meals.    fluticasone propionate (FLONASE) 50 mcg/actuation nasal spray 1 spray (50 mcg total) by Each Nostril route 2 (two) times daily.    gabapentin (NEURONTIN) 300 MG capsule 1 cap AM, 1 caps NOON, 2 capPM    lancets 32 gauge Misc Test  blood sugar in vitro BID    loratadine (CLARITIN) 10 mg tablet Take 1 tablet (10 mg total) by mouth once daily. For allergies    methylPREDNISolone (MEDROL DOSEPACK) 4 mg tablet TAKE 1 TABLET BY MOUTH AS DIRECTED    metoprolol tartrate (LOPRESSOR) 25 MG tablet Take 1 tablet (25 mg total) by mouth once daily.    mirtazapine (REMERON) 30 MG tablet Take 1 tablet (30 mg total) by mouth every evening.    potassium chloride SA (K-DUR,KLOR-CON) 20 MEQ tablet Take 1 tablet (20 mEq total) by mouth once daily.    triamcinolone (KENALOG) 0.5 % ointment Apply 1 application topically 2 (two) times daily.    VIRTUSSIN AC  mg/5 mL syrup TAKE 2 TEASPOONFUL BY MOUTH TWICE DAILY AS NEEDED FOR COUGH     Family History     Problem Relation (Age of Onset)    Cancer Mother    No Known Problems Father        Tobacco Use    Smoking status: Former Smoker     Packs/day: 0.50     Years: 58.00     Pack years: 29.00     Types: Cigarettes     Quit date: 2020     Years since quittin.1    Smokeless tobacco: Never Used    Tobacco comment: on nicoderm patches -DN  10/12/20   Substance and Sexual Activity    Alcohol use: No     Comment: hx of alochol abuse but does not drink at all now    Drug use: No     Comment: none    Sexual activity: Not Currently     Birth control/protection: None     Review of Systems   Unable to perform ROS: Intubated   Constitutional:        Can not obtain ROS or FHx 2/2 intubated and sedated.      Objective:     Vital Signs (Most Recent):  Pulse: (!) 120 (20)  BP: 105/65 (20)  SpO2: 100 % (20) Vital Signs (24h Range):  Pulse:  [102-202] 120  SpO2:  [100 %] 100 %  BP: (105-180)/() 105/65     Weight: 72.4 kg (159 lb 9.8 oz)  Body mass index is 25.76 kg/m².    Physical Exam  Vitals signs and nursing note reviewed.   Constitutional:       General: She is not in acute distress.     Appearance: She is well-developed. She is obese. She is not diaphoretic.   HENT:       Head: Normocephalic and atraumatic.   Eyes:      General: No scleral icterus.     Conjunctiva/sclera: Conjunctivae normal.      Pupils: Pupils are equal, round, and reactive to light.   Neck:      Musculoskeletal: Normal range of motion and neck supple.      Trachea: No tracheal deviation.   Cardiovascular:      Rate and Rhythm: Normal rate and regular rhythm.      Heart sounds: Normal heart sounds. No murmur. No gallop.    Pulmonary:      Effort: No respiratory distress.      Breath sounds: No stridor. Wheezing and rhonchi present. No rales.      Comments: Intubated. Mechanical breath sounds present. Not breathing over vent. Diffuse wheeze and rhonchi.   Abdominal:      General: Bowel sounds are normal. There is no distension.      Palpations: Abdomen is soft. There is no mass.      Tenderness: There is no abdominal tenderness. There is no guarding.   Skin:     General: Skin is warm and dry.      Coloration: Skin is not pale.      Findings: No erythema or rash.   Neurological:      Mental Status: She is alert.      Motor: No abnormal muscle tone.      Comments: Unable to assess as on propofol. No withdrawal from pain, no corneal reflex present. Pupils fixed and dilated.    Psychiatric:         Behavior: Behavior normal.         Thought Content: Thought content normal.         Judgment: Judgment normal.           CRANIAL NERVES     CN III, IV, VI   Pupils are equal, round, and reactive to light.       Significant Labs:   BMP:   Recent Labs   Lab 11/04/20  1829   *      K 5.2*   *   CO2 9*   BUN 12   CREATININE 1.3   CALCIUM 8.1*     CBC:   Recent Labs   Lab 11/04/20  1829   WBC 12.07   HGB 10.2*   HCT 36.2*        CMP:   Recent Labs   Lab 11/04/20  1829      K 5.2*   *   CO2 9*   *   BUN 12   CREATININE 1.3   CALCIUM 8.1*   PROT 5.3*   ALBUMIN 2.9*   BILITOT 0.1   ALKPHOS 114   *   ALT 80*   ANIONGAP 24*   EGFRNONAA 40*     Urine Culture: No results for  input(s): LABURIN in the last 48 hours.  Urine Studies: No results for input(s): COLORU, APPEARANCEUA, PHUR, SPECGRAV, PROTEINUA, GLUCUA, KETONESU, BILIRUBINUA, OCCULTUA, NITRITE, UROBILINOGEN, LEUKOCYTESUR, RBCUA, WBCUA, BACTERIA, SQUAMEPITHEL, HYALINECASTS in the last 48 hours.    Invalid input(s): WRIGHTSUR  All pertinent labs within the past 24 hours have been reviewed.    Significant Imaging: I have reviewed all pertinent imaging results/findings within the past 24 hours.   Imaging Results          X-ray Abdomen for NG Tube Placement (Nursing should notify Radiology after placement) (No Result on File)                CTA Chest Non-Coronary (Final result)  Result time 11/04/20 20:51:04    Final result by Edward Pathak DO (11/04/20 20:51:04)                 Impression:      1. Examination is limited by streak and motion artifact.  There is no evidence of pulmonary embolism to the level of the proximal segmental pulmonary arteries.  The distal segmental to subsegmental pulmonary arteries are not well evaluated.  2. Interval development of bilateral ground-glass and dense consolidations as described above, likely combination of aspiration, atelectasis and/or edema.  3. Acute, mildly displaced fractures of the left 2nd through 4th ribs and the right 2nd rib.  4. Right upper lobe masslike opacity and additional bilateral pulmonary nodules are not well assessed secondary to significant motion artifact.  Recommend continued attention on follow-up imaging.  5. Large left thyroid mass extending into the superior mediastinum, unchanged.  6. Stable mildly prominent mediastinal and bilateral axillary lymphadenopathy.      Electronically signed by: Edward Pathak  Date:    11/04/2020  Time:    20:51             Narrative:    EXAMINATION:  CTA CHEST NON CORONARY    CLINICAL HISTORY:  PE suspected, high pretest prob.    TECHNIQUE:  Low dose axial images, sagittal and coronal reformations were obtained from the thoracic  inlet to the lung bases following the IV administration of 75 mL of Omnipaque 350.  Contrast timing was optimized to evaluate the pulmonary arteries.  Maximum intensity projection images were provided for review.    COMPARISON:  CT of the chest from 06/03/2020.    FINDINGS:  Pulmonary vasculature: Contrast bolus timing is sufficient.  There is streak and motion artifact which limits evaluation of the bilateral upper lobes.  There is no evidence of pulmonary embolism to the level of the proximal segmental pulmonary arteries.  The distal segmental and subsegmental pulmonary arteries are not well visualized due to above mentioned artifact.  There is prominence of the main pulmonary artery which can be seen with pulmonary artery hypertension.    Aorta: Left-sided aortic arch.  No aneurysm and no significant atherosclerosis    Base of Neck: There is a large, heterogeneous left thyroid mass measuring approximately 6.5 x 5.4 cm, mildly deviating the trachea to the right.  There is extension into the superior mediastinum, unchanged.    Thoracic soft tissues: Normal.    Heart: Normal size. No effusion.    Saray/Mediastinum: There are mildly prominent bilateral axillary and mediastinal lymph nodes, not significantly changed.    Airways: There is an endotracheal tube which terminates between the thoracic inlet and the calos.  The large airways are patent.  There are multiple foci of endobronchial filling within the bilateral lower lobes compatible with aspiration.  There is bronchial wall thickening.    Lungs/Pleura: There are bilateral ground-glass opacities with a dense consolidation within the left upper lobe.  There is a masslike opacity within the right upper lobe with obscured margins.  This is difficult to evaluate secondary to motion artifact.  Previously seen left upper lobe nodule is obscured by dense consolidation.  There is a bulky calcified lesion within the right upper lobe, not significantly changed.  There are  ground-glass opacities within the bilateral lower lobes, likely aspiration.  There is mild interlobular septal thickening.    Esophagus: Normal.    Upper Abdomen: There is a moderate-sized hiatal hernia.  Orogastric tube terminates in the stomach.    Bones: There are acute, mildly displaced fractures of the left 2nd through 4th ribs anterolateral aspects, and the right 2nd rib anterolateral aspect.                               CT Head Without Contrast (Final result)  Result time 11/04/20 20:31:50    Final result by Edward Pathak DO (11/04/20 20:31:50)                 Impression:      1. No acute intracranial abnormality.  2. Bilateral paranasal sinus disease as above.      Electronically signed by: Edward Pathak  Date:    11/04/2020  Time:    20:31             Narrative:    EXAMINATION:  CT HEAD WITHOUT CONTRAST    CLINICAL HISTORY:  Altered mental status.    TECHNIQUE:  Low dose axial CT images obtained throughout the head without intravenous contrast. Sagittal and coronal reconstructions were performed.    COMPARISON:  CT of the head from 04/22/2020.    FINDINGS:  Ventricles and sulci are normal in size for age without evidence of hydrocephalus. No extra-axial blood or fluid collections.  The brain parenchyma is normal. No parenchymal mass, hemorrhage, edema or major vascular distribution infarct.    No calvarial fracture.  There is hyperostosis frontalis interna.  The scalp is unremarkable.  There is fluid within the bilateral frontal, sphenoid, ethmoid, and maxillary sinuses.  The mastoid air cells are clear.  Partially visualized orogastric tube and endotracheal tube.                               X-Ray Chest AP Portable (Final result)  Result time 11/04/20 18:34:58    Final result by Vikki Monique MD (11/04/20 18:34:58)                 Impression:      There is patchy opacification of the pulmonary parenchyma increased from prior exam wearing for pneumonia or aspiration.      Electronically signed  by: Vikki Monique MD  Date:    11/04/2020  Time:    18:34             Narrative:    EXAMINATION:  XR CHEST AP PORTABLE    CLINICAL HISTORY:  Cardiac arrest, cause unspecified    TECHNIQUE:  Single frontal view of the chest was performed.    COMPARISON:  None    FINDINGS:  There is an endotracheal tube with the tip at the clavicular heads, nasogastric tube with the tip inferior to the diaphragm.  There is patchy opacification of the pulmonary parenchyma increased from prior exam.

## 2020-11-05 NOTE — CONSULTS
Pharmacokinetic Initial Assessment: IV Vancomycin    Assessment/Plan:    Initiate intravenous vancomycin with loading dose of 1500 mg once with subsequent doses when random concentrations are less than 20 mcg/mL.  Desired empiric serum trough concentration is 15 to 20 mcg/mL.  Draw vancomycin random level on 11/6 at 1230.  Pharmacy will continue to follow and monitor vancomycin.      Please contact pharmacy at extension [phone] with any questions regarding this assessment.     Thank you for the consult,   Pamela Shanks       Patient brief summary:  Yocasta Almonte is a 75 y.o. female initiated on antimicrobial therapy with IV Vancomycin for treatment of suspected bacteremia.    Drug Allergies:   Review of patient's allergies indicates:   Allergen Reactions    Iodine and iodide containing products     Penicillins        Actual Body Weight:   77.6 kg    Renal Function:   Estimated Creatinine Clearance: 29.6 mL/min (A) (based on SCr of 1.7 mg/dL (H)).,     Dialysis Method (if applicable):  N/A    CBC (last 72 hours):  Recent Labs   Lab Result Units 11/04/20 1829 11/05/20 0150 11/05/20  0433 11/05/20  1110   WBC K/uL 12.07  --  7.11 7.95   Hemoglobin g/dL 10.2*  --  12.2 12.0   Hemoglobin A1C %  --  6.3*  --   --    Hematocrit % 36.2*  --  39.4 38.6   Platelets K/uL 204  --  191 170   Gran % % 29.0*  --  64.0  --    Lymph % % 58.0*  --  12.0*  --    Mono % % 4.0  --  5.0  --    Eosinophil % % 6.0  --  0.0  --    Basophil % % 1.0  --  0.0  --    Differential Method  Manual  --  Manual  --        Metabolic Panel (last 72 hours):  Recent Labs   Lab Result Units 11/04/20  1829 11/05/20  0150 11/05/20  0433 11/05/20  1002 11/05/20  1110 11/05/20  1111   Sodium mmol/L 144 144 144  --   --  140   Potassium mmol/L 5.2* 4.1 3.6  --   --  3.8   Chloride mmol/L 111* 116* 116*  --   --  112*   CO2 mmol/L 9* 16* 16*  --   --  13*   Glucose mg/dL 432* 155* 103  --  163* 162*   Glucose, UA   --   --   --  1+*  --   --    BUN mg/dL 12  17 18  --   --  20   Creatinine mg/dL 1.3 1.3 1.5*  --   --  1.7*   Albumin g/dL 2.9*  --   --   --   --   --    Total Bilirubin mg/dL 0.1  --   --   --   --   --    Alkaline Phosphatase U/L 114  --   --   --   --   --    AST U/L 149*  --   --   --   --   --    ALT U/L 80*  --   --   --   --   --    Magnesium mg/dL  --   --   --   --  1.7  --    Phosphorus mg/dL  --   --  4.3  --  4.5  --        Drug levels (last 3 results):  No results for input(s): VANCOMYCINRA, VANCOMYCINPE, VANCOMYCINTR in the last 72 hours.    Microbiologic Results:  Microbiology Results (last 7 days)       Procedure Component Value Units Date/Time    AFB Culture & Smear [[id number]] Collected: 11/05/20 1159    Order Status: Sent Specimen: Respiratory from Tracheal Aspirate Updated: 11/05/20 1223    Culture, Respiratory with Gram Stain [[id number]] Collected: 11/05/20 1159    Order Status: Sent Specimen: Respiratory from Tracheal Aspirate Updated: 11/05/20 1222    Blood culture [[id number]] Collected: 11/05/20 1111    Order Status: Sent Specimen: Blood from Peripheral, Right Hand Updated: 11/05/20 1113    Blood culture [[id number]]     Order Status: Sent Specimen: Blood

## 2020-11-05 NOTE — ASSESSMENT & PLAN NOTE
- Ms. Yocasta Almonte presents after cardiac arrest   - she had called 911, and become unresponsive while on the phone, and apparently giving herself a nebulized breathing treatment   - initial rhythm reported as a systole   - Per ED MD report, the patient was given epinephrine x 3 prior to ROSC   - initial ED labs w/ troponin 0.064, trend   - EKG in ED none   - EKG pending   - consult cardiology

## 2020-11-05 NOTE — PLAN OF CARE
Patient remains in ICU on targeted hypothermia. Patient to start rewarming tomorrow at 7 AM. Rectal tube draining moderate amount of watery red stool, hemoglobin stable. Patient grimacing and responding to pain this AM before cooling. Patient still has gag reflex and breathing over vent. On Levophed at .08 mcg/kg/min and Propofol at 15 mcg/kg/min. Plan of care reviewed with grand-daughter Tabby and multiple brothers and nieces. Dr. Marrero spoke with son Crow today.

## 2020-11-05 NOTE — PLAN OF CARE
Patient remains intubated on vent. Remains on Norepinephrine for B/P management. Continues to have large amount liquid, light red drainage from flexiceal. Urine output adequate. Remains on Insulin infusion Targeted temp. Management initiated as per orders. Plan monitor vital signs, adjust Insulin and Norepinephrine infusions as needed.Maintain CAUTI ,Clabsi, and Ventilator bundles. Maintain patient comfort and safety

## 2020-11-05 NOTE — ASSESSMENT & PLAN NOTE
- initially maintaining pressures   - now started on norepinephrine drip   - hold all home meds   - monitor

## 2020-11-05 NOTE — ED PROVIDER NOTES
CENTRAL VENOUS LINE INSERTION:    Indications: vascular access and med administration    Antisepsis: sterile gloves, mask, gown, and drape.  Skin prepped with chlorhexidine.  Catheter: 7 Fr x 3 lumen x 16cm via right internal jugular.  Insertion directed by ultrasound.  Heme positive aspiration all ports.  Secured with sutures at 14 cm at skin.  Dressing: dry sterile gauze and bio occlusive dressing.  Complications: none.     Kang Euceda MD  11/05/20 0008

## 2020-11-05 NOTE — PROGRESS NOTES
Spoke to grandson Tabby who lives with patient and is only grandson. He states Beulah is not patient's daughter but her niece and is giving false information that she is brain dead. He states his father Crow is the only child who is working offshore and not available to talk on phone. Tabby seems rational, clear and competent on phone. Will update facesheet to fix this.

## 2020-11-05 NOTE — ASSESSMENT & PLAN NOTE
- last A1C:   Lab Results   Component Value Date    HGBA1C 5.8 02/17/2020    - A1C pending for AM   - hold oral antidiabetic meds   - labs w/ hyperglycemia upon arrival    - started on an insulin drip, per D/w Jovany Oneal as patient had hyperglycemia w/ anion gap of 24 and CO2 of 9   - beta-hydroxybutyrate ordered, pending   - DKA order set utilized   - hien

## 2020-11-05 NOTE — CARE UPDATE
11/05/20 0739   Patient Assessment/Suction   Level of Consciousness (AVPU) unresponsive   Respiratory Effort Normal;Unlabored   Expansion/Accessory Muscles/Retractions expansion symmetric;no retractions;no use of accessory muscles   All Lung Fields Breath Sounds continuous;rhonchi   Rhythm/Pattern, Respiratory assisted mechanically   Cough Frequency with stimulation   PRE-TX-O2   O2 Device (Oxygen Therapy) ventilator   Oxygen Concentration (%) 50   SpO2 (!) 92 %   Pulse Oximetry Type Continuous   $ Pulse Oximetry - Multiple Charge Pulse Oximetry - Multiple   Pulse (!) 114   Resp (!) 30   Positioning Flat   ETCO2   $ ETCO2 Charge Exhaled CO2 Monitoring   $ ETCO2 Usage Currently wearing   ETCO2 (mmHg) 24 mmHg   ETCO2 Device Type Portable Bedside Monitor;Bedside Monitor   Aerosol Therapy   $ Aerosol Therapy Charges Aerosol Treatment   Daily Review of Necessity (SVN) completed   Respiratory Treatment Status (SVN) given   Treatment Route (SVN) ventilator   Patient Position (SVN) HOB not elevated due to medical condition   Post Treatment Assessment (SVN) breath sounds unchanged   Signs of Intolerance (SVN) none   Breath Sounds Post-Respiratory Treatment   Throughout All Fields Post-Treatment All Fields   Throughout All Fields Post-Treatment no change   Post-treatment Heart Rate (beats/min) 114   Post-treatment Resp Rate (breaths/min) 33   Wound Care   $ Wound Care Tech Time 15 min   Area of Concern Upper lip   Skin Color/Characteristics without discoloration   Skin Temperature cool        Airway - Non-Surgical 11/04/20 1730   Placement Date/Time: 11/04/20 1730   Present Prior to Hospital Arrival?: No   Secured at 24 cm   Measured At Lips   Secured Location Center   Secured by Commercial tube frey   Bite Block none   Site Condition Cool;Dry   Status Intact;Secured;Patent   Site Assessment Clean;Dry   Cuff Pressure 30 cm H2O   Airway Safety   Ambu bag with the patient? Yes, Adult Ambu   Is mask with the patient? Yes,  Adult Mask   Suction set is at the bedside? Yes   ETT Size 8   Respiratory Interventions   Airway/Ventilation Management airway patency maintained   Airway/Ventilation Support pulmonary hygiene promoted   VAP Prevention Bundle vent circuit breaks minimized   Vent Select   Conventional Vent Y   Charged w/in last 24h YES   Preset Conventional Ventilator Settings   Vent Type    Ventilation Type VC   Vent Mode A/C   Humidity HME   Set Rate 16 BPM   Vt Set 450 mL   PEEP/CPAP 5 cmH20   Pressure Support 0 cmH20   Waveform RAMP   Peak Flow 60 L/min   Set Inspiratory Pressure 0 cmH20   Insp Time 0 Sec(s)   Plateau Set/Insp. Hold (sec) 0   Insp Rise Time  0 %   Trigger Sensitivity Flow/I-Trigger 3 L/min   P High 0 cm H2O   P Low 0 cm H2O   T High 0 sec   T Low 0 sec   Patient Ventilator Parameters   Resp Rate Total 33 br/min   Peak Airway Pressure 28 cmH2O   Mean Airway Pressure 12 cmH20   Plateau Pressure 0 cmH20   Exhaled Vt 466 mL   Total Ve 15.2 mL   Spont Ve 0 L   I:E Ratio Measured 1:1.20   Conventional Ventilator Alarms   Alarms On Y   Ve High Alarm 30 L/min   Resp Rate High Alarm 40 br/min   Press High Alarm 60 cmH2O   Apnea Rate 16   Apnea Volume (mL) 450 mL   Apnea Oxygen Concentration  100   Apnea Flow Rate (L/min) 60   T Apnea 20 sec(s)   IHI Ventilator Associated Pneumonia Bundle   Daily Awakening Trials Performed Yes   Daily Assessment of Readiness to Extubate Yes   Head of Bed Elevated  HOB 30   Oral Care Mouth swabbed;Mouth moisturizer;Mouth suctioned   Ready to Wean/Extubation Screen   FIO2<=50 (chart decimal) 0.5   MV<16L (chart vol.) 15.2   PEEP <=8 (chart #) 5   Ready to Wean Parameters   F/VT Ratio<105 (RSBI) (!) 64.38   Will continue to monitor

## 2020-11-05 NOTE — H&P
[facility] Medicine  History & Physical    Patient Name: Yocasta Almonte  MRN: [id number]  Admission Date: 11/4/2020  Attending Physician: Sina Reyes MD   Primary Care Provider: Rani Welch MD         Patient information was obtained from patient, past medical records and ER records.     Subjective:     Principal Problem:Cardiopulmonary arrest    Chief Complaint:   Chief Complaint   Patient presents with    Cardiac Arrest        HPI: Ms. Yocasta Almonte is a 75 y.o. female, with PMH of CLL, right lung cancer, COPD, T2DM, HTN, HLD, thyroid disease, anemia, OA, GERD, tobacco abuse, who presented to [facility] ED on 11/4/20 after she called EMS 2/2 to SOB and went unresponsive on the phone. Upon arrival, EMS found her with a breathing treatment in process, s/p emesis, and with an inhaler nearby. Glucose was 331. She was given 3 rounds of epinephrine after finding rhythm was asystole with pupils fixed and dilated. She had ROSC w/ the epinephrine briefly each time. Upon arrival to ED EKG showed sinus tachycardia. She was a difficult intubation as she had many partially digested beans in her airway. ED labs concerning for DKA. Given her h/o cancer, and SOB preceding her arrest, a CTA Chest was ordered to evaluate for PE and was negative for PE, but did indicate aspiration pneumonia. A CTHead was negative for acute intracranial abnormalities. A CXR indicates patchy opacification of the pulmonary parenchyma, concerning for pneumonia or aspiration.     Past Medical History:   Diagnosis Date    Cancer     lymphoma    COPD (chronic obstructive pulmonary disease)     Diabetes mellitus     GERD (gastroesophageal reflux disease)     Hyperlipidemia     Hypertension     YESICA (obstructive sleep apnea) 06/2020    by home sleep study    Thyroid disease        Past Surgical History:   Procedure Laterality Date    ESOPHAGOGASTRODUODENOSCOPY Left 5/13/2020    Procedure: EGD  (ESOPHAGOGASTRODUODENOSCOPY);  Surgeon: Rogelio Harris MD;  Location: [facility];  Service: Endoscopy;  Laterality: Left;    LYMPH NODE DISSECTION       R axillary    NECK SURGERY      infection in neck removed       Review of patient's allergies indicates:   Allergen Reactions    Iodine and iodide containing products     Penicillins        Current Facility-Administered Medications on File Prior to Encounter   Medication    cyanocobalamin injection 1,000 mcg     Current Outpatient Medications on File Prior to Encounter   Medication Sig    albuterol (PROVENTIL) 5 mg/mL nebulizer solution Take 0.5 mLs (2.5 mg total) by nebulization every 4 (four) hours as needed for Wheezing or Shortness of Breath. Rescue    albuterol (PROVENTIL/VENTOLIN HFA) 90 mcg/actuation inhaler Inhale 1 puff into the lungs every 6 (six) hours as needed for Wheezing or Shortness of Breath. Rescue    atorvastatin (LIPITOR) 80 MG tablet Take 1 tablet (80 mg total) by mouth once daily. For cholesterol    blood sugar diagnostic (TRUE METRIX GLUCOSE TEST STRIP) Strp Test blood sugar in vitro BID    blood-glucose meter (TRUE METRIX GLUCOSE METER) Misc Test blood sugar in vitro BID    esomeprazole (NEXIUM) 40 MG capsule Take 1 capsule (40 mg total) by mouth 2 (two) times daily before meals.    fluticasone propionate (FLONASE) 50 mcg/actuation nasal spray 1 spray (50 mcg total) by Each Nostril route 2 (two) times daily.    gabapentin (NEURONTIN) 300 MG capsule 1 cap AM, 1 caps NOON, 2 capPM    lancets 32 gauge Misc Test blood sugar in vitro BID    loratadine (CLARITIN) 10 mg tablet Take 1 tablet (10 mg total) by mouth once daily. For allergies    methylPREDNISolone (MEDROL DOSEPACK) 4 mg tablet TAKE 1 TABLET BY MOUTH AS DIRECTED    metoprolol tartrate (LOPRESSOR) 25 MG tablet Take 1 tablet (25 mg total) by mouth once daily.    mirtazapine (REMERON) 30 MG tablet Take 1 tablet (30 mg total) by mouth every evening.    potassium chloride  SA (K-DUR,KLOR-CON) 20 MEQ tablet Take 1 tablet (20 mEq total) by mouth once daily.    triamcinolone (KENALOG) 0.5 % ointment Apply 1 application topically 2 (two) times daily.    VIRTUSSIN AC  mg/5 mL syrup TAKE 2 TEASPOONFUL BY MOUTH TWICE DAILY AS NEEDED FOR COUGH     Family History     Problem Relation (Age of Onset)    Cancer Mother    No Known Problems Father        Tobacco Use    Smoking status: Former Smoker     Packs/day: 0.50     Years: 58.00     Pack years: 29.00     Types: Cigarettes     Quit date: 2020     Years since quittin.1    Smokeless tobacco: Never Used    Tobacco comment: on nicoderm patches -DN  10/12/20   Substance and Sexual Activity    Alcohol use: No     Comment: hx of alochol abuse but does not drink at all now    Drug use: No     Comment: none    Sexual activity: Not Currently     Birth control/protection: None     Review of Systems   Unable to perform ROS: Intubated   Constitutional:        Can not obtain ROS or FHx 2/2 intubated and sedated.      Objective:     Vital Signs (Most Recent):  Pulse: (!) 120 (20)  BP: 105/65 (20)  SpO2: 100 % (20) Vital Signs (24h Range):  Pulse:  [102-202] 120  SpO2:  [100 %] 100 %  BP: (105-180)/() 105/65     Weight: 72.4 kg (159 lb 9.8 oz)  Body mass index is 25.76 kg/m².    Physical Exam  Vitals signs and nursing note reviewed.   Constitutional:       General: She is not in acute distress.     Appearance: She is well-developed. She is obese. She is not diaphoretic.   HENT:      Head: Normocephalic and atraumatic.   Eyes:      General: No scleral icterus.     Conjunctiva/sclera: Conjunctivae normal.      Pupils: Pupils are equal, round, and reactive to light.   Neck:      Musculoskeletal: Normal range of motion and neck supple.      Trachea: No tracheal deviation.   Cardiovascular:      Rate and Rhythm: Normal rate and regular rhythm.      Heart sounds: Normal heart sounds. No murmur. No  gallop.    Pulmonary:      Effort: No respiratory distress.      Breath sounds: No stridor. Wheezing and rhonchi present. No rales.      Comments: Intubated. Mechanical breath sounds present. Not breathing over vent. Diffuse wheeze and rhonchi.   Abdominal:      General: Bowel sounds are normal. There is no distension.      Palpations: Abdomen is soft. There is no mass.      Tenderness: There is no abdominal tenderness. There is no guarding.   Skin:     General: Skin is warm and dry.      Coloration: Skin is not pale.      Findings: No erythema or rash.   Neurological:      Mental Status: She is alert.      Motor: No abnormal muscle tone.      Comments: Unable to assess as on propofol. No withdrawal from pain, no corneal reflex present. Pupils fixed and dilated.    Psychiatric:         Behavior: Behavior normal.         Thought Content: Thought content normal.         Judgment: Judgment normal.           CRANIAL NERVES     CN III, IV, VI   Pupils are equal, round, and reactive to light.       Significant Labs:   BMP:   Recent Labs   Lab 11/04/20  1829   *      K 5.2*   *   CO2 9*   BUN 12   CREATININE 1.3   CALCIUM 8.1*     CBC:   Recent Labs   Lab 11/04/20  1829   WBC 12.07   HGB 10.2*   HCT 36.2*        CMP:   Recent Labs   Lab 11/04/20  1829      K 5.2*   *   CO2 9*   *   BUN 12   CREATININE 1.3   CALCIUM 8.1*   PROT 5.3*   ALBUMIN 2.9*   BILITOT 0.1   ALKPHOS 114   *   ALT 80*   ANIONGAP 24*   EGFRNONAA 40*     Urine Culture: No results for input(s): LABURIN in the last 48 hours.  Urine Studies: No results for input(s): COLORU, APPEARANCEUA, PHUR, SPECGRAV, PROTEINUA, GLUCUA, KETONESU, BILIRUBINUA, OCCULTUA, NITRITE, UROBILINOGEN, LEUKOCYTESUR, RBCUA, WBCUA, BACTERIA, SQUAMEPITHEL, HYALINECASTS in the last 48 hours.    Invalid input(s): WRIGHTSUR  All pertinent labs within the past 24 hours have been reviewed.    Significant Imaging: I have reviewed all  pertinent imaging results/findings within the past 24 hours.   Imaging Results          X-ray Abdomen for NG Tube Placement (Nursing should notify Radiology after placement) (No Result on File)                CTA Chest Non-Coronary (Final result)  Result time 11/04/20 20:51:04    Final result by Edward Pathak DO (11/04/20 20:51:04)                 Impression:      1. Examination is limited by streak and motion artifact.  There is no evidence of pulmonary embolism to the level of the proximal segmental pulmonary arteries.  The distal segmental to subsegmental pulmonary arteries are not well evaluated.  2. Interval development of bilateral ground-glass and dense consolidations as described above, likely combination of aspiration, atelectasis and/or edema.  3. Acute, mildly displaced fractures of the left 2nd through 4th ribs and the right 2nd rib.  4. Right upper lobe masslike opacity and additional bilateral pulmonary nodules are not well assessed secondary to significant motion artifact.  Recommend continued attention on follow-up imaging.  5. Large left thyroid mass extending into the superior mediastinum, unchanged.  6. Stable mildly prominent mediastinal and bilateral axillary lymphadenopathy.      Electronically signed by: Edward Pathak  Date:    11/04/2020  Time:    20:51             Narrative:    EXAMINATION:  CTA CHEST NON CORONARY    CLINICAL HISTORY:  PE suspected, high pretest prob.    TECHNIQUE:  Low dose axial images, sagittal and coronal reformations were obtained from the thoracic inlet to the lung bases following the IV administration of 75 mL of Omnipaque 350.  Contrast timing was optimized to evaluate the pulmonary arteries.  Maximum intensity projection images were provided for review.    COMPARISON:  CT of the chest from 06/03/2020.    FINDINGS:  Pulmonary vasculature: Contrast bolus timing is sufficient.  There is streak and motion artifact which limits evaluation of the bilateral upper  lobes.  There is no evidence of pulmonary embolism to the level of the proximal segmental pulmonary arteries.  The distal segmental and subsegmental pulmonary arteries are not well visualized due to above mentioned artifact.  There is prominence of the main pulmonary artery which can be seen with pulmonary artery hypertension.    Aorta: Left-sided aortic arch.  No aneurysm and no significant atherosclerosis    Base of Neck: There is a large, heterogeneous left thyroid mass measuring approximately 6.5 x 5.4 cm, mildly deviating the trachea to the right.  There is extension into the superior mediastinum, unchanged.    Thoracic soft tissues: Normal.    Heart: Normal size. No effusion.    Saray/Mediastinum: There are mildly prominent bilateral axillary and mediastinal lymph nodes, not significantly changed.    Airways: There is an endotracheal tube which terminates between the thoracic inlet and the calos.  The large airways are patent.  There are multiple foci of endobronchial filling within the bilateral lower lobes compatible with aspiration.  There is bronchial wall thickening.    Lungs/Pleura: There are bilateral ground-glass opacities with a dense consolidation within the left upper lobe.  There is a masslike opacity within the right upper lobe with obscured margins.  This is difficult to evaluate secondary to motion artifact.  Previously seen left upper lobe nodule is obscured by dense consolidation.  There is a bulky calcified lesion within the right upper lobe, not significantly changed.  There are ground-glass opacities within the bilateral lower lobes, likely aspiration.  There is mild interlobular septal thickening.    Esophagus: Normal.    Upper Abdomen: There is a moderate-sized hiatal hernia.  Orogastric tube terminates in the stomach.    Bones: There are acute, mildly displaced fractures of the left 2nd through 4th ribs anterolateral aspects, and the right 2nd rib anterolateral aspect.                                CT Head Without Contrast (Final result)  Result time 11/04/20 20:31:50    Final result by Edward Pathak DO (11/04/20 20:31:50)                 Impression:      1. No acute intracranial abnormality.  2. Bilateral paranasal sinus disease as above.      Electronically signed by: Edward Pathak  Date:    11/04/2020  Time:    20:31             Narrative:    EXAMINATION:  CT HEAD WITHOUT CONTRAST    CLINICAL HISTORY:  Altered mental status.    TECHNIQUE:  Low dose axial CT images obtained throughout the head without intravenous contrast. Sagittal and coronal reconstructions were performed.    COMPARISON:  CT of the head from 04/22/2020.    FINDINGS:  Ventricles and sulci are normal in size for age without evidence of hydrocephalus. No extra-axial blood or fluid collections.  The brain parenchyma is normal. No parenchymal mass, hemorrhage, edema or major vascular distribution infarct.    No calvarial fracture.  There is hyperostosis frontalis interna.  The scalp is unremarkable.  There is fluid within the bilateral frontal, sphenoid, ethmoid, and maxillary sinuses.  The mastoid air cells are clear.  Partially visualized orogastric tube and endotracheal tube.                               X-Ray Chest AP Portable (Final result)  Result time 11/04/20 18:34:58    Final result by Vikki Monique MD (11/04/20 18:34:58)                 Impression:      There is patchy opacification of the pulmonary parenchyma increased from prior exam wearing for pneumonia or aspiration.      Electronically signed by: Vikki Monique MD  Date:    11/04/2020  Time:    18:34             Narrative:    EXAMINATION:  XR CHEST AP PORTABLE    CLINICAL HISTORY:  Cardiac arrest, cause unspecified    TECHNIQUE:  Single frontal view of the chest was performed.    COMPARISON:  None    FINDINGS:  There is an endotracheal tube with the tip at the clavicular heads, nasogastric tube with the tip inferior to the diaphragm.  There is patchy  opacification of the pulmonary parenchyma increased from prior exam.                                 Assessment/Plan:     * Cardiopulmonary arrest  - MsWicho Almonte presents after cardiac arrest   - she had called 911, and become unresponsive while on the phone, and apparently giving herself a nebulized breathing treatment   - initial rhythm reported as a systole   - Per ED MD report, the patient was given epinephrine x 3 prior to ROSC   - initial ED labs w/ troponin 0.064, trend   - EKG in ED none   - EKG pending   - consult cardiology       Acute hypoxemic respiratory failure  - cause is unclear as little patient history can be obtained  - given h/o COPD, suspect the patient was likely having COPD exacerbation with some degree of aspiration either before or after cardiac arrest    - duonebs ordered [id number]    - suggest SLP eval if patient progresses off of ventilator  - imaging indicates aspiration pneumonia   - covering with cipro and clindamycin   - continue mechanical ventilation   - ABG ordered for AM   - CURB-65 score: 1  - procalcitonin ordered for AM   - pulmonary critical care consulted     COPD (chronic obstructive pulmonary disease)  - symptoms of exacerbation unable to be assessed   - duo nebs [id number] ordered   - monitor     Chronic lymphocytic leukemia  - follows w/ Hematology  - Ibrutinib d/c'd on 10/5/20       Coronary artery disease  - continue Atorvastatin 80 mg QD   - no ASA listed on home meds      Type 2 diabetes mellitus with other specified complication  - last A1C:   Lab Results   Component Value Date    HGBA1C 5.8 02/17/2020    - A1C pending for AM   - hold oral antidiabetic meds   - labs w/ hyperglycemia upon arrival    - started on an insulin drip, per D/w Jovany Oneal as patient had hyperglycemia w/ anion gap of 24 and CO2 of 9   - beta-hydroxybutyrate ordered, pending   - DKA order set utilized   - mointor     Anemia of decreased vitamin B12 absorption  - per history patient has  B12 deficiency and has injections monthly   - H&H lower than apparent baseline:   Results for CHEPE SQUIRES (MRN [id number]) as of 11/5/2020 03:54   Ref. Range 5/10/2020 21:10 5/11/2020 05:19 10/1/2020 08:35 11/4/2020 18:29   Hemoglobin Latest Ref Range: 12.0 - 16.0 g/dL 13.0 12.0 13.2 10.2 (L)   Hematocrit Latest Ref Range: 37.0 - 48.5 % 41.1 37.0 40.0 36.2 (L)   - iron studies ordered   - no apparent blood losses at present   - monitor AM labs   - PPI ordered     Essential hypertension  - initially maintaining pressures   - now started on norepinephrine drip   - hold all home meds   - monitor       VTE Risk Mitigation (From admission, onward)         Ordered     IP VTE HIGH RISK PATIENT  Once      11/04/20 2217     Place JIAN hose  Until discontinued      11/04/20 2217     Place sequential compression device  Until discontinued      11/04/20 2217     Place sequential compression device  Until discontinued      11/04/20 2039                   Iraida Shukla PA-C  Department of Hospital Medicine   [facility]

## 2020-11-05 NOTE — PROGRESS NOTES
Dr. Janes teresa, wants to keep patient on targeted hypothermia management. Instructed to leave Propofol off.

## 2020-11-05 NOTE — CONSULTS
Patient intubated at this time. Patients nurse to notify diabetic educator when patient is extubated, alert and oriented for education.

## 2020-11-05 NOTE — RESPIRATORY THERAPY
Results for CHEPE SQUIRES (MRN [id number]) as of 11/5/2020 06:29   Ref. Range 11/5/2020 06:08   POC PH Latest Ref Range: 7.35 - 7.45  7.223 (LL)   POC PCO2 Latest Ref Range: 35 - 45 mmHg 37.3   POC PO2 Latest Ref Range: 80 - 100 mmHg 85   POC BE Latest Ref Range: -2 to 2 mmol/L -12   POC HCO3 Latest Ref Range: 24 - 28 mmol/L 15.4 (L)   POC SATURATED O2 Latest Ref Range: 95 - 100 % 94 (L)   POC TCO2 Latest Ref Range: 23 - 27 mmol/L 17 (L)   FiO2 Unknown 50   Vt Unknown 450   PiP Unknown 23   PEEP Unknown 5.0   Sample Unknown ARTERIAL   DelSys Unknown Adult Vent   Allens Test Unknown N/A   Site Unknown RB   Mode Unknown AC/PRVC   Rate Unknown 16   Reported to Dr. Isaac Velez [email or web address]@ [id number]

## 2020-11-05 NOTE — RESPIRATORY THERAPY
Right radial arterial line placed without complications, one stick, using protocol. NARN. Will continue to monitor

## 2020-11-05 NOTE — RESPIRATORY THERAPY
Results for CHEPE SQUIRES (MRN [id number]) as of 11/4/2020 23:31   Ref. Range 11/4/2020 22:14   POC PH Latest Ref Range: 7.35 - 7.45  7.219 (LL)   POC PCO2 Latest Ref Range: 35 - 45 mmHg 46.7 (H)   POC PO2 Latest Ref Range: 80 - 100 mmHg 273 (H)   POC BE Latest Ref Range: -2 to 2 mmol/L -9   POC HCO3 Latest Ref Range: 24 - 28 mmol/L 19.1 (L)   POC SATURATED O2 Latest Ref Range: 95 - 100 % 100   POC TCO2 Latest Ref Range: 23 - 27 mmol/L 20 (L)   FiO2 Unknown 100   Vt Unknown 450   PiP Unknown 36   PEEP Unknown 5.0   Sample Unknown ARTERIAL   DelSys Unknown Adult Vent   Allens Test Unknown Pass   Site Unknown LR   Mode Unknown AC/PRVC   Rate Unknown 16   Results reported to Dr. Sampson@0086/due to values lowered O2 to 50%FIO2

## 2020-11-05 NOTE — PROGRESS NOTES
Patient aspirated a moderate amount of brown bile, nurse at bedside when this occurred. Suctioned mouth as soon as this happened. Suctioned ETT also, no brown bile seen through in line suction.

## 2020-11-05 NOTE — ASSESSMENT & PLAN NOTE
- cause is unclear as little patient history can be obtained  - given h/o COPD, suspect the patient was likely having COPD exacerbation with some degree of aspiration either before or after cardiac arrest    - duonebs ordered [id number]    - suggest SLP eval if patient progresses off of ventilator  - imaging indicates aspiration pneumonia   - covering with cipro and clindamycin   - continue mechanical ventilation   - ABG ordered for AM   - CURB-65 score: 1  - procalcitonin ordered for AM   - pulmonary critical care consulted

## 2020-11-05 NOTE — PLAN OF CARE
Intervention: collaboration with care providers     Recommendation:  1) Advance PO diet to goal of DM 2000 kcal, cardiac ( 4 carb servings/meal) once extubated   2) weigh pt weekly , DM education when appropriate  3) If unable to advance PO diet in < 4 days intiate TF: Diabetisource AC @ 20 ml/hr advancing to goal of 55 ml/hr + 120 ml flush q 4 hr   ( provides 1584 kcal ( 86% EEN), 79 g protein ( 85% EPN), and 1082 ml free water)    Goals: 1) PO diet advanced in < 4 days of nutrition support initiated  Nutrition Goal Status: new  Communication of RD Recs: (POC, sticky note)

## 2020-11-05 NOTE — HPI
Ms. Yocasta Almonte is a 75 y.o. female, with PMH of CLL, right lung cancer, COPD, T2DM, HTN, HLD, thyroid disease, anemia, OA, GERD, tobacco abuse, who presented to [facility] ED on 11/4/20 after she called EMS 2/2 to SOB and went unresponsive on the phone. Upon arrival, EMS found her with a breathing treatment in process, s/p emesis, and with an inhaler nearby. Glucose was 331. She was given 3 rounds of epinephrine after finding rhythm was asystole with pupils fixed and dilated. She had ROSC w/ the epinephrine briefly each time. Upon arrival to ED EKG showed sinus tachycardia. She was a difficult intubation as she had many partially digested beans in her airway. ED labs concerning for DKA. Given her h/o cancer, and SOB preceding her arrest, a CTA Chest was ordered to evaluate for PE and was negative for PE, but did indicate aspiration pneumonia. A CTHead was negative for acute intracranial abnormalities. A CXR indicates patchy opacification of the pulmonary parenchyma, concerning for pneumonia or aspiration.

## 2020-11-06 NOTE — CONSULTS
[facility]  Cardiology  Consult Note    Patient Name: Yocasta Almonte  MRN: [id number]  Admission Date: 11/4/2020  Hospital Length of Stay: 2 days  Code Status: Full Code   Attending Provider: Chris Marrero MD   Consulting Provider: Mick Fung MD  Primary Care Physician: Rani Welch MD  Principal Problem:Cardiopulmonary arrest    Patient information was obtained from patient, past medical records and ER records.     Consults  Subjective:     REASON FOR CONSULT:  Cardiomyopathy, Cardiac arrest     HPI:    75-year-old female with a past medical history significant for COPD, lung cancer, hypertension, obstructive sleep apnea, lymphoma was brought into the hospital after she reportedly called in for shortness of breath.  When paramedics arrived she reportedly was unresponsive and was in asystole.  She was resuscitated and was admitted to the hospital.  Currently she is on hypothermia protocol.  No meaningful history could be obtained as the patient is currently sedated.  Cardiology was consulted for cardiac arrest, cardiomyopathy and elevated troponins.  I called the phone number listed in epic however there was no answer.  EKG shows ST T wave abnormalities in the anterolateral leads.    Past Medical History:   Diagnosis Date    Cancer     lymphoma    COPD (chronic obstructive pulmonary disease)     Diabetes mellitus     GERD (gastroesophageal reflux disease)     Hyperlipidemia     Hypertension     YESICA (obstructive sleep apnea) 06/2020    by home sleep study    Thyroid disease        Past Surgical History:   Procedure Laterality Date    ESOPHAGOGASTRODUODENOSCOPY Left 5/13/2020    Procedure: EGD (ESOPHAGOGASTRODUODENOSCOPY);  Surgeon: Rogelio Harris MD;  Location: [facility];  Service: Endoscopy;  Laterality: Left;    LYMPH NODE DISSECTION       R axillary    NECK SURGERY      infection in neck removed       Review of patient's allergies indicates:   Allergen Reactions     Iodine and iodide containing products     Penicillins        No current facility-administered medications on file prior to encounter.      Current Outpatient Medications on File Prior to Encounter   Medication Sig    albuterol (PROVENTIL) 5 mg/mL nebulizer solution Take 0.5 mLs (2.5 mg total) by nebulization every 4 (four) hours as needed for Wheezing or Shortness of Breath. Rescue    albuterol (PROVENTIL/VENTOLIN HFA) 90 mcg/actuation inhaler Inhale 1 puff into the lungs every 6 (six) hours as needed for Wheezing or Shortness of Breath. Rescue    atorvastatin (LIPITOR) 80 MG tablet Take 1 tablet (80 mg total) by mouth once daily. For cholesterol    blood sugar diagnostic (TRUE METRIX GLUCOSE TEST STRIP) Strp Test blood sugar in vitro BID    blood-glucose meter (TRUE METRIX GLUCOSE METER) Misc Test blood sugar in vitro BID    esomeprazole (NEXIUM) 40 MG capsule Take 1 capsule (40 mg total) by mouth 2 (two) times daily before meals.    fluticasone propionate (FLONASE) 50 mcg/actuation nasal spray 1 spray (50 mcg total) by Each Nostril route 2 (two) times daily.    gabapentin (NEURONTIN) 300 MG capsule 1 cap AM, 1 caps NOON, 2 capPM    lancets 32 gauge Misc Test blood sugar in vitro BID    loratadine (CLARITIN) 10 mg tablet Take 1 tablet (10 mg total) by mouth once daily. For allergies    methylPREDNISolone (MEDROL DOSEPACK) 4 mg tablet TAKE 1 TABLET BY MOUTH AS DIRECTED    metoprolol tartrate (LOPRESSOR) 25 MG tablet Take 1 tablet (25 mg total) by mouth once daily.    mirtazapine (REMERON) 30 MG tablet Take 1 tablet (30 mg total) by mouth every evening.    potassium chloride SA (K-DUR,KLOR-CON) 20 MEQ tablet Take 1 tablet (20 mEq total) by mouth once daily.    triamcinolone (KENALOG) 0.5 % ointment Apply 1 application topically 2 (two) times daily.    VIRTUSSIN AC  mg/5 mL syrup TAKE 2 TEASPOONFUL BY MOUTH TWICE DAILY AS NEEDED FOR COUGH       Scheduled Meds:   acetaminophen  1,000 mg  Intravenous Q8H    albuterol-ipratropium  3 mL Nebulization Q6H    cefTRIAXone (ROCEPHIN) IVPB  1 g Intravenous Q24H    chlorhexidine  15 mL Mouth/Throat BID    mupirocin   Nasal BID    pantoprazole  40 mg Intravenous Daily     Continuous Infusions:   midazolam 1 mg/hr (20)    norepinephrine bitartrate-D5W 0.16 mcg/kg/min (20)    propofoL 50 mcg/kg/min (20)     PRN Meds:.calcium gluconate IVPB, calcium gluconate IVPB, calcium gluconate IVPB, dextrose 50%, glucagon (human recombinant), insulin aspart U-100, magnesium sulfate IVPB, magnesium sulfate IVPB, ondansetron, potassium chloride in water **AND** potassium chloride in water **AND** potassium chloride in water, sodium phosphate IVPB, sodium phosphate IVPB, sodium phosphate IVPB, [COMPLETED] Pharmacy to dose Vancomycin consult **AND** vancomycin - pharmacy to dose    Family History     Problem Relation (Age of Onset)    Cancer Mother    No Known Problems Father          Tobacco Use    Smoking status: Former Smoker     Packs/day: 0.50     Years: 58.00     Pack years: 29.00     Types: Cigarettes     Quit date: 2020     Years since quittin.1    Smokeless tobacco: Never Used    Tobacco comment: on nicoderm patches -DN  10/12/20   Substance and Sexual Activity    Alcohol use: No     Comment: hx of alochol abuse but does not drink at all now    Drug use: No     Comment: none    Sexual activity: Not Currently     Birth control/protection: None       ROS   Unobtainable  Objective:     Vital Signs (Most Recent):  Temp: (!) 94.8 °F (34.9 °C) (20)  Pulse: 104 (20)  Resp: 16 (20)  BP: (!) 108/55 (20)  SpO2: 100 % (20) Vital Signs (24h Range):  Temp:  [91.8 °F (33.2 °C)-98.6 °F (37 °C)] 94.8 °F (34.9 °C)  Pulse:  [] 104  Resp:  [16-33] 16  SpO2:  [76 %-100 %] 100 %  BP: ()/(55-88) 108/55  Arterial Line BP: (115-177)/(43-76) 140/47     Weight: 77.6 kg (171  lb 1.2 oz)  Body mass index is 27.61 kg/m².    SpO2: 100 %  O2 Device (Oxygen Therapy): ventilator      Intake/Output Summary (Last 24 hours) at 11/6/2020 0651  Last data filed at 11/5/2020 1800  Gross per 24 hour   Intake 2349.82 ml   Output 1800 ml   Net 549.82 ml       Lines/Drains/Airways     Central Venous Catheter Line            Percutaneous Central Line Insertion/Assessment - Triple Lumen  11/05/20 0008 right internal jugular 1 day          Drain                 Fecal Incontinence  11/05/20 0100 1 day         NG/OG Tube 11/04/20 1830 orogastric 18 Fr. Center mouth 1 day         Urethral Catheter 11/04/20 2113 Non-latex 16 Fr. 1 day          Airway                 Airway - Non-Surgical 11/04/20 1730 1 day          Arterial Line            Arterial Line 11/05/20 0835 Right Radial less than 1 day          Peripheral Intravenous Line                 Peripheral IV - Single Lumen 11/04/20 22 G Left Hand 2 days         Peripheral IV - Single Lumen 11/04/20 22 G Right Wrist 2 days                Physical Exam  HEENT: Normocephalic, atraumatic, PERRL, Conjunctiva pink, no scleral icterus.  ET tube in place  CVS: S1S2+, RRR, no murmurs, rubs or gallops, JVP: Normal.  LUNGS:  Positive coarse bilateral breath sounds  ABDOMEN: Soft, NT, BS+  EXTREMITIES: No cyanosis, clubbing or edema  NEURO:  Does not obey simple commands.        Significant Labs:   BMP:   Recent Labs   Lab 11/05/20 2106 11/06/20 0042 11/06/20  0409   * 157* 177*    135* 135*   K 4.4 3.6 3.5    107 107   CO2 13* 13* 13*   BUN 24* 26* 27*   CREATININE 2.1* 2.2* 2.4*   CALCIUM 7.4* 7.4* 7.4*   MG 2.5 2.2 2.2   , CMP   Recent Labs   Lab 11/04/20  1829  11/05/20 2106 11/06/20  0042 11/06/20  0409      < > 136 135* 135*   K 5.2*   < > 4.4 3.6 3.5   *   < > 109 107 107   CO2 9*   < > 13* 13* 13*   *   < > 115* 157* 177*   BUN 12   < > 24* 26* 27*   CREATININE 1.3   < > 2.1* 2.2* 2.4*   CALCIUM 8.1*   < > 7.4*  7.4* 7.4*   PROT 5.3*  --   --   --   --    ALBUMIN 2.9*  --   --   --   --    BILITOT 0.1  --   --   --   --    ALKPHOS 114  --   --   --   --    *  --   --   --   --    ALT 80*  --   --   --   --    ANIONGAP 24*   < > 14 15 15   ESTGFRAFRICA 46*   < > 26* 24* 22*   EGFRNONAA 40*   < > 22* 21* 19*    < > = values in this interval not displayed.   , CBC   Recent Labs   Lab 11/05/20  2106 11/06/20  0042 11/06/20  0409   WBC 7.53 6.93 6.63   HGB 11.6* 11.1* 10.9*   HCT 36.1* 32.7* 33.5*    142* 139*   , INR   Recent Labs   Lab 11/05/20  1110 11/06/20  0409   INR 1.1 1.2   , Lipid Panel No results for input(s): CHOL, HDL, LDLCALC, TRIG, CHOLHDL in the last 48 hours. and Troponin   Recent Labs   Lab 11/05/20  1111 11/05/20  2106 11/06/20  0409   TROPONINI 0.896* 1.900* 0.967*       Significant Imaging: Reviewed  Assessment and Plan:     IMPRESSION:  Status post resuscitation from cardiac arrest.  Initial rhythm reportedly was asystole.  Currently patient is in sinus rhythm.  Hypoxemic respiratory failure.  With possible aspiration pneumonia.  Rhabdomyolysis.  Elevated troponin.  Etiology could be rhabdomyolysis.  Cardiomyopathy.  No evidence of any scar tissue on the echocardiogram.  This could represent stunning of the myocardium.  History of COPD.  History of lung cancer.  History of lymphoma.    RECOMMENDATIONS:  1.  Continue supportive care and wean off Levophed as feasible.  2.  Patient reportedly will be we warmed starting at around 7:00 a.m. this morning.  3.  Continue IV fluids.  4.  Check CPK total.  5.  Check CVP.  6.  Patient would need repeat limited echocardiogram in a few days to be assess her LVEF.    Thank you for your consult. I will follow-up with patient. Please contact us if you have any additional questions.    Mick Fung MD  Cardiology   [facility]

## 2020-11-06 NOTE — PROGRESS NOTES
Progress Note  PULMONARY    Admit Date: 11/4/2020 11/06/2020      SUBJECTIVE:   Pt is a 76 yo female with CLL, lung ca who presented to the ED on 11/5 s/p cardiac arrest at home.   11/6- continues on vent, hypothermic protocol, requiring levophed 0.08 mcg/kg/min. Sedated w/ versed & propofol      OBJECTIVE:     Vitals (Most recent):  Vitals:    11/06/20 0945   BP:    Pulse: 110   Resp: 16   Temp: (!) 95.9 °F (35.5 °C)       Vitals (24 hour range):  Temp:  [91.8 °F (33.2 °C)-98.2 °F (36.8 °C)]   Pulse:  []   Resp:  [16-33]   BP: ()/(50-88)   SpO2:  [76 %-100 %]   Arterial Line BP: ()/(38-57)       Intake/Output Summary (Last 24 hours) at 11/6/2020 1000  Last data filed at 11/6/2020 0707  Gross per 24 hour   Intake 1731.23 ml   Output 1075 ml   Net 656.23 ml          Physical Exam:  The patient's neuro status (alertness,orientation,cognitive function,motor skills,), pharyngeal exam (oral lesions, hygiene, abn dentition,), Neck (jvd,mass,thyroid,nodes in neck and above/below clavicle),RESPIRATORY(symmetry,effort,fremitus,percussion,auscultation),  Cor(rhythm,heart tones including gallops,perfusion,edema)ABD(distention,hepatic&splenomegaly,tenderness,masses), Skin(rash,cyanosis),Psyc(affect,judgement,).  Exam negative except for these pertinent findings:    Intubated, sedated  Tongue protrudes, tip ischemic appearing  R pupil reacts, L pupil fixed  RIJ central line  Skin ulcerations over the sternum  Ventilated breath sounds  HR regular and tachycardic  Abdomen soft, BS+  Hernandez in place w/ yellow urine      Radiographs reviewed: view by direct vision   CXR 11/5- RUL and LISSY consolidations, new RML infiltrate developing  CTA chest 11/4-   1. Examination is limited by streak and motion artifact.  There is no evidence of pulmonary embolism to the level of the proximal segmental pulmonary arteries.  The distal segmental to subsegmental pulmonary arteries are not well evaluated.  2. Interval development  of bilateral ground-glass and dense consolidations as described above, likely combination of aspiration, atelectasis and/or edema.  3. Acute, mildly displaced fractures of the left 2nd through 4th ribs and the right 2nd rib.  4. Right upper lobe masslike opacity and additional bilateral pulmonary nodules are not well assessed secondary to significant motion artifact.  Recommend continued attention on follow-up imaging.  5. Large left thyroid mass extending into the superior mediastinum, unchanged.  6. Stable mildly prominent mediastinal and bilateral axillary lymphadenopathy.    TTE 11/5/20-   · Overall the study quality was poor. The study was difficult due to patient's body habitus.  · There is left ventricular concentric remodeling.  · The left ventricle is normal in size with moderately decreased systolic function. The estimated ejection fraction is 40%.  · There is moderate left ventricular global hypokinesis.  · Grade I diastolic dysfunction.  · Normal right ventricular size with normal right ventricular systolic function.  · Normal central venous pressure (3 mmHg).  Labs     Recent Labs   Lab 11/05/20  1619  11/06/20  0409   WBC 6.73   < > 6.63   HGB 11.7*   < > 10.9*   HCT 35.6*   < > 33.5*      < > 139*   BAND 28.0   < > 33.0   METAMYELOCYT 14.0   < > 8.0   MYELOPCT 4.0  --   --     < > = values in this interval not displayed.     Recent Labs   Lab 11/05/20  2106  11/06/20  0409 11/06/20  0801      < > 135*  --    K 4.4   < > 3.5  --       < > 107  --    CO2 13*   < > 13*  --    BUN 24*   < > 27*  --    CREATININE 2.1*   < > 2.4*  --    *   < > 177* 157*   CALCIUM 7.4*   < > 7.4*  --    MG 2.5   < > 2.2 2.1   PHOS  --   --  5.1*  --    INR  --   --  1.2  --    LACTATE 2.6*  --   --   --    TROPONINI 1.900*  --  0.967*  --    CPK  --   --   --  7848*    < > = values in this interval not displayed.     Recent Labs   Lab 11/06/20  0506   PH 7.235*   PCO2 36.1   PO2 176*   HCO3 15.3*      Microbiology Results (last 7 days)     Procedure Component Value Units Date/Time    Blood culture [[id number]] Collected: 11/05/20 1238    Order Status: Completed Specimen: Blood Updated: 11/06/20 0115     Blood Culture, Routine No Growth to date    Blood culture [[id number]] Collected: 11/05/20 1111    Order Status: Completed Specimen: Blood from Peripheral, Right Hand Updated: 11/06/20 0115     Blood Culture, Routine No Growth to date    Culture, Respiratory with Gram Stain [[id number]] Collected: 11/05/20 1159    Order Status: Completed Specimen: Respiratory from Tracheal Aspirate Updated: 11/05/20 2010     Gram Stain (Respiratory) <10 epithelial cells per low power field.     Gram Stain (Respiratory) Rare WBC's     Gram Stain (Respiratory) Many Gram negative rods     Gram Stain (Respiratory) Few Gram positive cocci    AFB Culture & Smear [[id number]] Collected: 11/05/20 1159    Order Status: Sent Specimen: Respiratory from Tracheal Aspirate Updated: 11/05/20 1758          Impression:  Active Hospital Problems    Diagnosis  POA    *Cardiopulmonary arrest [I46.9]  Yes    Calcified granuloma of lung [J84.10]  Yes    Calcification of aorta [I70.0]  Yes    Aspiration pneumonia [J69.0]  Yes    Anoxic encephalopathy [G93.1]  Yes    COPD (chronic obstructive pulmonary disease) [J44.9]  Yes    Acute hypoxemic respiratory failure [J96.01]  Yes    Type 2 diabetes mellitus with other specified complication [E11.69]  Yes    Anemia of decreased vitamin B12 absorption [D51.8]  Yes    Essential hypertension [I10]  Yes    HLD (hyperlipidemia) [E78.5]  Yes    Malignant neoplasm of upper lobe of left lung [C34.12]  Yes    Chronic lymphocytic leukemia [C91.10]  Yes    Nicotine dependence, unspecified, uncomplicated [F17.200]  Yes    Coronary artery disease [I25.10]  Yes      Resolved Hospital Problems    Diagnosis Date Resolved POA    Septic shock [A41.9, R65.21] 11/05/2020 Yes               Plan:   PEA arrest    acute hypoxemic respiratory failure  Combined heart failure, EF 40%  Septic vs cardiogenic shock  Lung cancer  CLL  Aspiration pneumonia  Thrombocytopenia  Normocytic anemia  MARCE  Rhabdomyolysis  Metabolic acidosis, gap and non-gap    - continue vent, adjusted for acidosis  - repeat ABG 1 hr  - SAT/SBT start in am  - continue sedation w/ propofol. Stop versed drip  - continue antibiotics  - continue inhaled bronchodilators  - continue TTM- rewarming now  - pressor support as needed to keep MAP >65  - monitor CPK, renal function  - d/w nurse  - once she is rewarmed can get a better assessment of neurologic status after the arrest    The following were evaluated and adjusted as needed: mechanical ventilator settings and weaning status, vasopressors, sedation and neurologic status, antibiotics, acid base balance and oxygenation needs and input and output and renal status       Critical Care  - THE PATIENT HAS A HIGH PROBABILITY OF IMMINENT OR LIFE THREATENING DETERIORATION.  Over 50%time of encounter was in direct care at bedside.  Time was 30 to 74 minutes required for patient care.  Time needed for all of the above totaled 30 minutes.    Jina Spring MD  Pulmonary & Critical Care Medicine                                        .

## 2020-11-06 NOTE — ASSESSMENT & PLAN NOTE
Patient with current diagnosis of pneumonia with concern for bacterial etiology due to  Streptococcus pneumoniae, MRSA and Klebsiella which is uncontrolled due to persistent hypoxia  and persistent altered mental status. Current antimicrobial regimen consists of   Antibiotics (From admission, onward)    Start     Stop Route Frequency Ordered    11/05/20 1345  cefTRIAXone (ROCEPHIN) 1 g/50 mL D5W IVPB      -- IV Every 24 hours (non-standard times) 11/05/20 1213    11/05/20 1313  vancomycin - pharmacy to dose  (vancomycin IVPB)      -- IV pharmacy to manage frequency 11/05/20 1213    11/05/20 0900  mupirocin 2 % ointment      11/10 0859 Nasl 2 times daily 11/05/20 0401      . Cultures drawn and noted-   Microbiology Results (last 7 days)     Procedure Component Value Units Date/Time    Culture, Respiratory with Gram Stain [[id number]] Collected: 11/05/20 1159    Order Status: Completed Specimen: Respiratory from Tracheal Aspirate Updated: 11/05/20 2010     Gram Stain (Respiratory) <10 epithelial cells per low power field.     Gram Stain (Respiratory) Rare WBC's     Gram Stain (Respiratory) Many Gram negative rods     Gram Stain (Respiratory) Few Gram positive cocci    Blood culture [[id number]] Collected: 11/05/20 1238    Order Status: Sent Specimen: Blood Updated: 11/05/20 1758    AFB Culture & Smear [[id number]] Collected: 11/05/20 1159    Order Status: Sent Specimen: Respiratory from Tracheal Aspirate Updated: 11/05/20 1758    Blood culture [[id number]] Collected: 11/05/20 1111    Order Status: Sent Specimen: Blood from Peripheral, Right Hand Updated: 11/05/20 1758       Will monitor patient closely and continue current treatment plan unchanged.

## 2020-11-06 NOTE — PLAN OF CARE
CM met with pt and granddaughter at bedside (Tabby) to complete discharge assessment. Pt intubated and unable to answer questions- assessment completed by Tabby. Tabby verified information on facesheet as correct. Reports that pt lives at listed address with her. Pt does not have POA. Has 1 child (Crow Almonte [phone]). Stated Crow is working offshore so cannot physically be at hospital but is reachable by phone. Reports pt was independent prior to admission. DME- rollator, cane, wc, rw, nebulizer, pulse ox, bsc, glucometer. Denies having any current home health. Tabby hopeful for pt to discharge to home with home health. CM following to assist in any DC needs.        11/05/20 1400   Discharge Assessment   Assessment Type Discharge Planning Assessment   Confirmed/corrected address and phone number on facesheet? Yes   Assessment information obtained from? Caregiver   Communicated expected length of stay with patient/caregiver yes   Prior to hospitilization cognitive status: Alert/Oriented   Prior to hospitalization functional status: Independent;Assistive Equipment   Current cognitive status: Coma/Sedated/Intubated   Current Functional Status: Completely Dependent   Lives With grandchild(titi)   Is patient able to care for self after discharge? Unable to determine at this time (comments)   Patient's perception of discharge disposition home health   Readmission Within the Last 30 Days no previous admission in last 30 days   Patient currently being followed by outpatient case management? No   Patient currently receives any other outside agency services? No   Equipment Currently Used at Home wheelchair;rollator;nebulizer;bedside commode;glucometer;cane, straight;shower chair   Do you have any problems affording any of your prescribed medications? No   Is the patient taking medications as prescribed? yes   Does the patient have transportation home? Yes   Transportation Anticipated family or friend will  provide   Does the patient receive services at the [facility]? No   Discharge Plan A Home with family   Discharge Plan B Hospice/home   DME Needed Upon Discharge  none   Patient/Family in Agreement with Plan yes

## 2020-11-06 NOTE — ASSESSMENT & PLAN NOTE
Patient with Hypercapnic and Hypoxic Respiratory failure which is Acute.  she is not on home oxygen. Supplemental ventilation was provided and noted- Vent Mode: SIMV  Oxygen Concentration (%):  [] 50  Resp Rate Total:  [19 br/min-33 br/min] 32 br/min  Vt Set:  [450 mL-600 mL] 600 mL  PEEP/CPAP:  [5 cmH20] 5 cmH20  Pressure Support:  [0 onX09-84 cmH20] 20 cmH20  Mean Airway Pressure:  [12 rfA66-79 cmH20] 12 cmH20.   Differential diagnosis includes - COPD, Pneumonia and Aspiration Labs and images were reviewed. Patient Has recent ABG, which has been reviewed.. Will treat underlying causes and adjust management of respiratory failure as follows- continue antibiotics, and consult with pulmonology for manage of ventilator.  Continue bronchodilators and monitor closely.

## 2020-11-06 NOTE — PLAN OF CARE
11/05/20 1950   Patient Assessment/Suction   Level of Consciousness (AVPU) responds to voice   Respiratory Effort Normal;Unlabored   Expansion/Accessory Muscles/Retractions expansion symmetric   All Lung Fields Breath Sounds coarse   Rhythm/Pattern, Respiratory assisted mechanically   Cough Frequency with stimulation   Suction Method tracheal   $ Suction Charges Inline Suction Procedure Stat Charge   Secretions Amount small   Secretions Color creamy;yellow   Secretions Characteristics thick   PRE-TX-O2   O2 Device (Oxygen Therapy) ventilator   Oxygen Concentration (%) 50   SpO2 100 %   Pulse Oximetry Type Continuous   Pulse 91   Resp (!) 23   Temp (!) 93.6 °F (34.2 °C)   ETCO2   $ ETCO2 Usage Currently wearing   ETCO2 (mmHg) 24 mmHg   Aerosol Therapy   $ Aerosol Therapy Charges Aerosol Treatment   Respiratory Treatment Status (SVN) given   Treatment Route (SVN) ventilator   Patient Position (SVN) HOB elevated   Post Treatment Assessment (SVN) breath sounds unchanged   Signs of Intolerance (SVN) none   Breath Sounds Post-Respiratory Treatment   Post-treatment Heart Rate (beats/min) 92   Post-treatment Resp Rate (breaths/min) 23        Airway - Non-Surgical 11/04/20 1730   Placement Date/Time: 11/04/20 1730   Present Prior to Hospital Arrival?: No   Secured at 24 cm   Measured At Lips   Secured Location Center   Secured by Commercial tube frey   Bite Block none   Site Condition Cool;Dry   Status Intact;Secured   Site Assessment Clean   Cuff Pressure 30 cm H2O   Vent Select   Conventional Vent Y   Charged w/in last 24h NO   Preset Conventional Ventilator Settings   Vent Type    Ventilation Type VC   Vent Mode SIMV   Humidity HME   Set Rate 16 BPM   Vt Set 600 mL   PEEP/CPAP 5 cmH20   Pressure Support 20 cmH20   Waveform RAMP   Peak Flow 80 L/min   Set Inspiratory Pressure 0 cmH20   Insp Time 0 Sec(s)   Plateau Set/Insp. Hold (sec) 0   Insp Rise Time  50 %   Trigger Sensitivity Flow/I-Trigger 3 L/min   P High  0 cm H2O   P Low 0 cm H2O   T High 0 sec   T Low 0 sec   Patient Ventilator Parameters   Resp Rate Total 25 br/min   Peak Airway Pressure 26 cmH2O   Mean Airway Pressure 12 cmH20   Plateau Pressure 0 cmH20   Exhaled Vt 643 mL   Total Ve 16.3 mL   Spont Ve 6.92 L   I:E Ratio Measured 1:2.10   Conventional Ventilator Alarms   Ve High Alarm 30 L/min   Resp Rate High Alarm 45 br/min   Press High Alarm 60 cmH2O   Apnea Rate 16   Apnea Volume (mL) 450 mL   Apnea Oxygen Concentration  100   Apnea Flow Rate (L/min) 60   T Apnea 20 sec(s)   Ready to Wean/Extubation Screen   FIO2<=50 (chart decimal) 0.5   MV<16L (chart vol.) (!) 16.3   PEEP <=8 (chart #) 5   Ready to Wean Parameters   F/VT Ratio<105 (RSBI) (!) 35.77

## 2020-11-06 NOTE — CHAPLAIN
Brief visit w/pt, one way conversation as she's intubated and sedated; said blessing over her; Lord, in your mercy.

## 2020-11-06 NOTE — PLAN OF CARE
Problem: Adult Inpatient Plan of Care  Goal: Plan of Care Review  Outcome: Ongoing, Progressing  Goal: Patient-Specific Goal (Individualization)  Outcome: Ongoing, Progressing  Goal: Absence of Hospital-Acquired Illness or Injury  Outcome: Ongoing, Progressing  Goal: Optimal Comfort and Wellbeing  Outcome: Ongoing, Progressing  Goal: Readiness for Transition of Care  Outcome: Ongoing, Progressing  Goal: Rounds/Family Conference  Outcome: Ongoing, Progressing     Problem: Infection  Goal: Infection Symptom Resolution  Outcome: Ongoing, Progressing     Problem: Diabetic Ketoacidosis  Goal: Fluid and Electrolyte Balance with Absence of Ketosis  Outcome: Ongoing, Progressing     Problem: Communication Impairment (Mechanical Ventilation, Invasive)  Goal: Effective Communication  Outcome: Ongoing, Progressing     Problem: Device-Related Complication Risk (Mechanical Ventilation, Invasive)  Goal: Optimal Device Function  Outcome: Ongoing, Progressing     Problem: Inability to Wean (Mechanical Ventilation, Invasive)  Goal: Mechanical Ventilation Liberation  Outcome: Ongoing, Progressing     Problem: Nutrition Impairment (Mechanical Ventilation, Invasive)  Goal: Optimal Nutrition Delivery  Outcome: Ongoing, Progressing     Problem: Skin and Tissue Injury (Mechanical Ventilation, Invasive)  Goal: Absence of Device-Related Skin and Tissue Injury  Outcome: Ongoing, Progressing     Problem: Ventilator-Induced Lung Injury (Mechanical Ventilation, Invasive)  Goal: Absence of Ventilator-Induced Lung Injury  Outcome: Ongoing, Progressing     Problem: Communication Impairment (Artificial Airway)  Goal: Effective Communication  Outcome: Ongoing, Progressing     Problem: Device-Related Complication Risk (Artificial Airway)  Goal: Optimal Device Function  Outcome: Ongoing, Progressing     Problem: Skin and Tissue Injury (Artificial Airway)  Goal: Absence of Device-Related Skin or Tissue Injury  Outcome: Ongoing, Progressing      Problem: Noninvasive Ventilation Acute  Goal: Effective Unassisted Ventilation and Oxygenation  Outcome: Ongoing, Progressing     Problem: Diabetes Comorbidity  Goal: Blood Glucose Level Within Desired Range  Outcome: Ongoing, Progressing     Problem: Fall Injury Risk  Goal: Absence of Fall and Fall-Related Injury  Outcome: Ongoing, Progressing     Problem: Restraint, Nonbehavioral (Nonviolent)  Goal: Discontinuation Criteria Achieved  Outcome: Ongoing, Progressing  Goal: Personal Dignity and Safety Maintained  Outcome: Ongoing, Progressing     Problem: Adjustment to Illness (Sepsis/Septic Shock)  Goal: Optimal Coping  Outcome: Ongoing, Progressing     Problem: Bleeding (Sepsis/Septic Shock)  Goal: Absence of Bleeding  Outcome: Ongoing, Progressing     Problem: Glycemic Control Impaired (Sepsis/Septic Shock)  Goal: Blood Glucose Level Within Desired Range  Outcome: Ongoing, Progressing     Problem: Hemodynamic Instability (Sepsis/Septic Shock)  Goal: Effective Tissue Perfusion  Outcome: Ongoing, Progressing     Problem: Infection (Sepsis/Septic Shock)  Goal: Absence of Infection Signs/Symptoms  Outcome: Ongoing, Progressing     Problem: Nutrition Impaired (Sepsis/Septic Shock)  Goal: Optimal Nutrition Intake  Outcome: Ongoing, Progressing     Problem: Respiratory Compromise (Sepsis/Septic Shock)  Goal: Effective Oxygenation and Ventilation  Outcome: Ongoing, Progressing     Problem: Skin Injury Risk Increased  Goal: Skin Health and Integrity  Outcome: Ongoing, Progressing     Problem: Oral Intake Inadequate  Goal: Improved Oral Intake  Outcome: Ongoing, Progressing

## 2020-11-06 NOTE — RESPIRATORY THERAPY
Patient remains on  on AC rate16, , Peep +5 and FI02 .4.  Hr 101 , ETC02 22mmhg and 02 saturation 100%.  Patient intubated via 8 et tube and secured at 24cm with [id number] cuff psi.  Ambu bag and mask at bedside with alarms on and functioning properly at this time

## 2020-11-06 NOTE — PROGRESS NOTES
Pharmacokinetic Assessment Follow Up: IV Vancomycin    Vancomycin serum concentration assessment(s):    The random level was drawn correctly and can be used to guide therapy at this time. The measurement is within the desired definitive target range of 15 to 20 mcg/mL.    Vancomycin Regimen Plan:    Pt's renal function is not stable.  Pharmacy will dose by level.   Pharmacy will dose vancomycin 500 mg x1.  A vancomycin level will be ordered on 11/06/2020 at 1200.       Drug levels (last 3 results):  Recent Labs   Lab Result Units 11/06/20  1017   Vancomycin, Random ug/mL 16.7       Pharmacy will continue to follow and monitor vancomycin.    Please contact pharmacy at extension [phone] for questions regarding this assessment.    Thank you for the consult,   Jeremiah Richter       Patient brief summary:  Yocasta Almonte is a 75 y.o. female initiated on antimicrobial therapy with IV Vancomycin for treatment of lower respiratory infection    The patient's current regimen is pulse dosing    Drug Allergies:   Review of patient's allergies indicates:   Allergen Reactions    Iodine and iodide containing products     Penicillins        Actual Body Weight:   77.6kg    Renal Function:   Estimated Creatinine Clearance: 20.1 mL/min (A) (based on SCr of 2.5 mg/dL (H)).,       CBC (last 72 hours):  Recent Labs   Lab Result Units 11/04/20  1829 11/05/20  0150 11/05/20  0433 11/05/20  1110 11/05/20  1619 11/05/20  2106 11/06/20  0042 11/06/20  0409 11/06/20  0802 11/06/20  1017   WBC K/uL 12.07  --  7.11 7.95 6.73 7.53 6.93 6.63 6.41 6.03   Hemoglobin g/dL 10.2*  --  12.2 12.0 11.7* 11.6* 11.1* 10.9* 10.6* 10.5*   Hemoglobin A1C %  --  6.3*  --   --   --   --   --   --   --   --    Hematocrit % 36.2*  --  39.4 38.6 35.6* 36.1* 32.7* 33.5* 32.2* 31.2*   Platelets K/uL 204  --  191 170 158 168 142* 139* 129* 114*   Gran % % 29.0*  --  64.0 73.9* 34.0* 54.0 54.0 51.0 42.0 75.3*   Lymph % % 58.0*  --  12.0* 14.5* 18.0 12.0* 7.0* 5.0* 8.0* 8.0*    Mono % % 4.0  --  5.0 9.2 2.0* 3.0* 3.0* 3.0* 8.0 7.3   Eosinophil % % 6.0  --  0.0 1.4 0.0 1.0 0.0 0.0 4.0 3.6   Basophil % % 1.0  --  0.0 0.5 0.0 0.0 0.0 0.0 0.0 0.8   Differential Method  Manual  --  Manual Automated Manual Manual Manual Manual Manual Automated       Metabolic Panel (last 72 hours):  Recent Labs   Lab Result Units 11/04/20  1829 11/05/20  0150 11/05/20  0433 11/05/20  1002 11/05/20  1110 11/05/20  1111 11/05/20  1619 11/05/20  2106 11/06/20  0042 11/06/20  0409 11/06/20  0801 11/06/20  1017   Sodium mmol/L 144 144 144  --   --  140 137 136 135* 135*  --  135*   Potassium mmol/L 5.2* 4.1 3.6  --   --  3.8 4.0 4.4 3.6 3.5  --  3.4*   Chloride mmol/L 111* 116* 116*  --   --  112* 109 109 107 107  --  109   CO2 mmol/L 9* 16* 16*  --   --  13* 13* 13* 13* 13*  --  11*   Glucose mg/dL 432* 155* 103  --  163* 161*  162* 122*  120* 115* 157* 177* 157* 140*  143*   Glucose, UA   --   --   --  1+*  --   --   --   --   --   --   --   --    BUN mg/dL 12 17 18  --   --  20 22 24* 26* 27*  --  28*   Creatinine mg/dL 1.3 1.3 1.5*  --   --  1.7* 1.9* 2.1* 2.2* 2.4*  --  2.5*   Albumin g/dL 2.9*  --   --   --   --   --   --   --   --   --   --   --    Total Bilirubin mg/dL 0.1  --   --   --   --   --   --   --   --   --   --   --    Alkaline Phosphatase U/L 114  --   --   --   --   --   --   --   --   --   --   --    AST U/L 149*  --   --   --   --   --   --   --   --   --   --   --    ALT U/L 80*  --   --   --   --   --   --   --   --   --   --   --    Magnesium mg/dL  --   --   --   --  1.7  --  1.7 2.5 2.2 2.2 2.1 2.0   Phosphorus mg/dL  --   --  4.3  --  4.5  --   --   --   --  5.1*  --   --        Vancomycin Administrations:  vancomycin given in the last 96 hours                     vancomycin 1.5 g in dextrose 5 % 250 mL IVPB (ready to mix) (mg) 1,500 mg New Bag 11/05/20 2700                    Microbiologic Results:  Microbiology Results (last 7 days)       Procedure Component Value Units Date/Time     Blood culture [[id number]] Collected: 11/05/20 1238    Order Status: Completed Specimen: Blood Updated: 11/06/20 0115     Blood Culture, Routine No Growth to date    Blood culture [[id number]] Collected: 11/05/20 1111    Order Status: Completed Specimen: Blood from Peripheral, Right Hand Updated: 11/06/20 0115     Blood Culture, Routine No Growth to date    Culture, Respiratory with Gram Stain [[id number]] Collected: 11/05/20 1159    Order Status: Completed Specimen: Respiratory from Tracheal Aspirate Updated: 11/05/20 2010     Gram Stain (Respiratory) <10 epithelial cells per low power field.     Gram Stain (Respiratory) Rare WBC's     Gram Stain (Respiratory) Many Gram negative rods     Gram Stain (Respiratory) Few Gram positive cocci    AFB Culture & Smear [[id number]] Collected: 11/05/20 1159    Order Status: Sent Specimen: Respiratory from Tracheal Aspirate Updated: 11/05/20 1757

## 2020-11-06 NOTE — SUBJECTIVE & OBJECTIVE
Interval History:  Patient seen and examined.  Plan of care discussed with multiple family members.  Patient's blood pressure and temperature remain labile throughout the day.  Electrolytes were replaced.  Patient is having some grimacing and breathing over the ventilator.  Also noted red beans and rice coming up through ET tube with isacc aspiration.    Review of Systems   Unable to perform ROS: Intubated     Objective:     Vital Signs (Most Recent):  Temp: 97.5 °F (36.4 °C) (11/05/20 1800)  Pulse: 91 (11/05/20 1950)  Resp: (!) 23 (11/05/20 1950)  BP: (!) 101/55 (11/05/20 1705)  SpO2: 100 % (11/05/20 1950) Vital Signs (24h Range):  Temp:  [94.3 °F (34.6 °C)-99 °F (37.2 °C)] 97.5 °F (36.4 °C)  Pulse:  [] 91  Resp:  [23-51] 23  SpO2:  [76 %-100 %] 100 %  BP: ()/(50-77) 101/55  Arterial Line BP: (115-177)/(43-76) 140/47     Weight: 77.6 kg (171 lb 1.2 oz)  Body mass index is 27.61 kg/m².    Intake/Output Summary (Last 24 hours) at 11/5/2020 2112  Last data filed at 11/5/2020 1800  Gross per 24 hour   Intake 3331.39 ml   Output 2595 ml   Net 736.39 ml      Physical Exam  Vitals signs and nursing note reviewed.   Constitutional:       General: She is not in acute distress.     Appearance: She is not diaphoretic.      Comments: Intubated, sedated   HENT:      Mouth/Throat:      Pharynx: No oropharyngeal exudate.      Comments: ETT/NG tube noted in place.  Eyes:      General:         Right eye: No discharge.         Left eye: No discharge.      Comments: Pupils sluggish   Neck:      Thyroid: No thyromegaly.      Vascular: No JVD.      Trachea: No tracheal deviation.   Cardiovascular:      Heart sounds: No murmur. No friction rub. No gallop.    Pulmonary:      Effort: No respiratory distress.      Breath sounds: No wheezing or rales.      Comments: Breath sounds coarse on ventilator.  Abdominal:      General: There is no distension.      Palpations: Abdomen is soft.      Tenderness: There is no abdominal  tenderness.   Genitourinary:     Comments: Hernandez catheter noted in place.  Musculoskeletal:         General: No tenderness or deformity.   Skin:     Capillary Refill: Capillary refill takes 2 to 3 seconds.      Findings: No erythema or rash.   Neurological:      Motor: No abnormal muscle tone.      Deep Tendon Reflexes: Reflexes normal.      Comments: Patient sedated, unresponsive at present         Significant Labs: All pertinent labs within the past 24 hours have been reviewed.    Significant Imaging: I have reviewed all pertinent imaging results/findings within the past 24 hours.

## 2020-11-06 NOTE — PROGRESS NOTES
[facility] Hospital Medicine  Progress Note    Patient Name: Yocasta Almonte  MRN: [id number]  Patient Class: IP- Inpatient   Admission Date: 11/4/2020  Length of Stay: 1 days  Attending Physician: Chris Marrero MD  Primary Care Provider: Rani Welch MD        Subjective:     Principal Problem:Cardiopulmonary arrest        HPI:  Ms. Yocasta Almonte is a 75 y.o. female, with PMH of CLL, right lung cancer, COPD, T2DM, HTN, HLD, thyroid disease, anemia, OA, GERD, tobacco abuse, who presented to [facility] ED on 11/4/20 after she called EMS 2/2 to SOB and went unresponsive on the phone. Upon arrival, EMS found her with a breathing treatment in process, s/p emesis, and with an inhaler nearby. Glucose was 331. She was given 3 rounds of epinephrine after finding rhythm was asystole with pupils fixed and dilated. She had ROSC w/ the epinephrine briefly each time. Upon arrival to ED EKG showed sinus tachycardia. She was a difficult intubation as she had many partially digested beans in her airway. ED labs concerning for DKA. Given her h/o cancer, and SOB preceding her arrest, a CTA Chest was ordered to evaluate for PE and was negative for PE, but did indicate aspiration pneumonia. A CTHead was negative for acute intracranial abnormalities. A CXR indicates patchy opacification of the pulmonary parenchyma, concerning for pneumonia or aspiration.     Overview/Hospital Course:  No notes on file    Interval History:  Patient seen and examined.  Plan of care discussed with multiple family members.  Patient's blood pressure and temperature remain labile throughout the day.  Electrolytes were replaced.  Patient is having some grimacing and breathing over the ventilator.  Also noted red beans and rice coming up through ET tube with isacc aspiration.    Review of Systems   Unable to perform ROS: Intubated     Objective:     Vital Signs (Most Recent):  Temp: 97.5 °F (36.4 °C) (11/05/20 1800)  Pulse: 91 (11/05/20  1950)  Resp: (!) 23 (11/05/20 1950)  BP: (!) 101/55 (11/05/20 1705)  SpO2: 100 % (11/05/20 1950) Vital Signs (24h Range):  Temp:  [94.3 °F (34.6 °C)-99 °F (37.2 °C)] 97.5 °F (36.4 °C)  Pulse:  [] 91  Resp:  [23-51] 23  SpO2:  [76 %-100 %] 100 %  BP: ()/(50-77) 101/55  Arterial Line BP: (115-177)/(43-76) 140/47     Weight: 77.6 kg (171 lb 1.2 oz)  Body mass index is 27.61 kg/m².    Intake/Output Summary (Last 24 hours) at 11/5/2020 2112  Last data filed at 11/5/2020 1800  Gross per 24 hour   Intake 3331.39 ml   Output 2595 ml   Net 736.39 ml      Physical Exam  Vitals signs and nursing note reviewed.   Constitutional:       General: She is not in acute distress.     Appearance: She is not diaphoretic.      Comments: Intubated, sedated   HENT:      Mouth/Throat:      Pharynx: No oropharyngeal exudate.      Comments: ETT/NG tube noted in place.  Eyes:      General:         Right eye: No discharge.         Left eye: No discharge.      Comments: Pupils sluggish   Neck:      Thyroid: No thyromegaly.      Vascular: No JVD.      Trachea: No tracheal deviation.   Cardiovascular:      Heart sounds: No murmur. No friction rub. No gallop.    Pulmonary:      Effort: No respiratory distress.      Breath sounds: No wheezing or rales.      Comments: Breath sounds coarse on ventilator.  Abdominal:      General: There is no distension.      Palpations: Abdomen is soft.      Tenderness: There is no abdominal tenderness.   Genitourinary:     Comments: Hernandez catheter noted in place.  Musculoskeletal:         General: No tenderness or deformity.   Skin:     Capillary Refill: Capillary refill takes 2 to 3 seconds.      Findings: No erythema or rash.   Neurological:      Motor: No abnormal muscle tone.      Deep Tendon Reflexes: Reflexes normal.      Comments: Patient sedated, unresponsive at present         Significant Labs: All pertinent labs within the past 24 hours have been reviewed.    Significant Imaging: I have reviewed  all pertinent imaging results/findings within the past 24 hours.      Assessment/Plan:      * Cardiopulmonary arrest  Patient with prolonged resuscitation, asystole as initial presenting rhythm, and unsure time of arrest.  He is 10 to predict poor outcomes in patients for anoxic brain injury.  I have discussed the patient most likely has a greater than 95% chance of severe neurologic episode given this free factors above.  However, will continue to monitor closely.  Plan of care discussed with Cardiology.  Noted echocardiogram with ejection fraction in the 30-40% range was likely secondary to myocardial stunning.  No need to adjust medication at this point in time.    Acute hypoxemic respiratory failure  Patient with Hypercapnic and Hypoxic Respiratory failure which is Acute.  she is not on home oxygen. Supplemental ventilation was provided and noted- Vent Mode: SIMV  Oxygen Concentration (%):  [] 50  Resp Rate Total:  [19 br/min-33 br/min] 32 br/min  Vt Set:  [450 mL-600 mL] 600 mL  PEEP/CPAP:  [5 cmH20] 5 cmH20  Pressure Support:  [0 tqE93-68 cmH20] 20 cmH20  Mean Airway Pressure:  [12 nzK66-40 cmH20] 12 cmH20.   Differential diagnosis includes - COPD, Pneumonia and Aspiration Labs and images were reviewed. Patient Has recent ABG, which has been reviewed.. Will treat underlying causes and adjust management of respiratory failure as follows- continue antibiotics, and consult with pulmonology for manage of ventilator.  Continue bronchodilators and monitor closely.        Aspiration pneumonia  Patient with current diagnosis of pneumonia with concern for bacterial etiology due to  Streptococcus pneumoniae, MRSA and Klebsiella which is uncontrolled due to persistent hypoxia  and persistent altered mental status. Current antimicrobial regimen consists of   Antibiotics (From admission, onward)    Start     Stop Route Frequency Ordered    11/05/20 1345  cefTRIAXone (ROCEPHIN) 1 g/50 mL D5W IVPB      -- IV Every 24  hours (non-standard times) 11/05/20 1213    11/05/20 1313  vancomycin - pharmacy to dose  (vancomycin IVPB)      -- IV pharmacy to manage frequency 11/05/20 1213    11/05/20 0900  mupirocin 2 % ointment      11/10 0859 Nasl 2 times daily 11/05/20 0401      . Cultures drawn and noted-   Microbiology Results (last 7 days)     Procedure Component Value Units Date/Time    Culture, Respiratory with Gram Stain [[id number]] Collected: 11/05/20 1159    Order Status: Completed Specimen: Respiratory from Tracheal Aspirate Updated: 11/05/20 2010     Gram Stain (Respiratory) <10 epithelial cells per low power field.     Gram Stain (Respiratory) Rare WBC's     Gram Stain (Respiratory) Many Gram negative rods     Gram Stain (Respiratory) Few Gram positive cocci    Blood culture [[id number]] Collected: 11/05/20 1238    Order Status: Sent Specimen: Blood Updated: 11/05/20 1758    AFB Culture & Smear [[id number]] Collected: 11/05/20 1159    Order Status: Sent Specimen: Respiratory from Tracheal Aspirate Updated: 11/05/20 1758    Blood culture [[id number]] Collected: 11/05/20 1111    Order Status: Sent Specimen: Blood from Peripheral, Right Hand Updated: 11/05/20 1758       Will monitor patient closely and continue current treatment plan unchanged.        COPD (chronic obstructive pulmonary disease)  Patient's COPD is uncontrolled due to continued dyspnea and worsening of baseline hypoxia currently.  Patient is currently off COPD Pathway. Continue scheduled inhalers Antibiotics and Supplemental oxygen and monitor respiratory status closely.       Anoxic encephalopathy  Patient has acute anoxic encephalopathy that likely secondary to Cerebral ischemia. Patient's normal mental status is. awake and alert; oriented to person, place, and time Evaluation and treatment for underlying cause is underway. Eval includes Consult to Neurology and Neuro-Imaging (MRI/CT). Will monitor neuro checks carefully, avoid narcotics and benzos that will  exacerbate agitation, and use PRN anti-psychotics for controls of behavior for self harm.          Type 2 diabetes mellitus with other specified complication  Patient's FSGs are controlled on current hypoglycemics.   Last A1c reviewed-   Lab Results   Component Value Date    HGBA1C 6.3 (H) 11/05/2020     Most recent fingerstick glucose reviewed-   Recent Labs   Lab 11/05/20  1039 11/05/20  1353 11/05/20  1839 11/05/20  2112   POCTGLUCOSE 154* 146* 96 110     Current correctional scale  Low  Maintain anti-hyperglycemic dose as follows-   Antihyperglycemics (From admission, onward)    Start     Stop Route Frequency Ordered    11/05/20 1315  insulin aspart U-100 pen 0-5 Units      -- SubQ Every 6 hours PRN 11/05/20 1217        Hold Oral hypoglycemics while patient is in the hospital.      Chronic lymphocytic leukemia  - follows w/ Hematology  - Ibrutinib d/c'd on 10/5/20       Coronary artery disease  Hold oral medications for now      Nicotine dependence, unspecified, uncomplicated  Will attempt counseling once patient is more alert      Anemia of decreased vitamin B12 absorption  Patient's anemia is currently controlled. Has not received any PRBCs to date.. Etiology likely d/t acute blood loss  Current CBC reviewed-   Lab Results   Component Value Date    HGB 11.7 (L) 11/05/2020    HCT 35.6 (L) 11/05/2020     Monitor serial CBC and transfuse if patient becomes hemodynamically unstable, symptomatic or H/H drops below 7/21.       HLD (hyperlipidemia)  Oral medications held at this moment in time      Essential hypertension  Continue on norepinephrine hold blood pressure medications secondary to hypotension.      Malignant neoplasm of upper lobe of left lung  Noted-chronic.  No acute inpatient management indicated at this point in time.        VTE Risk Mitigation (From admission, onward)         Ordered     Place sequential compression device  Until discontinued      11/05/20 0527     IP VTE HIGH RISK PATIENT  Once       11/05/20 0527     Place JIAN hose  Until discontinued      11/04/20 2217     Place sequential compression device  Until discontinued      11/04/20 2217     Place sequential compression device  Until discontinued      11/04/20 2039                Discharge Planning   RAF:      Code Status: Full Code   Is the patient medically ready for discharge?:     Reason for patient still in hospital (select all that apply): Treatment  Discharge Plan A: Home with family            Critical care time spent on the evaluation and treatment of severe organ dysfunction, review of pertinent labs and imaging studies, discussions with consulting providers and discussions with patient/family:  48 minutes.      Chris Marrero MD  Department of Hospital Medicine   [facility]

## 2020-11-06 NOTE — ASSESSMENT & PLAN NOTE
Patient's COPD is uncontrolled due to continued dyspnea and worsening of baseline hypoxia currently.  Patient is currently off COPD Pathway. Continue scheduled inhalers Antibiotics and Supplemental oxygen and monitor respiratory status closely.

## 2020-11-06 NOTE — ASSESSMENT & PLAN NOTE
Patient's anemia is currently controlled. Has not received any PRBCs to date.. Etiology likely d/t acute blood loss  Current CBC reviewed-   Lab Results   Component Value Date    HGB 11.7 (L) 11/05/2020    HCT 35.6 (L) 11/05/2020     Monitor serial CBC and transfuse if patient becomes hemodynamically unstable, symptomatic or H/H drops below 7/21.

## 2020-11-06 NOTE — ASSESSMENT & PLAN NOTE
Patient with prolonged resuscitation, asystole as initial presenting rhythm, and unsure time of arrest.  He is 10 to predict poor outcomes in patients for anoxic brain injury.  I have discussed the patient most likely has a greater than 95% chance of severe neurologic episode given this free factors above.  However, will continue to monitor closely.  Plan of care discussed with Cardiology.  Noted echocardiogram with ejection fraction in the 30-40% range was likely secondary to myocardial stunning.  No need to adjust medication at this point in time.

## 2020-11-06 NOTE — ASSESSMENT & PLAN NOTE
Patient has acute anoxic encephalopathy that likely secondary to Cerebral ischemia. Patient's normal mental status is. awake and alert; oriented to person, place, and time Evaluation and treatment for underlying cause is underway. Eval includes Consult to Neurology and Neuro-Imaging (MRI/CT). Will monitor neuro checks carefully, avoid narcotics and benzos that will exacerbate agitation, and use PRN anti-psychotics for controls of behavior for self harm.

## 2020-11-06 NOTE — PLAN OF CARE
Pt remained free of falls or injuries this shift.  Pt remained on Targeted Temperature management, with sedation titrated to keep pt from shivering.  Pt tolerated cooling well, BP controlled with Levo as needed.  All labs drawn as ordered, VSS stable throughout the night.  Will continue to monitor.

## 2020-11-06 NOTE — ASSESSMENT & PLAN NOTE
Patient's FSGs are controlled on current hypoglycemics.   Last A1c reviewed-   Lab Results   Component Value Date    HGBA1C 6.3 (H) 11/05/2020     Most recent fingerstick glucose reviewed-   Recent Labs   Lab 11/05/20  1039 11/05/20  1353 11/05/20  1839 11/05/20  2112   POCTGLUCOSE 154* 146* 96 110     Current correctional scale  Low  Maintain anti-hyperglycemic dose as follows-   Antihyperglycemics (From admission, onward)    Start     Stop Route Frequency Ordered    11/05/20 1315  insulin aspart U-100 pen 0-5 Units      -- SubQ Every 6 hours PRN 11/05/20 1217        Hold Oral hypoglycemics while patient is in the hospital.

## 2020-11-07 NOTE — SUBJECTIVE & OBJECTIVE
Interval History:  Patient seen and examined.  Process of rewarming began today.  Patient continues to show evidence of neurologic activity, including grimacing, asynchronous on a ventilator, and movement of upper extremities.  Pupils remain unequal.  Plan of care discussed with pulmonology, Cardiology, and patient's family at the bedside.    Review of Systems   Unable to perform ROS: Intubated     Objective:     Vital Signs (Most Recent):  Temp: 97.7 °F (36.5 °C) (11/06/20 1922)  Pulse: 104 (11/06/20 1922)  Resp: (!) 22 (11/06/20 1922)  BP: (!) 78/47 (11/06/20 1922)  SpO2: 100 % (11/06/20 1922) Vital Signs (24h Range):  Temp:  [91.8 °F (33.2 °C)-98.6 °F (37 °C)] 97.7 °F (36.5 °C)  Pulse:  [] 104  Resp:  [16-28] 22  SpO2:  [97 %-100 %] 100 %  BP: ()/(47-61) 78/47  Arterial Line BP: ()/(38-57) 106/41     Weight: 77.6 kg (171 lb 1.2 oz)  Body mass index is 27.61 kg/m².    Intake/Output Summary (Last 24 hours) at 11/6/2020 2133  Last data filed at 11/6/2020 1700  Gross per 24 hour   Intake 1508.19 ml   Output 520 ml   Net 988.19 ml      Physical Exam  Vitals signs and nursing note reviewed.   Constitutional:       General: She is not in acute distress.     Appearance: She is not diaphoretic.      Comments: Intubated, sedated   HENT:      Mouth/Throat:      Pharynx: No oropharyngeal exudate.      Comments: ETT/NG tube noted in place.  Eyes:      General:         Right eye: No discharge.         Left eye: No discharge.      Comments: Pupils sluggish   Neck:      Thyroid: No thyromegaly.      Vascular: No JVD.      Trachea: No tracheal deviation.   Cardiovascular:      Heart sounds: No murmur. No friction rub. No gallop.    Pulmonary:      Effort: No respiratory distress.      Breath sounds: No wheezing or rales.      Comments: Breath sounds coarse on ventilator.  Abdominal:      General: There is no distension.      Palpations: Abdomen is soft.      Tenderness: There is no abdominal tenderness.    Genitourinary:     Comments: Hernandez catheter noted in place.  Musculoskeletal:         General: No tenderness or deformity.   Skin:     Capillary Refill: Capillary refill takes 2 to 3 seconds.      Findings: No erythema or rash.   Neurological:      Motor: No abnormal muscle tone.      Deep Tendon Reflexes: Reflexes normal.      Comments: Patient sedated, unresponsive at present         Significant Labs: All pertinent labs within the past 24 hours have been reviewed.    Significant Imaging: I have reviewed all pertinent imaging results/findings within the past 24 hours.

## 2020-11-07 NOTE — ASSESSMENT & PLAN NOTE
Patient with Hypercapnic and Hypoxic Respiratory failure which is Acute.  she is not on home oxygen. Supplemental ventilation was provided and noted- Vent Mode: A/C  Oxygen Concentration (%):  [30-50] 30  Resp Rate Total:  [15 br/min-23 br/min] 22 br/min  Vt Set:  [600 mL] 600 mL  PEEP/CPAP:  [5 cmH20] 5 cmH20  Pressure Support:  [0 qqE61-14 cmH20] 0 cmH20  Mean Airway Pressure:  [11 xzA96-31 cmH20] 13 cmH20.   Differential diagnosis includes - COPD, Pneumonia and Aspiration Labs and images were reviewed. Patient Has recent ABG, which has been reviewed.. Will treat underlying causes and adjust management of respiratory failure as follows- continue antibiotics, and consult with pulmonology for manage of ventilator.  Continue bronchodilators and monitor closely.

## 2020-11-07 NOTE — PROGRESS NOTES
Pharmacokinetic Assessment Follow Up: IV Vancomycin    Vancomycin serum concentration assessment(s):    The random level was drawn correctly and can be used to guide therapy at this time. The measurement is within the desired definitive target range of 15 to 20 mcg/mL.    Vancomycin Regimen Plan:    Patient's random level was 19.8 mcg/mL and within the therapeutic range of 15-20 mcg/mL.  Patient will not be re-dosed vancomycin. Hold dose today.   A random level will be drawn on 11/8/20 with AM labs.   Continue target goal of 15-20 mcg/mL.    Drug levels (last 3 results):  Recent Labs   Lab Result Units 11/06/20  1017 11/07/20  1235   Vancomycin, Random ug/mL 16.7 19.8       Pharmacy will continue to follow and monitor vancomycin.    Please contact pharmacy at extension [phone] for questions regarding this assessment.    Thank you for the consult,   Tanner Burroughs       Patient brief summary:  Yocasta Almonte is a 75 y.o. female initiated on antimicrobial therapy with IV Vancomycin for treatment of lower respiratory infection    The patient is being dosed by level due to unstable renal function. Patient's renal function has continued to decline.     Drug Allergies:   Review of patient's allergies indicates:   Allergen Reactions    Iodine and iodide containing products     Penicillins        Actual Body Weight:   77.6 kg (171 lb 1.2 oz)    Renal Function:   Estimated Creatinine Clearance: 14.4 mL/min (A) (based on SCr of 3.5 mg/dL (H)).,     Dialysis Method (if applicable):  N/A    CBC (last 72 hours):  Recent Labs   Lab Result Units 11/04/20  1829 11/05/20  0150 11/05/20  0433 11/05/20  1110 11/05/20  1619 11/05/20  2106 11/06/20  0042 11/06/20  0409 11/06/20  0802 11/06/20  1017 11/06/20  1553 11/06/20  2010 11/06/20  2339 11/07/20  0455 11/07/20  0806 11/07/20  1235   WBC K/uL 12.07  --  7.11 7.95 6.73 7.53 6.93 6.63 6.41 6.03 6.68 8.60 9.08 10.91 10.68 10.42   Hemoglobin g/dL 10.2*  --  12.2 12.0 11.7* 11.6* 11.1*  10.9* 10.6* 10.5* 9.8* 9.7* 9.5* 9.8* 9.4* 8.9*   Hemoglobin A1C %  --  6.3*  --   --   --   --   --   --   --   --   --   --   --   --   --   --    Hematocrit % 36.2*  --  39.4 38.6 35.6* 36.1* 32.7* 33.5* 32.2* 31.2* 28.9* 28.6* 28.0* 29.2* 28.3* 26.9*   Platelets K/uL 204  --  191 170 158 168 142* 139* 129* 114* 127* 130* 127* 130* 128* 124*   Gran % % 29.0*  --  64.0 73.9* 34.0* 54.0 54.0 51.0 42.0 75.3* 36.0* 59.0 55.0 71.0 79.0*  --    Lymph % % 58.0*  --  12.0* 14.5* 18.0 12.0* 7.0* 5.0* 8.0* 8.0* 7.0* 5.0* 16.0* 6.0* 11.0*  --    Mono % % 4.0  --  5.0 9.2 2.0* 3.0* 3.0* 3.0* 8.0 7.3 6.0 5.0 3.0* 7.0 9.0  --    Eosinophil % % 6.0  --  0.0 1.4 0.0 1.0 0.0 0.0 4.0 3.6 4.0 1.0 4.0 0.0 1.0  --    Basophil % % 1.0  --  0.0 0.5 0.0 0.0 0.0 0.0 0.0 0.8 0.0 1.0 0.0 1.0 0.0  --    Differential Method  Manual  --  Manual Automated Manual Manual Manual Manual Manual Automated Manual Manual Manual Manual Manual  --        Metabolic Panel (last 72 hours):  Recent Labs   Lab Result Units 11/04/20  1829 11/05/20  0150 11/05/20  0433 11/05/20  1002 11/05/20  1110 11/05/20  1111 11/05/20  1619 11/05/20  2106 11/06/20  0042 11/06/20  0409 11/06/20  0801 11/06/20  1017 11/06/20  1259 11/06/20  1553 11/06/20 2010 11/06/20  2339 11/07/20  0455 11/07/20  0806 11/07/20  1235   Sodium mmol/L 144 144 144  --   --  140 137 136 135* 135*  --  135* 135*  --  131* 131* 134*  --   --    Potassium mmol/L 5.2* 4.1 3.6  --   --  3.8 4.0 4.4 3.6 3.5  --  3.4* 3.7  --  3.6 3.5 3.9  --   --    Chloride mmol/L 111* 116* 116*  --   --  112* 109 109 107 107  --  109 107  --  104 104 106  --   --    CO2 mmol/L 9* 16* 16*  --   --  13* 13* 13* 13* 13*  --  11* 11*  --  11* 10* 12*  --   --    Glucose mg/dL 432* 155* 103  --  163* 161*  162* 122*  120* 115* 157* 177* 157* 140*  143* 122* 139* 111* 117*  118* 85 74 83   Glucose, UA   --   --   --  1+*  --   --   --   --   --   --   --   --   --   --   --   --   --   --   --    BUN mg/dL 12 17 18   --   --  20 22 24* 26* 27*  --  28* 32*  --  34* 38* 41*  --   --    Creatinine mg/dL 1.3 1.3 1.5*  --   --  1.7* 1.9* 2.1* 2.2* 2.4*  --  2.5* 2.7*  --  3.1* 3.3* 3.5*  --   --    Albumin g/dL 2.9*  --   --   --   --   --   --   --   --   --   --   --   --   --   --   --   --   --   --    Total Bilirubin mg/dL 0.1  --   --   --   --   --   --   --   --   --   --   --   --   --   --   --   --   --   --    Alkaline Phosphatase U/L 114  --   --   --   --   --   --   --   --   --   --   --   --   --   --   --   --   --   --    AST U/L 149*  --   --   --   --   --   --   --   --   --   --   --   --   --   --   --   --   --   --    ALT U/L 80*  --   --   --   --   --   --   --   --   --   --   --   --   --   --   --   --   --   --    Magnesium mg/dL  --   --   --   --  1.7  --  1.7 2.5 2.2 2.2 2.1 2.0  --  2.0 2.1 2.0 2.2  --   --    Phosphorus mg/dL  --   --  4.3  --  4.5  --   --   --   --  5.1*  --   --   --   --   --   --  5.9*  --   --        Vancomycin Administrations:  vancomycin given in the last 96 hours                     vancomycin 500 mg in dextrose 5 % 100 mL IVPB (ready to mix system) (mg) 500 mg New Bag 11/06/20 1324    vancomycin 1.5 g in dextrose 5 % 250 mL IVPB (ready to mix) (mg) 1,500 mg New Bag 11/05/20 1320                    Microbiologic Results:  Microbiology Results (last 7 days)       Procedure Component Value Units Date/Time    Culture, Respiratory with Gram Stain [[id number]]  (Abnormal) Collected: 11/05/20 1159    Order Status: Completed Specimen: Respiratory from Tracheal Aspirate Updated: 11/07/20 1149     Respiratory Culture ACINETOBACTER BAUMANNII COMPLEX  Moderate  Susceptibility pending  Normal respiratory anuja also present       Gram Stain (Respiratory) <10 epithelial cells per low power field.     Gram Stain (Respiratory) Rare WBC's     Gram Stain (Respiratory) Many Gram negative rods     Gram Stain (Respiratory) Few Gram positive cocci    AFB Culture & Smear [[id number]] Collected:  11/05/20 1159    Order Status: Completed Specimen: Respiratory from Tracheal Aspirate Updated: 11/06/20 2127     AFB Culture & Smear Culture in progress     AFB CULTURE STAIN No acid fast bacilli seen.    Blood culture [[id number]] Collected: 11/05/20 1111    Order Status: Completed Specimen: Blood from Peripheral, Right Hand Updated: 11/06/20 2012     Blood Culture, Routine No Growth to date      No Growth to date    Blood culture [[id number]] Collected: 11/05/20 1238    Order Status: Completed Specimen: Blood Updated: 11/06/20 2012     Blood Culture, Routine No Growth to date      No Growth to date

## 2020-11-07 NOTE — PLAN OF CARE
11/06/20 1922   Patient Assessment/Suction   Level of Consciousness (AVPU) responds to voice   Respiratory Effort Normal;Unlabored   Expansion/Accessory Muscles/Retractions expansion symmetric   All Lung Fields Breath Sounds coarse   Rhythm/Pattern, Respiratory assisted mechanically   Cough Frequency with stimulation   Suction Method oral;tracheal   $ Suction Charges Inline Suction Procedure Stat Charge   Secretions Amount small   Secretions Color yellow   Secretions Characteristics thick   PRE-TX-O2   O2 Device (Oxygen Therapy) ventilator   Oxygen Concentration (%) 30   SpO2 100 %   Pulse Oximetry Type Continuous   Pulse 104   Resp (!) 22   Temp 97.7 °F (36.5 °C)   BP (!) 78/47   ETCO2   $ ETCO2 Usage Currently wearing   ETCO2 (mmHg) 17 mmHg   Aerosol Therapy   $ Aerosol Therapy Charges Aerosol Treatment   Respiratory Treatment Status (SVN) given   Treatment Route (SVN) ventilator   Patient Position (SVN) HOB elevated   Post Treatment Assessment (SVN) breath sounds unchanged   Signs of Intolerance (SVN) none   Breath Sounds Post-Respiratory Treatment   Post-treatment Heart Rate (beats/min) 104   Post-treatment Resp Rate (breaths/min) 22   Wound Care   $ Wound Care Tech Time 15 min   Area of Concern Upper lip;Lower lip   Skin Color/Characteristics without discoloration   Skin Temperature warm        Airway - Non-Surgical 11/04/20 1730   Placement Date/Time: 11/04/20 1730   Present Prior to Hospital Arrival?: No   Secured at 24 cm   Measured At Lips   Secured Location Center   Secured by Commercial tube frey   Bite Block none   Site Condition Cool;Dry   Status Intact;Secured   Site Assessment Clean   Cuff Pressure 28 cm H2O   Vent Select   Conventional Vent Y   Charged w/in last 24h YES   Preset Conventional Ventilator Settings   Vent Type    Ventilation Type VC   Vent Mode A/C   Humidity HME   Set Rate 22 BPM   Vt Set 600 mL   PEEP/CPAP 5 cmH20   Pressure Support 0 cmH20   Waveform RAMP   Peak Flow 80  L/min   Set Inspiratory Pressure 0 cmH20   Insp Time 0 Sec(s)   Plateau Set/Insp. Hold (sec) 0   Insp Rise Time  0 %   Trigger Sensitivity Flow/I-Trigger 3 L/min   P High 0 cm H2O   P Low 0 cm H2O   T High 0 sec   T Low 0 sec   Patient Ventilator Parameters   Resp Rate Total 22 br/min   Peak Airway Pressure 31 cmH2O   Mean Airway Pressure 13 cmH20   Plateau Pressure 0 cmH20   Exhaled Vt 619 mL   Total Ve 13.6 mL   Spont Ve 0 L   I:E Ratio Measured 1:2.30   Conventional Ventilator Alarms   Ve High Alarm 30 L/min   Resp Rate High Alarm 45 br/min   Press High Alarm 60 cmH2O   Apnea Rate 16   Apnea Volume (mL) 450 mL   Apnea Oxygen Concentration  100   Apnea Flow Rate (L/min) 60   T Apnea 20 sec(s)   Ready to Wean/Extubation Screen   FIO2<=50 (chart decimal) 0.3   MV<16L (chart vol.) 13.6   PEEP <=8 (chart #) 5   Ready to Wean Parameters   F/VT Ratio<105 (RSBI) (!) 35.54

## 2020-11-07 NOTE — HOSPITAL COURSE
11/7/2020:  -Pt remains unresponsive.  -CPK trending down.  -Troponin trending down.  -Creatinine now 3.5 vs 2.7 yesterday.

## 2020-11-07 NOTE — PLAN OF CARE
Has been rewarmed per Hypothermia protocol since 1700 yesterday. Remains connected to [employer] to maintain temp 98.6. On vent with small dose of propofol and Levophed off since 3am. Coughs,bites down on ETT and moves legs spontaneously but not to command. Sinus Tach 125-135. OGT and Flexicel draining green liquid. Hernandez draining 15-40 cc's/HR. Bed in rotate mode. Continues to be in Metabolic Acidosis per ABG this AM.

## 2020-11-07 NOTE — SUBJECTIVE & OBJECTIVE
ROS  Objective:     Vital Signs (Most Recent):  Temp: 98.6 °F (37 °C) (11/07/20 0800)  Pulse: (!) 127 (11/07/20 1202)  Resp: (!) 22 (11/07/20 1202)  BP: (!) 107/51 (11/07/20 1000)  SpO2: 100 % (11/07/20 1202) Vital Signs (24h Range):  Temp:  [97.3 °F (36.3 °C)-98.8 °F (37.1 °C)] 98.6 °F (37 °C)  Pulse:  [101-137] 127  Resp:  [22-58] 22  SpO2:  [60 %-100 %] 100 %  BP: ()/(47-59) 107/51  Arterial Line BP: ()/(36-77) 131/47     Weight: (unable to weigh due to arctic sun in use)  Body mass index is 27.61 kg/m².     SpO2: 100 %  O2 Device (Oxygen Therapy): ventilator      Intake/Output Summary (Last 24 hours) at 11/7/2020 1506  Last data filed at 11/7/2020 1022  Gross per 24 hour   Intake 693.61 ml   Output 1000 ml   Net -306.39 ml       Lines/Drains/Airways     Central Venous Catheter Line            Percutaneous Central Line Insertion/Assessment - Triple Lumen  11/05/20 0008 right internal jugular 2 days          Drain                 Fecal Incontinence  11/05/20 0100 2 days         NG/OG Tube 11/04/20 1830 orogastric 18 Fr. Center mouth 2 days         Urethral Catheter 11/04/20 2113 Non-latex 16 Fr. 2 days          Airway                 Airway - Non-Surgical 11/04/20 1730 2 days          Arterial Line            Arterial Line 11/05/20 0835 Right Radial 2 days          Peripheral Intravenous Line                 Peripheral IV - Single Lumen 11/04/20 22 G Left Hand 3 days         Peripheral IV - Single Lumen 11/04/20 22 G Right Wrist 3 days                Physical Exam   Constitutional: She appears well-developed and well-nourished.   Does not follow commands.  ET tube in place.   HENT:   Head: Normocephalic and atraumatic.   Eyes: Pupils are equal, round, and reactive to light. EOM are normal.   Neck: Neck supple. JVD present.   Cardiovascular: Normal rate and regular rhythm. Exam reveals no gallop and no friction rub.   No murmur heard.  Pulmonary/Chest: Effort normal and breath sounds  normal.   Abdominal: Soft. Bowel sounds are normal.   Musculoskeletal:         General: No edema.   Neurological:   Does not follow commands   Skin: Skin is dry.   Psychiatric:   Does not follow commands   Nursing note and vitals reviewed.      Significant Labs:   BMP:   Recent Labs   Lab 11/06/20 2010 11/06/20 2339 11/07/20 0455 11/07/20 0806 11/07/20  1235   * 117*  118* 85 74 83   * 131* 134*  --   --    K 3.6 3.5 3.9  --   --     104 106  --   --    CO2 11* 10* 12*  --   --    BUN 34* 38* 41*  --   --    CREATININE 3.1* 3.3* 3.5*  --   --    CALCIUM 7.5* 7.4* 7.7*  --   --    MG 2.1 2.0 2.2  --   --    , CMP   Recent Labs   Lab 11/06/20 2010 11/06/20 2339 11/07/20 0455 11/07/20 0806 11/07/20  1235   * 131* 134*  --   --    K 3.6 3.5 3.9  --   --     104 106  --   --    CO2 11* 10* 12*  --   --    * 117*  118* 85 74 83   BUN 34* 38* 41*  --   --    CREATININE 3.1* 3.3* 3.5*  --   --    CALCIUM 7.5* 7.4* 7.7*  --   --    ANIONGAP 16 17* 16  --   --    ESTGFRAFRICA 16* 15* 14*  --   --    EGFRNONAA 14* 13* 12*  --   --    , CBC   Recent Labs   Lab 11/07/20 0455 11/07/20 0806 11/07/20  1235   WBC 10.91 10.68 10.42   HGB 9.8* 9.4* 8.9*   HCT 29.2* 28.3* 26.9*   * 128* 124*   , INR   Recent Labs   Lab 11/06/20 0409 11/07/20 0455   INR 1.2 1.1   , Lipid Panel No results for input(s): CHOL, HDL, LDLCALC, TRIG, CHOLHDL in the last 48 hours., Troponin   Recent Labs   Lab 11/06/20 2010 11/07/20  0455 11/07/20  1235   TROPONINI 0.735* 0.613* 0.484*    and All pertinent lab results from the last 24 hours have been reviewed.    Significant Imaging:   Echo 11/6/2020:  · Overall the study quality was poor. The study was difficult due to patient's body habitus.  · There is left ventricular concentric remodeling.  · The left ventricle is normal in size with moderately decreased systolic function. The estimated ejection fraction is 40%.  · There is moderate left  ventricular global hypokinesis.  · Grade I diastolic dysfunction.  · Normal right ventricular size with normal right ventricular systolic function.  · Normal central venous pressure (3 mmHg).

## 2020-11-07 NOTE — ASSESSMENT & PLAN NOTE
Patient with prolonged resuscitation, asystole as initial presenting rhythm, and unsure time of arrest.  These characteristics tends to predict poor outcomes in patients for anoxic brain injury.  I have discussed the patient most likely has a greater than 95% chance of severe neurologic injury given these factors above.  However, will continue to monitor closely.  Plan of care discussed with Cardiology.  Noted echocardiogram with ejection fraction in the 30-40% range was likely secondary to myocardial stunning.  No need to adjust medication at this point in time.  Will attempt to wean sedation to determine patient's neurologic status.

## 2020-11-07 NOTE — ASSESSMENT & PLAN NOTE
Patient's FSGs are controlled on current hypoglycemics.   Last A1c reviewed-   Lab Results   Component Value Date    HGBA1C 6.3 (H) 11/05/2020     Most recent fingerstick glucose reviewed-   Recent Labs   Lab 11/06/20  1319   POCTGLUCOSE 114*     Current correctional scale  Low  Maintain anti-hyperglycemic dose as follows-   Antihyperglycemics (From admission, onward)    Start     Stop Route Frequency Ordered    11/05/20 1315  insulin aspart U-100 pen 0-5 Units      -- SubQ Every 6 hours PRN 11/05/20 1217        Hold Oral hypoglycemics while patient is in the hospital.

## 2020-11-07 NOTE — PROGRESS NOTES
[facility]  Cardiology  Progress Note    Patient Name: Yocasta Almonte  MRN: [id number]  Admission Date: 11/4/2020  Hospital Length of Stay: 3 days  Code Status: Full Code   Attending Physician: Rodney Araujo,*   Primary Care Physician: Rani Welch MD  Expected Discharge Date:   Principal Problem:Cardiopulmonary arrest    Subjective:     Hospital Course:   11/7/2020:  -Pt remains unresponsive.  -CPK trending down.  -Troponin trending down.  -Creatinine now 3.5 vs 2.7 yesterday.    ROS- unable to obtain due to patient being intubated.    Objective:     Vital Signs (Most Recent):  Temp: 98.6 °F (37 °C) (11/07/20 0800)  Pulse: (!) 127 (11/07/20 1202)  Resp: (!) 22 (11/07/20 1202)  BP: (!) 107/51 (11/07/20 1000)  SpO2: 100 % (11/07/20 1202) Vital Signs (24h Range):  Temp:  [97.3 °F (36.3 °C)-98.8 °F (37.1 °C)] 98.6 °F (37 °C)  Pulse:  [101-137] 127  Resp:  [22-58] 22  SpO2:  [60 %-100 %] 100 %  BP: ()/(47-59) 107/51  Arterial Line BP: ()/(36-77) 131/47     Weight: (unable to weigh due to arctic sun in use)  Body mass index is 27.61 kg/m².     SpO2: 100 %  O2 Device (Oxygen Therapy): ventilator      Intake/Output Summary (Last 24 hours) at 11/7/2020 1506  Last data filed at 11/7/2020 1022  Gross per 24 hour   Intake 693.61 ml   Output 1000 ml   Net -306.39 ml       Lines/Drains/Airways     Central Venous Catheter Line            Percutaneous Central Line Insertion/Assessment - Triple Lumen  11/05/20 0008 right internal jugular 2 days          Drain                 Fecal Incontinence  11/05/20 0100 2 days         NG/OG Tube 11/04/20 1830 orogastric 18 Fr. Center mouth 2 days         Urethral Catheter 11/04/20 2113 Non-latex 16 Fr. 2 days          Airway                 Airway - Non-Surgical 11/04/20 1730 2 days          Arterial Line            Arterial Line 11/05/20 0835 Right Radial 2 days          Peripheral Intravenous Line                 Peripheral IV - Single  Lumen 11/04/20 22 G Left Hand 3 days         Peripheral IV - Single Lumen 11/04/20 22 G Right Wrist 3 days                Physical Exam   Constitutional: She appears well-developed and well-nourished.   Does not follow commands.  ET tube in place.   HENT:   Head: Normocephalic and atraumatic.   Eyes: Pupils are equal, round, and reactive to light. EOM are normal.   Neck: Neck supple. JVD present.   Cardiovascular: Normal rate and regular rhythm. Exam reveals no gallop and no friction rub.   No murmur heard.  Pulmonary/Chest: Effort normal and breath sounds normal.   Abdominal: Soft. Bowel sounds are normal.   Musculoskeletal:         General: No edema.   Neurological:   Does not follow commands   Skin: Skin is dry.   Psychiatric:   Does not follow commands   Nursing note and vitals reviewed.      Significant Labs:   BMP:   Recent Labs   Lab 11/06/20 2010 11/06/20 2339 11/07/20 0455 11/07/20 0806 11/07/20  1235   * 117*  118* 85 74 83   * 131* 134*  --   --    K 3.6 3.5 3.9  --   --     104 106  --   --    CO2 11* 10* 12*  --   --    BUN 34* 38* 41*  --   --    CREATININE 3.1* 3.3* 3.5*  --   --    CALCIUM 7.5* 7.4* 7.7*  --   --    MG 2.1 2.0 2.2  --   --    , CMP   Recent Labs   Lab 11/06/20 2010 11/06/20 2339 11/07/20 0455 11/07/20  0806 11/07/20  1235   * 131* 134*  --   --    K 3.6 3.5 3.9  --   --     104 106  --   --    CO2 11* 10* 12*  --   --    * 117*  118* 85 74 83   BUN 34* 38* 41*  --   --    CREATININE 3.1* 3.3* 3.5*  --   --    CALCIUM 7.5* 7.4* 7.7*  --   --    ANIONGAP 16 17* 16  --   --    ESTGFRAFRICA 16* 15* 14*  --   --    EGFRNONAA 14* 13* 12*  --   --    , CBC   Recent Labs   Lab 11/07/20  0455 11/07/20  0806 11/07/20  1235   WBC 10.91 10.68 10.42   HGB 9.8* 9.4* 8.9*   HCT 29.2* 28.3* 26.9*   * 128* 124*   , INR   Recent Labs   Lab 11/06/20  0409 11/07/20  0455   INR 1.2 1.1   , Lipid Panel No results for input(s): CHOL, HDL, LDLCALC, TRIG,  CHOLHDL in the last 48 hours., Troponin   Recent Labs   Lab 11/06/20 2010 11/07/20  0455 11/07/20  1235   TROPONINI 0.735* 0.613* 0.484*    and All pertinent lab results from the last 24 hours have been reviewed.    Significant Imaging:   Echo 11/6/2020:  · Overall the study quality was poor. The study was difficult due to patient's body habitus.  · There is left ventricular concentric remodeling.  · The left ventricle is normal in size with moderately decreased systolic function. The estimated ejection fraction is 40%.  · There is moderate left ventricular global hypokinesis.  · Grade I diastolic dysfunction.  · Normal right ventricular size with normal right ventricular systolic function.  · Normal central venous pressure (3 mmHg).      Assessment and Plan:     IMPRESSION:  Status post resuscitation from cardiac arrest.  Initial rhythm reportedly was asystole.  Currently patient is in sinus rhythm.  Hypoxemic respiratory failure.  With possible aspiration pneumonia.  Rhabdomyolysis.  Elevated troponin.  Etiology could be rhabdomyolysis.  Cardiomyopathy.  No evidence of any scar tissue on the echocardiogram.  This could represent stunning of the myocardium.  History of COPD.  History of lung cancer.  History of lymphoma.     RECOMMENDATIONS:  1.  Continue supportive care and wean off Levophed as feasible.  2.   Wean Levophed as tolerated.  .  3.  Continue IV fluids.  4.  Plan forrepeat limited echocardiogram in a few days to reassess LVEF.     Thank you for your consult. I will follow-up with patient. Please contact us if you have any additional questions.        VTE Risk Mitigation (From admission, onward)         Ordered     Place sequential compression device  Until discontinued      11/05/20 0527     IP VTE HIGH RISK PATIENT  Once      11/05/20 0527     Place JIAN hose  Until discontinued      11/04/20 2217     Place sequential compression device  Until discontinued      11/04/20 2217     Place sequential  compression device  Until discontinued      11/04/20 2039                Antoni Buckner MD PhD  Cardiology  [facility]

## 2020-11-07 NOTE — HPI
REASON FOR CONSULT:  Cardiomyopathy, Cardiac arrest      HPI:    75-year-old female with a past medical history significant for COPD, lung cancer, hypertension, obstructive sleep apnea, lymphoma was brought into the hospital after she reportedly called in for shortness of breath.  When paramedics arrived she reportedly was unresponsive and was in asystole.  She was resuscitated and was admitted to the hospital.  Currently she is on hypothermia protocol.  No meaningful history could be obtained as the patient is currently sedated.  Cardiology was consulted for cardiac arrest, cardiomyopathy and elevated troponins.  I called the phone number listed in epic however there was no answer.  EKG shows ST T wave abnormalities in the anterolateral leads.

## 2020-11-07 NOTE — CONSULTS
[facility]  Neurology  Consult Note    Patient Name: Yocasta Almonte  MRN: [id number]  Admission Date: 11/4/2020  Hospital Length of Stay: 3 days  Code Status: Full Code   Attending Provider: Sina Reyes MD   Consulting Provider: Ginger Blood MD  Primary Care Physician: Rani Welch MD  Principal Problem:Cardiopulmonary arrest    Consults  Subjective:     Chief Complaint: Cardiac arrest    HPI:  Hx obtained per chart. 76yo with PMH of COPD, Lung cancer, DM GERD who was who brought in by EMS post cardiac arrest, Per chart patient was on the phone with EMS c/o SOB when she suddenly became unresponsive. On arrival of EMS, she had a nebulizer on, had vomited was suctioned several times.. She was in asystole, received 3 rounds of Epi before she achieved ROSC . Since admission, she underwent TTM protocol and achieved normothermia this afternoon. CT head was performed today which showed patchy loss of gray -white differentiation consistent with anoxic brain injury. Neurology consulted for further management    On my evaluation, patient was intubated on propofol, minimal response to noxious stimuli, pupil pinpoint and minimally reactive, right eye deviated to the right , absent oculocephalic reflex,absent corneal, gag and cough reflex intact.     Past Medical History:   Diagnosis Date    Cancer     lymphoma    COPD (chronic obstructive pulmonary disease)     Diabetes mellitus     GERD (gastroesophageal reflux disease)     Hyperlipidemia     Hypertension     YESICA (obstructive sleep apnea) 06/2020    by home sleep study    Thyroid disease        Past Surgical History:   Procedure Laterality Date    ESOPHAGOGASTRODUODENOSCOPY Left 5/13/2020    Procedure: EGD (ESOPHAGOGASTRODUODENOSCOPY);  Surgeon: Rogelio Harris MD;  Location: [facility];  Service: Endoscopy;  Laterality: Left;    LYMPH NODE DISSECTION       R axillary    NECK SURGERY      infection in neck removed       Review of  patient's allergies indicates:   Allergen Reactions    Iodine and iodide containing products     Penicillins        Current Neurological Medications:    No current facility-administered medications on file prior to encounter.      Current Outpatient Medications on File Prior to Encounter   Medication Sig    albuterol (PROVENTIL) 5 mg/mL nebulizer solution Take 0.5 mLs (2.5 mg total) by nebulization every 4 (four) hours as needed for Wheezing or Shortness of Breath. Rescue    albuterol (PROVENTIL/VENTOLIN HFA) 90 mcg/actuation inhaler Inhale 1 puff into the lungs every 6 (six) hours as needed for Wheezing or Shortness of Breath. Rescue    atorvastatin (LIPITOR) 80 MG tablet Take 1 tablet (80 mg total) by mouth once daily. For cholesterol    blood sugar diagnostic (TRUE METRIX GLUCOSE TEST STRIP) Strp Test blood sugar in vitro BID    blood-glucose meter (TRUE METRIX GLUCOSE METER) Misc Test blood sugar in vitro BID    esomeprazole (NEXIUM) 40 MG capsule Take 1 capsule (40 mg total) by mouth 2 (two) times daily before meals.    fluticasone propionate (FLONASE) 50 mcg/actuation nasal spray 1 spray (50 mcg total) by Each Nostril route 2 (two) times daily.    gabapentin (NEURONTIN) 300 MG capsule 1 cap AM, 1 caps NOON, 2 capPM    lancets 32 gauge Misc Test blood sugar in vitro BID    loratadine (CLARITIN) 10 mg tablet Take 1 tablet (10 mg total) by mouth once daily. For allergies    methylPREDNISolone (MEDROL DOSEPACK) 4 mg tablet TAKE 1 TABLET BY MOUTH AS DIRECTED    metoprolol tartrate (LOPRESSOR) 25 MG tablet Take 1 tablet (25 mg total) by mouth once daily.    mirtazapine (REMERON) 30 MG tablet Take 1 tablet (30 mg total) by mouth every evening.    potassium chloride SA (K-DUR,KLOR-CON) 20 MEQ tablet Take 1 tablet (20 mEq total) by mouth once daily.    triamcinolone (KENALOG) 0.5 % ointment Apply 1 application topically 2 (two) times daily.    VIRTUSSIN AC  mg/5 mL syrup TAKE 2 TEASPOONFUL BY  MOUTH TWICE DAILY AS NEEDED FOR COUGH      Family History     Problem Relation (Age of Onset)    Cancer Mother    No Known Problems Father        Tobacco Use    Smoking status: Former Smoker     Packs/day: 0.50     Years: 58.00     Pack years: 29.00     Types: Cigarettes     Quit date: 2020     Years since quittin.1    Smokeless tobacco: Never Used    Tobacco comment: on nicoderm patches -DN  10/12/20   Substance and Sexual Activity    Alcohol use: No     Comment: hx of alochol abuse but does not drink at all now    Drug use: No     Comment: none    Sexual activity: Not Currently     Birth control/protection: None     Review of Systems  Objective:     Vital Signs (Most Recent):  Temp: 98.6 °F (37 °C) (20 0800)  Pulse: (!) 127 (20 1202)  Resp: (!) 22 (20 1202)  BP: (!) 107/51 (20 1000)  SpO2: 100 % (20 1202) Vital Signs (24h Range):  Temp:  [97.3 °F (36.3 °C)-98.8 °F (37.1 °C)] 98.6 °F (37 °C)  Pulse:  [101-137] 127  Resp:  [22-58] 22  SpO2:  [60 %-100 %] 100 %  BP: ()/(47-59) 107/51  Arterial Line BP: ()/(36-77) 131/47     Weight: (unable to weigh due to arctic sun in use)  Body mass index is 27.61 kg/m².    Physical Exam    NEUROLOGICAL EXAMINATION:     MENTAL STATUS        Intubated on propofol  Pupils are pinpoint and unreactive  Right eye deviated to the right, absent oculocephalic  Absent corneals   Gag and cough reflex +  Withdraws to peripheral and central painful stimuli         Significant Labs:   Blood Culture: No results for input(s): LABBLOO in the last 48 hours.  CBC:   Recent Labs   Lab 20  0806 20  1235 20  1540   WBC 10.68 10.42 10.10   HGB 9.4* 8.9* 8.7*   HCT 28.3* 26.9* 26.0*   * 124* 122*     CMP:   Recent Labs   Lab 20  2339 20  0455 20  0806 20  1235 20  1540   * 117*  118* 85 74 83 81   * 131* 134*  --   --   --    K 3.6 3.5 3.9  --   --   --      104 106  --   --   --    CO2 11* 10* 12*  --   --   --    BUN 34* 38* 41*  --   --   --    CREATININE 3.1* 3.3* 3.5*  --   --   --    CALCIUM 7.5* 7.4* 7.7*  --   --   --    MG 2.1 2.0 2.2  --   --   --    ANIONGAP 16 17* 16  --   --   --    EGFRNONAA 14* 13* 12*  --   --   --        Significant Imaging: CT: I have reviewed all pertinent results/findings within the past 24 hours and my personal findings are:  Patchy Loss of gray white differentiation consistent with anoxic brrain injury    Assessment and Plan:     Active Diagnoses:    Diagnosis Date Noted POA    PRINCIPAL PROBLEM:  Cardiopulmonary arrest [I46.9] 11/04/2020 Yes    Calcified granuloma of lung [J84.10] 11/05/2020 Yes    Calcification of aorta [I70.0] 11/05/2020 Yes    Aspiration pneumonia [J69.0] 11/05/2020 Yes    Anoxic encephalopathy [G93.1] 11/05/2020 Yes    COPD (chronic obstructive pulmonary disease) [J44.9] 05/11/2020 Yes    Acute hypoxemic respiratory failure [J96.01] 04/22/2020 Yes    Type 2 diabetes mellitus with other specified complication [E11.69] 10/15/2019 Yes    Anemia of decreased vitamin B12 absorption [D51.8] 04/01/2019 Yes    Essential hypertension [I10] 07/25/2018 Yes    HLD (hyperlipidemia) [E78.5] 07/24/2018 Yes    Malignant neoplasm of upper lobe of left lung [C34.12] 08/08/2017 Yes    Chronic lymphocytic leukemia [C91.10] 04/27/2017 Yes    Nicotine dependence, unspecified, uncomplicated [F17.200] 11/07/2016 Yes    Coronary artery disease [I25.10] 11/02/2015 Yes      Problems Resolved During this Admission:    Diagnosis Date Noted Date Resolved POA    Septic shock [A41.9, R65.21] 11/05/2020 11/05/2020 Yes     Ms. Yocasta Almonte is a 75 y.o. female, with PMH of CLL, right lung cancer, COPD, T2DM, HTN, HLD, thyroid disease, anemia, OA, GERD, tobacco abuse, who presented to [facility] ED on 11/4/20 after she called EMS 2/2 to SOB and went unresponsive. Patient had cardiac arrest achieved ROSC  After 3 round of Epinephrine,  Unknown down time. But since admission, she was started on TTM,back to normothermia this afternoon. CT Head showed diffuse cerebral edema most notable in the cerebellum.  Upper cervical spinal canal is not included in the field of view, but there is concern for cerebellar tonsillar herniation. There is developing hydrocephalus secondary to compression of the 4th ventricle    On evaluation today,there is  absence of pupillary and corneal response,with preservation of gag reflex.However patient is currently receiving propofol, which limits neurological examination.    Despite the limited evidence of efficacy of osmotic therapy on diffuse cerebral edema from anoxic brain injury, will recommend instituting a trial of hyperosmotic therapy in the setting of impending herniation.    Recommendation  - Please obtain MRI brain wo contrast, MRA Brain to further evaluate extent of catastrophic intracranial injury  - Obtain EEG to evaluate for malignant background  to aid prognosis  - CT Head showed a significant cerebral edema with cerebellar tonsillar herniation with developing hydrocephalus.  - Will recommend starting 3% hypertonic saline at 30ml/hr given the impending cerebellar herniation.  - Check Sodium every 6hours and hold for sodium >155  - Maintain MAP>65mmhg per ICU utilizing vasopressor as needed  - Please hold sedation from tomorrow morning.  - Obtain Urine drug screen   - Continue with supportive care  - Maintain Normothermia  - We will continue to monitor closely with daily neurologic exam and provide full recommendation post imaging and EEG.      30mins Critical time spent in the management of the patient.    Thank you for your consult. I will follow-up with patient. Please contact us if you have any additional questions.    Ginger Blood MD  Neurology  [facility]

## 2020-11-07 NOTE — HOSPITAL COURSE
Patient was admitted to the hospital to secondary to cardiac arrest following aspiration event.  She had a prolonged resuscitation with difficult intubation and asystole is initial presenting rhythm.  She was initially and DKA and was started on insulin drip which was subsequently weaned off.  Patient underwent targeted temperature management protocol, and underwent a empiric antibiotic treatment for aspiration pneumonia. She was managed by cardiology, pulmonology, and neurology services. She was noted to have left gaze deviation and CT head was obtained which showed significant cerebellar edema and concern for herniation. Neurosurgery was consulted and plan with family was made for nonoperative management. She was placed on 3% saline to maintain sodium. Family met with Dr. Oneal on day of death and patient's son decided to transition her to comfort measures only and withdraw care as she had minimal change of making a meaningful recovery. After patient's family visited she was extubated and passed shortly after.

## 2020-11-07 NOTE — ASSESSMENT & PLAN NOTE
Physical Exam  HEENT: Normocephalic, atraumatic, PERRL, Conjunctiva pink, no scleral icterus.  ET tube in place  CVS: S1S2+, RRR, no murmurs, rubs or gallops, JVP: Normal.  LUNGS:  Positive coarse bilateral breath sounds  ABDOMEN: Soft, NT, BS+  EXTREMITIES: No cyanosis, clubbing or edema  NEURO:  Does not obey simple commands.

## 2020-11-07 NOTE — ASSESSMENT & PLAN NOTE
Patient with current diagnosis of pneumonia with concern for bacterial etiology due to  Streptococcus pneumoniae, MRSA and Klebsiella which is uncontrolled due to persistent hypoxia  and persistent altered mental status. Current antimicrobial regimen consists of   Antibiotics (From admission, onward)    Start     Stop Route Frequency Ordered    11/05/20 1345  cefTRIAXone (ROCEPHIN) 1 g/50 mL D5W IVPB      -- IV Every 24 hours (non-standard times) 11/05/20 1213    11/05/20 1313  vancomycin - pharmacy to dose  (vancomycin IVPB)      -- IV pharmacy to manage frequency 11/05/20 1213    11/05/20 0900  mupirocin 2 % ointment      11/10 0859 Nasl 2 times daily 11/05/20 0401      . Cultures drawn and noted-   Microbiology Results (last 7 days)     Procedure Component Value Units Date/Time    AFB Culture & Smear [[id number]] Collected: 11/05/20 1159    Order Status: Completed Specimen: Respiratory from Tracheal Aspirate Updated: 11/06/20 2127     AFB Culture & Smear Culture in progress     AFB CULTURE STAIN No acid fast bacilli seen.    Blood culture [[id number]] Collected: 11/05/20 1111    Order Status: Completed Specimen: Blood from Peripheral, Right Hand Updated: 11/06/20 2012     Blood Culture, Routine No Growth to date      No Growth to date    Blood culture [[id number]] Collected: 11/05/20 1238    Order Status: Completed Specimen: Blood Updated: 11/06/20 2012     Blood Culture, Routine No Growth to date      No Growth to date    Culture, Respiratory with Gram Stain [[id number]] Collected: 11/05/20 1159    Order Status: Completed Specimen: Respiratory from Tracheal Aspirate Updated: 11/05/20 2010     Gram Stain (Respiratory) <10 epithelial cells per low power field.     Gram Stain (Respiratory) Rare WBC's     Gram Stain (Respiratory) Many Gram negative rods     Gram Stain (Respiratory) Few Gram positive cocci       Will monitor patient closely and continue current treatment plan unchanged.

## 2020-11-07 NOTE — ASSESSMENT & PLAN NOTE
Patient's anemia is currently controlled. Has not received any PRBCs to date.. Etiology likely d/t acute blood loss  Current CBC reviewed-   Lab Results   Component Value Date    HGB 9.7 (L) 11/06/2020    HCT 28.6 (L) 11/06/2020     Monitor serial CBC and transfuse if patient becomes hemodynamically unstable, symptomatic or H/H drops below 7/21.

## 2020-11-07 NOTE — PROGRESS NOTES
Progress Note  PULMONARY    Admit Date: 11/4/2020 11/07/2020      SUBJECTIVE:   Pt is a 76 yo female with CLL, lung ca who presented to the ED on 11/5 s/p cardiac arrest at home.   11/6- continues on vent, hypothermic protocol, requiring levophed 0.08 mcg/kg/min. Sedated w/ versed & propofol  11/7- remains on vent, now rewarmed      OBJECTIVE:     Vitals (Most recent):  Vitals:    11/07/20 0741   BP:    Pulse: (!) 128   Resp: (!) 22   Temp:        Vitals (24 hour range):  Temp:  [96.3 °F (35.7 °C)-98.8 °F (37.1 °C)]   Pulse:  [101-137]   Resp:  [16-58]   BP: ()/(47-59)   SpO2:  [62 %-100 %]   Arterial Line BP: ()/(36-77)       Intake/Output Summary (Last 24 hours) at 11/7/2020 1011  Last data filed at 11/7/2020 0600  Gross per 24 hour   Intake 1092.98 ml   Output 855 ml   Net 237.98 ml          Physical Exam:  The patient's neuro status (alertness,orientation,cognitive function,motor skills,), pharyngeal exam (oral lesions, hygiene, abn dentition,), Neck (jvd,mass,thyroid,nodes in neck and above/below clavicle),RESPIRATORY(symmetry,effort,fremitus,percussion,auscultation),  Cor(rhythm,heart tones including gallops,perfusion,edema)ABD(distention,hepatic&splenomegaly,tenderness,masses), Skin(rash,cyanosis),Psyc(affect,judgement,).  Exam negative except for these pertinent findings:    Intubated, sedated  Tongue protrudes  Neg dolls eyes, R eye laterally deviated, sluggish pupils  Weak cough w/ suctioning, moves jaw spontaneously  RIJ central line  Skin ulcerations over the sternum  Ventilated breath sounds  HR regular and tachycardic  Abdomen soft, BS+  Hernandez in place w/ yellow urine      Radiographs reviewed: view by direct vision   CXR 11/7- bilateral upper lobe infiltrates  CXR 11/5- RUL and LISSY consolidations, new RML infiltrate developing  CTA chest 11/4-   1. Examination is limited by streak and motion artifact.  There is no evidence of pulmonary embolism to the level of the proximal segmental  pulmonary arteries.  The distal segmental to subsegmental pulmonary arteries are not well evaluated.  2. Interval development of bilateral ground-glass and dense consolidations as described above, likely combination of aspiration, atelectasis and/or edema.  3. Acute, mildly displaced fractures of the left 2nd through 4th ribs and the right 2nd rib.  4. Right upper lobe masslike opacity and additional bilateral pulmonary nodules are not well assessed secondary to significant motion artifact.  Recommend continued attention on follow-up imaging.  5. Large left thyroid mass extending into the superior mediastinum, unchanged.  6. Stable mildly prominent mediastinal and bilateral axillary lymphadenopathy.    TTE 11/5/20-   · Overall the study quality was poor. The study was difficult due to patient's body habitus.  · There is left ventricular concentric remodeling.  · The left ventricle is normal in size with moderately decreased systolic function. The estimated ejection fraction is 40%.  · There is moderate left ventricular global hypokinesis.  · Grade I diastolic dysfunction.  · Normal right ventricular size with normal right ventricular systolic function.  · Normal central venous pressure (3 mmHg).  Labs     Recent Labs   Lab 11/06/20  2339 11/07/20  0455 11/07/20  0806   WBC 9.08 10.91 10.68   HGB 9.5* 9.8* 9.4*   HCT 28.0* 29.2* 28.3*   * 130* 128*   BAND 18.0 12.0  --    METAMYELOCYT 3.0 3.0  --    MYELOPCT 1.0  --   --      Recent Labs   Lab 11/07/20  0455 11/07/20  0806   *  --    K 3.9  --      --    CO2 12*  --    BUN 41*  --    CREATININE 3.5*  --    GLU 85 74   CALCIUM 7.7*  --    MG 2.2  --    PHOS 5.9*  --    INR 1.1  --    TROPONINI 0.613*  --    CPK 4716*  --      Recent Labs   Lab 11/07/20  0513   PH 7.236*   PCO2 34.1*   PO2 83   HCO3 14.5*     Microbiology Results (last 7 days)     Procedure Component Value Units Date/Time    Culture, Respiratory with Gram Stain [[id number]]   (Abnormal) Collected: 11/05/20 1159    Order Status: Completed Specimen: Respiratory from Tracheal Aspirate Updated: 11/07/20 0923     Respiratory Culture GRAM NEGATIVE TRUONG  Moderate  Identification and susceptibility pending  Normal respiratory anuja also present       Gram Stain (Respiratory) <10 epithelial cells per low power field.     Gram Stain (Respiratory) Rare WBC's     Gram Stain (Respiratory) Many Gram negative rods     Gram Stain (Respiratory) Few Gram positive cocci    AFB Culture & Smear [[id number]] Collected: 11/05/20 1159    Order Status: Completed Specimen: Respiratory from Tracheal Aspirate Updated: 11/06/20 2127     AFB Culture & Smear Culture in progress     AFB CULTURE STAIN No acid fast bacilli seen.    Blood culture [[id number]] Collected: 11/05/20 1111    Order Status: Completed Specimen: Blood from Peripheral, Right Hand Updated: 11/06/20 2012     Blood Culture, Routine No Growth to date      No Growth to date    Blood culture [[id number]] Collected: 11/05/20 1238    Order Status: Completed Specimen: Blood Updated: 11/06/20 2012     Blood Culture, Routine No Growth to date      No Growth to date          Impression:  Active Hospital Problems    Diagnosis  POA    *Cardiopulmonary arrest [I46.9]  Yes    Calcified granuloma of lung [J84.10]  Yes    Calcification of aorta [I70.0]  Yes    Aspiration pneumonia [J69.0]  Yes    Anoxic encephalopathy [G93.1]  Yes    COPD (chronic obstructive pulmonary disease) [J44.9]  Yes    Acute hypoxemic respiratory failure [J96.01]  Yes    Type 2 diabetes mellitus with other specified complication [E11.69]  Yes    Anemia of decreased vitamin B12 absorption [D51.8]  Yes    Essential hypertension [I10]  Yes    HLD (hyperlipidemia) [E78.5]  Yes    Malignant neoplasm of upper lobe of left lung [C34.12]  Yes    Chronic lymphocytic leukemia [C91.10]  Yes    Nicotine dependence, unspecified, uncomplicated [F17.200]  Yes    Coronary artery disease [I25.10]   Yes      Resolved Hospital Problems    Diagnosis Date Resolved POA    Septic shock [A41.9, R65.21] 11/05/2020 Yes               Plan:   PEA arrest   acute hypoxemic respiratory failure  Combined heart failure, EF 40%  Septic vs cardiogenic shock  Lung cancer  CLL  Aspiration pneumonia  Thrombocytopenia  Normocytic anemia  MARCE  Rhabdomyolysis  Metabolic acidosis, gap and non-gap    - continue vent support  - daily ABG  - per nursing did not wake up off sedation today  - continue sedation w/ propofol- lighten as tolerated  - continue rocephin & vanco for now- f/u final sputum. With prelim GNR.   - continue inhaled bronchodilators  - pressor support as needed to keep MAP >65  - monitor CPK, renal function  - d/w nurse  - repeat CT head, neuro consult if abnormal    The following were evaluated and adjusted as needed: mechanical ventilator settings and weaning status, vasopressors, sedation and neurologic status, antibiotics, acid base balance and oxygenation needs and input and output and renal status       Critical Care  - THE PATIENT HAS A HIGH PROBABILITY OF IMMINENT OR LIFE THREATENING DETERIORATION.  Over 50%time of encounter was in direct care at bedside.  Time was 30 to 74 minutes required for patient care.  Time needed for all of the above totaled 30 minutes.    Jina Spring MD  Pulmonary & Critical Care Medicine                                        .

## 2020-11-07 NOTE — PROGRESS NOTES
[facility] Medicine  Progress Note    Patient Name: Yocasta Almonte  MRN: [id number]  Patient Class: IP- Inpatient   Admission Date: 11/4/2020  Length of Stay: 3 days  Attending Physician: Rodney Araujo,*  Primary Care Provider: Rani Welch MD        Subjective:     Principal Problem:Cardiopulmonary arrest        HPI:  Ms. Yocasta Almonte is a 75 y.o. female, with PMH of CLL, right lung cancer, COPD, T2DM, HTN, HLD, thyroid disease, anemia, OA, GERD, tobacco abuse, who presented to [facility] ED on 11/4/20 after she called EMS 2/2 to SOB and went unresponsive on the phone. Upon arrival, EMS found her with a breathing treatment in process, s/p emesis, and with an inhaler nearby. Glucose was 331. She was given 3 rounds of epinephrine after finding rhythm was asystole with pupils fixed and dilated. She had ROSC w/ the epinephrine briefly each time. Upon arrival to ED EKG showed sinus tachycardia. She was a difficult intubation as she had many partially digested beans in her airway. ED labs concerning for DKA. Given her h/o cancer, and SOB preceding her arrest, a CTA Chest was ordered to evaluate for PE and was negative for PE, but did indicate aspiration pneumonia. A CTHead was negative for acute intracranial abnormalities. A CXR indicates patchy opacification of the pulmonary parenchyma, concerning for pneumonia or aspiration.     Overview/Hospital Course:  Patient was admitted to the hospital to secondary to cardiac arrest following aspiration event.  She had a prolonged resuscitation with difficult intubation and asystole is initial presenting rhythm.  She was initially and DKA and was started on insulin drip which was subsequently weaned off.  Patient underwent targeted temperature management protocol, and underwent a empiric antibiotic treatment for aspiration pneumonia.    Interval History:  Patient seen and examined.  Rewarmed. Sedation nearly off, pt minimally responsive to  nox stim. R gaze deviation. CT head ordered. Case discussed with son at bedside    Review of Systems   Unable to perform ROS: Intubated     Objective:     Vital Signs (Most Recent):  Temp: 98.6 °F (37 °C) (11/07/20 0800)  Pulse: (!) 119 (11/07/20 1027)  Resp: (!) 22 (11/07/20 1027)  BP: (!) 107/51 (11/07/20 1000)  SpO2: 100 % (11/07/20 1027) Vital Signs (24h Range):  Temp:  [96.8 °F (36 °C)-98.8 °F (37.1 °C)] 98.6 °F (37 °C)  Pulse:  [101-137] 119  Resp:  [22-58] 22  SpO2:  [60 %-100 %] 100 %  BP: ()/(47-59) 107/51  Arterial Line BP: ()/(36-77) 131/47     Weight: (unable to weigh due to arctic sun in use)  Body mass index is 27.61 kg/m².    Intake/Output Summary (Last 24 hours) at 11/7/2020 1310  Last data filed at 11/7/2020 1022  Gross per 24 hour   Intake 1092.98 ml   Output 1020 ml   Net 72.98 ml      Physical Exam  Vitals signs and nursing note reviewed.   Constitutional:       General: She is not in acute distress.     Appearance: She is not diaphoretic.      Comments: Intubated, sedated   HENT:      Mouth/Throat:      Pharynx: No oropharyngeal exudate.      Comments: ETT/NG tube noted in place.  Eyes:      General:         Right eye: No discharge.         Left eye: No discharge.      Comments: Pupils sluggish   Neck:      Thyroid: No thyromegaly.      Vascular: No JVD.      Trachea: No tracheal deviation.   Cardiovascular:      Heart sounds: No murmur. No friction rub. No gallop.    Pulmonary:      Effort: No respiratory distress.      Breath sounds: No wheezing or rales.      Comments: Breath sounds coarse on ventilator.  Abdominal:      General: There is no distension.      Palpations: Abdomen is soft.      Tenderness: There is no abdominal tenderness.   Genitourinary:     Comments: Hernandez catheter noted in place.  Musculoskeletal:         General: No tenderness or deformity.   Skin:     Capillary Refill: Capillary refill takes 2 to 3 seconds.      Findings: No erythema or rash.   Neurological:       Motor: No abnormal muscle tone.      Deep Tendon Reflexes: Reflexes normal.      Comments: Patient sedated, unresponsive at present         Significant Labs: All pertinent labs within the past 24 hours have been reviewed.    Significant Imaging: I have reviewed all pertinent imaging results/findings within the past 24 hours.      Assessment/Plan:      * Cardiopulmonary arrest  Patient with prolonged resuscitation, asystole as initial presenting rhythm, and unsure time of arrest.  These characteristics tends to predict poor outcomes in patients for anoxic brain injury.  I have discussed the patient most likely has a greater than 95% chance of severe neurologic injury given these factors above.  However, will continue to monitor closely.  Plan of care discussed with Cardiology.  Noted echocardiogram with ejection fraction in the 30-40% range was likely secondary to myocardial stunning.  No need to adjust medication at this point in time.  Will attempt to wean sedation to determine patient's neurologic status.    Anoxic encephalopathy  Patient has acute anoxic encephalopathy that likely secondary to Cerebral ischemia. Patient's normal mental status is. awake and alert; oriented to person, place, and time Evaluation and treatment for underlying cause is underway. Eval includes Consult to Neurology and Neuro-Imaging (MRI/CT). Will monitor neuro checks carefully, avoid narcotics and benzos that will exacerbate agitation, and use PRN anti-psychotics for controls of behavior for self harm.          Aspiration pneumonia  Patient with current diagnosis of pneumonia with concern for bacterial etiology due to  Streptococcus pneumoniae, MRSA and Klebsiella which is uncontrolled due to persistent hypoxia  and persistent altered mental status. Current antimicrobial regimen consists of   Antibiotics (From admission, onward)    Start     Stop Route Frequency Ordered    11/05/20 1345  cefTRIAXone (ROCEPHIN) 1 g/50 mL D5W IVPB       -- IV Every 24 hours (non-standard times) 11/05/20 1213    11/05/20 1313  vancomycin - pharmacy to dose  (vancomycin IVPB)      -- IV pharmacy to manage frequency 11/05/20 1213    11/05/20 0900  mupirocin 2 % ointment      11/10 0859 Nasl 2 times daily 11/05/20 0401      . Cultures drawn and noted-   Microbiology Results (last 7 days)     Procedure Component Value Units Date/Time    AFB Culture & Smear [[id number]] Collected: 11/05/20 1159    Order Status: Completed Specimen: Respiratory from Tracheal Aspirate Updated: 11/06/20 2127     AFB Culture & Smear Culture in progress     AFB CULTURE STAIN No acid fast bacilli seen.    Blood culture [[id number]] Collected: 11/05/20 1111    Order Status: Completed Specimen: Blood from Peripheral, Right Hand Updated: 11/06/20 2012     Blood Culture, Routine No Growth to date      No Growth to date    Blood culture [[id number]] Collected: 11/05/20 1238    Order Status: Completed Specimen: Blood Updated: 11/06/20 2012     Blood Culture, Routine No Growth to date      No Growth to date    Culture, Respiratory with Gram Stain [[id number]] Collected: 11/05/20 1159    Order Status: Completed Specimen: Respiratory from Tracheal Aspirate Updated: 11/05/20 2010     Gram Stain (Respiratory) <10 epithelial cells per low power field.     Gram Stain (Respiratory) Rare WBC's     Gram Stain (Respiratory) Many Gram negative rods     Gram Stain (Respiratory) Few Gram positive cocci       Will monitor patient closely and continue current treatment plan unchanged.        COPD (chronic obstructive pulmonary disease)  Patient's COPD is uncontrolled due to continued dyspnea and worsening of baseline hypoxia currently.  Patient is currently off COPD Pathway. Continue scheduled inhalers Antibiotics and Supplemental oxygen and monitor respiratory status closely.       Acute hypoxemic respiratory failure  Patient with Hypercapnic and Hypoxic Respiratory failure which is Acute.  she is not on home oxygen.  Supplemental ventilation was provided and noted- Vent Mode: A/C  Oxygen Concentration (%):  [30-50] 30  Resp Rate Total:  [15 br/min-23 br/min] 22 br/min  Vt Set:  [600 mL] 600 mL  PEEP/CPAP:  [5 cmH20] 5 cmH20  Pressure Support:  [0 ifE45-55 cmH20] 0 cmH20  Mean Airway Pressure:  [11 pwI79-08 cmH20] 13 cmH20.   Differential diagnosis includes - COPD, Pneumonia and Aspiration Labs and images were reviewed. Patient Has recent ABG, which has been reviewed.. Will treat underlying causes and adjust management of respiratory failure as follows- continue antibiotics, and consult with pulmonology for manage of ventilator.  Continue bronchodilators and monitor closely.        Chronic lymphocytic leukemia  - follows w/ Hematology  - Ibrutinib d/c'd on 10/5/20       Coronary artery disease  Hold oral medications for now      Nicotine dependence, unspecified, uncomplicated  Will attempt counseling once patient is more alert      Type 2 diabetes mellitus with other specified complication  Patient's FSGs are controlled on current hypoglycemics.   Last A1c reviewed-   Lab Results   Component Value Date    HGBA1C 6.3 (H) 11/05/2020     Most recent fingerstick glucose reviewed-   Recent Labs   Lab 11/06/20  1319   POCTGLUCOSE 114*     Current correctional scale  Low  Maintain anti-hyperglycemic dose as follows-   Antihyperglycemics (From admission, onward)    Start     Stop Route Frequency Ordered    11/05/20 1315  insulin aspart U-100 pen 0-5 Units      -- SubQ Every 6 hours PRN 11/05/20 1217        Hold Oral hypoglycemics while patient is in the hospital.      Anemia of decreased vitamin B12 absorption  Patient's anemia is currently controlled. Has not received any PRBCs to date.. Etiology likely d/t acute blood loss  Current CBC reviewed-   Lab Results   Component Value Date    HGB 9.7 (L) 11/06/2020    HCT 28.6 (L) 11/06/2020     Monitor serial CBC and transfuse if patient becomes hemodynamically unstable, symptomatic or H/H  drops below 7/21.       HLD (hyperlipidemia)  Oral medications held at this moment in time      Essential hypertension  Continue on norepinephrine hold blood pressure medications secondary to hypotension.      Malignant neoplasm of upper lobe of left lung  Noted-chronic.  No acute inpatient management indicated at this point in time.        VTE Risk Mitigation (From admission, onward)         Ordered     Place sequential compression device  Until discontinued      11/05/20 0527     IP VTE HIGH RISK PATIENT  Once      11/05/20 0527     Place JIAN hose  Until discontinued      11/04/20 2217     Place sequential compression device  Until discontinued      11/04/20 2217     Place sequential compression device  Until discontinued      11/04/20 2039                Discharge Planning   RAF:      Code Status: Full Code   Is the patient medically ready for discharge?:     Reason for patient still in hospital (select all that apply): Patient trending condition and Treatment  Discharge Plan A: Home with family          Critical care time spent on the evaluation and treatment of severe organ dysfunction, review of pertinent labs and imaging studies, discussions with consulting providers and discussions with patient/family: 30 minutes.      Rodney Araujo MD  Department of Hospital Medicine   [facility]      Altered to abnormal CT by radiology: recommended consulting Neurosurgery for emergent intervention. Neurosurgery on call could not be reached by charge nurse. Discussed with Neurology on call: Reviewed CT. Recommended MRA H/N and MR Brain. Will discuss with Neurosurgery

## 2020-11-07 NOTE — PROGRESS NOTES
[facility] Medicine  Progress Note    Patient Name: Yocasta Almonte  MRN: [id number]  Patient Class: IP- Inpatient   Admission Date: 11/4/2020  Length of Stay: 2 days  Attending Physician: Chris Marrero MD  Primary Care Provider: Rani Welch MD        Subjective:     Principal Problem:Cardiopulmonary arrest        HPI:  Ms. Yocasta Almonte is a 75 y.o. female, with PMH of CLL, right lung cancer, COPD, T2DM, HTN, HLD, thyroid disease, anemia, OA, GERD, tobacco abuse, who presented to [facility] ED on 11/4/20 after she called EMS 2/2 to SOB and went unresponsive on the phone. Upon arrival, EMS found her with a breathing treatment in process, s/p emesis, and with an inhaler nearby. Glucose was 331. She was given 3 rounds of epinephrine after finding rhythm was asystole with pupils fixed and dilated. She had ROSC w/ the epinephrine briefly each time. Upon arrival to ED EKG showed sinus tachycardia. She was a difficult intubation as she had many partially digested beans in her airway. ED labs concerning for DKA. Given her h/o cancer, and SOB preceding her arrest, a CTA Chest was ordered to evaluate for PE and was negative for PE, but did indicate aspiration pneumonia. A CTHead was negative for acute intracranial abnormalities. A CXR indicates patchy opacification of the pulmonary parenchyma, concerning for pneumonia or aspiration.     Overview/Hospital Course:  No notes on file    Interval History:  Patient seen and examined.  Process of rewarming began today.  Patient continues to show evidence of neurologic activity, including grimacing, asynchronous on a ventilator, and movement of upper extremities.  Pupils remain unequal.  Plan of care discussed with pulmonology, Cardiology, and patient's family at the bedside.    Review of Systems   Unable to perform ROS: Intubated     Objective:     Vital Signs (Most Recent):  Temp: 97.7 °F (36.5 °C) (11/06/20 1922)  Pulse: 104 (11/06/20 1922)  Resp:  (!) 22 (11/06/20 1922)  BP: (!) 78/47 (11/06/20 1922)  SpO2: 100 % (11/06/20 1922) Vital Signs (24h Range):  Temp:  [91.8 °F (33.2 °C)-98.6 °F (37 °C)] 97.7 °F (36.5 °C)  Pulse:  [] 104  Resp:  [16-28] 22  SpO2:  [97 %-100 %] 100 %  BP: ()/(47-61) 78/47  Arterial Line BP: ()/(38-57) 106/41     Weight: 77.6 kg (171 lb 1.2 oz)  Body mass index is 27.61 kg/m².    Intake/Output Summary (Last 24 hours) at 11/6/2020 2133  Last data filed at 11/6/2020 1700  Gross per 24 hour   Intake 1508.19 ml   Output 520 ml   Net 988.19 ml      Physical Exam  Vitals signs and nursing note reviewed.   Constitutional:       General: She is not in acute distress.     Appearance: She is not diaphoretic.      Comments: Intubated, sedated   HENT:      Mouth/Throat:      Pharynx: No oropharyngeal exudate.      Comments: ETT/NG tube noted in place.  Eyes:      General:         Right eye: No discharge.         Left eye: No discharge.      Comments: Pupils sluggish   Neck:      Thyroid: No thyromegaly.      Vascular: No JVD.      Trachea: No tracheal deviation.   Cardiovascular:      Heart sounds: No murmur. No friction rub. No gallop.    Pulmonary:      Effort: No respiratory distress.      Breath sounds: No wheezing or rales.      Comments: Breath sounds coarse on ventilator.  Abdominal:      General: There is no distension.      Palpations: Abdomen is soft.      Tenderness: There is no abdominal tenderness.   Genitourinary:     Comments: Hernandez catheter noted in place.  Musculoskeletal:         General: No tenderness or deformity.   Skin:     Capillary Refill: Capillary refill takes 2 to 3 seconds.      Findings: No erythema or rash.   Neurological:      Motor: No abnormal muscle tone.      Deep Tendon Reflexes: Reflexes normal.      Comments: Patient sedated, unresponsive at present         Significant Labs: All pertinent labs within the past 24 hours have been reviewed.    Significant Imaging: I have reviewed all pertinent  imaging results/findings within the past 24 hours.      Assessment/Plan:      * Cardiopulmonary arrest  Patient with prolonged resuscitation, asystole as initial presenting rhythm, and unsure time of arrest.  These characteristics tends to predict poor outcomes in patients for anoxic brain injury.  I have discussed the patient most likely has a greater than 95% chance of severe neurologic injury given these factors above.  However, will continue to monitor closely.  Plan of care discussed with Cardiology.  Noted echocardiogram with ejection fraction in the 30-40% range was likely secondary to myocardial stunning.  No need to adjust medication at this point in time.  Will attempt to wean sedation to determine patient's neurologic status.    Acute hypoxemic respiratory failure  Patient with Hypercapnic and Hypoxic Respiratory failure which is Acute.  she is not on home oxygen. Supplemental ventilation was provided and noted- Vent Mode: A/C  Oxygen Concentration (%):  [30-50] 30  Resp Rate Total:  [15 br/min-23 br/min] 22 br/min  Vt Set:  [600 mL] 600 mL  PEEP/CPAP:  [5 cmH20] 5 cmH20  Pressure Support:  [0 vtR24-24 cmH20] 0 cmH20  Mean Airway Pressure:  [11 dhS51-16 cmH20] 13 cmH20.   Differential diagnosis includes - COPD, Pneumonia and Aspiration Labs and images were reviewed. Patient Has recent ABG, which has been reviewed.. Will treat underlying causes and adjust management of respiratory failure as follows- continue antibiotics, and consult with pulmonology for manage of ventilator.  Continue bronchodilators and monitor closely.        Aspiration pneumonia  Patient with current diagnosis of pneumonia with concern for bacterial etiology due to  Streptococcus pneumoniae, MRSA and Klebsiella which is uncontrolled due to persistent hypoxia  and persistent altered mental status. Current antimicrobial regimen consists of   Antibiotics (From admission, onward)    Start     Stop Route Frequency Ordered    11/05/20 5610   cefTRIAXone (ROCEPHIN) 1 g/50 mL D5W IVPB      -- IV Every 24 hours (non-standard times) 11/05/20 1213    11/05/20 1313  vancomycin - pharmacy to dose  (vancomycin IVPB)      -- IV pharmacy to manage frequency 11/05/20 1213    11/05/20 0900  mupirocin 2 % ointment      11/10 0859 Nasl 2 times daily 11/05/20 0401      . Cultures drawn and noted-   Microbiology Results (last 7 days)     Procedure Component Value Units Date/Time    AFB Culture & Smear [[id number]] Collected: 11/05/20 1159    Order Status: Completed Specimen: Respiratory from Tracheal Aspirate Updated: 11/06/20 2127     AFB Culture & Smear Culture in progress     AFB CULTURE STAIN No acid fast bacilli seen.    Blood culture [[id number]] Collected: 11/05/20 1111    Order Status: Completed Specimen: Blood from Peripheral, Right Hand Updated: 11/06/20 2012     Blood Culture, Routine No Growth to date      No Growth to date    Blood culture [[id number]] Collected: 11/05/20 1238    Order Status: Completed Specimen: Blood Updated: 11/06/20 2012     Blood Culture, Routine No Growth to date      No Growth to date    Culture, Respiratory with Gram Stain [[id number]] Collected: 11/05/20 1159    Order Status: Completed Specimen: Respiratory from Tracheal Aspirate Updated: 11/05/20 2010     Gram Stain (Respiratory) <10 epithelial cells per low power field.     Gram Stain (Respiratory) Rare WBC's     Gram Stain (Respiratory) Many Gram negative rods     Gram Stain (Respiratory) Few Gram positive cocci       Will monitor patient closely and continue current treatment plan unchanged.        COPD (chronic obstructive pulmonary disease)  Patient's COPD is uncontrolled due to continued dyspnea and worsening of baseline hypoxia currently.  Patient is currently off COPD Pathway. Continue scheduled inhalers Antibiotics and Supplemental oxygen and monitor respiratory status closely.       Anoxic encephalopathy  Patient has acute anoxic encephalopathy that likely secondary to  Cerebral ischemia. Patient's normal mental status is. awake and alert; oriented to person, place, and time Evaluation and treatment for underlying cause is underway. Eval includes Consult to Neurology and Neuro-Imaging (MRI/CT). Will monitor neuro checks carefully, avoid narcotics and benzos that will exacerbate agitation, and use PRN anti-psychotics for controls of behavior for self harm.          Type 2 diabetes mellitus with other specified complication  Patient's FSGs are controlled on current hypoglycemics.   Last A1c reviewed-   Lab Results   Component Value Date    HGBA1C 6.3 (H) 11/05/2020     Most recent fingerstick glucose reviewed-   Recent Labs   Lab 11/06/20  1319   POCTGLUCOSE 114*     Current correctional scale  Low  Maintain anti-hyperglycemic dose as follows-   Antihyperglycemics (From admission, onward)    Start     Stop Route Frequency Ordered    11/05/20 1315  insulin aspart U-100 pen 0-5 Units      -- SubQ Every 6 hours PRN 11/05/20 1217        Hold Oral hypoglycemics while patient is in the hospital.      Chronic lymphocytic leukemia  - follows w/ Hematology  - Ibrutinib d/c'd on 10/5/20       Coronary artery disease  Hold oral medications for now      Nicotine dependence, unspecified, uncomplicated  Will attempt counseling once patient is more alert      Anemia of decreased vitamin B12 absorption  Patient's anemia is currently controlled. Has not received any PRBCs to date.. Etiology likely d/t acute blood loss  Current CBC reviewed-   Lab Results   Component Value Date    HGB 9.7 (L) 11/06/2020    HCT 28.6 (L) 11/06/2020     Monitor serial CBC and transfuse if patient becomes hemodynamically unstable, symptomatic or H/H drops below 7/21.       HLD (hyperlipidemia)  Oral medications held at this moment in time      Essential hypertension  Continue on norepinephrine hold blood pressure medications secondary to hypotension.      Malignant neoplasm of upper lobe of left lung  Noted-chronic.  No  acute inpatient management indicated at this point in time.        VTE Risk Mitigation (From admission, onward)         Ordered     Place sequential compression device  Until discontinued      11/05/20 0527     IP VTE HIGH RISK PATIENT  Once      11/05/20 0527     Place JIAN hose  Until discontinued      11/04/20 2217     Place sequential compression device  Until discontinued      11/04/20 2217     Place sequential compression device  Until discontinued      11/04/20 2039                Discharge Planning   RAF:      Code Status: Full Code   Is the patient medically ready for discharge?:     Reason for patient still in hospital (select all that apply): Patient unstable  Discharge Plan A: Home with family            Critical care time spent on the evaluation and treatment of severe organ dysfunction, review of pertinent labs and imaging studies, discussions with consulting providers and discussions with patient/family:  42 minutes.      Chris Marrero MD  Department of Hospital Medicine   [facility]

## 2020-11-07 NOTE — SUBJECTIVE & OBJECTIVE
Interval History:  Patient seen and examined.  Rewarmed. Sedation nearly off, pt minimally responsive to nox stim. R gaze deviation. CT head ordered. Case discussed with son at bedside    Review of Systems   Unable to perform ROS: Intubated     Objective:     Vital Signs (Most Recent):  Temp: 98.6 °F (37 °C) (11/07/20 0800)  Pulse: (!) 119 (11/07/20 1027)  Resp: (!) 22 (11/07/20 1027)  BP: (!) 107/51 (11/07/20 1000)  SpO2: 100 % (11/07/20 1027) Vital Signs (24h Range):  Temp:  [96.8 °F (36 °C)-98.8 °F (37.1 °C)] 98.6 °F (37 °C)  Pulse:  [101-137] 119  Resp:  [22-58] 22  SpO2:  [60 %-100 %] 100 %  BP: ()/(47-59) 107/51  Arterial Line BP: ()/(36-77) 131/47     Weight: (unable to weigh due to arctic sun in use)  Body mass index is 27.61 kg/m².    Intake/Output Summary (Last 24 hours) at 11/7/2020 1310  Last data filed at 11/7/2020 1022  Gross per 24 hour   Intake 1092.98 ml   Output 1020 ml   Net 72.98 ml      Physical Exam  Vitals signs and nursing note reviewed.   Constitutional:       General: She is not in acute distress.     Appearance: She is not diaphoretic.      Comments: Intubated, sedated   HENT:      Mouth/Throat:      Pharynx: No oropharyngeal exudate.      Comments: ETT/NG tube noted in place.  Eyes:      General:         Right eye: No discharge.         Left eye: No discharge.      Comments: Pupils sluggish   Neck:      Thyroid: No thyromegaly.      Vascular: No JVD.      Trachea: No tracheal deviation.   Cardiovascular:      Heart sounds: No murmur. No friction rub. No gallop.    Pulmonary:      Effort: No respiratory distress.      Breath sounds: No wheezing or rales.      Comments: Breath sounds coarse on ventilator.  Abdominal:      General: There is no distension.      Palpations: Abdomen is soft.      Tenderness: There is no abdominal tenderness.   Genitourinary:     Comments: Hernandez catheter noted in place.  Musculoskeletal:         General: No tenderness or deformity.   Skin:      Capillary Refill: Capillary refill takes 2 to 3 seconds.      Findings: No erythema or rash.   Neurological:      Motor: No abnormal muscle tone.      Deep Tendon Reflexes: Reflexes normal.      Comments: Patient sedated, unresponsive at present         Significant Labs: All pertinent labs within the past 24 hours have been reviewed.    Significant Imaging: I have reviewed all pertinent imaging results/findings within the past 24 hours.

## 2020-11-08 NOTE — RESPIRATORY THERAPY
11/07/20 0741   Patient Assessment/Suction   Level of Consciousness (AVPU) responds to pain   All Lung Fields Breath Sounds coarse;diminished   Suction Method oral;tracheal   Secretions Amount large   Secretions Color brown   Secretions Characteristics thin   PRE-TX-O2   O2 Device (Oxygen Therapy) ventilator   $ Is the patient on Low Flow Oxygen? Yes   Oxygen Concentration (%) 30   SpO2 100 %   Pulse Oximetry Type Continuous   $ Pulse Oximetry - Multiple Charge Pulse Oximetry - Multiple   Pulse (!) 128   Resp (!) 22   ETCO2   $ ETCO2 Charge Exhaled CO2 Monitoring   $ ETCO2 Usage Currently wearing   ETCO2 (mmHg) 23 mmHg   Aerosol Therapy   $ Aerosol Therapy Charges Aerosol Treatment   Respiratory Treatment Status (SVN) given   Treatment Route (SVN) in-line   Patient Position (SVN) HOB elevated   Post Treatment Assessment (SVN) breath sounds unchanged   Signs of Intolerance (SVN) none   Breath Sounds Post-Respiratory Treatment   Post-treatment Heart Rate (beats/min) 121   Post-treatment Resp Rate (breaths/min) 22   Wound Care   $ Wound Care Tech Time 15 min   Area of Concern Upper lip;Lower lip;Corner lip   Skin Color/Characteristics without discoloration   Skin Temperature warm        Airway - Non-Surgical 11/04/20 1730   Placement Date/Time: 11/04/20 1730   Present Prior to Hospital Arrival?: No  Airway Device Size: 8.0  Style: Cuffed   Secured at 24 cm   Measured At Lips   Secured Location Right   Secured by Commercial tube frey   Bite Block none   Site Condition Cool;Dry   Status Intact;Secured;Patent   Site Assessment Clean;Dry   Vent Select   Conventional Vent Y   Ventilator Initiated No   $ Ventilator Subsequent 1   Charged w/in last 24h YES   Preset Conventional Ventilator Settings   Vent Type    Ventilation Type VC   Vent Mode A/C   Humidity HME   Set Rate 22 BPM   Vt Set 600 mL   PEEP/CPAP 5 cmH20   Pressure Support 0 cmH20   Waveform RAMP   Peak Flow 80 L/min   Set Inspiratory Pressure 0 cmH20    Insp Time 0 Sec(s)   Plateau Set/Insp. Hold (sec) 0   Insp Rise Time  0 %   Trigger Sensitivity Flow/I-Trigger 3 L/min   P High 0 cm H2O   P Low 0 cm H2O   T High 0 sec   T Low 0 sec   Patient Ventilator Parameters   Resp Rate Total 26 br/min   Peak Airway Pressure 28 cmH2O   Mean Airway Pressure 13 cmH20   Plateau Pressure 0 cmH20   Exhaled Vt 616 mL   Total Ve 16.3 mL   Spont Ve 0 L   I:E Ratio Measured 1:1.80   Conventional Ventilator Alarms   Ve High Alarm 30 L/min   Resp Rate High Alarm 45 br/min   Press High Alarm 60 cmH2O   Apnea Rate 16   Apnea Volume (mL) 450 mL   Apnea Oxygen Concentration  100   Apnea Flow Rate (L/min) 60   T Apnea 20 sec(s)   Ready to Wean/Extubation Screen   FIO2<=50 (chart decimal) 0.3   MV<16L (chart vol.) (!) 16.3   PEEP <=8 (chart #) 5   Ready to Wean Parameters   F/VT Ratio<105 (RSBI) (!) 35.71

## 2020-11-08 NOTE — CARE UPDATE
Received phone call from Dr. Araujo regarding patient case. He asked for me to follow neurologist recommendations and order 3% NS at 30 ml/hr with Na checks q 6 hours. Neurologist note reviewed, orders placed for 3% NS and CMP q 6 hours, first lab draw to be obtained now.

## 2020-11-08 NOTE — PROGRESS NOTES
Pharmacokinetic Assessment Follow Up: IV Vancomycin    Vancomycin serum concentration assessment(s):    The random level was drawn correctly and can be used to guide therapy at this time. The measurement is within the desired definitive target range of 15 to 20 mcg/mL.    Vancomycin Regimen Plan:    1. Patient's random level was 17 mcg/mL and within the therapeutic range of 15-20 mcg/mL.  2. Pharmacy will continue to pulse dose. Patient will be given vancomycin 500 mg.   3. A random level will be drawn on 11/9/20 with AM labs.  4. Continue target goal of 15-20 mcg/mL.    Drug levels (last 3 results):  Recent Labs   Lab Result Units 11/06/20  1017 11/07/20  1235 11/08/20  0355   Vancomycin, Random ug/mL 16.7 19.8 17.0       Pharmacy will continue to follow and monitor vancomycin.    Please contact pharmacy at extension [phone] for questions regarding this assessment.    Thank you for the consult,   Tanner Burroughs       Patient brief summary:  Yocasta Almonte is a 75 y.o. female initiated on antimicrobial therapy with IV Vancomycin for treatment of PNA.     The patient is being dosed by level.     Drug Allergies:   Review of patient's allergies indicates:   Allergen Reactions    Iodine and iodide containing products     Penicillins        Actual Body Weight:   77.6 kg (171 lb 1.2 oz)    Renal Function:   Estimated Creatinine Clearance: 11.4 mL/min (A) (based on SCr of 4.4 mg/dL (H)).,     Dialysis Method (if applicable):  N/A    CBC (last 72 hours):  Recent Labs   Lab Result Units 11/05/20  1110 11/05/20  1619 11/05/20  2106 11/06/20  0042 11/06/20  0409 11/06/20  0802 11/06/20  1017 11/06/20  1553 11/06/20  2010 11/06/20  2339 11/07/20  0455 11/07/20  0806 11/07/20  1235 11/07/20  1540 11/07/20  2043 11/07/20  2351 11/08/20  0354   WBC K/uL 7.95 6.73 7.53 6.93 6.63 6.41 6.03 6.68 8.60 9.08 10.91 10.68 10.42 10.10 11.48 11.03 11.88   Hemoglobin g/dL 12.0 11.7* 11.6* 11.1* 10.9* 10.6* 10.5* 9.8* 9.7* 9.5* 9.8* 9.4* 8.9*  8.7* 8.9* 8.6* 8.6*   Hematocrit % 38.6 35.6* 36.1* 32.7* 33.5* 32.2* 31.2* 28.9* 28.6* 28.0* 29.2* 28.3* 26.9* 26.0* 26.8* 26.3* 26.6*   Platelets K/uL 170 158 168 142* 139* 129* 114* 127* 130* 127* 130* 128* 124* 122* 126* 122* 126*   Gran % % 73.9* 34.0* 54.0 54.0 51.0 42.0 75.3* 36.0* 59.0 55.0 71.0 79.0* 86.0* 84.9* 78.0* 84.7* 82.8*   Lymph % % 14.5* 18.0 12.0* 7.0* 5.0* 8.0* 8.0* 7.0* 5.0* 16.0* 6.0* 11.0* 6.0* 4.6* 8.0* 4.3* 4.0*   Mono % % 9.2 2.0* 3.0* 3.0* 3.0* 8.0 7.3 6.0 5.0 3.0* 7.0 9.0 5.0 9.0 3.0* 9.0 10.3   Eosinophil % % 1.4 0.0 1.0 0.0 0.0 4.0 3.6 4.0 1.0 4.0 0.0 1.0 0.0 0.7 0.0 0.8 1.1   Basophil % % 0.5 0.0 0.0 0.0 0.0 0.0 0.8 0.0 1.0 0.0 1.0 0.0 0.0 0.4 0.0 0.5 0.6   Differential Method  Automated Manual Manual Manual Manual Manual Automated Manual Manual Manual Manual Manual Manual Automated Manual Automated Automated       Metabolic Panel (last 72 hours):  Recent Labs   Lab Result Units 11/05/20  1002 11/05/20  1110 11/05/20  1111 11/05/20  1619 11/05/20  2106 11/06/20  0042 11/06/20  0409 11/06/20  0801 11/06/20  1017 11/06/20  1259 11/06/20  1553 11/06/20 2010 11/06/20  2339 11/07/20  0455 11/07/20  0806 11/07/20  1235 11/07/20  1540 11/07/20  2043 11/07/20  2351 11/08/20  0220 11/08/20  0355   Sodium mmol/L  --   --  140 137 136 135* 135*  --  135* 135*  --  131* 131* 134*  --   --   --  135* 137  --  140   Potassium mmol/L  --   --  3.8 4.0 4.4 3.6 3.5  --  3.4* 3.7  --  3.6 3.5 3.9  --   --   --  3.6 3.6  --  3.9   Chloride mmol/L  --   --  112* 109 109 107 107  --  109 107  --  104 104 106  --   --   --  109 111*  --  113*   CO2 mmol/L  --   --  13* 13* 13* 13* 13*  --  11* 11*  --  11* 10* 12*  --   --   --  11* 10*  --  9*   Glucose mg/dL  --  163* 161*  162* 122*  120* 115* 157* 177* 157* 140*  143* 122* 139* 111* 117*  118* 85 74 83 81 89 78  78  --  77   Glucose, UA  1+*  --   --   --   --   --   --   --   --   --   --   --   --   --   --   --   --   --   --   --   --    BUN  mg/dL  --   --  20 22 24* 26* 27*  --  28* 32*  --  34* 38* 41*  --   --   --  51* 52*  --  55*   Creatinine mg/dL  --   --  1.7* 1.9* 2.1* 2.2* 2.4*  --  2.5* 2.7*  --  3.1* 3.3* 3.5*  --   --   --  4.3* 4.4*  --  4.4*   Creatinine, Urine mg/dL  --   --   --   --   --   --   --   --   --   --   --   --   --   --   --   --   --   --   --  19.8  --    Albumin g/dL  --   --   --   --   --   --   --   --   --   --   --   --   --   --   --   --   --  2.0* 2.0*  --  2.0*   Total Bilirubin mg/dL  --   --   --   --   --   --   --   --   --   --   --   --   --   --   --   --   --  0.4 0.3  --  0.4   Alkaline Phosphatase U/L  --   --   --   --   --   --   --   --   --   --   --   --   --   --   --   --   --  89 86  --  94   AST U/L  --   --   --   --   --   --   --   --   --   --   --   --   --   --   --   --   --  166* 154*  --  146*   ALT U/L  --   --   --   --   --   --   --   --   --   --   --   --   --   --   --   --   --  118* 115*  --  110*   Magnesium mg/dL  --  1.7  --  1.7 2.5 2.2 2.2 2.1 2.0  --  2.0 2.1 2.0 2.2  --   --   --   --   --   --  2.5   Phosphorus mg/dL  --  4.5  --   --   --   --  5.1*  --   --   --   --   --   --  5.9*  --   --   --   --   --   --  6.8*       Vancomycin Administrations:  vancomycin given in the last 96 hours                     vancomycin 500 mg in dextrose 5 % 100 mL IVPB (ready to mix system) (mg) 500 mg New Bag 11/06/20 1324    vancomycin 1.5 g in dextrose 5 % 250 mL IVPB (ready to mix) (mg) 1,500 mg New Bag 11/05/20 1320                    Microbiologic Results:  Microbiology Results (last 7 days)       Procedure Component Value Units Date/Time    Blood culture [[id number]] Collected: 11/05/20 1238    Order Status: Completed Specimen: Blood Updated: 11/07/20 2012     Blood Culture, Routine No Growth to date      No Growth to date      No Growth to date    Blood culture [[id number]] Collected: 11/05/20 1111    Order Status: Completed Specimen: Blood from Peripheral, Right Hand  Updated: 11/07/20 2012     Blood Culture, Routine No Growth to date      No Growth to date      No Growth to date    Culture, Respiratory with Gram Stain [[id number]]  (Abnormal) Collected: 11/05/20 1159    Order Status: Completed Specimen: Respiratory from Tracheal Aspirate Updated: 11/07/20 1149     Respiratory Culture ACINETOBACTER BAUMANNII COMPLEX  Moderate  Susceptibility pending  Normal respiratory anuja also present       Gram Stain (Respiratory) <10 epithelial cells per low power field.     Gram Stain (Respiratory) Rare WBC's     Gram Stain (Respiratory) Many Gram negative rods     Gram Stain (Respiratory) Few Gram positive cocci    AFB Culture & Smear [[id number]] Collected: 11/05/20 1159    Order Status: Completed Specimen: Respiratory from Tracheal Aspirate Updated: 11/06/20 2127     AFB Culture & Smear Culture in progress     AFB CULTURE STAIN No acid fast bacilli seen.

## 2020-11-08 NOTE — PROGRESS NOTES
Progress Note  PULMONARY    Admit Date: 11/4/2020 11/08/2020      SUBJECTIVE:   Pt is a 76 yo female with CLL, lung ca who presented to the ED on 11/5 s/p cardiac arrest at home.   11/6- continues on vent, hypothermic protocol, requiring levophed 0.08 mcg/kg/min. Sedated w/ versed & propofol  11/7- remains on vent, now rewarmed  11/8- with cerebellar swelling on CT, neurosurg and neuro consulted yesterday. Remains on vent       OBJECTIVE:     Vitals (Most recent):  Vitals:    11/08/20 1300   BP:    Pulse: 108   Resp: (!) 22   Temp:        Vitals (24 hour range):  Temp:  [98.1 °F (36.7 °C)-98.7 °F (37.1 °C)]   Pulse:  [107-132]   Resp:  [20-42]   BP: (120-150)/(43-56)   SpO2:  [90 %-100 %]   Arterial Line BP: (102-165)/(41-61)       Intake/Output Summary (Last 24 hours) at 11/8/2020 1421  Last data filed at 11/8/2020 1300  Gross per 24 hour   Intake 1394.56 ml   Output 1920 ml   Net -525.44 ml          Physical Exam:  The patient's neuro status (alertness,orientation,cognitive function,motor skills,), pharyngeal exam (oral lesions, hygiene, abn dentition,), Neck (jvd,mass,thyroid,nodes in neck and above/below clavicle),RESPIRATORY(symmetry,effort,fremitus,percussion,auscultation),  Cor(rhythm,heart tones including gallops,perfusion,edema)ABD(distention,hepatic&splenomegaly,tenderness,masses), Skin(rash,cyanosis),Psyc(affect,judgement,).  Exam negative except for these pertinent findings:    Intubated, no sedation  Tongue protrudes  Pupils pinpoint, dysconjugate gaze  Coughs, gags, bites ET tube  RIJ central line  Skin ulcerations over the sternum  Ventilated breath sounds  HR regular and tachycardic  Abdomen soft, BS+  Hernandez in place w/ yellow urine      Radiographs reviewed: view by direct vision   CT head 11/7/20-   1. Diffuse cerebral edema most notable in the cerebellum.  Upper cervical spinal canal is not included in the field of view, but there is concern for cerebellar tonsillar herniation.  2. There is  developing hydrocephalus secondary to compression of the 4th ventricle.  3. Recommend emergent neurosurgical consultation.    CXR 11/7- bilateral upper lobe infiltrates  CXR 11/5- RUL and LISSY consolidations, new RML infiltrate developing  CTA chest 11/4-   1. Examination is limited by streak and motion artifact.  There is no evidence of pulmonary embolism to the level of the proximal segmental pulmonary arteries.  The distal segmental to subsegmental pulmonary arteries are not well evaluated.  2. Interval development of bilateral ground-glass and dense consolidations as described above, likely combination of aspiration, atelectasis and/or edema.  3. Acute, mildly displaced fractures of the left 2nd through 4th ribs and the right 2nd rib.  4. Right upper lobe masslike opacity and additional bilateral pulmonary nodules are not well assessed secondary to significant motion artifact.  Recommend continued attention on follow-up imaging.  5. Large left thyroid mass extending into the superior mediastinum, unchanged.  6. Stable mildly prominent mediastinal and bilateral axillary lymphadenopathy.    TTE 11/5/20-   · Overall the study quality was poor. The study was difficult due to patient's body habitus.  · There is left ventricular concentric remodeling.  · The left ventricle is normal in size with moderately decreased systolic function. The estimated ejection fraction is 40%.  · There is moderate left ventricular global hypokinesis.  · Grade I diastolic dysfunction.  · Normal right ventricular size with normal right ventricular systolic function.  · Normal central venous pressure (3 mmHg).  Labs     Recent Labs   Lab 11/07/20  2043  11/08/20  1232   WBC 11.48   < > 11.44   HGB 8.9*   < > 8.5*   HCT 26.8*   < > 26.4*   *   < > 112*   BAND 9.0  --  2.0   METAMYELOCYT 1.0  --   --    MYELOPCT 1.0  --   --     < > = values in this interval not displayed.     Recent Labs   Lab 11/08/20  0354 11/08/20  0355   11/08/20  1232   NA  --  140  --  143   K  --  3.9  --  4.4   CL  --  113*  --  118*   CO2  --  9*  --  9*   BUN  --  55*  --  57*   CREATININE  --  4.4*  --  4.6*   GLU  --  77   < > 86  86   CALCIUM  --  7.9*  --  8.1*   MG  --  2.5  --   --    PHOS  --  6.8*  --   --    AST  --  146*  --  133*   ALT  --  110*  --  108*   ALKPHOS  --  94  --  96   BILITOT  --  0.4  --  0.4   PROT  --  5.2*  --  5.3*   ALBUMIN  --  2.0*  --  2.0*   INR 1.0  --   --   --    TROPONINI 0.291*  --   --  0.241*   CPK  --  3754*  --   --     < > = values in this interval not displayed.     Recent Labs   Lab 11/08/20  0454   PH 7.238*   PCO2 25.1*   PO2 104*   HCO3 10.7*     Microbiology Results (last 7 days)     Procedure Component Value Units Date/Time    Culture, Respiratory with Gram Stain [[id number]]  (Abnormal) Collected: 11/05/20 1159    Order Status: Completed Specimen: Respiratory from Tracheal Aspirate Updated: 11/08/20 1040     Respiratory Culture ACINETOBACTER BAUMANNII COMPLEX  Moderate  Susceptibility pending  Normal respiratory anuja also present       Gram Stain (Respiratory) <10 epithelial cells per low power field.     Gram Stain (Respiratory) Rare WBC's     Gram Stain (Respiratory) Many Gram negative rods     Gram Stain (Respiratory) Few Gram positive cocci    Blood culture [[id number]] Collected: 11/05/20 1238    Order Status: Completed Specimen: Blood Updated: 11/07/20 2012     Blood Culture, Routine No Growth to date      No Growth to date      No Growth to date    Blood culture [[id number]] Collected: 11/05/20 1111    Order Status: Completed Specimen: Blood from Peripheral, Right Hand Updated: 11/07/20 2012     Blood Culture, Routine No Growth to date      No Growth to date      No Growth to date    AFB Culture & Smear [[id number]] Collected: 11/05/20 1159    Order Status: Completed Specimen: Respiratory from Tracheal Aspirate Updated: 11/06/20 2127     AFB Culture & Smear Culture in progress     AFB CULTURE STAIN No  acid fast bacilli seen.          Impression:  Active Hospital Problems    Diagnosis  POA    *Cardiopulmonary arrest [I46.9]  Yes    Calcified granuloma of lung [J84.10]  Yes    Calcification of aorta [I70.0]  Yes    Aspiration pneumonia [J69.0]  Yes    Anoxic encephalopathy [G93.1]  Yes    COPD (chronic obstructive pulmonary disease) [J44.9]  Yes    Acute hypoxemic respiratory failure [J96.01]  Yes    Type 2 diabetes mellitus with other specified complication [E11.69]  Yes    Anemia of decreased vitamin B12 absorption [D51.8]  Yes    Essential hypertension [I10]  Yes    HLD (hyperlipidemia) [E78.5]  Yes    Malignant neoplasm of upper lobe of left lung [C34.12]  Yes    Chronic lymphocytic leukemia [C91.10]  Yes    Nicotine dependence, unspecified, uncomplicated [F17.200]  Yes    Coronary artery disease [I25.10]  Yes      Resolved Hospital Problems    Diagnosis Date Resolved POA    Septic shock [A41.9, R65.21] 11/05/2020 Yes               Plan:   PEA arrest   acute hypoxemic respiratory failure  Combined heart failure, EF 40%  Septic vs cardiogenic shock  Lung cancer  CLL  Aspiration pneumonia  Thrombocytopenia  Normocytic anemia  MARCE  Rhabdomyolysis  Metabolic acidosis, gap and non-gap  Cerebral edema post arrest- suspect anoxic brain injury    - continue vent support  - daily ABG  - maintain off sedation if possible  - continue rocephin & vanco for now- f/u speciation. Acinetobacter in sputum  - continue inhaled bronchodilators  - pressor support as needed to keep MAP >65  - monitor CPK, renal function  - appreciate neurology, neurosurgery recs  - very poor prognosis      The following were evaluated and adjusted as needed: mechanical ventilator settings and weaning status, vasopressors, sedation and neurologic status, antibiotics, acid base balance and oxygenation needs and input and output and renal status       Critical Care  - THE PATIENT HAS A HIGH PROBABILITY OF IMMINENT OR LIFE THREATENING  DETERIORATION.  Over 50%time of encounter was in direct care at bedside.  Time was 30 to 74 minutes required for patient care.  Time needed for all of the above totaled 30 minutes.    Jina Spring MD  Pulmonary & Critical Care Medicine                                        .

## 2020-11-08 NOTE — CONSULTS
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  Uncertain how long patient was hypoxic for but was very acidotic upon resuscitation.  Follow-up brain imaging has revealed signs of significant anoxic brain injury with patchy multi territory bilateral hemispheric infarct with bilateral large cerebellar infarct with obstructive hydrocephalus.  Neurology was consulted.  Patient has been intubated and sedated but has a poor neurologic exam by all accounts.    Facility-Administered Medications Prior to Admission   Medication    cyanocobalamin injection 1,000 mcg     PTA Medications   Medication Sig    albuterol (PROVENTIL) 5 mg/mL nebulizer solution Take 0.5 mLs (2.5 mg total) by nebulization every 4 (four) hours as needed for Wheezing or Shortness of Breath. Rescue    albuterol (PROVENTIL/VENTOLIN HFA) 90 mcg/actuation inhaler Inhale 1 puff into the lungs every 6 (six) hours as needed for Wheezing or Shortness of Breath. Rescue    atorvastatin (LIPITOR) 80 MG tablet Take 1 tablet (80 mg total) by mouth once daily. For cholesterol    blood sugar diagnostic (TRUE METRIX GLUCOSE TEST STRIP) Strp Test blood sugar in vitro BID    blood-glucose meter (TRUE METRIX GLUCOSE METER) Misc Test blood sugar in vitro BID    esomeprazole (NEXIUM) 40 MG capsule Take 1 capsule (40 mg total) by mouth 2 (two) times daily before meals.    fluticasone propionate (FLONASE)  50 mcg/actuation nasal spray 1 spray (50 mcg total) by Each Nostril route 2 (two) times daily.    gabapentin (NEURONTIN) 300 MG capsule 1 cap AM, 1 caps NOON, 2 capPM    lancets 32 gauge Misc Test blood sugar in vitro BID    loratadine (CLARITIN) 10 mg tablet Take 1 tablet (10 mg total) by mouth once daily. For allergies    methylPREDNISolone (MEDROL DOSEPACK) 4 mg tablet TAKE 1 TABLET BY MOUTH AS DIRECTED    metoprolol tartrate (LOPRESSOR) 25 MG tablet Take 1 tablet (25 mg total) by mouth once daily.    mirtazapine (REMERON) 30 MG tablet Take 1 tablet (30 mg total) by mouth every evening.    potassium chloride SA (K-DUR,KLOR-CON) 20 MEQ tablet Take 1 tablet (20 mEq total) by mouth once daily.    triamcinolone (KENALOG) 0.5 % ointment Apply 1 application topically 2 (two) times daily.    VIRTUSSIN AC  mg/5 mL syrup TAKE 2 TEASPOONFUL BY MOUTH TWICE DAILY AS NEEDED FOR COUGH       Review of patient's allergies indicates:   Allergen Reactions    Iodine and iodide containing products     Penicillins        Past Medical History:   Diagnosis Date    Cancer     lymphoma    COPD (chronic obstructive pulmonary disease)     Diabetes mellitus     GERD (gastroesophageal reflux disease)     Hyperlipidemia     Hypertension     YESICA (obstructive sleep apnea) 06/2020    by home sleep study    Thyroid disease      Past Surgical History:   Procedure Laterality Date    ESOPHAGOGASTRODUODENOSCOPY Left 5/13/2020    Procedure: EGD (ESOPHAGOGASTRODUODENOSCOPY);  Surgeon: Rogelio Harris MD;  [facility];  Service: Endoscopy;  Laterality: Left;    LYMPH NODE DISSECTION       R axillary    NECK SURGERY      infection in neck removed     Family History     Problem Relation (Age of Onset)    Cancer Mother    No Known Problems Father        Tobacco Use    Smoking status: Former Smoker     Packs/day: 0.50     Years: 58.00     Pack years: 29.00     Types: Cigarettes     Quit date: 9/14/2020     Years since  quittin.1    Smokeless tobacco: Never Used    Tobacco comment: on nicoderm patches -DN  10/12/20   Substance and Sexual Activity    Alcohol use: No     Comment: hx of alochol abuse but does not drink at all now    Drug use: No     Comment: none    Sexual activity: Not Currently     Birth control/protection: None     Review of Systems  Objective:     Weight: (unable to weigh due to arctic sun in use)  Body mass index is 27.61 kg/m².  Vital Signs (Most Recent):  Temp: 98.7 °F (37.1 °C) (20 191)  Pulse: (!) 126 (20)  Resp: (!) 24 (20)  BP: (!) 120/56 (20 151)  SpO2: 99 % (20) Vital Signs (24h Range):  Temp:  [98.1 °F (36.7 °C)-98.8 °F (37.1 °C)] 98.7 °F (37.1 °C)  Pulse:  [103-137] 126  Resp:  [22-58] 24  SpO2:  [60 %-100 %] 99 %  BP: ()/(50-58) 120/56  Arterial Line BP: ()/(36-77) 146/54     Date 20 0700 - 20 0659   Shift 3440-1332 0001-1007 1171-1743 24 Hour Total   INTAKE   I.V.(mL/kg)  921.8(11.9)  921.8(11.9)   IV Piggyback  50  50   Shift Total(mL/kg)  971.8(12.5)  971.8(12.5)   OUTPUT   Urine(mL/kg/hr) 335(0.5) 210  545   Drains  200  200   Shift Total(mL/kg) 335(4.3) 410(5.3)  745(9.6)   Weight (kg) 77.6 77.6 77.6 77.6              Vent Mode: A/C  Oxygen Concentration (%):  [30] 30  Resp Rate Total:  [0 br/min-33 br/min] 25 br/min  Vt Set:  [600 mL] 600 mL  PEEP/CPAP:  [5 cmH20] 5 cmH20  Pressure Support:  [0 cmH20-15 cmH20] 0 cmH20  Mean Airway Pressure:  [8.2 vdX23-99 cmH20] 13 cmH20         NG/OG Tube 20 1830 orogastric 18 Fr. Center mouth (Active)   Placement Check placement verified by x-ray 20   Tolerance no signs/symptoms of discomfort 20   Securement secured to nostril center 20   Clamp Status/Tolerance unclamped 20   Suction Setting/Drainage Method suction at;low;intermittent setting 20   Insertion Site Appearance no redness, warmth, tenderness, skin breakdown,  "drainage 11/07/20 1600   Drainage Green;Bile 11/07/20 1600   Flush/Irrigation flushed w/;water 11/07/20 1600   Tube Output(mL)(Include Discarded Residual) 200 mL 11/07/20 1800            Urethral Catheter 11/04/20 2113 Non-latex 16 Fr. (Active)   Site Assessment Clean;Intact 11/07/20 1600   Collection Container Urimeter 11/07/20 1600   Securement Method secured to top of thigh w/ adhesive device 11/07/20 1600   Catheter Care Performed yes 11/07/20 1600   Reason for Continuing Urinary Catheterization Critically ill in ICU and requiring hourly monitoring of intake/output 11/07/20 1600   CAUTI Prevention Bundle StatLock in place w 1" slack;Intact seal between catheter & drainage tubing;Drainage bag/urimeter off the floor;Green sheeting clip in use;No dependent loops or kinks;Drainage bag/urimeter not overfilled (<2/3 full);Drainage bag/urimeter below bladder 11/07/20 0800   Output (mL) 110 mL 11/07/20 1800            Fecal Incontinence  11/05/20 0100 (Active)   Application fecal incontinence  in place 11/07/20 1600   Drainage Method attached to drainage bag 11/07/20 1600   Securement to gravity 11/07/20 1600   Skin no breakdown 11/07/20 1600   Tolerance no signs/symptoms of discomfort 11/07/20 1600       Neurosurgery Physical Exam    Significant Labs:  Recent Labs   Lab 11/06/20 2010 11/06/20  2339 11/07/20  0455 11/07/20  0806 11/07/20  1235 11/07/20  1540   * 117*  118* 85 74 83 81   * 131* 134*  --   --   --    K 3.6 3.5 3.9  --   --   --     104 106  --   --   --    CO2 11* 10* 12*  --   --   --    BUN 34* 38* 41*  --   --   --    CREATININE 3.1* 3.3* 3.5*  --   --   --    CALCIUM 7.5* 7.4* 7.7*  --   --   --    MG 2.1 2.0 2.2  --   --   --      Recent Labs   Lab 11/07/20  1235 11/07/20  1540 11/07/20  2043   WBC 10.42 10.10 11.48   HGB 8.9* 8.7* 8.9*   HCT 26.9* 26.0* 26.8*   * 122* 126*     Recent Labs   Lab 11/06/20  0409 11/07/20  0455   INR 1.2 1.1   APTT 43.3* " 43.6*     Microbiology Results (last 7 days)     Procedure Component Value Units Date/Time    Blood culture [[id number]] Collected: 11/05/20 1238    Order Status: Completed Specimen: Blood Updated: 11/07/20 2012     Blood Culture, Routine No Growth to date      No Growth to date      No Growth to date    Blood culture [[id number]] Collected: 11/05/20 1111    Order Status: Completed Specimen: Blood from Peripheral, Right Hand Updated: 11/07/20 2012     Blood Culture, Routine No Growth to date      No Growth to date      No Growth to date    Culture, Respiratory with Gram Stain [[id number]]  (Abnormal) Collected: 11/05/20 1159    Order Status: Completed Specimen: Respiratory from Tracheal Aspirate Updated: 11/07/20 1149     Respiratory Culture ACINETOBACTER BAUMANNII COMPLEX  Moderate  Susceptibility pending  Normal respiratory anuja also present       Gram Stain (Respiratory) <10 epithelial cells per low power field.     Gram Stain (Respiratory) Rare WBC's     Gram Stain (Respiratory) Many Gram negative rods     Gram Stain (Respiratory) Few Gram positive cocci    AFB Culture & Smear [[id number]] Collected: 11/05/20 1159    Order Status: Completed Specimen: Respiratory from Tracheal Aspirate Updated: 11/06/20 2127     AFB Culture & Smear Culture in progress     AFB CULTURE STAIN No acid fast bacilli seen.        ABGs:   Recent Labs   Lab 11/06/20  0506 11/06/20  1313 11/07/20  0513   PH 7.235* 7.305* 7.236*   PCO2 36.1 27.6* 34.1*   PO2 176* 99 83   HCO3 15.3* 13.7* 14.5*   POCSATURATED 99 97 94*   BE -12 -13 -13     Cardiac markers:   Recent Labs   Lab 11/06/20 2010 11/07/20  0455 11/07/20  1235   TROPONINI 0.735* 0.613* 0.484*     CMP:   Recent Labs   Lab 11/06/20 2010 11/06/20  2339 11/07/20  0455 11/07/20  0806 11/07/20  1235 11/07/20  1540   * 117*  118* 85 74 83 81   CALCIUM 7.5* 7.4* 7.7*  --   --   --    * 131* 134*  --   --   --    K 3.6 3.5 3.9  --   --   --    CO2 11* 10* 12*  --   --   --    CL  104 104 106  --   --   --    BUN 34* 38* 41*  --   --   --    CREATININE 3.1* 3.3* 3.5*  --   --   --      CRP: No results for input(s): CRP in the last 48 hours.  Recent Lab Results       11/07/20  2043   11/07/20  1540   11/07/20  1235   11/07/20  0806   11/07/20  0513        Allens Test         N/A     Anion Gap               Aniso   Slight Slight Slight       aPTT               BANDS     2.0         Baso #   0.04           Basophil %   0.4 0.0 0.0       Site         Keshia/UAC     BUN               Calcium               Chloride               CO2               CPK               Creatinine               DelSys         Adult Vent     Differential Method   Automated Manual  Comment:  Corrected result; previously reported as Automated on 11/07/2020 at   13:51.  [C] Manual  Comment:  Corrected result; previously reported as Automated on 11/07/2020 at   11:16.  [C]       Dohle Bodies   Present Present         eGFR if                eGFR if non                Eos #   0.1           Eosinophil %   0.7 0.0 1.0       FiO2         30     Glucose   81 83 74       Gran # (ANC)   8.6           Gran %   84.9 86.0 79.0       Hematocrit 26.8 26.0 26.9 28.3       Hemoglobin 8.9 8.7 8.9 9.4       Hypo   Occasional Occasional         Immature Grans (Abs)   0.04  Comment:  Mild elevation in immature granulocytes is non specific and   can be seen in a variety of conditions including stress response,   acute inflammation, trauma and pregnancy. Correlation with other   laboratory and clinical findings is essential.   CANCELED  Comment:  Mild elevation in immature granulocytes is non specific and   can be seen in a variety of conditions including stress response,   acute inflammation, trauma and pregnancy. Correlation with other   laboratory and clinical findings is essential.    Result canceled by the ancillary.   CANCELED  Comment:  Mild elevation in immature granulocytes is non specific and   can be seen  in a variety of conditions including stress response,   acute inflammation, trauma and pregnancy. Correlation with other   laboratory and clinical findings is essential.    Result canceled by the ancillary.         Immature Granulocytes   0.4 CANCELED  Comment:  Result canceled by the ancillary. CANCELED  Comment:  Result canceled by the ancillary.       INR               Lymph #   0.5           Lymph %   4.6 6.0 11.0       Magnesium               MCH 29.3 29.2 29.2 29.7       MCHC 33.2 33.5 33.1 33.2       MCV 88 87 88 89       Metamyelocytes     1.0         Min Vol         14.6     Mode         PSV     Mono #   0.9           Mono %   9.0 5.0 9.0       MPV 11.4 11.6 11.5 11.5       Myelocytes               nRBC   0 0 0       Ovalocytes   Occasional Occasional Occasional       PEEP         5     Phosphorus               Platelet Estimate   Decreased Decreased Decreased       Platelets 126 122 124 128       POC BE         -13     POC HCO3         14.5     POC PCO2         34.1     POC PH         7.236     POC PO2         83     POC SATURATED O2         94     POC TCO2         16     POCT Glucose               Poik   Slight Slight         Poly   Occasional Occasional         Potassium               Protime               PS         10     RBC 3.04 2.98 3.05 3.17       RDW 15.5 15.3 15.4 15.3       Sample         ARTERIAL     Sodium               Sp02         95     Spont Rate         31     Tear Drop Cells   Occasional Occasional         Troponin I     0.484  Comment:  The reference interval for Troponin I represents the 99th percentile   cutoff   for our facility and is consistent with 3rd generation assay   performance.           Vancomycin, Random     19.8         Vol         470     WBC 11.48 10.10 10.42 10.68                        11/07/20  0455   11/07/20  0037   11/06/20  2339        Allens Test           Anion Gap 16   17     Aniso           aPTT 43.6  Comment:  aPTT therapeutic range = 39-69 seconds          BANDS 12.0   18.0     Baso # CANCELED  Comment:  Result canceled by the ancillary.   CANCELED  Comment:  Result canceled by the ancillary.     Basophil % 1.0   0.0     Site           BUN 41   38     Calcium 7.7   7.4     Chloride 106   104     CO2 12   10     CPK 4716         Creatinine 3.5   3.3     DelSys           Differential Method Manual   Manual     Dohle Bodies           eGFR if  14   15     eGFR if non  12  Comment:  Calculation used to obtain the estimated glomerular filtration  rate (eGFR) is the CKD-EPI equation.      13  Comment:  Calculation used to obtain the estimated glomerular filtration  rate (eGFR) is the CKD-EPI equation.        Eos # CANCELED  Comment:  Result canceled by the ancillary.   CANCELED  Comment:  Result canceled by the ancillary.     Eosinophil % 0.0   4.0     FiO2           Glucose 85   117          118     Gran # (ANC)           Gran % 71.0   55.0     Hematocrit 29.2   28.0     Hemoglobin 9.8   9.5     Hypo           Immature Grans (Abs) CANCELED  Comment:  Mild elevation in immature granulocytes is non specific and   can be seen in a variety of conditions including stress response,   acute inflammation, trauma and pregnancy. Correlation with other   laboratory and clinical findings is essential.    Result canceled by the ancillary.     CANCELED  Comment:  Mild elevation in immature granulocytes is non specific and   can be seen in a variety of conditions including stress response,   acute inflammation, trauma and pregnancy. Correlation with other   laboratory and clinical findings is essential.    Result canceled by the ancillary.       Immature Granulocytes CANCELED  Comment:  Result canceled by the ancillary.   CANCELED  Comment:  Result canceled by the ancillary.     INR 1.1  Comment:  Coumadin Therapy:  2.0 - 3.0 for INR for all indicators except mechanical heart valves  and antiphospholipid syndromes which should use 2.5 - 3.5.           Lymph  # CANCELED  Comment:  Result canceled by the ancillary.   CANCELED  Comment:  Result canceled by the ancillary.     Lymph % 6.0   16.0     Magnesium 2.2   2.0     MCH 29.2   29.5     MCHC 33.6   33.9     MCV 87   87     Metamyelocytes 3.0   3.0     Min Vol           Mode           Mono # CANCELED  Comment:  Result canceled by the ancillary.   CANCELED  Comment:  Result canceled by the ancillary.     Mono % 7.0   3.0     MPV 11.9   11.8     Myelocytes     1.0     nRBC 0   0     Ovalocytes Occasional   Occasional     PEEP           Phosphorus 5.9         Platelet Estimate Appears normal   Appears normal     Platelets 130   127     POC BE           POC HCO3           POC PCO2           POC PH           POC PO2           POC SATURATED O2           POC TCO2           POCT Glucose   121       Poik Slight   Slight     Poly Occasional   Occasional     Potassium 3.9   3.5     Protime 11.7         PS           RBC 3.36   3.22     RDW 15.3   15.0     Sample           Sodium 134   131     Sp02           Spont Rate           Tear Drop Cells           Troponin I 0.613  Comment:  The reference interval for Troponin I represents the 99th percentile   cutoff   for our facility and is consistent with 3rd generation assay   performance.           Vancomycin, Random           Vol           WBC 10.91   9.08         All pertinent labs from the last 24 hours have been reviewed.  Significant Diagnostics:  CT: Ct Head Without Contrast    Result Date: 11/7/2020  1. Diffuse cerebral edema most notable in the cerebellum.  Upper cervical spinal canal is not included in the field of view, but there is concern for cerebellar tonsillar herniation. 2. There is developing hydrocephalus secondary to compression of the 4th ventricle. 3. Recommend emergent neurosurgical consultation. Findings discussed with Dr. Araujo via telephone at 15:50 hours Electronically signed by: Trey Smyth Date:    11/07/2020 Time:    16:01  MRI: No results found in  the last 24 hours.  I have reviewed all pertinent imaging results/findings within the past 24 hours.    Assessment/Plan:     Active Diagnoses:    Diagnosis Date Noted POA    PRINCIPAL PROBLEM:  Cardiopulmonary arrest [I46.9] 11/04/2020 Yes    Calcified granuloma of lung [J84.10] 11/05/2020 Yes    Calcification of aorta [I70.0] 11/05/2020 Yes    Aspiration pneumonia [J69.0] 11/05/2020 Yes    Anoxic encephalopathy [G93.1] 11/05/2020 Yes    COPD (chronic obstructive pulmonary disease) [J44.9] 05/11/2020 Yes    Acute hypoxemic respiratory failure [J96.01] 04/22/2020 Yes    Type 2 diabetes mellitus with other specified complication [E11.69] 10/15/2019 Yes    Anemia of decreased vitamin B12 absorption [D51.8] 04/01/2019 Yes    Essential hypertension [I10] 07/25/2018 Yes    HLD (hyperlipidemia) [E78.5] 07/24/2018 Yes    Malignant neoplasm of upper lobe of left lung [C34.12] 08/08/2017 Yes    Chronic lymphocytic leukemia [C91.10] 04/27/2017 Yes    Nicotine dependence, unspecified, uncomplicated [F17.200] 11/07/2016 Yes    Coronary artery disease [I25.10] 11/02/2015 Yes      Problems Resolved During this Admission:    Diagnosis Date Noted Date Resolved POA    Septic shock [A41.9, R65.21] 11/05/2020 11/05/2020 Yes       Overall this is a 75-year-old female with multiple comorbidities but was independent before this event now with bilateral and multi territory but Prater a brain injury related strokes with obstructive hydrocephalus and poor neurologic exam.  Looks like patient has not improved much since admission on 11/04/2020.    Unfortunately do not think there is any neurosurgical intervention that would change her overall prognosis.  We can temporize her by placing a right frontal ventriculostomy but I doubt that would do much for her prognostic Gray other than potentially prevent acute herniation.    I had a long and in-depth discussion with her family.  Her grandson is person lives with her well her [place]  After in-depth discussion between the family members they have decided against proceeding with a ventriculostomy.  They state that patient is very independent and would not want aggressive intervention unless there was a reasonable chance that she would be somewhat independent again.  I was fairly upfront with the family about the ventriculostomy may help prevent it from herniation but I do not see a good chance for her regaining consciousness or being able to return to any sort of independent living or have a reasonable quality of life.  Family is not quite ready to is transition to hospice or comfort care they would like to continue maximum medical therapy and support a would not want any intervention.    At this stage neurosurgical recommendation would be to continue ventilatory support, optimize her oxygenation and laboratory data.  I would maintain a sodium level above 140 and keep her normotensive.     I am happy to follow along but no acute neurosurgical intervention at this time.  We will plan to talk more the family tomorrow        Thank you for your consult. I will follow-up with patient. Please contact us if you have any additional questions.    Hugo Faye MD  Neurosurgery  [phone]

## 2020-11-08 NOTE — ASSESSMENT & PLAN NOTE
Patient has acute anoxic encephalopathy that likely secondary to Cerebral ischemia. Patient's normal mental status is. awake and alert; oriented to person, place, and time Evaluation and treatment for underlying cause is underway. Eval includes Consult to Neurology and Neuro-Imaging (MRI/CT). Will monitor neuro checks carefully, avoid narcotics and benzos that will exacerbate agitation, and use PRN anti-psychotics for controls of behavior for self harm.    Imaging is suggestive edema with concern for herniation.  Patient on hypertonic saline per Neurology recommendation.  Non operative management per goals of care discussion between your surgery and family.  Family updated today.

## 2020-11-08 NOTE — RESPIRATORY THERAPY
11/08/20 0717   Patient Assessment/Suction   Level of Consciousness (AVPU) unresponsive   All Lung Fields Breath Sounds coarse;diminished   Secretions Amount small   Secretions Color brown   Secretions Characteristics thin   PRE-TX-O2   O2 Device (Oxygen Therapy) ventilator   $ Is the patient on Low Flow Oxygen? Yes   Oxygen Concentration (%) 30   SpO2 100 %   Pulse Oximetry Type Continuous   $ Pulse Oximetry - Multiple Charge Pulse Oximetry - Multiple   Pulse (!) 113   Resp (!) 22   ETCO2   $ ETCO2 Charge Exhaled CO2 Monitoring   $ ETCO2 Usage Currently wearing   ETCO2 (mmHg) 18 mmHg   Aerosol Therapy   $ Aerosol Therapy Charges Aerosol Treatment   Respiratory Treatment Status (SVN) given   Treatment Route (SVN) in-line   Patient Position (SVN) HOB elevated   Post Treatment Assessment (SVN) breath sounds unchanged   Signs of Intolerance (SVN) none   Breath Sounds Post-Respiratory Treatment   Post-treatment Heart Rate (beats/min) 111   Post-treatment Resp Rate (breaths/min) 22   Wound Care   $ Wound Care Tech Time 15 min   Area of Concern Upper lip;Lower lip;Corner lip   Skin Color/Characteristics without discoloration   Skin Temperature warm        Airway - Non-Surgical 11/04/20 1730   Placement Date/Time: 11/04/20 1730   Present Prior to Hospital Arrival?: No  Airway Device Size: 8.0  Style: Cuffed   Secured at 24 cm   Measured At Lips   Secured Location Right   Secured by Commercial tube frey   Bite Block none   Site Condition Dry   Status Intact;Secured;Patent   Site Assessment Clean;Dry   Vent Select   Conventional Vent Y   Ventilator Initiated No   $ Ventilator Subsequent 1   Charged w/in last 24h YES   Preset Conventional Ventilator Settings   Vent Type    Ventilation Type VC   Vent Mode A/C   Humidity HME   Set Rate 22 BPM   Vt Set 600 mL   PEEP/CPAP 5 cmH20   Pressure Support 0 cmH20   Waveform RAMP   Peak Flow 80 L/min   Set Inspiratory Pressure 0 cmH20   Insp Time 0 Sec(s)   Plateau Set/Insp.  Hold (sec) 0   Insp Rise Time  0 %   Trigger Sensitivity Flow/I-Trigger 3 L/min   P High 0 cm H2O   P Low 0 cm H2O   T High 0 sec   T Low 0 sec   Patient Ventilator Parameters   Resp Rate Total 22 br/min   Peak Airway Pressure 32 cmH2O   Mean Airway Pressure 13 cmH20   Plateau Pressure 0 cmH20   Exhaled Vt 616 mL   Total Ve 13.5 mL   Spont Ve 0 L   I:E Ratio Measured 1:2.30   Conventional Ventilator Alarms   Ve High Alarm 30 L/min   Resp Rate High Alarm 45 br/min   Press High Alarm 60 cmH2O   Apnea Rate 16   Apnea Volume (mL) 450 mL   Apnea Oxygen Concentration  100   Apnea Flow Rate (L/min) 60   T Apnea 20 sec(s)   Ready to Wean/Extubation Screen   FIO2<=50 (chart decimal) 0.3   MV<16L (chart vol.) 13.5   PEEP <=8 (chart #) 5   Ready to Wean Parameters   F/VT Ratio<105 (RSBI) (!) 35.71

## 2020-11-08 NOTE — PLAN OF CARE
11/07/20 1904   Patient Assessment/Suction   Level of Consciousness (AVPU) responds to pain   Respiratory Effort Normal;Unlabored   Expansion/Accessory Muscles/Retractions expansion symmetric   All Lung Fields Breath Sounds coarse   Rhythm/Pattern, Respiratory assisted mechanically   Cough Frequency with stimulation   Suction Method oral;tracheal   $ Suction Charges Inline Suction Procedure Stat Charge   Secretions Amount small   Secretions Color tan   Secretions Characteristics thick   PRE-TX-O2   O2 Device (Oxygen Therapy) ventilator   Oxygen Concentration (%) 30   SpO2 99 %   Pulse Oximetry Type Continuous   Pulse (!) 115   Resp (!) 22   ETCO2   $ ETCO2 Usage Currently wearing   ETCO2 (mmHg) 20 mmHg   Aerosol Therapy   $ Aerosol Therapy Charges Aerosol Treatment   Respiratory Treatment Status (SVN) given   Treatment Route (SVN) in-line   Patient Position (SVN) HOB elevated   Post Treatment Assessment (SVN) breath sounds unchanged   Signs of Intolerance (SVN) none   Breath Sounds Post-Respiratory Treatment   Post-treatment Heart Rate (beats/min) 116   Post-treatment Resp Rate (breaths/min) 22   Wound Care   $ Wound Care Tech Time 15 min   Area of Concern Upper lip;Lower lip   Skin Color/Characteristics without discoloration   Skin Temperature warm        Airway - Non-Surgical 11/04/20 1730   Placement Date/Time: 11/04/20 1730   Present Prior to Hospital Arrival?: No  Airway Device Size: 8.0  Style: Cuffed   Secured at 24 cm   Measured At Lips   Secured Location Right   Secured by Commercial tube frey   Bite Block none   Site Condition Cool;Dry   Status Intact;Secured   Site Assessment Clean   Cuff Pressure 30 cm H2O   Vent Select   Conventional Vent Y   Charged w/in last 24h YES   Preset Conventional Ventilator Settings   Vent Type    Ventilation Type VC   Vent Mode A/C   Humidity HME   Set Rate 22 BPM   Vt Set 600 mL   PEEP/CPAP 5 cmH20   Pressure Support 0 cmH20   Waveform RAMP   Peak Flow 80 L/min    Set Inspiratory Pressure 0 cmH20   Insp Time 0 Sec(s)   Plateau Set/Insp. Hold (sec) 0   Insp Rise Time  0 %   Trigger Sensitivity Flow/I-Trigger 3 L/min   P High 0 cm H2O   P Low 0 cm H2O   T High 0 sec   T Low 0 sec   Patient Ventilator Parameters   Resp Rate Total 22 br/min   Peak Airway Pressure 29 cmH2O   Mean Airway Pressure 13 cmH20   Plateau Pressure 0 cmH20   Exhaled Vt 617 mL   Total Ve 13.6 mL   Spont Ve 0 L   I:E Ratio Measured 1:2.30   Conventional Ventilator Alarms   Ve High Alarm 30 L/min   Resp Rate High Alarm 45 br/min   Press High Alarm 60 cmH2O   Apnea Rate 16   Apnea Volume (mL) 450 mL   Apnea Oxygen Concentration  100   Apnea Flow Rate (L/min) 60   T Apnea 20 sec(s)   Ready to Wean/Extubation Screen   FIO2<=50 (chart decimal) 0.3   MV<16L (chart vol.) 13.6   PEEP <=8 (chart #) 5   Ready to Wean Parameters   F/VT Ratio<105 (RSBI) (!) 35.66

## 2020-11-08 NOTE — PLAN OF CARE
Patient MRI critical yesterday evening, with multifocal infarcts of bilateral parietal lobes, and herniation. Neurology started prophylactic Keppra and consulted Neurosurgery (MD Yunier), who spoke with family who stated that would want to continue with medical management but did not want any aggressive surgical intervention. Family is to visit today and possibly make further decisions. 3% sodium infusion started with serial CMP's. CO2 critical at 9 on am labs, spoke with NP, who deferred to EICU for management. No new orders at this time noted. Neurologically patient has pinpoint pupils deviating to the right.Cough and gag are weak. She flexes inward in  the BUE extremities and withdraws inward in  BLE, Potassium replaced. No sedation infusing. VSS and on chart for review. UOP averages 100 ml/hr. OG to WARD LANGFORD with minimal output of 50 ml's. Patient safety maintained.

## 2020-11-08 NOTE — RESPIRATORY THERAPY
1448: Called to ICU to transport pt to MRI  1715: Pt transported to MRI @ this time.  1820: Pt transported back to ICU

## 2020-11-08 NOTE — PROCEDURES
[facility] EEG/VIDEO MONITORING REPORT  Yocasta Almonte  [id number]  1945    DATE OF SERVICE:  11/07/2020  DATE OF ADMISSION: 11/4/2020  5:28 PM    ADMITTING/REQUESTING PROVIDER: Valdemar Sampson MD    REASON FOR CONSULT:  75-year-old woman admitted after cardiac arrest now with persistent decreased responsiveness.  Evaluate for evidence of epileptiform activity.    METHODOLOGY   Electroencephalographic (EEG) recording is with electrodes placed according to the International 10-20 placement system.  Thirty two (32) channels of digital signal (sampling rate of 512/sec) including T1 and T2 was simultaneously recorded from the scalp and may include  EKG, EMG, and/or eye monitors.  Recording band pass was 0.1 to 512 hz.  Digital video recording of the patient is simultaneously recorded with the EEG.  The patient is instructed report clinical symptoms which may occur during the recording session.  EEG and video recording is stored and archived in digital format.  Activation procedures which include photic stimulation, hyperventilation and instructing patients to perform simple task are done in selected patients.   The EEG is displayed on a monitor screen and can be reviewed using different montages.  Computer assisted analysis is employed to detect spike and electrographic seizure activity.   The entire record is submitted for computer analysis.  The entire recording is visually reviewed and the times identified by computer analysis as being spikes or seizures are reviewed again.  Compresses spectral analysis (CSA) is also performed on the activity recorded from each individual channel.  This is displayed as a power display of frequencies from 0 to 30 Hz over time.   The CSA is reviewed looking for asymmetries in power between homologous areas of the scalp and then compared with the original EEG recording.      RECORDING TIMES  Start on 11/07/2027 at 17:00 p.m.  Stop on 11/08/2020 at 07:00 a.m.  A total of 11 hr and 40 min of  EEG recording is obtained.    EEG FINDINGS  Background activity:   The background is discontinuous, relatively symmetric, and slow.  There is predominantly theta/delta activity with frequent brief periods of generalized attenuation.  There are waxing and waning runs of moderate-high voltage, periodic, generalized or bilateral independent, discharges up to 2 hz especially with stimulation.  At times, the discharges coalesce on a rhythmic background for several seconds at a time consistent with brief ictal appearing rhythmic discharges (BIRDs) and occur periodically. At other times, there is lower voltage, discontinuous, polymorphic theta activity with less sharp features.    Sleep:  There is no normal sleep architecture    Activation procedures:   Hyperventilation is not performed  Photic stimulation is not performed    Cardiac Monitor:    Tachycardia    Impression:   This is an abnormal continuous EEG monitoring study because of stimulus induced periodic/rhythmic features.  This pattern is on the ictal/interictal continuum and is consistent with an irritative process and cortical hyperexcitability.  There is generalized background slowing consistent with diffuse cortical dysfunction and a severe encephalopathy.  This finding is nonspecific with regards to etiology but can be seen in the setting of toxic/metabolic derangements, anoxia, infection, and as a medication effect. There are no prominent focal findings however encephalopathy obscures focal findings.  There are no pushbutton activations or discrete seizures. Findings and recommendations discussed with the primary team.    Dalila Enrique MD PhD  Neurology-Epilepsy  [facility].  [facility]

## 2020-11-08 NOTE — PROGRESS NOTES
[facility] Medicine  Progress Note    Patient Name: Yocasta Almonte  MRN: [id number]  Patient Class: IP- Inpatient   Admission Date: 11/4/2020  Length of Stay: 4 days  Attending Physician: Rodney Araujo,*  Primary Care Provider: Rani Welch MD        Subjective:     Principal Problem:Cardiopulmonary arrest        HPI:  Ms. Yocasta Almonte is a 75 y.o. female, with PMH of CLL, right lung cancer, COPD, T2DM, HTN, HLD, thyroid disease, anemia, OA, GERD, tobacco abuse, who presented to [facility] ED on 11/4/20 after she called EMS 2/2 to SOB and went unresponsive on the phone. Upon arrival, EMS found her with a breathing treatment in process, s/p emesis, and with an inhaler nearby. Glucose was 331. She was given 3 rounds of epinephrine after finding rhythm was asystole with pupils fixed and dilated. She had ROSC w/ the epinephrine briefly each time. Upon arrival to ED EKG showed sinus tachycardia. She was a difficult intubation as she had many partially digested beans in her airway. ED labs concerning for DKA. Given her h/o cancer, and SOB preceding her arrest, a CTA Chest was ordered to evaluate for PE and was negative for PE, but did indicate aspiration pneumonia. A CTHead was negative for acute intracranial abnormalities. A CXR indicates patchy opacification of the pulmonary parenchyma, concerning for pneumonia or aspiration.     Overview/Hospital Course:  Patient was admitted to the hospital to secondary to cardiac arrest following aspiration event.  She had a prolonged resuscitation with difficult intubation and asystole is initial presenting rhythm.  She was initially and DKA and was started on insulin drip which was subsequently weaned off.  Patient underwent targeted temperature management protocol, and underwent a empiric antibiotic treatment for aspiration pneumonia.    Interval History:  Patient seen and examined.  CT from yesterday concerning for significant cerebral  edema and concern for herniation.  Neurosurgery consulted and after discussion with family recommended medical management.  Neurology consulted started on hypertonic saline.    Review of Systems   Unable to perform ROS: Intubated     Objective:     Vital Signs (Most Recent):  Temp: 98.7 °F (37.1 °C) (11/08/20 1200)  Pulse: 108 (11/08/20 1300)  Resp: (!) 22 (11/08/20 1300)  BP: (!) 150/54 (11/08/20 0400)  SpO2: 100 % (11/08/20 1300) Vital Signs (24h Range):  Temp:  [98.1 °F (36.7 °C)-98.7 °F (37.1 °C)] 98.7 °F (37.1 °C)  Pulse:  [107-132] 108  Resp:  [20-42] 22  SpO2:  [90 %-100 %] 100 %  BP: (120-150)/(43-56) 150/54  Arterial Line BP: (102-165)/(41-61) 132/50     Weight: (unable to weigh due to arctic sun in use)  Body mass index is 27.61 kg/m².    Intake/Output Summary (Last 24 hours) at 11/8/2020 1458  Last data filed at 11/8/2020 1300  Gross per 24 hour   Intake 1394.56 ml   Output 1920 ml   Net -525.44 ml      Physical Exam  Vitals signs and nursing note reviewed.   Constitutional:       General: She is not in acute distress.     Appearance: She is not diaphoretic.      Comments: Intubated, sedated   HENT:      Mouth/Throat:      Pharynx: No oropharyngeal exudate.      Comments: ETT/NG tube noted in place.  Eyes:      General:         Right eye: No discharge.         Left eye: No discharge.      Comments: Pupils sluggish   Neck:      Thyroid: No thyromegaly.      Vascular: No JVD.      Trachea: No tracheal deviation.   Cardiovascular:      Heart sounds: No murmur. No friction rub. No gallop.    Pulmonary:      Effort: No respiratory distress.      Breath sounds: No wheezing or rales.      Comments: Breath sounds coarse on ventilator.  Abdominal:      General: There is no distension.      Palpations: Abdomen is soft.      Tenderness: There is no abdominal tenderness.   Genitourinary:     Comments: Hernandez catheter noted in place.  Musculoskeletal:         General: No tenderness or deformity.   Skin:     Capillary  Refill: Capillary refill takes 2 to 3 seconds.      Findings: No erythema or rash.   Neurological:      Motor: No abnormal muscle tone.      Deep Tendon Reflexes: Reflexes normal.      Comments: Patient sedated, unresponsive at present         Significant Labs: All pertinent labs within the past 24 hours have been reviewed.    Significant Imaging: I have reviewed all pertinent imaging results/findings within the past 24 hours.      Assessment/Plan:      * Cardiopulmonary arrest  Patient with prolonged resuscitation, asystole as initial presenting rhythm, and unsure time of arrest.  These characteristics tends to predict poor outcomes in patients for anoxic brain injury.  I have discussed the patient most likely has a greater than 95% chance of severe neurologic injury given these factors above.  However, will continue to monitor closely.  Plan of care discussed with Cardiology.  Noted echocardiogram with ejection fraction in the 30-40% range was likely secondary to myocardial stunning.  No need to adjust medication at this point in time.  Will attempt to wean sedation to determine patient's neurologic status.    Anoxic encephalopathy  Patient has acute anoxic encephalopathy that likely secondary to Cerebral ischemia. Patient's normal mental status is. awake and alert; oriented to person, place, and time Evaluation and treatment for underlying cause is underway. Eval includes Consult to Neurology and Neuro-Imaging (MRI/CT). Will monitor neuro checks carefully, avoid narcotics and benzos that will exacerbate agitation, and use PRN anti-psychotics for controls of behavior for self harm.    Imaging is suggestive edema with concern for herniation.  Patient on hypertonic saline per Neurology recommendation.  Non operative management per goals of care discussion between your surgery and family.  Family updated today.          Aspiration pneumonia  Patient with current diagnosis of pneumonia with concern for bacterial  etiology due to  Streptococcus pneumoniae, MRSA and Klebsiella which is uncontrolled due to persistent hypoxia  and persistent altered mental status. Current antimicrobial regimen consists of   Antibiotics (From admission, onward)    Start     Stop Route Frequency Ordered    11/05/20 1345  cefTRIAXone (ROCEPHIN) 1 g/50 mL D5W IVPB      -- IV Every 24 hours (non-standard times) 11/05/20 1213    11/05/20 1313  vancomycin - pharmacy to dose  (vancomycin IVPB)      -- IV pharmacy to manage frequency 11/05/20 1213    11/05/20 0900  mupirocin 2 % ointment      11/10 0859 Nasl 2 times daily 11/05/20 0401      . Cultures drawn and noted-   Microbiology Results (last 7 days)     Procedure Component Value Units Date/Time    AFB Culture & Smear [[id number]] Collected: 11/05/20 1159    Order Status: Completed Specimen: Respiratory from Tracheal Aspirate Updated: 11/06/20 2127     AFB Culture & Smear Culture in progress     AFB CULTURE STAIN No acid fast bacilli seen.    Blood culture [[id number]] Collected: 11/05/20 1111    Order Status: Completed Specimen: Blood from Peripheral, Right Hand Updated: 11/06/20 2012     Blood Culture, Routine No Growth to date      No Growth to date    Blood culture [[id number]] Collected: 11/05/20 1238    Order Status: Completed Specimen: Blood Updated: 11/06/20 2012     Blood Culture, Routine No Growth to date      No Growth to date    Culture, Respiratory with Gram Stain [[id number]] Collected: 11/05/20 1159    Order Status: Completed Specimen: Respiratory from Tracheal Aspirate Updated: 11/05/20 2010     Gram Stain (Respiratory) <10 epithelial cells per low power field.     Gram Stain (Respiratory) Rare WBC's     Gram Stain (Respiratory) Many Gram negative rods     Gram Stain (Respiratory) Few Gram positive cocci       Will monitor patient closely and continue current treatment plan unchanged.        COPD (chronic obstructive pulmonary disease)  Patient's COPD is uncontrolled due to continued  dyspnea and worsening of baseline hypoxia currently.  Patient is currently off COPD Pathway. Continue scheduled inhalers Antibiotics and Supplemental oxygen and monitor respiratory status closely.       Acute hypoxemic respiratory failure  Patient with Hypercapnic and Hypoxic Respiratory failure which is Acute.  she is not on home oxygen. Supplemental ventilation was provided and noted- Vent Mode: A/C  Oxygen Concentration (%):  [30-50] 30  Resp Rate Total:  [15 br/min-23 br/min] 22 br/min  Vt Set:  [600 mL] 600 mL  PEEP/CPAP:  [5 cmH20] 5 cmH20  Pressure Support:  [0 [id number] cmH20] 0 cmH20  Mean Airway Pressure:  [11 [id number] cmH20] 13 cmH20.   Differential diagnosis includes - COPD, Pneumonia and Aspiration Labs and images were reviewed. Patient Has recent ABG, which has been reviewed.. Will treat underlying causes and adjust management of respiratory failure as follows- continue antibiotics, and consult with pulmonology for manage of ventilator.  Continue bronchodilators and monitor closely.        Chronic lymphocytic leukemia  - follows w/ Hematology  - Ibrutinib d/c'd on 10/5/20       Coronary artery disease  Hold oral medications for now      Nicotine dependence, unspecified, uncomplicated  Will attempt counseling once patient is more alert      Type 2 diabetes mellitus with other specified complication  Patient's FSGs are controlled on current hypoglycemics.   Last A1c reviewed-   Lab Results   Component Value Date    HGBA1C 6.3 (H) 11/05/2020     Most recent fingerstick glucose reviewed-   Recent Labs   Lab 11/06/20  1319   POCTGLUCOSE 114*     Current correctional scale  Low  Maintain anti-hyperglycemic dose as follows-   Antihyperglycemics (From admission, onward)    Start     Stop Route Frequency Ordered    11/05/20 1315  insulin aspart U-100 pen 0-5 Units      -- SubQ Every 6 hours PRN 11/05/20 1217        Hold Oral hypoglycemics while patient is in the hospital.      Anemia of decreased vitamin B12  absorption  Patient's anemia is currently controlled. Has not received any PRBCs to date.. Etiology likely d/t acute blood loss  Current CBC reviewed-   Lab Results   Component Value Date    HGB 9.7 (L) 11/06/2020    HCT 28.6 (L) 11/06/2020     Monitor serial CBC and transfuse if patient becomes hemodynamically unstable, symptomatic or H/H drops below 7/21.       HLD (hyperlipidemia)  Oral medications held at this moment in time      Essential hypertension  Continue on norepinephrine hold blood pressure medications secondary to hypotension.      Malignant neoplasm of upper lobe of left lung  Noted-chronic.  No acute inpatient management indicated at this point in time.        VTE Risk Mitigation (From admission, onward)         Ordered     Place sequential compression device  Until discontinued      11/05/20 0527     IP VTE HIGH RISK PATIENT  Once      11/05/20 0527     Place JIAN hose  Until discontinued      11/04/20 2217     Place sequential compression device  Until discontinued      11/04/20 2217     Place sequential compression device  Until discontinued      11/04/20 2039                Discharge Planning   RAF:      Code Status: Full Code   Is the patient medically ready for discharge?:     Reason for patient still in hospital (select all that apply): Treatment, Imaging and Consult recommendations  Discharge Plan A: Home with family            Critical care time spent on the evaluation and treatment of severe organ dysfunction, review of pertinent labs and imaging studies, discussions with consulting providers and discussions with patient/family: 40 minutes.      Rodney Araujo MD  Department of Hospital Medicine   [facility]

## 2020-11-08 NOTE — SUBJECTIVE & OBJECTIVE
Interval History:  Patient seen and examined.  CT from yesterday concerning for significant cerebral edema and concern for herniation.  Neurosurgery consulted and after discussion with family recommended medical management.  Neurology consulted started on hypertonic saline.    Review of Systems   Unable to perform ROS: Intubated     Objective:     Vital Signs (Most Recent):  Temp: 98.7 °F (37.1 °C) (11/08/20 1200)  Pulse: 108 (11/08/20 1300)  Resp: (!) 22 (11/08/20 1300)  BP: (!) 150/54 (11/08/20 0400)  SpO2: 100 % (11/08/20 1300) Vital Signs (24h Range):  Temp:  [98.1 °F (36.7 °C)-98.7 °F (37.1 °C)] 98.7 °F (37.1 °C)  Pulse:  [107-132] 108  Resp:  [20-42] 22  SpO2:  [90 %-100 %] 100 %  BP: (120-150)/(43-56) 150/54  Arterial Line BP: (102-165)/(41-61) 132/50     Weight: (unable to weigh due to arctic sun in use)  Body mass index is 27.61 kg/m².    Intake/Output Summary (Last 24 hours) at 11/8/2020 1458  Last data filed at 11/8/2020 1300  Gross per 24 hour   Intake 1394.56 ml   Output 1920 ml   Net -525.44 ml      Physical Exam  Vitals signs and nursing note reviewed.   Constitutional:       General: She is not in acute distress.     Appearance: She is not diaphoretic.      Comments: Intubated, sedated   HENT:      Mouth/Throat:      Pharynx: No oropharyngeal exudate.      Comments: ETT/NG tube noted in place.  Eyes:      General:         Right eye: No discharge.         Left eye: No discharge.      Comments: Pupils sluggish   Neck:      Thyroid: No thyromegaly.      Vascular: No JVD.      Trachea: No tracheal deviation.   Cardiovascular:      Heart sounds: No murmur. No friction rub. No gallop.    Pulmonary:      Effort: No respiratory distress.      Breath sounds: No wheezing or rales.      Comments: Breath sounds coarse on ventilator.  Abdominal:      General: There is no distension.      Palpations: Abdomen is soft.      Tenderness: There is no abdominal tenderness.   Genitourinary:     Comments: Hernandez catheter  noted in place.  Musculoskeletal:         General: No tenderness or deformity.   Skin:     Capillary Refill: Capillary refill takes 2 to 3 seconds.      Findings: No erythema or rash.   Neurological:      Motor: No abnormal muscle tone.      Deep Tendon Reflexes: Reflexes normal.      Comments: Patient sedated, unresponsive at present         Significant Labs: All pertinent labs within the past 24 hours have been reviewed.    Significant Imaging: I have reviewed all pertinent imaging results/findings within the past 24 hours.

## 2020-11-08 NOTE — PROGRESS NOTES
[facility]  Neurology  Progress Note    Patient Name: Yocasta Almonte  MRN: [id number]  Admission Date: 11/4/2020  Hospital Length of Stay: 4 days  Code Status: Full Code   Attending Provider: Rodney Araujo,*  Primary Care Physician: Rani Welch MD   Principal Problem:Cardiopulmonary arrest    Subjective:   Hx obtained per chart. 76yo with PMH of COPD, Lung cancer, DM GERD who was who brought in by EMS post cardiac arrest, Per chart patient was on the phone with EMS c/o SOB when she suddenly became unresponsive. On arrival of EMS, she had a nebulizer on, had vomited was suctioned several times.. She was in asystole, received 3 rounds of Epi before she achieved ROSC . Since admission, she underwent TTM protocol and achieved normothermia this afternoon. CT head was performed today which showed patchy loss of gray -white differentiation consistent with anoxic brain injury. Neurology consulted for further management     On my evaluation, patient was intubated on propofol, minimal response to noxious stimuli, pupil pinpoint and minimally reactive, right eye deviated to the right , absent oculocephalic reflex,absent corneal, gag and cough reflex intact.      Interval History: 11/08  On further evaluation this morning without sedation, patient was minimal responsive to both central and peripheral painful stimuli, this was less when compared to what she could do yesterday. No pupillary reaction but there was mild right corneal reflex and gag reflex. Her MRI showed a diffuses cerebral edema with both cerebellar hemispheres severely affected. Multiple areas of acute infarct in the cerebellum, basal ganglia and watershed zones. EEG showed a stimulus induced periodic/rhythmic features,consistent with an irritative process and cortical hyperexcitability, she was started on Keppra     MRI brain wo contrast  1. Diffuse cerebral edema, worst infratentorial.  Multiple areas of infarct/ischemia  including cerebellum, basal ganglia and cerebral watershed zones.  2. Developing hydrocephalus due to 4th ventricle compression.  3. Concern for developing downward transtentorial herniation.    MRA Neck  No hemodynamically significant stenosis or aneurysm of the included neck arterial vasculature.    EEG  Impression:   This is an abnormal continuous EEG monitoring study because of stimulus induced periodic/rhythmic features.  This pattern is on the ictal/interictal continuum and is consistent with an irritative process and cortical hyperexcitability.  There is generalized background slowing consistent with diffuse cortical dysfunction and a severe encephalopathy.  This finding is nonspecific with regards to etiology but can be seen in the setting of toxic/metabolic derangements, anoxia, infection, and as a medication effect. There are no prominent focal findings however encephalopathy obscures focal findings.  There are no pushbutton activations or discrete seizures. Findings and recommendations discussed with the primary team.    Current Neurological Medications:     Current Facility-Administered Medications   Medication Dose Route Frequency Provider Last Rate Last Dose    albuterol-ipratropium 2.5 mg-0.5 mg/3 mL nebulizer solution 3 mL  3 mL Nebulization Q6H Iraida Shukla PA-C   3 mL at 11/08/20 0717    calcium gluc in NaCl, iso-osm 1 gram/50 mL Soln 1,000 mg  1 g Intravenous PRN Chris Marrero MD   1,000 mg at 11/06/20 1148    calcium gluconate 2 g in dextrose 5 % 100 mL IVPB  2 g Intravenous PRN Chris Marrero MD        calcium gluconate 3 g in dextrose 5 % 100 mL IVPB  3 g Intravenous PRN Chris Marrero MD        cefTRIAXone (ROCEPHIN) 1 g/50 mL D5W IVPB  1 g Intravenous Q24H Chris Marrero MD   1 g at 11/07/20 1657    chlorhexidine 0.12 % solution 15 mL  15 mL Mouth/Throat BID Lionel Dick MD   15 mL at 11/08/20 0858    dextrose 50% injection 12.5 g  12.5 g Intravenous PRN Chris Marrero MD         glucagon (human recombinant) injection 1 mg  1 mg Intramuscular PRN Chris Marrero MD        insulin aspart U-100 pen 0-5 Units  0-5 Units Subcutaneous Q6H PRN Chris Marrero MD        levETIRAcetam in NaCl (iso-os) IVPB 500 mg  500 mg Intravenous Q12H Francisco Mejia MD        magnesium sulfate 2g in water 50mL IVPB (premix)  2 g Intravenous PRN Chris Marrero MD 25 mL/hr at 11/05/20 1747 2 g at 11/05/20 1747    magnesium sulfate 2g in water 50mL IVPB (premix)  4 g Intravenous PRN Chris Marrero MD        mupirocin 2 % ointment   Nasal BID Sina Reyes MD        norepinephrine 4 mg in dextrose 5% 250 mL infusion (premix) (titrating)  0.05 mcg/kg/min Intravenous Continuous Kang Euceda MD   Stopped at 11/07/20 0300    ondansetron injection 4 mg  4 mg Intravenous Q6H PRN Iraida Shukla PA-C   4 mg at 11/05/20 0023    pantoprazole injection 40 mg  40 mg Intravenous Daily Iraida Shukla PA-C   40 mg at 11/08/20 0858    potassium chloride 40 mEq in 100 mL IVPB (FOR CENTRAL LINE ADMINISTRATION ONLY)  40 mEq Intravenous PRN Chris Marrero MD 25 mL/hr at 11/08/20 0000 40 mEq at 11/08/20 0000    And    potassium chloride 20 mEq in 100 mL IVPB (FOR CENTRAL LINE ADMINISTRATION ONLY)  60 mEq Intravenous PRN Chris Marrero MD        And    potassium chloride 40 mEq in 100 mL IVPB (FOR CENTRAL LINE ADMINISTRATION ONLY)  80 mEq Intravenous PRN Chris Marrero MD        propofol (DIPRIVAN) 10 mg/mL infusion  5 mcg/kg/min Intravenous Continuous Valdemar Sampson MD 0.4 mL/hr at 11/07/20 1022 0.921 mcg/kg/min at 11/07/20 1022    sodium chloride 3% solution  30 mL/hr Intravenous Continuous Gregoria Dc, NP 30 mL/hr at 11/07/20 2234 30 mL/hr at 11/07/20 2234    sodium phosphate 15 mmol in dextrose 5 % 250 mL IVPB  15 mmol Intravenous PRN Chris Marrero MD        sodium phosphate 20.01 mmol in dextrose 5 % 250 mL IVPB  20.01 mmol Intravenous PRN Chris Marrero MD        sodium phosphate  30 mmol in dextrose 5 % 250 mL IVPB  30 mmol Intravenous PRN Chris Marrero MD        vancomycin - pharmacy to dose   Intravenous pharmacy to manage frequency Chris Marrero MD        vancomycin 500 mg in dextrose 5 % 100 mL IVPB (ready to mix system)  500 mg Intravenous Once Rodney Araujo  mL/hr at 11/08/20 0858 500 mg at 11/08/20 0858       Review of Systems  Objective:     Vital Signs (Most Recent):  Temp: 98.2 °F (36.8 °C) (11/08/20 0800)  Pulse: 108 (11/08/20 0845)  Resp: (!) 22 (11/08/20 0845)  BP: (!) 150/54 (11/08/20 0400)  SpO2: 100 % (11/08/20 0845) Vital Signs (24h Range):  Temp:  [98.1 °F (36.7 °C)-98.7 °F (37.1 °C)] 98.2 °F (36.8 °C)  Pulse:  [108-132] 108  Resp:  [20-42] 22  SpO2:  [90 %-100 %] 100 %  BP: ()/(43-56) 150/54  Arterial Line BP: ()/(40-61) 127/50     Weight: (unable to weigh due to arctic sun in use)  Body mass index is 27.61 kg/m².    Physical Exam      MENTAL STATUS   Intubated off sedation  Pupils are pinpoint and unreactive  Right eye deviated to the right, absent oculocephalic  Right corneal, absent corneal on the left  Gag reflex +  Minimal reaction on lower ext to peripheral and central painful stimuli    Significant Labs:   Hemoglobin A1c:   Recent Labs   Lab 11/05/20  0150   HGBA1C 6.3*     CBC:   Recent Labs   Lab 11/08/20  0354 11/08/20  0855 11/08/20  1232   WBC 11.88 11.05 11.44   HGB 8.6* 8.2* 8.5*   HCT 26.6* 25.4* 26.4*   * 114* 112*     CMP:   Recent Labs   Lab 11/06/20  2339 11/07/20  0455  11/07/20  2351 11/08/20  0355 11/08/20  0855 11/08/20  1232   *  118* 85   < > 78  78 77 74 86  86   * 134*   < > 137 140  --  143   K 3.5 3.9   < > 3.6 3.9  --  4.4    106   < > 111* 113*  --  118*   CO2 10* 12*   < > 10* 9*  --  9*   BUN 38* 41*   < > 52* 55*  --  57*   CREATININE 3.3* 3.5*   < > 4.4* 4.4*  --  4.6*   CALCIUM 7.4* 7.7*   < > 7.7* 7.9*  --  8.1*   MG 2.0 2.2  --   --  2.5  --   --    PROT  --   --    < > 5.1*  5.2*  --  5.3*   ALBUMIN  --   --    < > 2.0* 2.0*  --  2.0*   BILITOT  --   --    < > 0.3 0.4  --  0.4   ALKPHOS  --   --    < > 86 94  --  96   AST  --   --    < > 154* 146*  --  133*   ALT  --   --    < > 115* 110*  --  108*   ANIONGAP 17* 16   < > 16 18*  --  16   EGFRNONAA 13* 12*   < > 9* 9*  --  9*    < > = values in this interval not displayed.       Significant Imaging:  MRI brain wo contrast  1. Diffuse cerebral edema, worst infratentorial.  Multiple areas of infarct/ischemia including cerebellum, basal ganglia and cerebral watershed zones.  2. Developing hydrocephalus due to 4th ventricle compression.  3. Concern for developing downward transtentorial herniation.    MRA Neck  No hemodynamically significant stenosis or aneurysm of the included neck arterial vasculature.    EEG  Impression:   This is an abnormal continuous EEG monitoring study because of stimulus induced periodic/rhythmic features.  This pattern is on the ictal/interictal continuum and is consistent with an irritative process and cortical hyperexcitability.  There is generalized background slowing consistent with diffuse cortical dysfunction and a severe encephalopathy.  This finding is nonspecific with regards to etiology but can be seen in the setting of toxic/metabolic derangements, anoxia, infection, and as a medication effect. There are no prominent focal findings however encephalopathy obscures focal findings.  There are no pushbutton activations or discrete seizures. Findings and recommendations discussed with the primary team.    Assessment and Plan:     Active Diagnoses:    Diagnosis Date Noted POA    PRINCIPAL PROBLEM:  Cardiopulmonary arrest [I46.9] 11/04/2020 Yes    Calcified granuloma of lung [J84.10] 11/05/2020 Yes    Calcification of aorta [I70.0] 11/05/2020 Yes    Aspiration pneumonia [J69.0] 11/05/2020 Yes    Anoxic encephalopathy [G93.1] 11/05/2020 Yes    COPD (chronic obstructive pulmonary disease) [J44.9]  05/11/2020 Yes    Acute hypoxemic respiratory failure [J96.01] 04/22/2020 Yes    Type 2 diabetes mellitus with other specified complication [E11.69] 10/15/2019 Yes    Anemia of decreased vitamin B12 absorption [D51.8] 04/01/2019 Yes    Essential hypertension [I10] 07/25/2018 Yes    HLD (hyperlipidemia) [E78.5] 07/24/2018 Yes    Malignant neoplasm of upper lobe of left lung [C34.12] 08/08/2017 Yes    Chronic lymphocytic leukemia [C91.10] 04/27/2017 Yes    Nicotine dependence, unspecified, uncomplicated [F17.200] 11/07/2016 Yes    Coronary artery disease [I25.10] 11/02/2015 Yes      Problems Resolved During this Admission:    Diagnosis Date Noted Date Resolved POA    Septic shock [A41.9, R65.21] 11/05/2020 11/05/2020 Yes     Ms. Yocasta Almonte is a 75 y.o. female, with PMH of CLL, right lung cancer, COPD, T2DM, HTN, HLD, thyroid disease, anemia, OA, GERD, tobacco abuse, who presented to [facility] ED on 11/4/20 after she called EMS 2/2 to SOB and went unresponsive. Patient had cardiac arrest achieved ROSC  After 3 round of Epinephrine, Unknown down time. But since admission, she was started on TTM,back to normothermia this afternoon. CT Head showed diffuse cerebral edema most notable in the cerebellum.  Upper cervical spinal canal is not included in the field of view, but there is concern for cerebellar tonsillar herniation. There is developing hydrocephalus secondary to compression of the 4th ventricle     On evaluation ,there is  absence of pupillary and corneal response,with preservation of gag reflex.However patient is currently receiving propofol, which limits neurological examination.     Despite the limited evidence of efficacy of osmotic therapy on diffuse cerebral edema from anoxic brain injury, will recommend instituting a trial of hyperosmotic therapy in the setting of impending herniation.     11/08/20: Had discussion with family members on current exam and radiological findings. Patient suffered a  catastrophic brain injury based on the hx and radiological findings. Her chance is meaningful recovery based on my exam and radiological studies is narrow. Per family members, they are waiting for patient's son before making any decision.     Recommendation  - MRI brain wo contrast, MRA Brain consistent with diffuse cerebral edema infratentorial areas affected the most, no large vessel occlusion.  - EEG consistent with periodic/rhythmic feature and cortical hyperexcitability with the potential of developing seizures  - Patient started on keppra following keppra loading.   - Continue with Keppra 500mg BID. Keppra is renally dosed  - CT Head showed a significant cerebral edema with cerebellar tonsillar herniation with developing hydrocephalus.  - Continue 3% hypertonic saline at 30ml/hr given the impending cerebellar herniation.  - Check Sodium every 6hours and hold for sodium >155  - Maintain MAP>65mmhg per ICU utilizing vasopressor as needed  - Please hold sedation, if necessary utilize precedex or very low dose propofol  - Urine drug screen negative  - Continue with supportive care  - Maintain Normothermia  - We will continue to monitor closely with daily neurologic exam and provide full recommendation       30mins Critical time spent in the management of the patient.     Thank you for your consult. Neurology will continue to  follow-up with patient. Please contact us if you have any additional questions.     VTE Risk Mitigation (From admission, onward)         Ordered     Place sequential compression device  Until discontinued      11/05/20 0527     IP VTE HIGH RISK PATIENT  Once      11/05/20 0527     Place JIAN hose  Until discontinued      11/04/20 2217     Place sequential compression device  Until discontinued      11/04/20 2217     Place sequential compression device  Until discontinued      11/04/20 2039                Ginger Blood MD  Neurology  [facility]

## 2020-11-08 NOTE — PROGRESS NOTES
Reviewed MR:  Bilateral Large Cerebellar DWI changes.  Consult Neurosurgery to review images tonight.  CT non con in am.  Spoke with ICU nursing staff.

## 2020-11-08 NOTE — PROGRESS NOTES
[facility]  Cardiology  Progress Note    Patient Name: Yocasta Almonte  MRN: [id number]  Admission Date: 11/4/2020  Hospital Length of Stay: 4 days  Code Status: Full Code   Attending Physician: Rodney Araujo,*   Primary Care Physician: Rani Welch MD  Expected Discharge Date:   Principal Problem:Cardiopulmonary arrest    Subjective:     Hospital Course:   11/8/2020:  -Pt remains unresponsive with no purposeful movements  -MRI brain on 11/7/2020 revealed:  1. Diffuse cerebral edema, worst infratentorial.  Multiple areas of infarct/ischemia including cerebellum, basal ganglia and cerebral watershed zones.  2. Developing hydrocephalus due to 4th ventricle compression.  3. Concern for developing downward transtentorial herniation.    11/7/2020:  -Pt remains unresponsive.  -CPK trending down.  -Troponin trending down.  -Creatinine now 3.5 vs 2.7 yesterday.    ROS- unable to obtain due to patient being intubated and unresponsive.    Objective:     Vital Signs (Most Recent):  Temp: 98.6 °F (37 °C) (11/07/20 0800)  Pulse: (!) 127 (11/07/20 1202)  Resp: (!) 22 (11/07/20 1202)  BP: (!) 107/51 (11/07/20 1000)  SpO2: 100 % (11/07/20 1202) Vital Signs (24h Range):  Temp:  [97.3 °F (36.3 °C)-98.8 °F (37.1 °C)] 98.6 °F (37 °C)  Pulse:  [101-137] 127  Resp:  [22-58] 22  SpO2:  [60 %-100 %] 100 %  BP: ()/(47-59) 107/51  Arterial Line BP: ()/(36-77) 131/47     Weight: (unable to weigh due to arctic sun in use)  Body mass index is 27.61 kg/m².     SpO2: 100 %  O2 Device (Oxygen Therapy): ventilator      Intake/Output Summary (Last 24 hours) at 11/7/2020 1506  Last data filed at 11/7/2020 1022  Gross per 24 hour   Intake 693.61 ml   Output 1000 ml   Net -306.39 ml       Lines/Drains/Airways     Central Venous Catheter Line            Percutaneous Central Line Insertion/Assessment - Triple Lumen  11/05/20 0008 right internal jugular 2 days          Drain                 Fecal Incontinence   11/05/20 0100 2 days         NG/OG Tube 11/04/20 1830 orogastric 18 Fr. Center mouth 2 days         Urethral Catheter 11/04/20 2113 Non-latex 16 Fr. 2 days          Airway                 Airway - Non-Surgical 11/04/20 1730 2 days          Arterial Line            Arterial Line 11/05/20 0835 Right Radial 2 days          Peripheral Intravenous Line                 Peripheral IV - Single Lumen 11/04/20 22 G Left Hand 3 days         Peripheral IV - Single Lumen 11/04/20 22 G Right Wrist 3 days                Physical Exam   Constitutional: She appears well-developed and well-nourished.   Does not follow commands.  ET tube in place.   HENT:   Head: Normocephalic and atraumatic.   Eyes: Pupils are equal, round, and reactive to light. EOM are normal.   Neck: Neck supple. JVD present.   Cardiovascular: Normal rate and regular rhythm. Exam reveals no gallop and no friction rub.   No murmur heard.  Pulmonary/Chest: Effort normal and breath sounds normal.   Abdominal: Soft. Bowel sounds are normal.   Musculoskeletal:         General: No edema.   Neurological:   Does not follow commands   Skin: Skin is dry.   Psychiatric:   Does not follow commands   Nursing note and vitals reviewed.      Significant Labs:   BMP:   Recent Labs   Lab 11/06/20 2010 11/06/20 2339 11/07/20 0455 11/07/20 0806 11/07/20  1235   * 117*  118* 85 74 83   * 131* 134*  --   --    K 3.6 3.5 3.9  --   --     104 106  --   --    CO2 11* 10* 12*  --   --    BUN 34* 38* 41*  --   --    CREATININE 3.1* 3.3* 3.5*  --   --    CALCIUM 7.5* 7.4* 7.7*  --   --    MG 2.1 2.0 2.2  --   --    , CMP   Recent Labs   Lab 11/06/20 2010 11/06/20 2339 11/07/20 0455 11/07/20  0806 11/07/20  1235   * 131* 134*  --   --    K 3.6 3.5 3.9  --   --     104 106  --   --    CO2 11* 10* 12*  --   --    * 117*  118* 85 74 83   BUN 34* 38* 41*  --   --    CREATININE 3.1* 3.3* 3.5*  --   --    CALCIUM 7.5* 7.4* 7.7*  --   --     ANIONGAP 16 17* 16  --   --    ESTGFRAFRICA 16* 15* 14*  --   --    EGFRNONAA 14* 13* 12*  --   --    , CBC   Recent Labs   Lab 11/07/20 0455 11/07/20  0806 11/07/20  1235   WBC 10.91 10.68 10.42   HGB 9.8* 9.4* 8.9*   HCT 29.2* 28.3* 26.9*   * 128* 124*   , INR   Recent Labs   Lab 11/06/20  0409 11/07/20  0455   INR 1.2 1.1   , Lipid Panel No results for input(s): CHOL, HDL, LDLCALC, TRIG, CHOLHDL in the last 48 hours., Troponin   Recent Labs   Lab 11/06/20 2010 11/07/20 0455 11/07/20  1235   TROPONINI 0.735* 0.613* 0.484*    and All pertinent lab results from the last 24 hours have been reviewed.    Significant Imaging:   Echo 11/6/2020:  · Overall the study quality was poor. The study was difficult due to patient's body habitus.  · There is left ventricular concentric remodeling.  · The left ventricle is normal in size with moderately decreased systolic function. The estimated ejection fraction is 40%.  · There is moderate left ventricular global hypokinesis.  · Grade I diastolic dysfunction.  · Normal right ventricular size with normal right ventricular systolic function.  · Normal central venous pressure (3 mmHg).      Assessment and Plan:     IMPRESSION:  Status post resuscitation from cardiac arrest.  Initial rhythm reportedly was asystole.  Currently patient is in sinus rhythm.  Hypoxemic respiratory failure.  With possible aspiration pneumonia.  Rhabdomyolysis.  Elevated troponin.  Etiology could be rhabdomyolysis.  Cardiomyopathy.  No evidence of any scar tissue on the echocardiogram.  This could represent stunning of the myocardium.  History of COPD.  History of lung cancer.  History of lymphoma.     RECOMMENDATIONS:  1.  Continue supportive care.  2.  Wean Levophed as tolerated.  .  3.  Plan for repeat limited echocardiogram in a few days to reassess LVEF.     Thank you for your consult. I will follow-up with patient. Please contact us if you have any additional questions.        VTE Risk  Mitigation (From admission, onward)         Ordered     Place sequential compression device  Until discontinued      11/05/20 0527     IP VTE HIGH RISK PATIENT  Once      11/05/20 0527     Place JIAN hose  Until discontinued      11/04/20 2217     Place sequential compression device  Until discontinued      11/04/20 2217     Place sequential compression device  Until discontinued      11/04/20 2039                Antoni Buckner MD PhD  Cardiology  [facility]

## 2020-11-09 PROBLEM — Z51.5 COMFORT MEASURES ONLY STATUS: Status: ACTIVE | Noted: 2020-01-01

## 2020-11-09 NOTE — SIGNIFICANT EVENT
Called to pronounce patient dead  Patient alone in room, unresponsive to physical and verbal stimulation.  No heart tones, no spontaneous respirations, pupils fixed and dilated  Patient pronounced dead at 14:04  Family aware per nurse

## 2020-11-09 NOTE — CHAPLAIN
Spent about an hour with pt and family again; prepped for w/d care, assisted w/fam doning and escorting into/out of room; prayed over pt again as she was quickly declining post-w/d care; provided grief care and  Support during and after death; provided family grief pkt and explained next steps; they are waiting for one more family member to arrive to say last good bye before they leave. Lord, in your mercy.

## 2020-11-09 NOTE — PLAN OF CARE
Pt remains intubated, continuous EEG  Still in progress. General moderate selling in the face and neck. tmax 98.7. benjamin to gravity, draining clear yellow urine. flexi seal to gravity, draining green watery stool. OG to LIS. Family is supposed to visit today and make final decisions of management. Co2 critical on am labs, informed NP, no new orders. Safety maintained. Will continue to monitor.

## 2020-11-09 NOTE — PROGRESS NOTES
Yocasta Almonte [id number] is a 75 y.o. female who has been consulted for vancomycin dosing.    Pharmacy consult for vancomycin dosing in no longer required.  Vancomycin was discontinued.    Thank you for allowing us to participate in this patient's care.     Jeremiah Richter, [place]

## 2020-11-09 NOTE — CHAPLAIN
Brief visit w/family for check in; compassionate presence, confirming next steps, awaiting more family before w./d care. , in your mercy,.

## 2020-11-09 NOTE — PROCEDURES
[facility] EEG/VIDEO MONITORING REPORT  Yocasta Almonte  [id number]  1945    DATE OF SERVICE:  11/08/2020 - 11/09/2020  DATE OF ADMISSION: 11/4/2020  5:28 PM    ADMITTING/REQUESTING PROVIDER: Valdemar Sampson MD    REASON FOR CONSULT:  75-year-old woman admitted after cardiac arrest now with persistent decreased responsiveness.  Evaluate for evidence of epileptiform activity.    METHODOLOGY   Electroencephalographic (EEG) recording is with electrodes placed according to the International 10-20 placement system.  Thirty two (32) channels of digital signal (sampling rate of 512/sec) including T1 and T2 was simultaneously recorded from the scalp and may include  EKG, EMG, and/or eye monitors.  Recording band pass was 0.1 to 512 hz.  Digital video recording of the patient is simultaneously recorded with the EEG.  The patient is instructed report clinical symptoms which may occur during the recording session.  EEG and video recording is stored and archived in digital format.  Activation procedures which include photic stimulation, hyperventilation and instructing patients to perform simple task are done in selected patients.   The EEG is displayed on a monitor screen and can be reviewed using different montages.  Computer assisted analysis is employed to detect spike and electrographic seizure activity.   The entire record is submitted for computer analysis.  The entire recording is visually reviewed and the times identified by computer analysis as being spikes or seizures are reviewed again.  Compresses spectral analysis (CSA) is also performed on the activity recorded from each individual channel.  This is displayed as a power display of frequencies from 0 to 30 Hz over time.   The CSA is reviewed looking for asymmetries in power between homologous areas of the scalp and then compared with the original EEG recording.      RECORDING TIMES  Start on 11/08/2027 at 07:00 a.m.  Stop on 11/09/2020 at 11:42 a.m. -> End of the  Recording Session  A total of 24 hr and 42 min of EEG recording is obtained.    EEG FINDINGS  Background activity:   The background is discontinuous, relatively symmetric, and slow.  There is predominantly theta/delta activity with frequent brief periods of generalized attenuation.  There are sporadic and waxing and waning runs of moderate-high voltage, periodic, generalized or bilateral independent, discharges up to 1 hz especially with stimulation.  At other times, there is lower voltage, discontinuous, polymorphic theta activity with less sharp features.    Sleep:  There is no normal sleep architecture    Activation procedures:   Hyperventilation is not performed  Photic stimulation is not performed    Cardiac Monitor:    Tachycardia    Impression:   This is an abnormal continuous EEG monitoring study because of stimulus induced periodic/rhythmic features as well as generalized/bilateral independent discharges.  This pattern is consistent with an irritative process and the potential for cortical hyperexcitability.  There is generalized background slowing consistent with diffuse cortical dysfunction and a severe encephalopathy.  This finding is nonspecific with regards to etiology but can be seen in the setting of toxic/metabolic derangements, anoxia, infection, and as a medication effect. There are no prominent focal findings however encephalopathy obscures focal findings.  There are no pushbutton activations and no discrete seizures.  Compared to the previous day's recording, there are significantly less sharp features.     Dalila Enrique MD PhD  Neurology-Epilepsy  [facility].  [facility]

## 2020-11-09 NOTE — CHAPLAIN
Pt now comfort measures only; put calming music on, peaceful sign on the door to indicate comfort; Dove basket ordered and in room, as well as pitcher of water and cups; escorted family to room, circled up and prayed together, talked about memories of pt; waiting for bandage to be removed and respiratory to remove tubes; told fam I'd be back shortly, gave them some space; Lord, in your mercy. Nurse Cecy, please call when respiratory arrives-- [phone] and I'll return.

## 2020-11-09 NOTE — PROGRESS NOTES
Patient is now comfort care. Family at bedside to say goodbye. Son will be staying with her. Morphine drip initiated. Waiting for son to be ready for terminal extubation.

## 2020-11-09 NOTE — RESPIRATORY THERAPY
Patient remains on  on AC rate 22, , Peep +5 and FI02  11/30/2020.  , ETC02 20mmhg and 02 saturation 100%.  Patien  t intubated via 8 et tube and secured at 24cm@ lip with [id number] cuff psi.  Patient receiving aerosol tx via duonb now and q6hr.  Ambu bag and mask at bedside with alarms on and functioning properly at this time.

## 2020-11-09 NOTE — CONSULTS
Pharmacokinetic Assessment Follow Up: IV Vancomycin    Vancomycin serum concentration assessment(s):    The random level was drawn correctly and can be used to guide therapy at this time. The measurement is above the desired definitive target range of 15 to 20 mcg/mL.    Vancomycin Regimen Plan:    Re-dose when the random level is less than 17 mcg/mL, next level to be drawn at AM labs on 11/10    Drug levels (last 3 results):  Recent Labs   Lab Result Units 11/07/20  1235 11/08/20  0355 11/09/20  0315   Vancomycin, Random ug/mL 19.8 17.0 20.3       Pharmacy will continue to follow and monitor vancomycin.    Please contact pharmacy at extension [phone] for questions regarding this assessment.    Thank you for the consult,   Maria Teresa Lantigua       Patient brief summary:  Yocasta Almonte is a 75 y.o. female initiated on antimicrobial therapy with IV Vancomycin for treatment of lower respiratory infection    The patient's current regimen is pulse dosing    Drug Allergies:   Review of patient's allergies indicates:   Allergen Reactions    Iodine and iodide containing products     Penicillins        Actual Body Weight:   77.6 kg    Renal Function:   Estimated Creatinine Clearance: 9.9 mL/min (A) (based on SCr of 5.1 mg/dL (H)).,     Dialysis Method (if applicable):  N/A    CBC (last 72 hours):  Recent Labs   Lab Result Units 11/06/20  0802 11/06/20  1017 11/06/20  1553 11/06/20 2010 11/06/20  2339 11/07/20  0455 11/07/20  0806 11/07/20  1235 11/07/20  1540 11/07/20  2043 11/07/20  2351 11/08/20  0354 11/08/20  0855 11/08/20  1232 11/08/20  1600 11/08/20 2012 11/08/20  2339 11/09/20  0315   WBC K/uL 6.41 6.03 6.68 8.60 9.08 10.91 10.68 10.42 10.10 11.48 11.03 11.88 11.05 11.44 10.57 12.16 10.75 12.83*   Hemoglobin g/dL 10.6* 10.5* 9.8* 9.7* 9.5* 9.8* 9.4* 8.9* 8.7* 8.9* 8.6* 8.6* 8.2* 8.5* 8.2* 8.3* 7.9* 6.8*   Hematocrit % 32.2* 31.2* 28.9* 28.6* 28.0* 29.2* 28.3* 26.9* 26.0* 26.8* 26.3* 26.6* 25.4* 26.4* 26.2* 26.2* 25.4*  22.4*   Platelets K/uL 129* 114* 127* 130* 127* 130* 128* 124* 122* 126* 122* 126* 114* 112* 113* 109* 104* 107*   Gran % % 42.0 75.3* 36.0* 59.0 55.0 71.0 79.0* 86.0* 84.9* 78.0* 84.7* 82.8* 81.7* 84.0* 86.0* 76.0* 74.4* 74.0*   Lymph % % 8.0* 8.0* 7.0* 5.0* 16.0* 6.0* 11.0* 6.0* 4.6* 8.0* 4.3* 4.0* 3.5* 8.0* 5.0* 10.0* 3.7* 7.0*   Mono % % 8.0 7.3 6.0 5.0 3.0* 7.0 9.0 5.0 9.0 3.0* 9.0 10.3 11.9 6.0 3.0* 9.0 16.7* 12.0   Eosinophil % % 4.0 3.6 4.0 1.0 4.0 0.0 1.0 0.0 0.7 0.0 0.8 1.1 1.2 0.0 0.0 2.0 1.0 1.0   Basophil % % 0.0 0.8 0.0 1.0 0.0 1.0 0.0 0.0 0.4 0.0 0.5 0.6 0.4 0.0 1.0 1.0 0.9 0.0   Differential Method  Manual Automated Manual Manual Manual Manual Manual Manual Automated Manual Automated Automated Automated Manual Manual Manual Automated Manual       Metabolic Panel (last 72 hours):  Recent Labs   Lab Result Units 11/06/20  0801 11/06/20  1017 11/06/20  1259 11/06/20  1553 11/06/20  2010 11/06/20  2339 11/07/20  0455 11/07/20  0806 11/07/20  1235 11/07/20  1540 11/07/20  2043 11/07/20  2351 11/08/20  0220 11/08/20  0355 11/08/20  0855 11/08/20  1232 11/08/20  1600 11/08/20 2012 11/08/20  2339 11/09/20  0315   Sodium mmol/L  --  135* 135*  --  131* 131* 134*  --   --   --  135* 137  --  140  --  143  --  148* 149* 151*   Potassium mmol/L  --  3.4* 3.7  --  3.6 3.5 3.9  --   --   --  3.6 3.6  --  3.9  --  4.4  --  4.5 4.5 4.7   Chloride mmol/L  --  109 107  --  104 104 106  --   --   --  109 111*  --  113*  --  118*  --  121* 123* 125*   CO2 mmol/L  --  11* 11*  --  11* 10* 12*  --   --   --  11* 10*  --  9*  --  9*  --  8* 8* 7*   Glucose mg/dL 157* 140*  143* 122* 139* 111* 117*  118* 85 74 83 81 89 78  78  --  77 74 86  86 87 82 82  82 84   BUN mg/dL  --  28* 32*  --  34* 38* 41*  --   --   --  51* 52*  --  55*  --  57*  --  61* 63* 62*   Creatinine mg/dL  --  2.5* 2.7*  --  3.1* 3.3* 3.5*  --   --   --  4.3* 4.4*  --  4.4*  --  4.6*  --  5.0* 5.0* 5.1*   Creatinine, Urine mg/dL  --   --   --   --    --   --   --   --   --   --   --   --  19.8  --   --   --   --   --   --   --    Albumin g/dL  --   --   --   --   --   --   --   --   --   --  2.0* 2.0*  --  2.0*  --  2.0*  --  1.9* 1.9* 1.9*   Total Bilirubin mg/dL  --   --   --   --   --   --   --   --   --   --  0.4 0.3  --  0.4  --  0.4  --  0.4 0.3 0.3   Alkaline Phosphatase U/L  --   --   --   --   --   --   --   --   --   --  89 86  --  94  --  96  --  93 84 115   AST U/L  --   --   --   --   --   --   --   --   --   --  166* 154*  --  146*  --  133*  --  121* 113* 107*   ALT U/L  --   --   --   --   --   --   --   --   --   --  118* 115*  --  110*  --  108*  --  106* 103* 101*   Magnesium mg/dL 2.1 2.0  --  2.0 2.1 2.0 2.2  --   --   --   --   --   --  2.5  --   --   --   --   --  2.7*   Phosphorus mg/dL  --   --   --   --   --   --  5.9*  --   --   --   --   --   --  6.8*  --   --   --   --   --  6.7*       Vancomycin Administrations:  vancomycin given in the last 96 hours                     vancomycin 500 mg in dextrose 5 % 100 mL IVPB (ready to mix system) (mg) 500 mg New Bag 11/08/20 0858    vancomycin 500 mg in dextrose 5 % 100 mL IVPB (ready to mix system) (mg) 500 mg New Bag 11/06/20 1324    vancomycin 1.5 g in dextrose 5 % 250 mL IVPB (ready to mix) (mg) 1,500 mg New Bag 11/05/20 1320                    Microbiologic Results:  Microbiology Results (last 7 days)       Procedure Component Value Units Date/Time    Blood culture [[id number]] Collected: 11/05/20 1238    Order Status: Completed Specimen: Blood Updated: 11/08/20 2012     Blood Culture, Routine No Growth to date      No Growth to date      No Growth to date      No Growth to date    Blood culture [[id number]] Collected: 11/05/20 1111    Order Status: Completed Specimen: Blood from Peripheral, Right Hand Updated: 11/08/20 2012     Blood Culture, Routine No Growth to date      No Growth to date      No Growth to date      No Growth to date    Culture, Respiratory with Gram Stain [[id number]]   (Abnormal) Collected: 11/05/20 1159    Order Status: Completed Specimen: Respiratory from Tracheal Aspirate Updated: 11/08/20 1040     Respiratory Culture ACINETOBACTER BAUMANNII COMPLEX  Moderate  Susceptibility pending  Normal respiratory anuja also present       Gram Stain (Respiratory) <10 epithelial cells per low power field.     Gram Stain (Respiratory) Rare WBC's     Gram Stain (Respiratory) Many Gram negative rods     Gram Stain (Respiratory) Few Gram positive cocci    AFB Culture & Smear [[id number]] Collected: 11/05/20 0019    Order Status: Completed Specimen: Respiratory from Tracheal Aspirate Updated: 11/06/20 2125     AFB Culture & Smear Culture in progress     AFB CULTURE STAIN No acid fast bacilli seen.

## 2020-11-09 NOTE — PROGRESS NOTES
Patients son is at bedside. Other family members are outside the hospital. Waiting for a decision from the family if we will be withdrawing care or not.

## 2020-11-09 NOTE — PROGRESS NOTES
1355: Patient extubated.   1356: Family brought back into room.  1404: Patient with no blood pressure, no respirations, asystole. Dr. Oneal notified.

## 2020-11-09 NOTE — EICU
HCO3 8, pH 7.17, gave Bicarb to avoid imminent cardiac arrest until family arrives in AM  Hypertonic Na stopped as serum Na has risen

## 2020-11-09 NOTE — PROGRESS NOTES
Spoke with patient son, there are no more family members coming and he verified that it is [facility].   [facility]ANICE and  have been notified. Patient is not a JANICE candidate and  has released body to the  home.

## 2020-11-10 LAB
BACTERIA BLD CULT: NORMAL
BACTERIA BLD CULT: NORMAL
BACTERIA SPEC AEROBE CULT: ABNORMAL
BACTERIA SPEC AEROBE CULT: ABNORMAL
GRAM STN SPEC: ABNORMAL
NSE SERPL-MCNC: 181 NG/ML
NSE SERPL-MCNC: 80 NG/ML

## 2020-11-10 NOTE — DISCHARGE SUMMARY
[facility] Medicine  Discharge Summary      Patient Name: Yocasta Almonte  MRN: [id number]  Admission Date: 11/4/2020  Hospital Length of Stay: 5 days  Discharge Date and Time:      Attending Physician: No att. providers found   Discharging Provider: Nataliia Oneal MD  Primary Care Provider: Rani Welch MD      HPI:   Ms. Yocasta Almonte is a 75 y.o. female, with PMH of CLL, right lung cancer, COPD, T2DM, HTN, HLD, thyroid disease, anemia, OA, GERD, tobacco abuse, who presented to [facility] ED on 11/4/20 after she called EMS 2/2 to SOB and went unresponsive on the phone. Upon arrival, EMS found her with a breathing treatment in process, s/p emesis, and with an inhaler nearby. Glucose was 331. She was given 3 rounds of epinephrine after finding rhythm was asystole with pupils fixed and dilated. She had ROSC w/ the epinephrine briefly each time. Upon arrival to ED EKG showed sinus tachycardia. She was a difficult intubation as she had many partially digested beans in her airway. ED labs concerning for DKA. Given her h/o cancer, and SOB preceding her arrest, a CTA Chest was ordered to evaluate for PE and was negative for PE, but did indicate aspiration pneumonia. A CTHead was negative for acute intracranial abnormalities. A CXR indicates patchy opacification of the pulmonary parenchyma, concerning for pneumonia or aspiration.     * No surgery found *      Hospital Course:   Patient was admitted to the hospital to secondary to cardiac arrest following aspiration event.  She had a prolonged resuscitation with difficult intubation and asystole is initial presenting rhythm.  She was initially and DKA and was started on insulin drip which was subsequently weaned off.  Patient underwent targeted temperature management protocol, and underwent a empiric antibiotic treatment for aspiration pneumonia. She was managed by cardiology, pulmonology, and neurology services. She was noted to have left gaze  deviation and CT head was obtained which showed significant cerebellar edema and concern for herniation. Neurosurgery was consulted and plan with family was made for nonoperative management. She was placed on 3% saline to maintain sodium. Family met with Dr. Onela on day of death and patient's son decided to transition her to comfort measures only and withdraw care as she had minimal change of making a meaningful recovery. After patient's family visited she was extubated and passed shortly after.       Consults:   Consults (From admission, onward)        Status Ordering Provider     Inpatient consult to Diabetes educator  Once     Provider:  (Not yet assigned)    Completed SALAM ALEXANDER     Inpatient consult to Neurosurgery  Once     Provider:  Hugo Faye MD    Completed MARTY ALBRIGHT     Inpatient consult to Pulmonology  Once     Provider:  Kale Pizano MD    Completed SALMA ALEXANDER     Inpatient consult to Registered Dietitian/Nutritionist  Once     Provider:  (Not yet assigned)    Completed SALMA ALEXANDER     Pharmacy to dose Vancomycin consult  Once     Provider:  (Not yet assigned)    Completed MEETA HARRIS          No new Assessment & Plan notes have been filed under this hospital service since the last note was generated.  Service: Hospital Medicine    Final Active Diagnoses:    Diagnosis Date Noted POA    PRINCIPAL PROBLEM:  Cardiopulmonary arrest [I46.9] 11/04/2020 Yes    Comfort measures only status [Z51.5] 11/09/2020 Not Applicable    Calcified granuloma of lung [J84.10] 11/05/2020 Yes    Calcification of aorta [I70.0] 11/05/2020 Yes    Aspiration pneumonia [J69.0] 11/05/2020 Yes    Anoxic encephalopathy [G93.1] 11/05/2020 Yes    COPD (chronic obstructive pulmonary disease) [J44.9] 05/11/2020 Yes    Acute hypoxemic respiratory failure [J96.01] 04/22/2020 Yes    Type 2 diabetes mellitus with other specified complication [E11.69] 10/15/2019 Yes    Anemia of decreased  vitamin B12 absorption [D51.8] 2019 Yes    Essential hypertension [I10] 2018 Yes    HLD (hyperlipidemia) [E78.5] 2018 Yes    Malignant neoplasm of upper lobe of left lung [C34.12] 2017 Yes    Chronic lymphocytic leukemia [C91.10] 2017 Yes    Nicotine dependence, unspecified, uncomplicated [F17.200] 2016 Yes    Coronary artery disease [I25.10] 2015 Yes      Problems Resolved During this Admission:    Diagnosis Date Noted Date Resolved POA    Septic shock [A41.9, R65.21] 2020 Yes       Discharged Condition:     Disposition:     Follow Up:    Patient Instructions:   No discharge procedures on file.    Significant Diagnostic Studies: Labs:   BMP:   Recent Labs   Lab 20  0315 20  0740   GLU 82 82  82 84 84   * 149* 151*  --    K 4.5 4.5 4.7  --    * 123* 125*  --    CO2 8* 8* 7*  --    BUN 61* 63* 62*  --    CREATININE 5.0* 5.0* 5.1*  --    CALCIUM 8.2* 8.2* 8.3*  --    MG  --   --  2.7*  --     and CBC   Recent Labs   Lab 20  0315 20  0740   WBC 10.75 12.83* 11.24   HGB 7.9* 6.8* 7.8*   HCT 25.4* 22.4* 24.8*   * 107* 96*       Pending Diagnostic Studies:     Procedure Component Value Units Date/Time    EKG 12-lead [[id number]] Collected: 20 0907    Order Status: Sent Lab Status: In process Updated: 20 1435    Narrative:      Test Reason : R77.8,    Vent. Rate : 104 BPM     Atrial Rate : 104 BPM     P-R Int : 140 ms          QRS Dur : 082 ms      QT Int : 424 ms       P-R-T Axes : 068 000 120 degrees     QTc Int : 557 ms    Sinus tachycardia  T wave abnormality, consider anterolateral ischemia  Prolonged QT  Abnormal ECG  When compared with ECG of 2020 06:58,  No significant change was found    Referred By: [email or web address]   SELF           Confirmed By:     Glucose, random (Unless on insulin infusion) [[id number]] Collected: 20 3611     Order Status: Sent Lab Status: In process Updated: 11/05/20 2106    Specimen: Blood     Magnesium [[id number]] Collected: 11/05/20 2106    Order Status: Sent Lab Status: In process Updated: 11/05/20 2106    Specimen: Blood     Troponin I [[id number]] Collected: 11/05/20 2106    Order Status: Sent Lab Status: In process Updated: 11/05/20 2106    Specimen: Blood     Narrative:      Collection has been rescheduled by HB1 at 11/05/2020 16:28 Reason:   not due until 8       Radiology Results (last 7 days)    Procedure Component Value Units Date/Time   MRA Neck without contrast [[id number]] Resulted: 11/08/20 1437   Order Status: Completed Updated: 11/08/20 1440   Narrative:     EXAMINATION:   MRA NECK WITHOUT CONTRAST     CLINICAL HISTORY:   Stroke, follow up;     TECHNIQUE:   Routine Non-contrast 2D and/or 3D time-of-flight MR angiography of the neck was performed, with MIP reformatting.     COMPARISON:   Same-day MRI; prior CTA chest 11/04/2020     FINDINGS:   Motion limited exam.     Extracranial carotid circulation: No hemodynamically significant stenosis, aneurysmal dilatation, or dissection.     Extracranial vertebral circulation: No hemodynamically significant stenosis, aneurysmal dilatation, or dissection.  Patient is Co vertebral artery dominant.     Mass to the left of midline near the thoracic inlet is better characterized on CT.  This results in rightward tracheal deviation.  Endotracheal tube and orogastric tube are partially imaged.    Impression:       No hemodynamically significant stenosis or aneurysm of the included neck arterial vasculature.       Electronically signed by: Trey Smyth   Date: 11/08/2020   Time: 14:37   MRI Brain Without Contrast [[id number]] Resulted: 11/08/20 1422   Order Status: Completed Updated: 11/08/20 1425   Narrative:     EXAMINATION:   MRI BRAIN WITHOUT CONTRAST     CLINICAL HISTORY:   Altered mental status;     TECHNIQUE:   Multiplanar multisequence MR imaging of the brain  was performed without contrast.     COMPARISON:   Same-day head CT     FINDINGS:   Enlarged lateral and 3rd ventricles and compressed 4th ventricle.  Mild cerebral involutional change.  Confluent white matter T2/FLAIR signal hyperintensity.  Diffuse loss of gray-white differentiation is specially in the posterior fossa.  Multiple areas of restricted diffusion.  These include the bilateral cerebellar hemispheres diffusely, symmetric basal ganglia, symmetric watershed zones (especially posterior) and a few punctate foci along the corpus callosum.  There is no midline shift.  There is effacement of the basilar cisterns.  There is crowding of structures at the foramen magnum and cerebellar tonsils extend below the foramen magnum by several mm.  Surgical changes of the globes. Pansinus disease.  Small bilateral mastoid effusions.  Orogastric and endotracheal tubes partially imaged.    Impression:       1. Diffuse cerebral edema, worst infratentorial.  Multiple areas of infarct/ischemia including cerebellum, basal ganglia and cerebral watershed zones.   2. Developing hydrocephalus due to 4th ventricle compression.   3. Concern for developing downward transtentorial herniation.       Electronically signed by: Trey Smyth   Date: 11/08/2020   Time: 14:22   CT Head Without Contrast [[id number]] Resulted: 11/07/20 1601   Order Status: Completed Updated: 11/07/20 1603   Narrative:     EXAMINATION:   CT HEAD WITHOUT CONTRAST     CLINICAL HISTORY:   post arrest;     TECHNIQUE:   Low dose axial images were obtained through the head.  Coronal and sagittal reformations were also performed. Contrast was not administered.     COMPARISON:   11/04/2020     FINDINGS:   There is enlargement of the lateral and 3rd ventricles.  There is new periventricular white matter hypoattenuation suggestive of subependymal flow of CSF.    No acute hemorrhage.  There is swelling of the cerebellum with loss of gray-white differentiation, exerting  mass effect on the 4th ventricle and brainstem at the craniocervical junction as best noted on sagittal image.  To lesser extent there is diminished gray-white differentiation throughout the cerebral hemispheres.  There is crowding of the basilar cisterns.  No midline shift.  Surgical changes of the globes. Mastoid air cells are clear.  Mild mucosal thickening in the ethmoid sinuses.  Partial opacification of the remaining sinuses with fluid levels.  Orogastric and endo tracheal tubes on  image.  Atherosclerosis.    Impression:       1. Diffuse cerebral edema most notable in the cerebellum.  Upper cervical spinal canal is not included in the field of view, but there is concern for cerebellar tonsillar herniation.   2. There is developing hydrocephalus secondary to compression of the 4th ventricle.   3. Recommend emergent neurosurgical consultation.   Findings discussed with Dr. Araujo via telephone at 15:50 hours       Electronically signed by: Trey Smyth   Date: 11/07/2020   Time: 16:01   X-Ray Chest 1 View [[id number]] Resulted: 11/07/20 0840   Order Status: Completed Updated: 11/07/20 0842   Narrative:     EXAMINATION:   XR CHEST 1 VIEW     CLINICAL HISTORY:   pneumonia;     TECHNIQUE:   Single frontal view of the chest was performed.     COMPARISON:   Multiple prior radiographs including 11/05/2020 and 04/23/2020; PET-CT 01/09/2020     FINDINGS:   Calcified granuloma right upper lung zone.  Adjacent spicular opacity is chronic since April comparison.  Patchy spicular opacity left upper lung zone is consistent with known neoplasm post radiation.  Minimal patchy airspace disease elsewhere in the upper lung zones.  Unchanged heart size.  Endotracheal tube right central line and gastric tube.  No pleural effusion or pneumothorax.     Mediastinal adenopathy, left thyroid nodule and rib fractures better seen at CT.    Impression:       Decreased extent of bilateral airspace disease.  Subacute chronic  opacities in both apices.  Appropriately positioned assist devices.       Electronically signed by: Trey Smyth   Date: 11/07/2020   Time: 08:40   RADIOLOGY REPORT [[id number]] Resulted: 11/04/20 0000   Order Status: Completed Updated: 11/06/20 1330   XR Non-Rad Performed NG/Gastric Tube Check [[id number]] Resulted: 11/05/20 0752   Order Status: Completed Updated: 11/05/20 0754   Narrative:     EXAMINATION:   XR NON-RADIOLOGIST PERFORMED NG/GASTRIC TUBE CHECK     CLINICAL HISTORY:   ng tube advanced;     TECHNIQUE:   AP view of the lower chest and abdomen is provided.     COMPARISON:   Prior abdomen of November 5, 2020 at 01:15.     FINDINGS:   The NG tube has been advanced and now is coiled in the stomach in good position.  The bowel gas pattern is within normal limits.  The psoas shadows sharp.  No free air is seen.    Impression:       NG tube has been advanced and is now in good position.       Electronically signed by: Aleksandr Wilson MD   Date: 11/05/2020   Time: 07:52   X-Ray Abdomen AP 1 View (KUB) [[id number]] Resulted: 11/05/20 0751   Order Status: Completed Updated: 11/05/20 0754   Narrative:     EXAMINATION:   XR ABDOMEN AP 1 VIEW     CLINICAL HISTORY:   Encounter for fitting and adjustment of other gastrointestinal appliance and device     TECHNIQUE:   AP View(s) of the abdomen was performed.     COMPARISON:   Abdomen x-ray of February 18, 2014.     FINDINGS:   An NG tube has been placed traversing through the esophagus with the tip ending in the stomach however the side hole is at the lower esophagus and this tube should be advanced.  The bowel gas pattern is within normal limits.  No free air is seen.    Impression:       The NG tube traverses the esophagus with the end in the stomach but the side hole is in the esophagus and this tube should be advanced 4-5 cm.       Electronically signed by: Aleksandr Wilson MD   Date: 11/05/2020   Time: 07:51   X-Ray Chest AP Portable [[id number]] Resulted:  11/05/20 0749   Order Status: Completed Updated: 11/05/20 0752   Narrative:     EXAMINATION:   XR CHEST AP PORTABLE     CLINICAL HISTORY:   Encounter for adjustment and management of vascular access device     TECHNIQUE:   Single frontal view of the chest was performed.     COMPARISON:   Chest portable of November 4, 2020.     FINDINGS:   An endotracheal tube ends in the trachea above the level the calos.  An NG tube traverses the esophagus to the stomach.  A central line enters at the right neck and ends in the distal superior vena cava.  The mediastinal and cardiac size and contours normal.  There are patchy infiltrates identified in both upper lung fields and at the right medial lung base which has increased from the prior study.  No pneumothorax or pleural effusion is noted.    Impression:       Increased infiltrate at the right lung base with continued patches of infiltrate at both upper lobes.  Endotracheal tube, NG tube and central lines in good position.       Electronically signed by: Aleksandr Wilson MD   Date: 11/05/2020   Time: 07:49   CTA Chest Non-Coronary [[id number]] Resulted: 11/04/20 2051   Order Status: Completed Updated: 11/04/20 2053   Narrative:     EXAMINATION:   CTA CHEST NON CORONARY     CLINICAL HISTORY:   PE suspected, high pretest prob.     TECHNIQUE:   Low dose axial images, sagittal and coronal reformations were obtained from the thoracic inlet to the lung bases following the IV administration of 75 mL of Omnipaque 350.  Contrast timing was optimized to evaluate the pulmonary arteries.  Maximum intensity projection images were provided for review.     COMPARISON:   CT of the chest from 06/03/2020.     FINDINGS:   Pulmonary vasculature: Contrast bolus timing is sufficient.  There is streak and motion artifact which limits evaluation of the bilateral upper lobes.  There is no evidence of pulmonary embolism to the level of the proximal segmental pulmonary arteries.  The distal segmental  and subsegmental pulmonary arteries are not well visualized due to above mentioned artifact.  There is prominence of the main pulmonary artery which can be seen with pulmonary artery hypertension.     Aorta: Left-sided aortic arch.  No aneurysm and no significant atherosclerosis     Base of Neck: There is a large, heterogeneous left thyroid mass measuring approximately 6.5 x 5.4 cm, mildly deviating the trachea to the right.  There is extension into the superior mediastinum, unchanged.     Thoracic soft tissues: Normal.     Heart: Normal size. No effusion.     Saray/Mediastinum: There are mildly prominent bilateral axillary and mediastinal lymph nodes, not significantly changed.     Airways: There is an endotracheal tube which terminates between the thoracic inlet and the calos.  The large airways are patent.  There are multiple foci of endobronchial filling within the bilateral lower lobes compatible with aspiration.  There is bronchial wall thickening.     Lungs/Pleura: There are bilateral ground-glass opacities with a dense consolidation within the left upper lobe.  There is a masslike opacity within the right upper lobe with obscured margins.  This is difficult to evaluate secondary to motion artifact.  Previously seen left upper lobe nodule is obscured by dense consolidation.  There is a bulky calcified lesion within the right upper lobe, not significantly changed.  There are ground-glass opacities within the bilateral lower lobes, likely aspiration.  There is mild interlobular septal thickening.     Esophagus: Normal.     Upper Abdomen: There is a moderate-sized hiatal hernia.  Orogastric tube terminates in the stomach.     Bones: There are acute, mildly displaced fractures of the left 2nd through 4th ribs anterolateral aspects, and the right 2nd rib anterolateral aspect.    Impression:       1. Examination is limited by streak and motion artifact.  There is no evidence of pulmonary embolism to the level of  the proximal segmental pulmonary arteries.  The distal segmental to subsegmental pulmonary arteries are not well evaluated.   2. Interval development of bilateral ground-glass and dense consolidations as described above, likely combination of aspiration, atelectasis and/or edema.   3. Acute, mildly displaced fractures of the left 2nd through 4th ribs and the right 2nd rib.   4. Right upper lobe masslike opacity and additional bilateral pulmonary nodules are not well assessed secondary to significant motion artifact.  Recommend continued attention on follow-up imaging.   5. Large left thyroid mass extending into the superior mediastinum, unchanged.   6. Stable mildly prominent mediastinal and bilateral axillary lymphadenopathy.       Electronically signed by: Edward Pathak   Date: 11/04/2020   Time: 20:51   CT Head Without Contrast [[id number]] Resulted: 11/04/20 2031   Order Status: Completed Updated: 11/04/20 2034   Narrative:     EXAMINATION:   CT HEAD WITHOUT CONTRAST     CLINICAL HISTORY:   Altered mental status.     TECHNIQUE:   Low dose axial CT images obtained throughout the head without intravenous contrast. Sagittal and coronal reconstructions were performed.     COMPARISON:   CT of the head from 04/22/2020.     FINDINGS:   Ventricles and sulci are normal in size for age without evidence of hydrocephalus. No extra-axial blood or fluid collections.  The brain parenchyma is normal. No parenchymal mass, hemorrhage, edema or major vascular distribution infarct.     No calvarial fracture.  There is hyperostosis frontalis interna.  The scalp is unremarkable.  There is fluid within the bilateral frontal, sphenoid, ethmoid, and maxillary sinuses.  The mastoid air cells are clear.  Partially visualized orogastric tube and endotracheal tube.    Impression:       1. No acute intracranial abnormality.   2. Bilateral paranasal sinus disease as above.       Electronically signed by: Edward Pathak   Date: 11/04/2020    Time: 20:31   X-Ray Chest AP Portable [[id number]] Resulted: 11/04/20 1834   Order Status: Completed Updated: 11/04/20 1837   Narrative:     EXAMINATION:   XR CHEST AP PORTABLE     CLINICAL HISTORY:   Cardiac arrest, cause unspecified     TECHNIQUE:   Single frontal view of the chest was performed.     COMPARISON:   None     FINDINGS:   There is an endotracheal tube with the tip at the clavicular heads, nasogastric tube with the tip inferior to the diaphragm.  There is patchy opacification of the pulmonary parenchyma increased from prior exam.    Impression:       There is patchy opacification of the pulmonary parenchyma increased from prior exam wearing for pneumonia or aspiration.       Electronically signed by: Vikki Monique MD   Date: 11/04/2020   Time: 18:34     Microbiology Results (Last 90 Days)    Procedure Component Value Units Date/Time   Blood culture [[id number]] Collected: 11/05/20 1238   Order Status: Completed Specimen: Blood Updated: 11/09/20 2012    Blood Culture, Routine No Growth to date     No Growth to date     No Growth to date     No Growth to date     No Growth to date   Culture, Respiratory with Gram Stain [[id number]] (Abnormal)  Collected: 11/05/20 1159   Order Status: Completed Specimen: Respiratory from Tracheal Aspirate Updated: 11/10/20 1254    Respiratory Culture No S aureus or Pseudomonas isolated.     ACINETOBACTER BAUMANNII/HAEMOLYTICUS   Moderate   Normal respiratory anuja also present   Abnormal     Gram Stain (Respiratory) <10 epithelial cells per low power field.    Gram Stain (Respiratory) Rare WBC's    Gram Stain (Respiratory) Many Gram negative rods    Gram Stain (Respiratory) Few Gram positive cocci   Susceptibility     ACINETOBACTER BAUMANNII/HAEMOLYTICUS     CULTURE, RESPIRATORY     Amikacin <=16 mcg/mL Sensitive     Amp/Sulbactam <=8/4 mcg/mL Sensitive     Cefepime 4 mcg/mL Sensitive     Ceftriaxone 8 mcg/mL Sensitive     Ciprofloxacin <=1 mcg/mL Sensitive      Gentamicin <=4 mcg/mL Sensitive     Levofloxacin <=2 mcg/mL Sensitive     Meropenem <=1 mcg/mL Sensitive     Tetracycline <=4 mcg/mL Sensitive     Tobramycin <=4 mcg/mL Sensitive     Trimeth/Sulfa <=2/38 mcg/mL Sensitive            Linear View         AFB Culture & Smear [[id number]] Collected: 20 1159   Order Status: Completed Specimen: Respiratory from Tracheal Aspirate Updated: 20    AFB Culture & Smear Culture in progress    AFB CULTURE STAIN No acid fast bacilli seen.   Blood culture [[id number]] Collected: 20 1111   Order Status: Completed Specimen: Blood from Peripheral, Right Hand Updated: 20    Blood Culture, Routine No Growth to date     No Growth to date     No Growth to date     No Growth to date     No Growth to date       Medications:  None (patient  at medical facility)    Indwelling Lines/Drains at time of discharge:   Lines/Drains/Airways     Central Venous Catheter Line            Percutaneous Central Line Insertion/Assessment - Triple Lumen  20 0008 right internal jugular 5 days          Drain                 Fecal Incontinence  20 0100 5 days         NG/OG Tube 20 1830 orogastric 18 Fr. Center mouth 5 days         Urethral Catheter 20 2113 Non-latex 16 Fr. 5 days          Arterial Line            Arterial Line 20 0835 Right Radial 5 days                Time spent on the discharge of patient: 35 minutes  Patient was seen and examined on the date of discharge and determined to be suitable for discharge.    Critical care time spent on the evaluation and treatment of severe organ dysfunction, review of pertinent labs and imaging studies, discussions with consulting providers and discussions with patient/family: 70 minutes.     Advance Care Planning     [facility]  I engaged the patient's son in a conversation about advance care planning and we specifically addressed what the goals of care would be moving forward.  We did  specifically address the patient's likely prognosis, which is poor.  We explored the patient's values and preferences for future care.  The patient's son endorses that what is most important right now is to focus on quality of life, even if it means sacrificing a little time    Accordingly, we have decided that the best plan to meet the patient's goals includes discontinuing treatment    I did explain the role for hospice care at this stage of the patient's illness, including its ability to help the patient live with the best quality of life possible.  We will not be making a hospice referral as patient is expected to pass quickly.    Son requests change to DNR status    I spent a total of 20 minutes engaging the patient's son in this advance care planning discussion.              Nataliia Oneal MD  Department of Hospital Medicine  [facility]

## 2020-11-10 NOTE — PLAN OF CARE
11/10/20 0801   Final Note   Assessment Type Final Discharge Note   Anticipated Discharge Disposition

## 2020-11-18 NOTE — PHYSICIAN QUERY
PT Name: Yocasta Almonte  MR #: [id number]     Diagnosis Clarification      CDS/: Xiao Clark RN, CCDS              Contact information: [email or web address]    This form is a permanent document in the medical record.     Query Date: November 18, 2020    Dear Provider,  By submitting this query, we are merely seeking further clarification of documentation.  Please utilize your independent clinical judgment when addressing the question(s) below.     The medical record contains the following:    Supporting Clinical Information Location in Medical Record   presents emergency department in cardiopulmonary arrest with CPR in progress.    lactic acidosis    SOB preceding her arrest, a CTA Chest was ordered to evaluate for PE and was negative for PE, but did indicate aspiration pneumonia    Temperature increasing to 37.3 C  On Levophed at .08 mcg/kg/min     Septic vs Cardiogenic Shock    Septic Shock Resolved-      WBC- 12.07-->7.11-->6.93-->10.91-->11.88-->12.83  Lactate-2.6  Blood cultures- NGTD  Resp culture- Acinetobacter Baumannii/ Haemolyticus    Patient was admitted to the hospital to secondary to cardiac arrest following aspiration event.   underwent a empiric antibiotic treatment for aspiration pneumonia 11/4 ed note        11/4 h/p      11/5 nurse note  11/5 nurse poc    11/6- 11/8 pulm note    11/6 -11/8 pulm notes,  11/7-11/8 neuro notes, 11/9 d/c summary    11/4-11/9 lab  11/5 lab  11/5 lab  11/5 lab    11/9 d/c summary     Please clarify if the Septic Shock diagnosis has been:    [x  ] Septic Shock Ruled In   [  ] Septic Shock Ruled Out   [  ] Other/Clarification of findings (please specify)_______________    [   ] Clinically undetermined       Present on admission (POA) status:   [   ] Yes (Y)                          [  ] Clinically Undetermined (W)  [   ] No (N)                            [   ] Documentation insufficient to determine if condition is POA (U)       Please document in your progress notes  daily for the duration of treatment, until resolved, and include in your discharge summary.

## 2020-11-20 NOTE — PHYSICIAN QUERY
PT Name: Yocasta Almonte  MR #: [id number]     PRESENT ON ADMISSION (POA) DIAGNOSIS CLARIFICATION     CDS/: Xiao Clark RN, CCDS              Contact information: [email or web address]  This form is a permanent document in the medical record.     Query Date: November 20, 2020    By submitting this query, we are merely seeking further clarification of documentation.  Please utilize your independent clinical judgment when addressing the question(s) below.     The Medical Record contains following diagnoses documented:    Clinical Information Location in Medical Records     SOB preceding her arrest, a CTA Chest was ordered to evaluate for PE and was negative for PE, but did indicate aspiration pneumonia    Temperature increasing to 37.3 C    On Levophed at .08 mcg/kg/min     Septic vs Cardiogenic Shock    WBC- 12.07-->7.11-->6.93-->10.91-->11.88-->12.83  Lactate-2.6  Blood cultures- NGTD  Resp culture- Acinetobacter Baumannii/ Haemolyticus     BP  94/57 , HR-105    Septic Shock ruled in   11/4 h/p      11/5 nurse note    11/5 nurse poc    11/6- 11/8 pulm note    11/4-11/9 lab  11/5 lab  11/5 lab  11/5 lab    11/5 eicu note    11/18 md query                 Present on admission (POA) is defined as present at the time inpatient admission occurs. Conditions that develop during an outpatient encounter, including emergency department, observation or outpatient surgery, are considered as present on admission. [facility] 4th Quarter 2008      Please clarify the Present on Admission (POA) status of the diagnosis:  Septic Shock    Present on admission (POA) status:   [  x ] Yes (Y)               [] Clinically Undetermined (W)        [   ] No (N)                 [   ] Documentation insufficient to determine if condition is POA (U)

## 2020-11-20 NOTE — PHYSICIAN QUERY
PT Name: Yocasta Almonte  MR #: [id number]    CAUSE AND EFFECT RELATIONSHIP CLARIFICATION     CDS/: Xiao Clark RN, CCDS              Contact information: [email or web address]    This form is a permanent document in the medical record.     Query Date: November 20, 2020    By submitting this query, we are merely seeking further clarification of documentation. Please utilize your independent clinical judgment when addressing the question(s) below.    Supporting Clinical Findings Location in Medical Record     Septic Shock ruled in   11/18 md query     resp culture:  Acinetobacter Baumannii/Haemolyticus    11/5 lab       Provider, please clarify if there is any clinical correlation between  Septic Shock  and Acinetobacter Baumannii/Haemolyticus.           Are the conditions:      [ x ] Due to or associated with each other   [  ] Unrelated to each other   [  ] Other explanation (Please Specify): ______________   [  ] Clinically Undetermined                                                                               Please document in your progress notes daily for the duration of treatment until resolved and include in your discharge summary.

## 2021-01-07 LAB
ACID FAST MOD KINY STN SPEC: NORMAL
MYCOBACTERIUM SPEC QL CULT: NORMAL

## 2021-07-19 NOTE — PLAN OF CARE
Problem: Diabetes Comorbidity  Goal: Blood Glucose Level Within Desired Range  Outcome: Ongoing, Progressing     Problem: Skin Injury Risk Increased  Goal: Skin Health and Integrity  Outcome: Ongoing, Progressing      Expiration Date (Month Year): 04/2023

## 2022-02-24 NOTE — PATIENT INSTRUCTIONS
What Is Non-Hodgkin Lymphoma?    Cancer occurs when cells in the body begin to change and multiply out of control. These cells can form lumps of tissue called tumors. Lymphoma is cancer that starts in cells in the body's lymphatic system. Most types of cancer in the lymphatic system are non-Hodgkin lymphoma.  Understanding the lymphatic system  The lymphatic system helps your body fight disease and infections. This system is a network of tubes, or vessels. The vessels pass through tissue all over your body. A clear fluid called lymph flows through the vessels. Lymph helps the body fight infections. Small organs called lymph nodes are also found along this network of vessels. Lymph nodes are found throughout the body. Most are grouped together in the neck, underarms, and groin area. You may feel swollen or enlarged nodes in these areas when you have a cold.  Some of the bodys internal organs are also part of the lymphatic system. These organs include the bone marrow, spleen, thymus, and tonsils. Other organs, such as parts of the digestive tract, also have lymph tissue.  When non-Hodgkin lymphoma forms  The lymphatic system stretches over the whole body. So non-Hodgkin lymphoma can start almost anywhere in your body. Lymphoma can also spread from the lymphatic system to other tissues of the body. This spread is called metastasis.  Treatment options for non-Hodgkin lymphoma  You and your healthcare provider will discuss a treatment plan thats best for your needs. Treatment options may include:  · Radiation therapy. This uses focused rays of energy to kill cancer cells.  · Chemotherapy. This uses strong medicines to kill cancer cells.  · Immunotherapy. This uses the bodys own immune system to help fight cancer.  · Targeted therapy. This uses medicines that target parts of cancer cells that make them different from normal cells.  · Stem cell transplant. This kills cancer cells with high doses of chemotherapy and  radiation.  Date Last Reviewed: 7/1/2015  © 4459-5752 [employer]. [address] [place] [address], [place] [address]. All rights reserved. This information is not intended as a substitute for professional medical care. Always follow your healthcare professional's instructions.         There are no Wet Read(s) to document. There are 2 Wet Read(s) to document.